# Patient Record
Sex: FEMALE | Race: BLACK OR AFRICAN AMERICAN | NOT HISPANIC OR LATINO | Employment: OTHER | URBAN - METROPOLITAN AREA
[De-identification: names, ages, dates, MRNs, and addresses within clinical notes are randomized per-mention and may not be internally consistent; named-entity substitution may affect disease eponyms.]

---

## 2017-02-10 DIAGNOSIS — G89.29 CHRONIC NECK PAIN: ICD-10-CM

## 2017-02-10 DIAGNOSIS — M54.50 CHRONIC LOW BACK PAIN: ICD-10-CM

## 2017-02-10 DIAGNOSIS — M54.2 CHRONIC NECK PAIN: ICD-10-CM

## 2017-02-10 DIAGNOSIS — G89.29 CHRONIC LOW BACK PAIN: ICD-10-CM

## 2017-02-11 RX ORDER — TRAMADOL HYDROCHLORIDE 50 MG/1
TABLET ORAL
Qty: 90 TABLET | Refills: 0 | Status: SHIPPED | OUTPATIENT
Start: 2017-02-11 | End: 2017-04-03 | Stop reason: SDUPTHER

## 2017-02-13 DIAGNOSIS — G89.29 CHRONIC BILATERAL LOW BACK PAIN WITHOUT SCIATICA: ICD-10-CM

## 2017-02-13 DIAGNOSIS — M54.50 CHRONIC BILATERAL LOW BACK PAIN WITHOUT SCIATICA: ICD-10-CM

## 2017-02-13 RX ORDER — DICLOFENAC SODIUM 10 MG/G
GEL TOPICAL 3 TIMES DAILY
Qty: 5 TUBE | Refills: 0 | Status: SHIPPED | OUTPATIENT
Start: 2017-02-13 | End: 2017-04-03 | Stop reason: SDUPTHER

## 2017-03-03 ENCOUNTER — CLINICAL SUPPORT (OUTPATIENT)
Dept: OPHTHALMOLOGY | Facility: CLINIC | Age: 77
End: 2017-03-03
Payer: MEDICARE

## 2017-03-03 ENCOUNTER — OFFICE VISIT (OUTPATIENT)
Dept: OPHTHALMOLOGY | Facility: CLINIC | Age: 77
End: 2017-03-03
Payer: MEDICARE

## 2017-03-03 DIAGNOSIS — H40.053 OCULAR HYPERTENSION, BILATERAL: Primary | ICD-10-CM

## 2017-03-03 DIAGNOSIS — Z96.1 PSEUDOPHAKIA: ICD-10-CM

## 2017-03-03 DIAGNOSIS — H53.002 AMBLYOPIA, LEFT: ICD-10-CM

## 2017-03-03 DIAGNOSIS — H52.7 REFRACTIVE ERROR: ICD-10-CM

## 2017-03-03 DIAGNOSIS — H04.123 DRY EYE SYNDROME, BILATERAL: ICD-10-CM

## 2017-03-03 DIAGNOSIS — H40.053 OCULAR HYPERTENSION, BILATERAL: ICD-10-CM

## 2017-03-03 PROCEDURE — 99212 OFFICE O/P EST SF 10 MIN: CPT | Mod: PBBFAC,PO | Performed by: OPHTHALMOLOGY

## 2017-03-03 PROCEDURE — 92083 EXTENDED VISUAL FIELD XM: CPT | Mod: 26,S$PBB,, | Performed by: OPHTHALMOLOGY

## 2017-03-03 PROCEDURE — 92133 CPTRZD OPH DX IMG PST SGM ON: CPT | Mod: 26,S$PBB,, | Performed by: OPHTHALMOLOGY

## 2017-03-03 PROCEDURE — 99999 PR PBB SHADOW E&M-EST. PATIENT-LVL II: CPT | Mod: PBBFAC,,, | Performed by: OPHTHALMOLOGY

## 2017-03-03 PROCEDURE — 92012 INTRM OPH EXAM EST PATIENT: CPT | Mod: S$PBB,,, | Performed by: OPHTHALMOLOGY

## 2017-03-03 NOTE — MR AVS SNAPSHOT
[facility]  [address]  [place] [address]  Phone: [phone]  Fax: [phone]                  Rachel Asif   3/3/2017 3:30 PM   Office Visit    Description:  Female : 1940   Provider:  Nilay Duval MD   Department:  [facility]           Reason for Visit     Ocular Hypertension           Diagnoses this Visit        Comments    Ocular hypertension, bilateral    -  Primary     Dry eye syndrome, bilateral         Amblyopia, left         Refractive error         Pseudophakia                To Do List           Future Appointments        Provider Department Dept Phone    3/6/2017 2:00 PM Oneyda Pace MD [facility] [phone]    4/3/2017 9:00 AM Lisa Villavicencio MD [facility] [phone]      Goals (5 Years of Data)              6/16/16    11/23/15    5/26/15    HEMOGLOBIN A1C < 7.0   6.0  6.0  6.1    Related Problems    Prediabetes      Follow-Up and Disposition     Return in about 6 months (around 9/3/2017) for IOP's & DFE..       These Medications        Disp Refills Start End    travoprost (TRAVATAN Z) 0.004 % Drop 2.5 mL 6 3/4/2017     Place 1 drop into both eyes every evening. - Both Eyes    Pharmacy: [facility] [address] [place] [address] [address] [facility] [address] Ph #: [phone]         [facility] On Call     [facility] On Call Nurse Care Line -  Assistance  Registered nurses in the [facility] provide clinical advisement, health education, appointment booking, and other advisory services.  Call for this free service at [phone].             Medications           Message regarding Medications     Verify the changes and/or additions to your medication regime listed below are the same as discussed with your clinician today.  If any of these changes or additions are incorrect, please notify your healthcare provider.             Verify that the  below list of medications is an accurate representation of the medications you are currently taking.  If none reported, the list may be blank. If incorrect, please contact your healthcare provider. Carry this list with you in case of emergency.           Current Medications     amlodipine (NORVASC) 10 MG tablet Take 1 tablet (10 mg total) by mouth once daily.    aspirin 81 MG chewable tablet Every day    atorvastatin (LIPITOR) 20 MG tablet Take 1 tablet (20 mg total) by mouth once daily.    diclofenac sodium (VOLTAREN) 1 % Gel Apply topically 3 (three) times daily.    duloxetine (CYMBALTA) 60 MG capsule Take 1 capsule (60 mg total) by mouth 2 (two) times daily.    gabapentin (NEURONTIN) 300 MG capsule Take 1 capsule (300 mg total) by mouth 3 (three) times daily.    latanoprost (XALATAN) 0.005 % ophthalmic solution     meloxicam (MOBIC) 15 MG tablet Take 1 tablet (15 mg total) by mouth daily as needed for Pain. 1 Tablet Oral Every day    pantoprazole (PROTONIX) 20 MG tablet Take 1 tablet (20 mg total) by mouth once daily. To protect stomach due to taking NSAIDs (meloxicam)    peg 3350-electrolytes-vit C 100-7.5-2.691 gram PwPk Use as directed    potassium chloride SA (K-DUR,KLOR-CON) 20 MEQ tablet Take 1 tablet (20 mEq total) by mouth once daily.    PROPYLENE GLYCOL/ (SYSTANE OPHT) Weekly PRN    tramadol (ULTRAM) 50 mg tablet TAKE 1-2 TABLETS BY MOUTH THREE TIMES DAILY AS NEEDED FOR PAIN    travoprost (TRAVATAN Z) 0.004 % Drop Place 1 drop into both eyes every evening.    valsartan-hydrochlorothiazide (DIOVAN-HCT) 320-25 mg per tablet Take 1 tablet by mouth once daily.           Clinical Reference Information           Allergies as of 3/3/2017     No Known Allergies      Immunizations Administered on Date of Encounter - 3/3/2017     None      [facility] Sign-Up     Activating your [facility] account is as easy as 1-2-3!     1) Visit [email or web address], select Sign Up Now, enter this activation code and your date of  birth, then select Next.  [email or web address]  Expires: 4/18/2017 10:48 AM      2) Create a username and password to use when you visit [facility] in the future and select a security question in case you lose your password and select Next.    3) Enter your e-mail address and click Sign Up!    Additional Information  If you have questions, please e-mail [email or web address] or call [phone] to talk to our [facility] staff. Remember, [facility] is NOT to be used for urgent needs. For medical emergencies, dial 911.         Language Assistance Services     ATTENTION: Language assistance services are available, free of charge. Please call [phone].      ATENCIÓN: Si habla español, tiene a cline disposición servicios gratuitos de asistencia lingüística. Llame al [phone].     CHÚ Ý: N?u b?n nói Ti?ng Vi?t, có các d?ch v? h? tr? ngôn ng? mi?n phí dành cho b?n. G?i s? [phone].         [facility] complies with applicable Federal civil rights laws and does not discriminate on the basis of race, color, national origin, age, disability, or sex.

## 2017-03-04 RX ORDER — TRAVOPROST OPHTHALMIC SOLUTION 0.04 MG/ML
1 SOLUTION OPHTHALMIC NIGHTLY
Qty: 2.5 ML | Refills: 6 | Status: SHIPPED | OUTPATIENT
Start: 2017-03-04 | End: 2017-10-12 | Stop reason: SDUPTHER

## 2017-03-04 NOTE — PROGRESS NOTES
Subjective:       Patient ID: Rachel Asif is a 76 y.o. female.    Chief Complaint: Ocular Hypertension (Ocular Hypertension bilateral)    HPI  Review of Systems    Objective:      Physical Exam    Assessment:       1. Ocular hypertension, bilateral    2. Dry eye syndrome, bilateral    3. Amblyopia, left    4. Refractive error    5. Pseudophakia - Both Eyes        Plan:       OHT-IOP's stable. HVF WNL's OD & unreliable OS with superior arcuate defect OS. OCT's WNL's OD & TI defect OS.  SABINO-Stable on AT's.  Amblyopia OS-Stable.  RE-Pt wants MRx.          Cont Travatan Z OU.  AT's.  Give MRx.  RTC 6 mos for IOP's & DFE.

## 2017-04-03 ENCOUNTER — OFFICE VISIT (OUTPATIENT)
Dept: PHYSICAL MEDICINE AND REHAB | Facility: CLINIC | Age: 77
End: 2017-04-03
Payer: MEDICARE

## 2017-04-03 VITALS
DIASTOLIC BLOOD PRESSURE: 77 MMHG | HEIGHT: 61 IN | SYSTOLIC BLOOD PRESSURE: 153 MMHG | HEART RATE: 109 BPM | WEIGHT: 191.81 LBS | BODY MASS INDEX: 36.21 KG/M2

## 2017-04-03 DIAGNOSIS — M54.2 CHRONIC NECK PAIN: ICD-10-CM

## 2017-04-03 DIAGNOSIS — M25.561 CHRONIC PAIN OF RIGHT KNEE: ICD-10-CM

## 2017-04-03 DIAGNOSIS — M47.816 LUMBAR FACET ARTHROPATHY: ICD-10-CM

## 2017-04-03 DIAGNOSIS — G89.29 CHRONIC PAIN OF RIGHT KNEE: ICD-10-CM

## 2017-04-03 DIAGNOSIS — G89.29 CHRONIC MIDLINE LOW BACK PAIN WITH RIGHT-SIDED SCIATICA: Primary | ICD-10-CM

## 2017-04-03 DIAGNOSIS — M47.812 DJD (DEGENERATIVE JOINT DISEASE), CERVICAL: ICD-10-CM

## 2017-04-03 DIAGNOSIS — G89.29 CHRONIC NECK PAIN: ICD-10-CM

## 2017-04-03 DIAGNOSIS — M54.41 CHRONIC MIDLINE LOW BACK PAIN WITH RIGHT-SIDED SCIATICA: Primary | ICD-10-CM

## 2017-04-03 PROCEDURE — 99214 OFFICE O/P EST MOD 30 MIN: CPT | Mod: S$PBB,,, | Performed by: PHYSICAL MEDICINE & REHABILITATION

## 2017-04-03 PROCEDURE — 99213 OFFICE O/P EST LOW 20 MIN: CPT | Mod: PBBFAC | Performed by: PHYSICAL MEDICINE & REHABILITATION

## 2017-04-03 PROCEDURE — 99999 PR PBB SHADOW E&M-EST. PATIENT-LVL III: CPT | Mod: PBBFAC,,, | Performed by: PHYSICAL MEDICINE & REHABILITATION

## 2017-04-03 RX ORDER — DULOXETIN HYDROCHLORIDE 60 MG/1
60 CAPSULE, DELAYED RELEASE ORAL DAILY
Qty: 180 CAPSULE | Refills: 1 | Status: SHIPPED | OUTPATIENT
Start: 2017-04-03 | End: 2017-08-03 | Stop reason: SDUPTHER

## 2017-04-03 RX ORDER — TRAMADOL HYDROCHLORIDE 50 MG/1
50 TABLET ORAL 3 TIMES DAILY PRN
Qty: 100 TABLET | Refills: 2 | Status: SHIPPED | OUTPATIENT
Start: 2017-04-03 | End: 2017-11-15 | Stop reason: SDUPTHER

## 2017-04-03 RX ORDER — GABAPENTIN 300 MG/1
300 CAPSULE ORAL 2 TIMES DAILY
Qty: 270 CAPSULE | Refills: 1 | Status: SHIPPED | OUTPATIENT
Start: 2017-04-03 | End: 2017-11-15 | Stop reason: SDUPTHER

## 2017-04-03 RX ORDER — DICLOFENAC SODIUM 10 MG/G
GEL TOPICAL 3 TIMES DAILY
Qty: 5 TUBE | Refills: 2 | Status: SHIPPED | OUTPATIENT
Start: 2017-04-03 | End: 2017-05-03 | Stop reason: SDUPTHER

## 2017-04-03 RX ORDER — MELOXICAM 15 MG/1
15 TABLET ORAL DAILY PRN
Qty: 90 TABLET | Refills: 1 | Status: SHIPPED | OUTPATIENT
Start: 2017-04-03 | End: 2017-07-12 | Stop reason: SDUPTHER

## 2017-04-03 NOTE — MR AVS SNAPSHOT
[facility]  [address]  [facility] [address]  Phone: [phone]                  Rachel Asif   4/3/2017 9:00 AM   Office Visit    Description:  Female : 1940   Provider:  Lisa Villavicencio MD   Department:  [facility]           Diagnoses this Visit        Comments    Chronic midline low back pain with right-sided sciatica    -  Primary     Lumbar facet arthropathy         Chronic neck pain         DJD (degenerative joint disease), cervical         Chronic pain of right knee                To Do List           Future Appointments        Provider Department Dept Phone    5/3/2017 9:00 AM Oneyda Pace MD [facility] [phone]      Goals (5 Years of Data)              6/16/16    11/23/15    5/26/15    HEMOGLOBIN A1C < 7.0   6.0  6.0  6.1    Related Problems    Prediabetes      Follow-Up and Disposition     Return in about 4 months (around 8/3/2017).       These Medications        Disp Refills Start End    meloxicam (MOBIC) 15 MG tablet 90 tablet 1 4/3/2017     Take 1 tablet (15 mg total) by mouth daily as needed for Pain. 1 Tablet Oral Every day - Oral    [email or web address] [id number] [place] [address] [address] AT [facility] Ph #: [phone]       duloxetine (CYMBALTA) 60 MG capsule 180 capsule 1 4/3/2017 5/3/2017    Take 1 capsule (60 mg total) by mouth once daily. In 1-2 weeks, if no relief, may increase dose to 2 capsules once daily. - Oral    Pharmacy: [facility] [id number] [facility] [place] - [address] AT [facility] Ph #: [phone]       gabapentin (NEURONTIN) 300 MG capsule 270 capsule 1 4/3/2017     Take 1 capsule (300 mg total) by mouth 2 (two) times daily. In 1-2 weeks, if no relief, may increase dose to 3 times per day. - Oral    Pharmacy: [facility] [id number] [facility] [place] - [address] AT [facility]  [place] Ph #: [phone]       tramadol (ULTRAM) 50 mg tablet 100 tablet 2 4/3/2017 5/6/2017    Take 1 tablet (50 mg total) by mouth 3 (three) times daily as needed for Pain. - Oral    Pharmacy: [facility] [id number] [facility] [address] AT [facility] Ph #: [phone]       diclofenac sodium (VOLTAREN) 1 % Gel 5 Tube 2 4/3/2017 7/2/2017    Apply topically 3 (three) times daily. - Topical    [facility] [id number] [facility] [address] AT [facility] Ph #: [phone]         [facility]     [facility] On Call Nurse Care Line - 24/7 Assistance  Unless otherwise directed by your provider, please contact [facility], our nurse care line that is available for 24/7 assistance.     Registered nurses in the [facility] provide: appointment scheduling, clinical advisement, health education, and other advisory services.  Call: [phone] (toll free)               Medications           Message regarding Medications     Verify the changes and/or additions to your medication regime listed below are the same as discussed with your clinician today.  If any of these changes or additions are incorrect, please notify your healthcare provider.        CHANGE how you are taking these medications     Start Taking Instead of    duloxetine (CYMBALTA) 60 MG capsule duloxetine (CYMBALTA) 60 MG capsule    Dosage:  Take 1 capsule (60 mg total) by mouth once daily. In 1-2 weeks, if no relief, may increase dose to 2 capsules once daily. Dosage:  Take 1 capsule (60 mg total) by mouth 2 (two) times daily.    Reason for Change:  Reorder     gabapentin (NEURONTIN) 300 MG capsule gabapentin (NEURONTIN) 300 MG capsule    Dosage:  Take 1 capsule (300 mg total) by mouth 2 (two) times daily. In 1-2 weeks, if no relief, may increase dose to 3 times per day. Dosage:  Take 1 capsule (300 mg total) by mouth 3 (three) times daily.    Reason  for Change:  Reorder     tramadol (ULTRAM) 50 mg tablet tramadol (ULTRAM) 50 mg tablet    Dosage:  Take 1 tablet (50 mg total) by mouth 3 (three) times daily as needed for Pain. Dosage:  TAKE 1-2 TABLETS BY MOUTH THREE TIMES DAILY AS NEEDED FOR PAIN    Reason for Change:  Reorder            Verify that the below list of medications is an accurate representation of the medications you are currently taking.  If none reported, the list may be blank. If incorrect, please contact your healthcare provider. Carry this list with you in case of emergency.           Current Medications     amlodipine (NORVASC) 10 MG tablet Take 1 tablet (10 mg total) by mouth once daily.    aspirin 81 MG chewable tablet Every day    atorvastatin (LIPITOR) 20 MG tablet Take 1 tablet (20 mg total) by mouth once daily.    diclofenac sodium (VOLTAREN) 1 % Gel Apply topically 3 (three) times daily.    duloxetine (CYMBALTA) 60 MG capsule Take 1 capsule (60 mg total) by mouth once daily. In 1-2 weeks, if no relief, may increase dose to 2 capsules once daily.    gabapentin (NEURONTIN) 300 MG capsule Take 1 capsule (300 mg total) by mouth 2 (two) times daily. In 1-2 weeks, if no relief, may increase dose to 3 times per day.    latanoprost (XALATAN) 0.005 % ophthalmic solution     meloxicam (MOBIC) 15 MG tablet Take 1 tablet (15 mg total) by mouth daily as needed for Pain. 1 Tablet Oral Every day    pantoprazole (PROTONIX) 20 MG tablet Take 1 tablet (20 mg total) by mouth once daily. To protect stomach due to taking NSAIDs (meloxicam)    peg 3350-electrolytes-vit C 100-7.5-2.691 gram PwPk Use as directed    potassium chloride SA (K-DUR,KLOR-CON) 20 MEQ tablet Take 1 tablet (20 mEq total) by mouth once daily.    PROPYLENE GLYCOL/ (SYSTANE OPHT) Weekly PRN    tramadol (ULTRAM) 50 mg tablet Take 1 tablet (50 mg total) by mouth 3 (three) times daily as needed for Pain.    travoprost (TRAVATAN Z) 0.004 % Drop Place 1 drop into both eyes every  "evening.    valsartan-hydrochlorothiazide (DIOVAN-HCT) 320-25 mg per tablet Take 1 tablet by mouth once daily.           Clinical Reference Information           Your Vitals Were     BP Pulse Height Weight BMI    153/77 109 5' 1" (1.549 m) 87 kg (191 lb 12.8 oz) 36.24 kg/m2      Blood Pressure          Most Recent Value    BP  (!)  153/77      Allergies as of 4/3/2017     No Known Allergies      Immunizations Administered on Date of Encounter - 4/3/2017     None      Orders Placed During Today's Visit     Future Labs/Procedures Expected by Expires    X-ray AP Standing Knees with Both Lateral  4/3/2017 4/3/2018      [facility] Sign-Up     Activating your [facility] account is as easy as 1-2-3!     1) Visit [email or web address], select Sign Up Now, enter this activation code and your date of birth, then select Next.  [email or web address]  Expires: 4/18/2017 11:48 AM      2) Create a username and password to use when you visit [facility] in the future and select a security question in case you lose your password and select Next.    3) Enter your e-mail address and click Sign Up!    Additional Information  If you have questions, please e-mail [email or web address] or call [phone] to talk to our [facility] staff. Remember, [facility] is NOT to be used for urgent needs. For medical emergencies, dial 911.         Instructions      Neck/Back Pain [General]    Both neck and back pain are usually caused by injury to the muscles or ligaments of the spine. Sometimes the disks that separate each bone of the spine may cause pain by putting pressure on a nearby nerve. Back and neck pain may appear after a sudden twisting/bending force (such as in a car accident), or sometimes after a simple awkward movement. In either case, muscle spasm is often present and adds to the pain.  Acute neck and back pain usually gets better in one to two weeks. Pain related to disk disease, arthritis in the spinal joints or spinal stenosis (narrowing of the " spinal canal) can become chronic and last for months or years.  Home Care:  · FOR NECK PAIN: Use a comfortable pillow that supports the head and keeps the spine in a neutral position. The position of the head should not be tilted forward or backward.  · FOR BACK PAIN: You may need to stay in bed the first few days. But, as soon as possible, begin sitting or walking to avoid problems with prolonged bed rest (muscle weakness, worsening back stiffness and pain, blood clots in the legs).  · When in bed, try to find a position of comfort. A firm mattress is best. Try lying flat on your back with pillows under your knees. You can also try lying on your side with your knees bent up towards your chest and a pillow between your knees.  · Avoid prolonged sitting. This puts more stress on the lower back than standing or walking.  · During the first two days after injury, apply an ICE PACK to the painful area for 20 minutes every 2-4 hours. This will reduce swelling and pain. HEAT (hot shower, hot bath or heating pad) works well for muscle spasm. You can start with ice, then switch to heat after two days. Some patients feel best alternating ice and heat treatments. Use the one method that feels the best to you.  · You may use acetaminophen (Tylenol) or ibuprofen (Motrin, Advil) to control pain, unless another pain medicine was prescribed. [NOTE: If you have chronic liver or kidney disease or ever had a stomach ulcer or GI bleeding, talk with your doctor before using these medicines.]  · Be aware of safe lifting methods and do not lift anything over 15 pounds until all the pain is gone.  Follow Up  with your physician or this facility if your symptoms do not start to improve after one week. Physical therapy or further tests may be needed.  [NOTE: If X-rays were taken, they will be reviewed by a radiologist. You will be notified of any new findings that may affect your care.]  Get Prompt Medical Attention  if any of the following  occur:  · Pain becomes worse or spreads into your arms or legs  · Weakness, numbness or pain in one or both arms or legs  · Loss of bowel or bladder control  · Numbness in the groin area  · Difficulty walking  · Fever of 100.4ºF (38ºC) or higher, or as directed by your healthcare provider  © 1795-0053 [place] [address]. All rights reserved. This information is not intended as a substitute for professional medical care. Always follow your healthcare professional's instructions.    Back Exercises: Back Press  Do this exercise on your hands and knees. Keep your knees under your hips and your hands under your shoulders. Keep your spine in a neutral position (not arched or sagging). Be sure to maintain your necks natural curve.  · Tighten your abdominal and buttocks muscles to press your back upward. Let your head drop slightly.  · Hold for 5 seconds. Return to starting position.  · Repeat 5 times.     © 2000-2013 [place] [address]. All rights reserved. This information is not intended as a substitute for professional medical care. Always follow your healthcare professional's instructions.    Back Exercises: Back Release  Do this exercise on your hands and knees. Keep your knees under your hips and your hands under your shoulders. Keep your spine in a neutral position (not arched or sagging). Be sure to maintain your necks natural curve.  · Relax your abdominal and buttocks muscles, lift your head, and let your back sag. Be sure to keep your weight evenly distributed. Dont sit back on your hips.   · Hold for 5 seconds.  · Return to starting position.  · Repeat 5 times.  © 2000-2013 [place] [address]. All rights reserved. This information is not intended as a substitute for professional medical care. Always follow your healthcare professional's instructions.    Back Exercises: Bridge  The Bridge  exercise strengthens your abdominal, buttocks, and hamstring muscles. This helps keep your back stable and aligned when you walk.  · Lie on the floor with your back and palms flat. Bend your knees. Keep your feet flat on the floor.  · Contract your abdominal and buttocks muscles. Slowly lift your buttocks off the floor until there is a straight line from your knees to your shoulders.  · Hold for 5  seconds. Repeat 10 times.    © 3574-1491 [address] [address]. All rights reserved. This information is not intended as a substitute for professional medical care. Always follow your healthcare professional's instructions.    Back Exercises: Elbow Press    To start, lie face down on your stomach, feet slightly apart, forehead on the floor. Breathe deeply. You should feel comfortable and relaxed in this position.  · Press up on your forearms. Keep your abdomen and hips on the floor.  · Hold for 20 seconds. Lower slowly.  · Repeat 2 times.  · Return to starting position.  © 5566-5958 [address] [address]. All rights reserved. This information is not intended as a substitute for professional medical care. Always follow your healthcare professional's instructions.    Back Exercises: Pelvic Tilt  To start, lie on your back with your knees bent and feet flat on the floor. Dont press your neck or lower back to the floor. Breathe deeply. You should feel comfortable and relaxed in this position.  · Tighten your abdomen and buttocks, and press your lower back toward the floor. This should be a small, subtle movement.  · Hold for 5 seconds. Release.  · Repeat 5 times.         © 0497-6961 [facility], [address] [place] [address]. All rights reserved. This information is not intended as a substitute for professional medical care. Always follow your healthcare professional's instructions.    Back Exercises: Partial Curl-Ups          To start,  lie on your back with your knees bent and feet flat on the floor. Dont press your neck or lower back to the floor. Breathe deeply. You should feel comfortable and relaxed in this position.  · Cross your arms loosely.  · Tighten your abdomen and curl MCFP up, keeping your head in line with your shoulders.  · Hold for 5 seconds. Uncurl to lie down.  · Repeat 5 times.   © 9088-4325 [place] [address]. All rights reserved. This information is not intended as a substitute for professional medical care. Always follow your healthcare professional's instructions.    Back Exercises: Lower Back Stretch                            To start, sit in a chair with your feet flat on the floor. Shift your weight slightly forward to avoid rounding your back. Relax, and keep your ears, shoulders, and hips aligned.  · Sit with your feet well apart.  · Bend forward and touch the floor with the backs of your hands. Relax and let your body drop.  · Hold for 20 seconds. Return to starting position.  · Repeat 2 times.   © 7535-7144 [place] [address]. All rights reserved. This information is not intended as a substitute for professional medical care. Always follow your healthcare professional's instructions.    Back Exercises: Seated Rotation      To start, sit in a chair with your feet flat on the floor. Shift your weight slightly forward to avoid rounding your back. Relax, and keep your ears, shoulders, and hips aligned.  · Fold your arms, elbows just below shoulder height.  · Turn from the waist with hips forward. Turn your head last.  · Hold for a count of 5. Return to starting position.  · Repeat 5 times on one side. Then switch sides.  © 1961-4585 [facility], [address] [place] [address]. All rights reserved. This information is not intended as a substitute for professional medical care. Always follow your healthcare professional's  instructions.    Back Exercises: Side Stretch  To start, sit in a chair with your feet flat on the floor. Shift your weight slightly forward to avoid rounding your back. Relax. Keep your ears, shoulders, and hips aligned.  · Stretch your right arm overhead.  · Slowly bend to the left. Dont twist your torso.  · Hold for 20 seconds. Return to starting position.  · Repeat 2 times. Then, switch to the other side.  © 3785-3458 [facility], [address] [place] [address]. All rights reserved. This information is not intended as a substitute for professional medical care. Always follow your healthcare professional's instructions    Neck/Back Pain [General]    Both neck and back pain are usually caused by injury to the muscles or ligaments of the spine. Sometimes the disks that separate each bone of the spine may cause pain by putting pressure on a nearby nerve. Back and neck pain may appear after a sudden twisting/bending force (such as in a car accident), or sometimes after a simple awkward movement. In either case, muscle spasm is often present and adds to the pain.  Acute neck and back pain usually gets better in one to two weeks. Pain related to disk disease, arthritis in the spinal joints or spinal stenosis (narrowing of the spinal canal) can become chronic and last for months or years.  Home Care:  · FOR NECK PAIN: Use a comfortable pillow that supports the head and keeps the spine in a neutral position. The position of the head should not be tilted forward or backward.  · FOR BACK PAIN: You may need to stay in bed the first few days. But, as soon as possible, begin sitting or walking to avoid problems with prolonged bed rest (muscle weakness, worsening back stiffness and pain, blood clots in the legs).  · When in bed, try to find a position of comfort. A firm mattress is best. Try lying flat on your back with pillows under your knees. You can also try lying on your side with your knees bent up  towards your chest and a pillow between your knees.  · Avoid prolonged sitting. This puts more stress on the lower back than standing or walking.  · During the first two days after injury, apply an ICE PACK to the painful area for 20 minutes every 2-4 hours. This will reduce swelling and pain. HEAT (hot shower, hot bath or heating pad) works well for muscle spasm. You can start with ice, then switch to heat after two days. Some patients feel best alternating ice and heat treatments. Use the one method that feels the best to you.  · You may use acetaminophen (Tylenol) or ibuprofen (Motrin, Advil) to control pain, unless another pain medicine was prescribed. [NOTE: If you have chronic liver or kidney disease or ever had a stomach ulcer or GI bleeding, talk with your doctor before using these medicines.]  · Be aware of safe lifting methods and do not lift anything over 15 pounds until all the pain is gone.  Follow Up  with your physician or this facility if your symptoms do not start to improve after one week. Physical therapy or further tests may be needed.  [NOTE: If X-rays were taken, they will be reviewed by a radiologist. You will be notified of any new findings that may affect your care.]  Get Prompt Medical Attention  if any of the following occur:  · Pain becomes worse or spreads into your arms or legs  · Weakness, numbness or pain in one or both arms or legs  · Loss of bowel or bladder control  · Numbness in the groin area  · Difficulty walking  · Fever of 100.4ºF (38ºC) or higher, or as directed by your healthcare provider  © 8919-0272 [facility], [address] [place] [address], [place] [address]. All rights reserved. This information is not intended as a substitute for professional medical care. Always follow your healthcare professional's instructions.    Neck Exercises: Passive Neck Rotation          To start, lie on your back, knees bent and feet flat on the floor. Keep your ears, shoulders, and hips  aligned, but dont press your lower back to the floor. Rest your hands on your pelvis. Breathe deeply and relax.  · With your neck relaxed, place the palm of one hand on your forehead. Use your hand to turn your head to one side until you feel a stretch in the neck muscles. Do not push through pain.  · Hold for 5 seconds. Then turn to the other side.  · Repeat 5 times on each side.   Note: Keep your shoulders on the floor. Dont lift your chin as you turn your head.  © 5135-9615 [facility], [address] [place] [address]. All rights reserved. This information is not intended as a substitute for professional medical care. Always follow your healthcare professional's instructions.    Exercises: Neck Isometrics  To start, sit in a chair with your feet flat on the floor. Your weight should be slightly forward so that youre balanced evenly on your buttocks. Relax your shoulders and keep your head level. Using a chair with arms may help you keep your balance.  1. Press your palm against your forehead. Resist with your neck muscles. Hold for 10 seconds. Relax. Repeat 5 times.  2. Do the exercise again, pressing on the side of your head. Repeat 5 times. Switch sides.  3. Do the exercise again, pressing on the back of your head. Repeat 5 times.          For your safety, check with your healthcare provider before starting an exercise program.    © 2052-2813 [facility], [address] [place] [address]. All rights reserved. This information is not intended as a substitute for professional medical care. Always follow your healthcare professional's instructions.    Shoulder and Upper Back Stretch  To start, stand tall with your ears, shoulders, and hips in line. Your feet should be slightly apart, positioned just under your hips. Focus your eyes directly in front of you.  this position for a few seconds before starting your exercise. This helps increase your awareness of proper  posture.  · Reach overhead and slightly back with both arms. Keep your shoulders and neck aligned and your elbows behind your shoulders.  · With your palms facing the ceiling, turn your fingers inward.  · Take a deep breath. Breathe out and lower your elbows toward your buttocks. Hold for 5 seconds, then return to starting position.  · Repeat 3 times.          © 4047-9302 [facility], [address] [place] [address], [place] [address]. All rights reserved. This information is not intended as a substitute for professional medical care. Always follow your healthcare professional's instructions.         Language Assistance Services     ATTENTION: Language assistance services are available, free of charge. Please call [phone].      ATENCIÓN: Si ernestola cristina, tiene a cline disposición servicios gratuitos de asistencia lingüística. Llame al [phone].     INGRID Ý: N?u b?n nói Ti?ng Vi?t, có các d?ch v? h? tr? ngôn ng? mi?n phí dành cho b?n. G?i s? [phone].         [facility] complies with applicable Federal civil rights laws and does not discriminate on the basis of race, color, national origin, age, disability, or sex.

## 2017-04-03 NOTE — PROGRESS NOTES
Subjective:       Patient ID: Rachel Asif is a 76 y.o. female.    Chief Complaint: No chief complaint on file.    HPI    HISTORY OF PRESENT ILLNESS:  Mrs. Asif is a 76-year-old black female who is   followed up in the [facility] for chronic low back pain with   lumbar radiculopathy, chronic neck pain, and recurrent trochanteric bursitis.    Her last visit to the clinic was on 05/24/2016.  She was maintained on   meloxicam, gabapentin, Cymbalta, p.r.n. tramadol and p.r.n. hydrocodone/APAP.    The patient was lost to follow up due to personal reasons.  She is coming today   to the clinic to reestablish care and to refill of her medications.  She   continues to complain of back pain.  It is an intermittent stabbing pain at the   lumbar spine.  She has occasional radiation to the right knee with nagging pain.    Her pain is worse with standing or walking, better with sitting down or lying   down.  Her maximum pain is 9-10/10 and minimum 3-4/10.  Today, it is 7-8/10.    The patient denies any lower extremity weakness.  She denies any bowel or   bladder incontinence.    Her neck pain has been under fair control.  It is an intermittent aching pain in   the cervical spine.  She denies any radiation to her arms.  It is worse with   activity and better with rest.  Her maximum pain is 3-4/10 and minimum 1-2/10.    Today, it is 1-2/10.  The patient denies any upper extremity weakness or hand   numbness.    She has been also complaining of chronic right knee pain.  It is an almost   constant aching pain aggravated by weightbearing and better with sitting down or   lying down.  Her maximum pain is 5-6/10 and minimum 1-2/10.  Today, it is   5-6/10.  The patient denies any warmth or swelling in her knees.    The patient was taking meloxicam, gabapentin, Cymbalta, but has been out of them   for couple of months.  She continues to take tramadol 50 mg p.r.n., usually   twice per day.  She has also been started  on diclofenac gel, which she uses a   couple of times per day as needed for her back or knee pain.  She has been   walking regularly, but not doing any exercises specific for her core muscles or   neck muscles.      MS/HN  dd: 04/03/2017 09:42:53 (CDT)  td: 04/03/2017 23:40:54 (CDT)  Doc ID   #[id number]  Job ID #[id number]    CC:           Review of Systems   Constitutional: Negative for fatigue.   Eyes: Negative for visual disturbance.   Respiratory: Positive for shortness of breath.    Cardiovascular: Negative for chest pain.   Gastrointestinal: Negative for blood in stool, constipation, nausea and vomiting.   Genitourinary: Negative for difficulty urinating.   Musculoskeletal: Positive for arthralgias, back pain and neck pain. Negative for gait problem.   Skin: Negative for rash.   Neurological: Positive for dizziness and headaches.   Psychiatric/Behavioral: Negative for behavioral problems and sleep disturbance.       Objective:      Physical Exam   Constitutional: She appears well-developed and well-nourished.   HENT:   Head: Normocephalic and atraumatic.   Neck: Normal range of motion.   Mild pain at end range.   Musculoskeletal:   BUE:  ROM:full.  Strength:    RUE: 5/5 at shoulder abduction, elbow flexion, wrist extension, elbow extension, hand .   LUE: 5/5 at shoulder abduction, elbow flexion, wrist extension, elbow extension, hand .  Sensation to pinprick:   RUE: intact.   LUE: intact.  DTR:    RUE: +1 biceps, +1 triceps.   LUE:  +1 biceps, +1 triceps.  Laboy:   RUE: -ve.   LUE: -ve.      BLE:  ROM:full.  Mild bilateral knee crepitus.  Strength:      RLE: 5/5 at hip flexion, knee extension, ankle DF/PF, EHL.     LLE:  5/5 at hip flexion, knee extension, ankle DF/PF, EHL.  Sensation to pinprick:     RLE: intact.      LLE: intact.   SLR (sitting):      RLE: +ve.      LLE: +ve.   Mild tenderness over lumbar spine, R>L hips.     Neurological: She is alert.   Skin: Skin is warm.   Psychiatric: She has a  normal mood and affect. Her behavior is normal.   Vitals reviewed.        IMAGING STUDIES (5/24/16):      X-Ray Lumbar Spine Ap Lateral w/Flex Ext    Narrative     Lumbar spine AP lateral and flexion-extension.      Alignment and vertebral body height are fairly well-maintained.  There is significant facet hypertrophy at the lower levels.    Vascular calcifications seen.    Flexion and extension views do not show any instability.  Impression      As above.      X-Ray Cervical Spine AP Lat with Flexion Extension    Narrative     Cervical spine AP lateral with flexion and extension 4 views.  Mild degenerative change seen in the lateral view.  There is some facet hypertrophy.  Calcified plaques in the distal left common carotid artery.  Flexion and extension does not show any instability.  Odontoid is not well seen.  Impression      As above.          Assessment:       1. Chronic midline low back pain with right-sided sciatica    2. Lumbar facet arthropathy    3. Chronic neck pain    4. DJD (degenerative joint disease), cervical    5. Chronic pain of right knee        Plan:     - X-Ray findings were discussed with the patient.  - X-ray AP Standing Knees with Both Lateral; Future  - Restart the patient on her medications:   -     meloxicam (MOBIC) 15 MG tablet; Take 1 tablet (15 mg total) by mouth daily as needed for Pain. 1 Tablet Oral Every day   -     duloxetine (CYMBALTA) 60 MG capsule; Take 1 capsule (60 mg total) by mouth once daily. In 1-2 weeks, if no relief, may increase dose to 2 capsules once daily.   -     gabapentin (NEURONTIN) 300 MG capsule; Take 1 capsule (300 mg total) by mouth 2 (two) times daily. In 1-2 weeks, if no relief, may increase dose to 3 times per day.   -     tramadol (ULTRAM) 50 mg tablet; Take 1 tablet (50 mg total) by mouth 3 (three) times daily as needed for Pain.   -     diclofenac sodium (VOLTAREN) 1 % Gel; Apply topically 3 (three) times daily.  - The patient was encouraged to adopt a  regular Home Exercise Program, and provided with printouts.  - Weight loss was encouraged. She gained 6.6 lbs since last visit.  - Return in about 4 months (around 8/3/2017).

## 2017-04-03 NOTE — PATIENT INSTRUCTIONS
Neck/Back Pain [General]    Both neck and back pain are usually caused by injury to the muscles or ligaments of the spine. Sometimes the disks that separate each bone of the spine may cause pain by putting pressure on a nearby nerve. Back and neck pain may appear after a sudden twisting/bending force (such as in a car accident), or sometimes after a simple awkward movement. In either case, muscle spasm is often present and adds to the pain.  Acute neck and back pain usually gets better in one to two weeks. Pain related to disk disease, arthritis in the spinal joints or spinal stenosis (narrowing of the spinal canal) can become chronic and last for months or years.  Home Care:  · FOR NECK PAIN: Use a comfortable pillow that supports the head and keeps the spine in a neutral position. The position of the head should not be tilted forward or backward.  · FOR BACK PAIN: You may need to stay in bed the first few days. But, as soon as possible, begin sitting or walking to avoid problems with prolonged bed rest (muscle weakness, worsening back stiffness and pain, blood clots in the legs).  · When in bed, try to find a position of comfort. A firm mattress is best. Try lying flat on your back with pillows under your knees. You can also try lying on your side with your knees bent up towards your chest and a pillow between your knees.  · Avoid prolonged sitting. This puts more stress on the lower back than standing or walking.  · During the first two days after injury, apply an ICE PACK to the painful area for 20 minutes every 2-4 hours. This will reduce swelling and pain. HEAT (hot shower, hot bath or heating pad) works well for muscle spasm. You can start with ice, then switch to heat after two days. Some patients feel best alternating ice and heat treatments. Use the one method that feels the best to you.  · You may use acetaminophen (Tylenol) or ibuprofen (Motrin, Advil) to control pain, unless another pain medicine was  prescribed. [NOTE: If you have chronic liver or kidney disease or ever had a stomach ulcer or GI bleeding, talk with your doctor before using these medicines.]  · Be aware of safe lifting methods and do not lift anything over 15 pounds until all the pain is gone.  Follow Up  with your physician or this facility if your symptoms do not start to improve after one week. Physical therapy or further tests may be needed.  [NOTE: If X-rays were taken, they will be reviewed by a radiologist. You will be notified of any new findings that may affect your care.]  Get Prompt Medical Attention  if any of the following occur:  · Pain becomes worse or spreads into your arms or legs  · Weakness, numbness or pain in one or both arms or legs  · Loss of bowel or bladder control  · Numbness in the groin area  · Difficulty walking  · Fever of 100.4ºF (38ºC) or higher, or as directed by your healthcare provider  © 6712-5558 [facility], [address] [place] [address]. All rights reserved. This information is not intended as a substitute for professional medical care. Always follow your healthcare professional's instructions.    Back Exercises: Back Press  Do this exercise on your hands and knees. Keep your knees under your hips and your hands under your shoulders. Keep your spine in a neutral position (not arched or sagging). Be sure to maintain your necks natural curve.  · Tighten your abdominal and buttocks muscles to press your back upward. Let your head drop slightly.  · Hold for 5 seconds. Return to starting position.  · Repeat 5 times.     © 4067-7925 [facility], [address] [place] [address]. All rights reserved. This information is not intended as a substitute for professional medical care. Always follow your healthcare professional's instructions.    Back Exercises: Back Release  Do this exercise on your hands and knees. Keep your knees under your hips and your hands under your shoulders. Keep  your spine in a neutral position (not arched or sagging). Be sure to maintain your necks natural curve.  · Relax your abdominal and buttocks muscles, lift your head, and let your back sag. Be sure to keep your weight evenly distributed. Dont sit back on your hips.   · Hold for 5 seconds.  · Return to starting position.  · Repeat 5 times.  © 6463-3148 [facility] [place] [address]. All rights reserved. This information is not intended as a substitute for professional medical care. Always follow your healthcare professional's instructions.    Back Exercises: Bridge  The Bridge exercise strengthens your abdominal, buttocks, and hamstring muscles. This helps keep your back stable and aligned when you walk.  · Lie on the floor with your back and palms flat. Bend your knees. Keep your feet flat on the floor.  · Contract your abdominal and buttocks muscles. Slowly lift your buttocks off the floor until there is a straight line from your knees to your shoulders.  · Hold for 5  seconds. Repeat 10 times.    © 6819-7591 [place] [address]. All rights reserved. This information is not intended as a substitute for professional medical care. Always follow your healthcare professional's instructions.    Back Exercises: Elbow Press    To start, lie face down on your stomach, feet slightly apart, forehead on the floor. Breathe deeply. You should feel comfortable and relaxed in this position.  · Press up on your forearms. Keep your abdomen and hips on the floor.  · Hold for 20 seconds. Lower slowly.  · Repeat 2 times.  · Return to starting position.  © 9862-9257 [facility], [address] [place] [address]. All rights reserved. This information is not intended as a substitute for professional medical care. Always follow your healthcare professional's instructions.    Back Exercises: Pelvic Tilt  To start, lie on your back with your knees bent and  feet flat on the floor. Dont press your neck or lower back to the floor. Breathe deeply. You should feel comfortable and relaxed in this position.  · Tighten your abdomen and buttocks, and press your lower back toward the floor. This should be a small, subtle movement.  · Hold for 5 seconds. Release.  · Repeat 5 times.         © 6656-8970 [facility], [address] [place] [address]. All rights reserved. This information is not intended as a substitute for professional medical care. Always follow your healthcare professional's instructions.    Back Exercises: Partial Curl-Ups          To start, lie on your back with your knees bent and feet flat on the floor. Dont press your neck or lower back to the floor. Breathe deeply. You should feel comfortable and relaxed in this position.  · Cross your arms loosely.  · Tighten your abdomen and curl nursing home up, keeping your head in line with your shoulders.  · Hold for 5 seconds. Uncurl to lie down.  · Repeat 5 times.   © 0845-4100 [facility], [address] [place] [address]. All rights reserved. This information is not intended as a substitute for professional medical care. Always follow your healthcare professional's instructions.    Back Exercises: Lower Back Stretch                            To start, sit in a chair with your feet flat on the floor. Shift your weight slightly forward to avoid rounding your back. Relax, and keep your ears, shoulders, and hips aligned.  · Sit with your feet well apart.  · Bend forward and touch the floor with the backs of your hands. Relax and let your body drop.  · Hold for 20 seconds. Return to starting position.  · Repeat 2 times.   © 0809-6504 [facility], [address] [place] [address]. All rights reserved. This information is not intended as a substitute for professional medical care. Always follow your healthcare professional's instructions.    Back Exercises: Seated Rotation      To  start, sit in a chair with your feet flat on the floor. Shift your weight slightly forward to avoid rounding your back. Relax, and keep your ears, shoulders, and hips aligned.  · Fold your arms, elbows just below shoulder height.  · Turn from the waist with hips forward. Turn your head last.  · Hold for a count of 5. Return to starting position.  · Repeat 5 times on one side. Then switch sides.  © 9798-0065 [place] [address]. All rights reserved. This information is not intended as a substitute for professional medical care. Always follow your healthcare professional's instructions.    Back Exercises: Side Stretch  To start, sit in a chair with your feet flat on the floor. Shift your weight slightly forward to avoid rounding your back. Relax. Keep your ears, shoulders, and hips aligned.  · Stretch your right arm overhead.  · Slowly bend to the left. Dont twist your torso.  · Hold for 20 seconds. Return to starting position.  · Repeat 2 times. Then, switch to the other side.  © 6184-5386 [facility], [address] [place] [address]. All rights reserved. This information is not intended as a substitute for professional medical care. Always follow your healthcare professional's instructions    Neck/Back Pain [General]    Both neck and back pain are usually caused by injury to the muscles or ligaments of the spine. Sometimes the disks that separate each bone of the spine may cause pain by putting pressure on a nearby nerve. Back and neck pain may appear after a sudden twisting/bending force (such as in a car accident), or sometimes after a simple awkward movement. In either case, muscle spasm is often present and adds to the pain.  Acute neck and back pain usually gets better in one to two weeks. Pain related to disk disease, arthritis in the spinal joints or spinal stenosis (narrowing of the spinal canal) can become chronic and last for months or years.  Home  Care:  · FOR NECK PAIN: Use a comfortable pillow that supports the head and keeps the spine in a neutral position. The position of the head should not be tilted forward or backward.  · FOR BACK PAIN: You may need to stay in bed the first few days. But, as soon as possible, begin sitting or walking to avoid problems with prolonged bed rest (muscle weakness, worsening back stiffness and pain, blood clots in the legs).  · When in bed, try to find a position of comfort. A firm mattress is best. Try lying flat on your back with pillows under your knees. You can also try lying on your side with your knees bent up towards your chest and a pillow between your knees.  · Avoid prolonged sitting. This puts more stress on the lower back than standing or walking.  · During the first two days after injury, apply an ICE PACK to the painful area for 20 minutes every 2-4 hours. This will reduce swelling and pain. HEAT (hot shower, hot bath or heating pad) works well for muscle spasm. You can start with ice, then switch to heat after two days. Some patients feel best alternating ice and heat treatments. Use the one method that feels the best to you.  · You may use acetaminophen (Tylenol) or ibuprofen (Motrin, Advil) to control pain, unless another pain medicine was prescribed. [NOTE: If you have chronic liver or kidney disease or ever had a stomach ulcer or GI bleeding, talk with your doctor before using these medicines.]  · Be aware of safe lifting methods and do not lift anything over 15 pounds until all the pain is gone.  Follow Up  with your physician or this facility if your symptoms do not start to improve after one week. Physical therapy or further tests may be needed.  [NOTE: If X-rays were taken, they will be reviewed by a radiologist. You will be notified of any new findings that may affect your care.]  Get Prompt Medical Attention  if any of the following occur:  · Pain becomes worse or spreads into your arms or  legs  · Weakness, numbness or pain in one or both arms or legs  · Loss of bowel or bladder control  · Numbness in the groin area  · Difficulty walking  · Fever of 100.4ºF (38ºC) or higher, or as directed by your healthcare provider  © 6993-5617 [facility], [address] [place] [address]. All rights reserved. This information is not intended as a substitute for professional medical care. Always follow your healthcare professional's instructions.    Neck Exercises: Passive Neck Rotation          To start, lie on your back, knees bent and feet flat on the floor. Keep your ears, shoulders, and hips aligned, but dont press your lower back to the floor. Rest your hands on your pelvis. Breathe deeply and relax.  · With your neck relaxed, place the palm of one hand on your forehead. Use your hand to turn your head to one side until you feel a stretch in the neck muscles. Do not push through pain.  · Hold for 5 seconds. Then turn to the other side.  · Repeat 5 times on each side.   Note: Keep your shoulders on the floor. Dont lift your chin as you turn your head.  © 2811-8253 [facility], [address] [place] [address]. All rights reserved. This information is not intended as a substitute for professional medical care. Always follow your healthcare professional's instructions.    Exercises: Neck Isometrics  To start, sit in a chair with your feet flat on the floor. Your weight should be slightly forward so that youre balanced evenly on your buttocks. Relax your shoulders and keep your head level. Using a chair with arms may help you keep your balance.  1. Press your palm against your forehead. Resist with your neck muscles. Hold for 10 seconds. Relax. Repeat 5 times.  2. Do the exercise again, pressing on the side of your head. Repeat 5 times. Switch sides.  3. Do the exercise again, pressing on the back of your head. Repeat 5 times.          For your safety, check with your healthcare  provider before starting an exercise program.    © 8151-0830 [facility], [address] [place] [address]. All rights reserved. This information is not intended as a substitute for professional medical care. Always follow your healthcare professional's instructions.    Shoulder and Upper Back Stretch  To start, stand tall with your ears, shoulders, and hips in line. Your feet should be slightly apart, positioned just under your hips. Focus your eyes directly in front of you.  this position for a few seconds before starting your exercise. This helps increase your awareness of proper posture.  · Reach overhead and slightly back with both arms. Keep your shoulders and neck aligned and your elbows behind your shoulders.  · With your palms facing the ceiling, turn your fingers inward.  · Take a deep breath. Breathe out and lower your elbows toward your buttocks. Hold for 5 seconds, then return to starting position.  · Repeat 3 times.          © 7341-2917 [facility], [address] [place] [address]. All rights reserved. This information is not intended as a substitute for professional medical care. Always follow your healthcare professional's instructions.

## 2017-05-03 ENCOUNTER — OFFICE VISIT (OUTPATIENT)
Dept: FAMILY MEDICINE | Facility: CLINIC | Age: 77
End: 2017-05-03
Payer: MEDICARE

## 2017-05-03 VITALS
HEART RATE: 88 BPM | SYSTOLIC BLOOD PRESSURE: 132 MMHG | OXYGEN SATURATION: 97 % | HEIGHT: 62 IN | WEIGHT: 186.75 LBS | DIASTOLIC BLOOD PRESSURE: 74 MMHG | BODY MASS INDEX: 34.37 KG/M2 | TEMPERATURE: 98 F

## 2017-05-03 DIAGNOSIS — M54.41 CHRONIC MIDLINE LOW BACK PAIN WITH RIGHT-SIDED SCIATICA: ICD-10-CM

## 2017-05-03 DIAGNOSIS — N18.30 BENIGN HYPERTENSION WITH CHRONIC KIDNEY DISEASE, STAGE III: Primary | ICD-10-CM

## 2017-05-03 DIAGNOSIS — N25.81 SECONDARY HYPERPARATHYROIDISM OF RENAL ORIGIN: ICD-10-CM

## 2017-05-03 DIAGNOSIS — E78.5 HYPERLIPIDEMIA WITH TARGET LDL LESS THAN 100: ICD-10-CM

## 2017-05-03 DIAGNOSIS — M94.9 DISORDER OF BONE AND CARTILAGE: ICD-10-CM

## 2017-05-03 DIAGNOSIS — E66.9 OBESITY (BMI 30.0-34.9): ICD-10-CM

## 2017-05-03 DIAGNOSIS — G89.29 CHRONIC NECK PAIN: ICD-10-CM

## 2017-05-03 DIAGNOSIS — M81.0 OSTEOPOROSIS, POST-MENOPAUSAL: ICD-10-CM

## 2017-05-03 DIAGNOSIS — R73.03 PREDIABETES: ICD-10-CM

## 2017-05-03 DIAGNOSIS — K21.9 GASTROESOPHAGEAL REFLUX DISEASE WITHOUT ESOPHAGITIS: ICD-10-CM

## 2017-05-03 DIAGNOSIS — G89.29 CHRONIC MIDLINE LOW BACK PAIN WITH RIGHT-SIDED SCIATICA: ICD-10-CM

## 2017-05-03 DIAGNOSIS — I70.0 ATHEROSCLEROSIS OF AORTA: ICD-10-CM

## 2017-05-03 DIAGNOSIS — G89.29 CHRONIC PAIN OF RIGHT KNEE: ICD-10-CM

## 2017-05-03 DIAGNOSIS — M54.2 CHRONIC NECK PAIN: ICD-10-CM

## 2017-05-03 DIAGNOSIS — I12.9 BENIGN HYPERTENSION WITH CHRONIC KIDNEY DISEASE, STAGE III: Primary | ICD-10-CM

## 2017-05-03 DIAGNOSIS — E55.9 VITAMIN D DEFICIENCY: ICD-10-CM

## 2017-05-03 DIAGNOSIS — M89.9 DISORDER OF BONE AND CARTILAGE: ICD-10-CM

## 2017-05-03 DIAGNOSIS — M25.561 CHRONIC PAIN OF RIGHT KNEE: ICD-10-CM

## 2017-05-03 PROCEDURE — 99999 PR PBB SHADOW E&M-EST. PATIENT-LVL III: CPT | Mod: PBBFAC,,, | Performed by: INTERNAL MEDICINE

## 2017-05-03 PROCEDURE — 99213 OFFICE O/P EST LOW 20 MIN: CPT | Mod: PBBFAC,PO | Performed by: INTERNAL MEDICINE

## 2017-05-03 PROCEDURE — 99214 OFFICE O/P EST MOD 30 MIN: CPT | Mod: S$PBB,,, | Performed by: INTERNAL MEDICINE

## 2017-05-03 RX ORDER — VALSARTAN AND HYDROCHLOROTHIAZIDE 320; 25 MG/1; MG/1
1 TABLET, FILM COATED ORAL DAILY
Qty: 90 TABLET | Refills: 1 | Status: SHIPPED | OUTPATIENT
Start: 2017-05-03 | End: 2017-07-13 | Stop reason: SDUPTHER

## 2017-05-03 RX ORDER — DICLOFENAC SODIUM 10 MG/G
GEL TOPICAL 3 TIMES DAILY
Qty: 5 TUBE | Refills: 2 | Status: SHIPPED | OUTPATIENT
Start: 2017-05-03 | End: 2018-05-14 | Stop reason: SDUPTHER

## 2017-05-03 NOTE — MR AVS SNAPSHOT
[facility]  [address]  [place] [address]  Phone: [phone]  Fax: [phone]                  Rachel Asif   5/3/2017 9:00 AM   Office Visit    Description:  Female : 1940   Provider:  Oneyda Pace MD   Department:  [facility]           Reason for Visit     Hypertension     Follow-up           Diagnoses this Visit        Comments    Hyperlipidemia with target LDL less than 100    -  Primary     Chronic midline low back pain with right-sided sciatica         Chronic neck pain         Chronic pain of right knee         Benign hypertension with chronic kidney disease, stage III         Osteoporosis, post-menopausal         Obesity (BMI 30.0-34.9)         Prediabetes         Atherosclerosis of aorta         Vitamin D deficiency         Gastroesophageal reflux disease without esophagitis         Secondary hyperparathyroidism of renal origin         Disorder of bone and cartilage                To Do List           Future Appointments        Provider Department Dept Phone    8/3/2017 10:00 AM Lisa Villavicencio MD [facility] [phone]      Goals (5 Years of Data)              6/16/16    11/23/15    5/26/15    HEMOGLOBIN A1C < 7.0   6.0  6.0  6.1    Related Problems    Prediabetes      Follow-Up and Disposition     Return in about 6 months (around 11/3/2017) for annual exam.       These Medications        Disp Refills Start End    diclofenac sodium (VOLTAREN) 1 % Gel 5 Tube 2 5/3/2017 2017    Apply topically 3 (three) times daily. - Topical    [email or web address] [id number] [place] [address] [address] [facility] Ph #: [phone]       valsartan-hydrochlorothiazide (DIOVAN-HCT) 320-25 mg per tablet 90 tablet 1 5/3/2017     Take 1 tablet by mouth once daily. - Oral    [email or web address] [id number] [facility] [address] AT [facility] Ph #:  [phone]         [facility]     [facility] On [facility] Nurse Care Line - 24/7 Assistance  Unless otherwise directed by your provider, please contact [facility], our nurse care line that is available for 24/7 assistance.     Registered nurses in the [facility] provide: appointment scheduling, clinical advisement, health education, and other advisory services.  Call: [phone] (toll free)               Medications           Message regarding Medications     Verify the changes and/or additions to your medication regime listed below are the same as discussed with your clinician today.  If any of these changes or additions are incorrect, please notify your healthcare provider.             Verify that the below list of medications is an accurate representation of the medications you are currently taking.  If none reported, the list may be blank. If incorrect, please contact your healthcare provider. Carry this list with you in case of emergency.           Current Medications     amlodipine (NORVASC) 10 MG tablet Take 1 tablet (10 mg total) by mouth once daily.    aspirin 81 MG chewable tablet Every day    atorvastatin (LIPITOR) 20 MG tablet Take 1 tablet (20 mg total) by mouth once daily.    diclofenac sodium (VOLTAREN) 1 % Gel Apply topically 3 (three) times daily.    duloxetine (CYMBALTA) 60 MG capsule Take 1 capsule (60 mg total) by mouth once daily. In 1-2 weeks, if no relief, may increase dose to 2 capsules once daily.    gabapentin (NEURONTIN) 300 MG capsule Take 1 capsule (300 mg total) by mouth 2 (two) times daily. In 1-2 weeks, if no relief, may increase dose to 3 times per day.    latanoprost (XALATAN) 0.005 % ophthalmic solution     meloxicam (MOBIC) 15 MG tablet Take 1 tablet (15 mg total) by mouth daily as needed for Pain. 1 Tablet Oral Every day    pantoprazole (PROTONIX) 20 MG tablet Take 1 tablet (20 mg total) by mouth once daily. To protect stomach due to taking NSAIDs (meloxicam)  "   peg 3350-electrolytes-vit C 100-7.5-2.691 gram PwPk Use as directed    potassium chloride SA (K-DUR,KLOR-CON) 20 MEQ tablet Take 1 tablet (20 mEq total) by mouth once daily.    PROPYLENE GLYCOL/ (SYSTANE OPHT) Weekly PRN    tramadol (ULTRAM) 50 mg tablet Take 1 tablet (50 mg total) by mouth 3 (three) times daily as needed for Pain.    travoprost (TRAVATAN Z) 0.004 % Drop Place 1 drop into both eyes every evening.    valsartan-hydrochlorothiazide (DIOVAN-HCT) 320-25 mg per tablet Take 1 tablet by mouth once daily.           Clinical Reference Information           Your Vitals Were     BP Pulse Temp Height Weight SpO2    132/74 88 98.4 °F (36.9 °C) (Oral) 5' 2" (1.575 m) 84.7 kg (186 lb 11.7 oz) 97%    BMI                34.15 kg/m2          Blood Pressure          Most Recent Value    BP  132/74      Allergies as of 5/3/2017     No Known Allergies      Immunizations Administered on Date of Encounter - 5/3/2017     None      Orders Placed During Today's Visit     Future Labs/Procedures Expected by Expires    CBC auto differential  5/3/2017 5/3/2018    Comprehensive metabolic panel  5/3/2017 5/3/2018    DXA Bone Density Spine And Hip  5/3/2017 5/3/2018    Hemoglobin A1c  5/3/2017 5/3/2018    Lipid panel  5/3/2017 5/3/2018    Vitamin D  5/3/2017 5/3/2018    X-Ray Chest PA And Lateral  5/3/2017 5/3/2018      Language Assistance Services     ATTENTION: Language assistance services are available, free of charge. Please call [phone].      ATENCIÓN: Si habla español, tiene a cline disposición servicios gratuitos de asistencia lingüística. Llame al [phone].     CHÚ Ý: N?u b?n nói Ti?ng Vi?t, có các d?ch v? h? tr? ngôn ng? mi?n phí dành cho b?n. G?i s? [phone].         [facility] complies with applicable Federal civil rights laws and does not discriminate on the basis of race, color, national origin, age, disability, or sex.        "

## 2017-05-03 NOTE — PROGRESS NOTES
HISTORY OF PRESENT ILLNESS:  Rachel Asif is a 76 y.o. female who presents to the clinic today for Hypertension and Follow-up  .  The patient presents to clinic today for follow-up of her hypertension complicated by chronic kidney disease stage III, hyperlipidemia, and prediabetes.  She states she does some minimal exercising on a regular basis.  She tries to eating healthy diet.  She has lost a few pounds since her last office visit with me. The patient denies any problems with cardiac chest pain, dizziness, palpitations, orthopnea, or lower extremity edema.  The patient reports compliance with current medication- patient denies missing any  medication doses.  She denies any significant problems with heartburn or reflux.  Her primary complaint today is intermittent arthralgias in her back, neck, and knees.  She uses topical Voltaren gel as needed.  She sleeps okay at night.  She denies anxiety or depression.      PAST MEDICAL HISTORY:  Past Medical History:   Diagnosis Date    Amblyopia     os    Anemia of other chronic disease     Atherosclerosis of aorta     noted on L-spine X-ray 4/14/2011    Chronic low back pain     followed by orthopaedics    DJD (degenerative joint disease)     Glaucoma     History of colonic polyps     History of vitamin D deficiency     Hyperlipidemia     Hypertension     Lumbar radiculopathy     Obesity     Obesity     OH (ocular hypertension)     Osteoarthritis     Osteoporosis, post-menopausal     currently on a drug holiday    Postmenopausal status     Prediabetes     Secondary hyperparathyroidism of renal origin     Trochanteric bursitis        PAST SURGICAL HISTORY:  Past Surgical History:   Procedure Laterality Date    CATARACT EXTRACTION W/  INTRAOCULAR LENS IMPLANT Bilateral        SOCIAL HISTORY:  Social History     Social History    Marital status:      Spouse name: N/A    Number of children: N/A    Years of education: N/A     Occupational  History    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 She is taking care of a sister who has brain damage from a car accident.       FAMILY HISTORY:  Family History   Problem Relation Age of Onset    Cataracts Mother     Hypertension Mother     Other Mother      complications from splenectomy after fall    Cataracts Father     Hypertension Father     Hypertension Sister     Edema Sister     Hypertension Brother     Diabetes Brother     Heart disease Brother     Glaucoma Maternal Grandmother     Hypertension Sister     Other Sister      shingles    Lupus Sister     Lung cancer Sister     Hypertension Sister     Other Sister      brain damage from MVA    Hypertension Sister     Hypertension Brother     Heart disease Brother     Hypertension Brother     Heart attack Brother     Hypertension Brother     Hypertension Brother     No Known Problems Brother     No Known Problems Brother     Hypertension Brother     Heart disease Brother     Cancer Neg Hx     Amblyopia Neg Hx     Blindness Neg Hx     Retinal detachment Neg Hx     Strabismus Neg Hx     Stroke Neg Hx        ALLERGIES AND MEDICATIONS: updated and reviewed.  Review of patient's allergies indicates:   Allergen Reactions    No known allergies      Medication List with Changes/Refills   Current Medications    AMLODIPINE (NORVASC) 10 MG TABLET    Take 1 tablet (10 mg total) by mouth once daily.    ASPIRIN 81 MG CHEWABLE TABLET    Every day    ATORVASTATIN (LIPITOR) 20 MG TABLET    Take 1 tablet (20 mg total) by mouth once daily.    DULOXETINE (CYMBALTA) 60 MG CAPSULE    Take 1 capsule (60 mg total) by mouth once daily. In 1-2 weeks, if no relief, may  increase dose to 2 capsules once daily.    GABAPENTIN (NEURONTIN) 300 MG CAPSULE    Take 1 capsule (300 mg total) by mouth 2 (two) times daily. In 1-2 weeks, if no relief, may increase dose to 3 times per day.    LATANOPROST (XALATAN) 0.005 % OPHTHALMIC SOLUTION        MELOXICAM (MOBIC) 15 MG TABLET    Take 1 tablet (15 mg total) by mouth daily as needed for Pain. 1 Tablet Oral Every day    PANTOPRAZOLE (PROTONIX) 20 MG TABLET    Take 1 tablet (20 mg total) by mouth once daily. To protect stomach due to taking NSAIDs (meloxicam)    PEG 3350-ELECTROLYTES-VIT C 100-7.5-2.691 GRAM PWPK    Use as directed    POTASSIUM CHLORIDE SA (K-DUR,KLOR-CON) 20 MEQ TABLET    Take 1 tablet (20 mEq total) by mouth once daily.    PROPYLENE GLYCOL/ (SYSTANE OPHT)    Weekly PRN    TRAMADOL (ULTRAM) 50 MG TABLET    Take 1 tablet (50 mg total) by mouth 3 (three) times daily as needed for Pain.    TRAVOPROST (TRAVATAN Z) 0.004 % DROP    Place 1 drop into both eyes every evening.   Changed and/or Refilled Medications    Modified Medication Previous Medication    DICLOFENAC SODIUM (VOLTAREN) 1 % GEL diclofenac sodium (VOLTAREN) 1 % Gel       Apply topically 3 (three) times daily.    Apply topically 3 (three) times daily.    VALSARTAN-HYDROCHLOROTHIAZIDE (DIOVAN-HCT) 320-25 MG PER TABLET valsartan-hydrochlorothiazide (DIOVAN-HCT) 320-25 mg per tablet       Take 1 tablet by mouth once daily.    Take 1 tablet by mouth once daily.            REVIEW OF SYSTEMS:  General ROS: negative for - chills, fever or malaise  Psychological ROS: negative for - anxiety, depression, sleep disturbances or suicidal ideation  Ophthalmic ROS: negative for - blurry vision or eye pain  ENT ROS: negative for - epistaxis, oral lesions or sore throat  Allergy and Immunology ROS: negative for - hives  Hematological and Lymphatic ROS: negative for - swollen lymph nodes  Endocrine ROS: negative for - polydipsia/polyuria or temperature intolerance  Respiratory ROS:  "no cough, shortness of breath, or wheezing  Cardiovascular ROS: no chest pain or dyspnea on exertion  Gastrointestinal ROS: no abdominal pain, change in bowel habits, or black or bloody stools  Dermatological ROS: negative for mole changes and rash  Musculoskeletal ROS: negative for - gait disturbance or joint swelling  Neurological ROS: no TIA or stroke symptoms  Genito-Urinary ROS: no dysuria, trouble voiding, or hematuria      PHYSICAL EXAM:   Vitals:    05/03/17 0854   BP: 132/74   Pulse: 88   Temp: 98.4 °F (36.9 °C)     Weight: 84.7 kg (186 lb 11.7 oz)   Height: 5' 2" (157.5 cm)   Body mass index is 34.15 kg/(m^2).     General appearance - alert, well appearing, and in no distress and obese  Mental status - alert, oriented to person, place, and time, normal mood, behavior, speech, dress, motor activity, and thought processes  Eyes - pupils equal and reactive, extraocular eye movements intact, sclera anicteric  Mouth - mucous membranes moist, pharynx normal without lesions  Neck - supple, no significant adenopathy, carotids upstroke normal bilaterally, no bruits, thyroid exam: thyroid is normal in size without nodules or tenderness  Lymphatics - no palpable lymphadenopathy  Chest - clear to auscultation, no wheezes, rales or rhonchi, symmetric air entry  Heart - normal rate and regular rhythm, no gallops noted  Back exam - limited range of motion; in depth exam deferred  Neurological - alert, oriented, normal speech, no focal findings or movement disorder noted, cranial nerves II through XII intact  Musculoskeletal - no muscular tenderness noted, Moderate osteoarthritic changes noted to both knee joints. No joint effusions noted.   Extremities - no pedal edema noted  Skin - normal coloration and turgor, no rashes, no suspicious skin lesions noted      ASSESSMENT AND PLAN:  1. Benign hypertension with chronic kidney disease, stage III  Discussed sodium restriction, maintaining ideal body weight and regular " exercise program as physiologic means to achieve blood pressure control. The patient will strive towards this. The current medical regimen is effective;  continue present plan and medications. Recommended patient to check home readings to monitor and see me for followup as scheduled or sooner as needed. Patient was educated that both decongestant and anti-inflammatory medication may raise blood pressure. Stable kidney function. Observe. Patient counseled to avoid/minimize the use of anti-inflammatory  medication.   - valsartan-hydrochlorothiazide (DIOVAN-HCT) 320-25 mg per tablet; Take 1 tablet by mouth once daily.  Dispense: 90 tablet; Refill: 1  - X-Ray Chest PA And Lateral; Future  - CBC auto differential; Future    2. Hyperlipidemia with target LDL less than 100  We discussed low fat diet and regular exercise.The current medical regimen is effective;  continue present plan and medications.    - Comprehensive metabolic panel; Future  - Lipid panel; Future    3. Osteoporosis, post-menopausal/4. Vitamin D deficiency  We discussed adequate calcium and vitamin D supplementation. She is scheduled for her BMD. She is currently on a drug Holiday.  - Vitamin D; Future    5. Prediabetes  Stable. We discussed low sugar/low carbohydrate diet and regular exercise to prevent progression. No need for prescription medication at this time.   - Hemoglobin A1c; Future    6. Chronic midline low back pain with right-sided sciatica/7. Chronic neck pain/8. Chronic pain of right knee  Stable. Medication as needed.  I encouraged continued regular physical activity.  No need for consultation with orthopedics at this time.  - diclofenac sodium (VOLTAREN) 1 % Gel; Apply topically 3 (three) times daily.  Dispense: 5 Tube; Refill: 2    9. Atherosclerosis of aorta  Patient with Atherosclerosis of the Aorta.  Stable/asymptomatic. Currently stable on lipid lowering medication and b/p monitoring.     10. Gastroesophageal reflux disease without  esophagitis  Symptoms controlled. Reflux precautions discussed (eliminate tobacco if a smoker; minimize caffeine, chocolate and red/white peppermint intake; avoid heavy and spicy meals; don't lay down within 2-3 hours after eating). Medication as needed. Patient asked to take medication breaks, if possible - discussed chronic use can limit calcium absorption, increases the risk for intestinal infections, and can cause kidney damage.      11. Secondary hyperparathyroidism of renal origin  Stable. Asymptomatic. Observe.     12. Obesity (BMI 30.0-34.9)  The patient is asked to make an attempt to improve diet and exercise patterns to aid in medical management of this problem.     13. Disorder of bone and cartilage    - DXA Bone Density Spine And Hip; Future          Return in about 6 months (around 11/3/2017) for annual exam. or sooner as needed.

## 2017-05-04 ENCOUNTER — HOSPITAL ENCOUNTER (OUTPATIENT)
Dept: RADIOLOGY | Facility: CLINIC | Age: 77
Discharge: HOME | End: 2017-05-04
Attending: INTERNAL MEDICINE
Payer: MEDICARE

## 2017-05-04 ENCOUNTER — HOSPITAL ENCOUNTER (OUTPATIENT)
Dept: RADIOLOGY | Facility: HOSPITAL | Age: 77
Discharge: HOME | End: 2017-05-04
Attending: INTERNAL MEDICINE
Payer: MEDICARE

## 2017-05-04 DIAGNOSIS — M94.9 DISORDER OF BONE AND CARTILAGE: ICD-10-CM

## 2017-05-04 DIAGNOSIS — M89.9 DISORDER OF BONE AND CARTILAGE: ICD-10-CM

## 2017-05-04 DIAGNOSIS — N18.30 BENIGN HYPERTENSION WITH CHRONIC KIDNEY DISEASE, STAGE III: ICD-10-CM

## 2017-05-04 DIAGNOSIS — I12.9 BENIGN HYPERTENSION WITH CHRONIC KIDNEY DISEASE, STAGE III: ICD-10-CM

## 2017-05-04 PROCEDURE — 77080 DXA BONE DENSITY AXIAL: CPT | Mod: TC,PO

## 2017-05-04 PROCEDURE — 71020 XR CHEST PA AND LATERAL: CPT | Mod: 26,,, | Performed by: RADIOLOGY

## 2017-05-04 PROCEDURE — 77080 DXA BONE DENSITY AXIAL: CPT | Mod: 26,,, | Performed by: INTERNAL MEDICINE

## 2017-05-12 ENCOUNTER — TELEPHONE (OUTPATIENT)
Dept: FAMILY MEDICINE | Facility: CLINIC | Age: 77
End: 2017-05-12

## 2017-05-12 NOTE — TELEPHONE ENCOUNTER
----- Message from Dileep Khanna sent at 5/12/2017  3:27 PM CDT -----  Contact: self  Pt requesting a call back to discuss lab results.  Please call pt at .    Thanks

## 2017-07-12 DIAGNOSIS — M54.41 CHRONIC MIDLINE LOW BACK PAIN WITH RIGHT-SIDED SCIATICA: ICD-10-CM

## 2017-07-12 DIAGNOSIS — G89.29 CHRONIC MIDLINE LOW BACK PAIN WITH RIGHT-SIDED SCIATICA: ICD-10-CM

## 2017-07-12 RX ORDER — MELOXICAM 15 MG/1
TABLET ORAL
Qty: 90 TABLET | Refills: 0 | Status: SHIPPED | OUTPATIENT
Start: 2017-07-12 | End: 2017-07-13 | Stop reason: SDUPTHER

## 2017-07-13 DIAGNOSIS — E78.5 HYPERLIPIDEMIA WITH TARGET LDL LESS THAN 100: Primary | ICD-10-CM

## 2017-07-13 DIAGNOSIS — N18.30 BENIGN HYPERTENSION WITH CHRONIC KIDNEY DISEASE, STAGE III: ICD-10-CM

## 2017-07-13 DIAGNOSIS — G89.29 CHRONIC MIDLINE LOW BACK PAIN WITH RIGHT-SIDED SCIATICA: ICD-10-CM

## 2017-07-13 DIAGNOSIS — M54.41 CHRONIC MIDLINE LOW BACK PAIN WITH RIGHT-SIDED SCIATICA: ICD-10-CM

## 2017-07-13 DIAGNOSIS — I12.9 BENIGN HYPERTENSION WITH CHRONIC KIDNEY DISEASE, STAGE III: ICD-10-CM

## 2017-07-13 RX ORDER — VALSARTAN AND HYDROCHLOROTHIAZIDE 320; 25 MG/1; MG/1
1 TABLET, FILM COATED ORAL DAILY
Qty: 90 TABLET | Refills: 1 | Status: SHIPPED | OUTPATIENT
Start: 2017-07-13 | End: 2018-02-15 | Stop reason: SDUPTHER

## 2017-07-13 RX ORDER — ATORVASTATIN CALCIUM 20 MG/1
20 TABLET, FILM COATED ORAL DAILY
Qty: 90 TABLET | Refills: 1 | Status: SHIPPED | OUTPATIENT
Start: 2017-07-13 | End: 2018-02-15 | Stop reason: SDUPTHER

## 2017-07-13 RX ORDER — POTASSIUM CHLORIDE 20 MEQ/1
20 TABLET, EXTENDED RELEASE ORAL DAILY
Qty: 90 TABLET | Refills: 1 | Status: SHIPPED | OUTPATIENT
Start: 2017-07-13 | End: 2018-02-15 | Stop reason: SDUPTHER

## 2017-07-13 RX ORDER — MELOXICAM 15 MG/1
15 TABLET ORAL DAILY
Qty: 90 TABLET | Refills: 0 | Status: SHIPPED | OUTPATIENT
Start: 2017-07-13 | End: 2018-02-15 | Stop reason: SDUPTHER

## 2017-07-13 RX ORDER — AMLODIPINE BESYLATE 10 MG/1
10 TABLET ORAL DAILY
Qty: 90 TABLET | Refills: 1 | Status: SHIPPED | OUTPATIENT
Start: 2017-07-13 | End: 2018-02-15 | Stop reason: SDUPTHER

## 2017-07-13 NOTE — TELEPHONE ENCOUNTER
----- Message from Caty Murdock sent at 7/13/2017  9:29 AM CDT -----  Contact: Self  Refill : valsartan-hydrochlorothiazide (DIOVAN-HCT) 320-25 mg per tablet             potassium chloride SA (K-DUR,KLOR-CON) 20 MEQ tablet             meloxicam (MOBIC) 15 MG tablet             amlodipine (NORVASC) 10 MG tablet             atorvastatin (LIPITOR) 20 MG tablet    Pharmacy     [facility] [address] [place] [address] [address]

## 2017-08-03 ENCOUNTER — OFFICE VISIT (OUTPATIENT)
Dept: PHYSICAL MEDICINE AND REHAB | Facility: CLINIC | Age: 77
End: 2017-08-03
Payer: MEDICARE

## 2017-08-03 ENCOUNTER — HOSPITAL ENCOUNTER (OUTPATIENT)
Dept: RADIOLOGY | Facility: HOSPITAL | Age: 77
Discharge: HOME | End: 2017-08-03
Attending: PHYSICAL MEDICINE & REHABILITATION
Payer: MEDICARE

## 2017-08-03 VITALS
HEIGHT: 62 IN | WEIGHT: 194 LBS | BODY MASS INDEX: 35.7 KG/M2 | DIASTOLIC BLOOD PRESSURE: 82 MMHG | HEART RATE: 91 BPM | SYSTOLIC BLOOD PRESSURE: 177 MMHG

## 2017-08-03 DIAGNOSIS — M47.812 DJD (DEGENERATIVE JOINT DISEASE), CERVICAL: ICD-10-CM

## 2017-08-03 DIAGNOSIS — M25.561 CHRONIC PAIN OF RIGHT KNEE: ICD-10-CM

## 2017-08-03 DIAGNOSIS — G89.29 CHRONIC PAIN OF RIGHT KNEE: ICD-10-CM

## 2017-08-03 DIAGNOSIS — G89.29 CHRONIC MIDLINE LOW BACK PAIN WITH RIGHT-SIDED SCIATICA: Primary | ICD-10-CM

## 2017-08-03 DIAGNOSIS — M54.2 CHRONIC NECK PAIN: ICD-10-CM

## 2017-08-03 DIAGNOSIS — M54.41 CHRONIC MIDLINE LOW BACK PAIN WITH RIGHT-SIDED SCIATICA: Primary | ICD-10-CM

## 2017-08-03 DIAGNOSIS — M47.816 LUMBAR FACET ARTHROPATHY: ICD-10-CM

## 2017-08-03 DIAGNOSIS — G89.29 CHRONIC NECK PAIN: ICD-10-CM

## 2017-08-03 PROCEDURE — 73560 X-RAY EXAM OF KNEE 1 OR 2: CPT | Mod: TC,50

## 2017-08-03 PROCEDURE — 73560 X-RAY EXAM OF KNEE 1 OR 2: CPT | Mod: 26,50,, | Performed by: RADIOLOGY

## 2017-08-03 PROCEDURE — 99214 OFFICE O/P EST MOD 30 MIN: CPT | Mod: S$PBB,,, | Performed by: PHYSICAL MEDICINE & REHABILITATION

## 2017-08-03 PROCEDURE — 99999 PR PBB SHADOW E&M-EST. PATIENT-LVL III: CPT | Mod: PBBFAC,,, | Performed by: PHYSICAL MEDICINE & REHABILITATION

## 2017-08-03 RX ORDER — DULOXETIN HYDROCHLORIDE 60 MG/1
60 CAPSULE, DELAYED RELEASE ORAL 2 TIMES DAILY
Start: 2017-08-03 | End: 2018-02-14 | Stop reason: SDUPTHER

## 2017-10-12 ENCOUNTER — OFFICE VISIT (OUTPATIENT)
Dept: OPHTHALMOLOGY | Facility: CLINIC | Age: 77
End: 2017-10-12
Payer: MEDICARE

## 2017-10-12 DIAGNOSIS — H43.813 VITREOUS DETACHMENT, BILATERAL: ICD-10-CM

## 2017-10-12 DIAGNOSIS — Z96.1 PSEUDOPHAKIA: ICD-10-CM

## 2017-10-12 DIAGNOSIS — H53.002 AMBLYOPIA, LEFT: ICD-10-CM

## 2017-10-12 DIAGNOSIS — H40.053 OCULAR HYPERTENSION, BILATERAL: Primary | ICD-10-CM

## 2017-10-12 DIAGNOSIS — H52.7 REFRACTIVE ERROR: ICD-10-CM

## 2017-10-12 DIAGNOSIS — I10 ESSENTIAL HYPERTENSION: ICD-10-CM

## 2017-10-12 DIAGNOSIS — H04.123 DRY EYE SYNDROME, BILATERAL: ICD-10-CM

## 2017-10-12 PROCEDURE — 92014 COMPRE OPH EXAM EST PT 1/>: CPT | Mod: S$PBB,,, | Performed by: OPHTHALMOLOGY

## 2017-10-12 PROCEDURE — 99999 PR PBB SHADOW E&M-EST. PATIENT-LVL II: CPT | Mod: PBBFAC,,, | Performed by: OPHTHALMOLOGY

## 2017-10-12 PROCEDURE — 99212 OFFICE O/P EST SF 10 MIN: CPT | Mod: PBBFAC,PO | Performed by: OPHTHALMOLOGY

## 2017-10-12 RX ORDER — TRAVOPROST OPHTHALMIC SOLUTION 0.04 MG/ML
1 SOLUTION OPHTHALMIC NIGHTLY
Qty: 2.5 ML | Refills: 6 | Status: SHIPPED | OUTPATIENT
Start: 2017-10-12 | End: 2019-01-18 | Stop reason: SDUPTHER

## 2017-10-12 NOTE — PROGRESS NOTES
Subjective:       Patient ID: Rachel Asif is a 77 y.o. female.    Chief Complaint: Glaucoma (IOP and DFE)    HPI  Review of Systems    Objective:      Physical Exam    Assessment:       1. Ocular hypertension, bilateral    2. Dry eye syndrome, bilateral    3. Vitreous detachment, bilateral    4. Essential hypertension    5. Amblyopia, left    6. Refractive error    7. Pseudophakia - Both Eyes        Plan:       OHT OU-IOP's & ON's are stable OU.  SABINO-Needs AT's.  PVD's OU-Stable.  HTN-No retinopathy OU.  Amblyopia OS-Stable.  RE-No need to change Rx.      Decrease Travatan to qd OU.  AT's.  Control HTN.  RTC 6 mos for IOP's & HVF's.

## 2017-11-07 ENCOUNTER — OFFICE VISIT (OUTPATIENT)
Dept: PHYSICAL MEDICINE AND REHAB | Facility: CLINIC | Age: 77
End: 2017-11-07
Payer: MEDICARE

## 2017-11-07 VITALS
SYSTOLIC BLOOD PRESSURE: 139 MMHG | WEIGHT: 187.38 LBS | DIASTOLIC BLOOD PRESSURE: 81 MMHG | HEIGHT: 63 IN | HEART RATE: 83 BPM | BODY MASS INDEX: 33.2 KG/M2

## 2017-11-07 DIAGNOSIS — G89.29 CHRONIC NECK PAIN: ICD-10-CM

## 2017-11-07 DIAGNOSIS — M54.2 CHRONIC NECK PAIN: ICD-10-CM

## 2017-11-07 DIAGNOSIS — G89.29 CHRONIC MIDLINE LOW BACK PAIN WITH RIGHT-SIDED SCIATICA: Primary | ICD-10-CM

## 2017-11-07 DIAGNOSIS — M54.41 CHRONIC MIDLINE LOW BACK PAIN WITH RIGHT-SIDED SCIATICA: Primary | ICD-10-CM

## 2017-11-07 DIAGNOSIS — E66.9 OBESITY (BMI 30.0-34.9): ICD-10-CM

## 2017-11-07 DIAGNOSIS — M47.816 LUMBAR FACET ARTHROPATHY: ICD-10-CM

## 2017-11-07 DIAGNOSIS — M25.561 CHRONIC PAIN OF RIGHT KNEE: ICD-10-CM

## 2017-11-07 DIAGNOSIS — G89.29 CHRONIC PAIN OF RIGHT KNEE: ICD-10-CM

## 2017-11-07 DIAGNOSIS — M47.812 DJD (DEGENERATIVE JOINT DISEASE), CERVICAL: ICD-10-CM

## 2017-11-07 DIAGNOSIS — M17.0 PRIMARY OSTEOARTHRITIS OF BOTH KNEES: ICD-10-CM

## 2017-11-07 PROCEDURE — 99214 OFFICE O/P EST MOD 30 MIN: CPT | Mod: S$PBB,,, | Performed by: PHYSICAL MEDICINE & REHABILITATION

## 2017-11-07 PROCEDURE — 99213 OFFICE O/P EST LOW 20 MIN: CPT | Mod: PBBFAC | Performed by: PHYSICAL MEDICINE & REHABILITATION

## 2017-11-07 PROCEDURE — 99999 PR PBB SHADOW E&M-EST. PATIENT-LVL III: CPT | Mod: PBBFAC,,, | Performed by: PHYSICAL MEDICINE & REHABILITATION

## 2017-11-07 NOTE — PROGRESS NOTES
Subjective:       Patient ID: Rachel Asif is a 77 y.o. female.    Chief Complaint: No chief complaint on file.    HPI    HISTORY OF PRESENT ILLNESS:  Mrs. Asif is a 77-year-old black female with   past medical history of hypertension and hyperlipidemia who is followed up in   the Physical Medicine Clinic for chronic low back pain, neck pain and knee pain.    Her last visit to the clinic was on 08/03/2017.  X-rays of the knees were   ordered.  She was maintained on gabapentin, Cymbalta, meloxicam, p.r.n. tramadol   and Voltaren gel.    The patient is coming today to the clinic for followup.  Her back pain has been   under good control.  It is an intermittent aching pain in the low lumbar spine   and across her back.  She continues to have occasional shooting pain to the   right knee, but less often than previously.  Her maximum pain is 5-6/10 and   minimum 1-2/10.  Today, it is 1-2/10.  The patient denies any lower extremity   weakness.  She denies any bowel or bladder incontinence.    Her neck pain has also been under good control.  It is an intermittent aching   pain in the cervical spine.  She has occasional shooting pain to the right hand,   but less often.  Her maximum pain is 3-4/10 and minimum 1-2/10.  Today, it is   1-2/10.  The patient denies any upper extremity weakness.    She continues to complain of bilateral knee pain, worse on the right.  It has   generally been better.  It is a localized aching aggravated by standing or   walking.  Her maximum pain is 3-4/10 and minimum 1-2/10.  Today, it is 1-2/10.    She denies any warmth or swelling of her knees.    She is currently taking meloxicam 15 mg p.o. daily, gabapentin 300 mg p.o. 2-3   times per week, Cymbalta 60 mg p.o. twice per daily and tramadol 50 mg p.r.n.,   usually once daily few days per week.  She has been exercising and dieting.  She   has lost some weight since her last visit.      MS/HN  dd: 11/07/2017 10:16:19 (CST)  td: 11/08/2017  08:29:48 ([facility])  Doc ID   #[id number]  Job ID #[id number]    CC:         Review of Systems   Constitutional: Negative for fatigue.   Eyes: Negative for visual disturbance.   Respiratory: Negative for shortness of breath.    Cardiovascular: Negative for chest pain.   Gastrointestinal: Negative for blood in stool, constipation, nausea and vomiting.   Genitourinary: Negative for difficulty urinating.   Musculoskeletal: Positive for arthralgias, back pain and neck pain. Negative for gait problem.   Skin: Negative for rash.   Neurological: Negative for dizziness and headaches.   Psychiatric/Behavioral: Negative for behavioral problems and sleep disturbance.       Objective:      Physical Exam   Constitutional: She appears well-developed and well-nourished.   HENT:   Head: Normocephalic and atraumatic.   Neck: Normal range of motion.   Mild pain at end range.   Musculoskeletal:   BUE:  ROM:full.  Strength:    RUE: 5/5 at shoulder abduction, 5 elbow flexion, 5 elbow extension, 5 hand .   LUE: 5/5 at shoulder abduction, 5 elbow flexion, 5 elbow extension, 5 hand .  Sensation to pinprick:   RUE: intact.   LUE: intact.  DTR:    RUE: +1 biceps, +1 triceps.   LUE:  +1 biceps, +1 triceps.  [place]oy:   RUE: -ve.   LUE: -ve.      BLE:  ROM:full.  Mild bilateral knee crepitus.  Strength:    RLE: 5/5 at hip flexion, 5 knee extension, 5 ankle DF, 5 PF.   LLE: 5/5 at hip flexion, 5 knee extension, 5 ankle DF, 5 PF.  Sensation to pinprick:     RLE: intact.      LLE: intact.   DTR:     RLE: +2 knee, +1 ankle.    LLE: +2 knee, +1 ankle.  SLR (sitting):      RLE: -ve.      LLE: -ve.     -ve tenderness over lumbar spine, R>L hips.    Gait: WNL.     Neurological: She is alert.   Skin: Skin is warm.   Psychiatric: She has a normal mood and affect. Her behavior is normal.   Vitals reviewed.          IMAGING STUDIES:    X-ray AP Standing Knees with Both Lateral (8/3/17):    Narrative     2 views bilateral.    Right: There is moderate DJD and a  varus deformity.  No fracture dislocation bone destruction seen.    Left: There is mild-moderate DJD and a varus deformity.  No fracture dislocation bone destruction seen.      Assessment:       1. Chronic midline low back pain with right-sided sciatica    2. Lumbar facet arthropathy    3. Chronic neck pain    4. DJD (degenerative joint disease), cervical    5. Chronic pain of right knee    6. Primary osteoarthritis of both knees (moderate)    7. Obesity (BMI 30.0-34.9)        Plan:       - X-Ray findings were discussed with the patient.  - Continue meloxicam (MOBIC) 15 MG tablet; Take 1 tablet (15 mg total) by mouth daily as needed for Pain. 1 Tablet Oral Every day  - Continue duloxetine (CYMBALTA) 60 MG capsule; Take 1 capsule (60 mg total) by mouth twice daily.  - Take consistently: gabapentin (NEURONTIN) 300 MG capsule; Take 1 capsule (300 mg total) by mouth at bedtime..  - Continue tramadol (ULTRAM) 50 mg tablet; Take 1 tablet (50 mg total) by mouth 3 (three) times daily as needed for Pain.  - Continue diclofenac sodium (VOLTAREN) 1 % Gel; Apply topically 3 (three) times daily.  - Regular home exercise program was encouraged.  - The patient was commended on her weight loss (187.4 lbs today vs 194 lbs last visit 8/3/17) and encouraged to continue with weight loss efforts  - Return in about 4 months (around 3/7/2018).     This was a 25 minute visit, more than 50% of which was spent counseling the patient about the diagnosis and the treatment plan including medication adjustment, weight loss and exercise.  .

## 2017-11-15 DIAGNOSIS — G89.29 CHRONIC NECK PAIN: ICD-10-CM

## 2017-11-15 DIAGNOSIS — G89.29 CHRONIC MIDLINE LOW BACK PAIN WITH RIGHT-SIDED SCIATICA: ICD-10-CM

## 2017-11-15 DIAGNOSIS — M54.2 CHRONIC NECK PAIN: ICD-10-CM

## 2017-11-15 DIAGNOSIS — M54.41 CHRONIC MIDLINE LOW BACK PAIN WITH RIGHT-SIDED SCIATICA: ICD-10-CM

## 2017-11-15 RX ORDER — TRAMADOL HYDROCHLORIDE 50 MG/1
50 TABLET ORAL 3 TIMES DAILY PRN
Qty: 100 TABLET | Refills: 2 | Status: SHIPPED | OUTPATIENT
Start: 2017-11-15 | End: 2018-05-14 | Stop reason: SDUPTHER

## 2017-11-15 RX ORDER — GABAPENTIN 300 MG/1
300 CAPSULE ORAL 2 TIMES DAILY
Qty: 270 CAPSULE | Refills: 1 | Status: SHIPPED | OUTPATIENT
Start: 2017-11-15 | End: 2018-03-26 | Stop reason: SDUPTHER

## 2018-02-14 DIAGNOSIS — G89.29 CHRONIC MIDLINE LOW BACK PAIN WITH RIGHT-SIDED SCIATICA: ICD-10-CM

## 2018-02-14 DIAGNOSIS — M54.41 CHRONIC MIDLINE LOW BACK PAIN WITH RIGHT-SIDED SCIATICA: ICD-10-CM

## 2018-02-14 RX ORDER — DULOXETIN HYDROCHLORIDE 60 MG/1
CAPSULE, DELAYED RELEASE ORAL
Qty: 180 CAPSULE | Refills: 0 | Status: SHIPPED | OUTPATIENT
Start: 2018-02-14 | End: 2018-03-26 | Stop reason: SDUPTHER

## 2018-02-15 DIAGNOSIS — I12.9 BENIGN HYPERTENSION WITH CHRONIC KIDNEY DISEASE, STAGE III: ICD-10-CM

## 2018-02-15 DIAGNOSIS — E78.5 HYPERLIPIDEMIA WITH TARGET LDL LESS THAN 100: ICD-10-CM

## 2018-02-15 DIAGNOSIS — N18.30 BENIGN HYPERTENSION WITH CHRONIC KIDNEY DISEASE, STAGE III: ICD-10-CM

## 2018-02-15 DIAGNOSIS — G89.29 CHRONIC MIDLINE LOW BACK PAIN WITH RIGHT-SIDED SCIATICA: ICD-10-CM

## 2018-02-15 DIAGNOSIS — M54.41 CHRONIC MIDLINE LOW BACK PAIN WITH RIGHT-SIDED SCIATICA: ICD-10-CM

## 2018-02-15 RX ORDER — MELOXICAM 15 MG/1
15 TABLET ORAL DAILY
Qty: 90 TABLET | Refills: 0 | Status: SHIPPED | OUTPATIENT
Start: 2018-02-15 | End: 2018-05-14 | Stop reason: SDUPTHER

## 2018-02-15 RX ORDER — AMLODIPINE BESYLATE 10 MG/1
TABLET ORAL
Qty: 90 TABLET | Refills: 0 | Status: SHIPPED | OUTPATIENT
Start: 2018-02-15 | End: 2018-05-14 | Stop reason: SDUPTHER

## 2018-02-15 RX ORDER — VALSARTAN AND HYDROCHLOROTHIAZIDE 320; 25 MG/1; MG/1
1 TABLET, FILM COATED ORAL DAILY
Qty: 90 TABLET | Refills: 0 | Status: SHIPPED | OUTPATIENT
Start: 2018-02-15 | End: 2018-05-14 | Stop reason: SDUPTHER

## 2018-02-15 RX ORDER — ATORVASTATIN CALCIUM 20 MG/1
20 TABLET, FILM COATED ORAL DAILY
Qty: 90 TABLET | Refills: 0 | Status: SHIPPED | OUTPATIENT
Start: 2018-02-15 | End: 2018-05-14 | Stop reason: SDUPTHER

## 2018-02-15 RX ORDER — POTASSIUM CHLORIDE 20 MEQ/1
20 TABLET, EXTENDED RELEASE ORAL DAILY
Qty: 90 TABLET | Refills: 0 | Status: SHIPPED | OUTPATIENT
Start: 2018-02-15 | End: 2018-05-14 | Stop reason: SDUPTHER

## 2018-02-20 ENCOUNTER — CLINICAL SUPPORT (OUTPATIENT)
Dept: FAMILY MEDICINE | Facility: CLINIC | Age: 78
End: 2018-02-20
Payer: MEDICARE

## 2018-02-20 DIAGNOSIS — Z23 NEED FOR PROPHYLACTIC VACCINATION AND INOCULATION AGAINST INFLUENZA: Primary | ICD-10-CM

## 2018-02-20 PROCEDURE — 99499 UNLISTED E&M SERVICE: CPT | Mod: S$PBB,,, | Performed by: INTERNAL MEDICINE

## 2018-02-20 PROCEDURE — 90662 IIV NO PRSV INCREASED AG IM: CPT | Mod: PBBFAC,PO | Performed by: INTERNAL MEDICINE

## 2018-03-26 ENCOUNTER — OFFICE VISIT (OUTPATIENT)
Dept: PHYSICAL MEDICINE AND REHAB | Facility: CLINIC | Age: 78
End: 2018-03-26
Payer: MEDICARE

## 2018-03-26 VITALS
SYSTOLIC BLOOD PRESSURE: 128 MMHG | WEIGHT: 197.19 LBS | HEART RATE: 87 BPM | BODY MASS INDEX: 34.94 KG/M2 | HEIGHT: 63 IN | DIASTOLIC BLOOD PRESSURE: 68 MMHG

## 2018-03-26 DIAGNOSIS — M25.561 CHRONIC PAIN OF RIGHT KNEE: ICD-10-CM

## 2018-03-26 DIAGNOSIS — G89.29 CHRONIC NECK PAIN: ICD-10-CM

## 2018-03-26 DIAGNOSIS — M54.41 CHRONIC MIDLINE LOW BACK PAIN WITH RIGHT-SIDED SCIATICA: Primary | ICD-10-CM

## 2018-03-26 DIAGNOSIS — M47.816 LUMBAR FACET ARTHROPATHY: ICD-10-CM

## 2018-03-26 DIAGNOSIS — E66.9 OBESITY (BMI 30.0-34.9): ICD-10-CM

## 2018-03-26 DIAGNOSIS — G89.29 CHRONIC MIDLINE LOW BACK PAIN WITH RIGHT-SIDED SCIATICA: Primary | ICD-10-CM

## 2018-03-26 DIAGNOSIS — G89.29 CHRONIC PAIN OF RIGHT KNEE: ICD-10-CM

## 2018-03-26 DIAGNOSIS — M17.0 PRIMARY OSTEOARTHRITIS OF BOTH KNEES: ICD-10-CM

## 2018-03-26 DIAGNOSIS — M54.2 CHRONIC NECK PAIN: ICD-10-CM

## 2018-03-26 DIAGNOSIS — M47.812 DJD (DEGENERATIVE JOINT DISEASE), CERVICAL: ICD-10-CM

## 2018-03-26 PROCEDURE — 3074F SYST BP LT 130 MM HG: CPT | Mod: CPTII,S$GLB,, | Performed by: PHYSICAL MEDICINE & REHABILITATION

## 2018-03-26 PROCEDURE — 3078F DIAST BP <80 MM HG: CPT | Mod: CPTII,S$GLB,, | Performed by: PHYSICAL MEDICINE & REHABILITATION

## 2018-03-26 PROCEDURE — 99214 OFFICE O/P EST MOD 30 MIN: CPT | Mod: S$GLB,,, | Performed by: PHYSICAL MEDICINE & REHABILITATION

## 2018-03-26 PROCEDURE — 99999 PR PBB SHADOW E&M-EST. PATIENT-LVL III: CPT | Mod: PBBFAC,,, | Performed by: PHYSICAL MEDICINE & REHABILITATION

## 2018-03-26 RX ORDER — GABAPENTIN 300 MG/1
300 CAPSULE ORAL 2 TIMES DAILY
Start: 2018-03-26 | End: 2018-05-14 | Stop reason: SDUPTHER

## 2018-03-26 RX ORDER — DULOXETIN HYDROCHLORIDE 60 MG/1
60 CAPSULE, DELAYED RELEASE ORAL 2 TIMES DAILY
Start: 2018-03-26 | End: 2018-05-14 | Stop reason: SDUPTHER

## 2018-03-26 NOTE — PROGRESS NOTES
Subjective:       Patient ID: Rachel Asif is a 77 y.o. female.    Chief Complaint: No chief complaint on file.    HPI    Ms. Asif is a 77-year-old black female with HTN who is followed up in the   [facility] for chronic low back pain with lumbar radiculopathy,   chronic neck pain and OA of the knees.  Her last visit to the clinic was on   11/07/2017.  She was maintained on meloxicam, Cymbalta, gabapentin and p.r.n.   tramadol.    The patient comes today to the clinic for followup.  Her back pain has been   slightly better.  It is an intermittent aching pain in the lumbar spine.  She   continues to have shooting pain to the right knee, but the frequency and   severity have been less.  Her pain is worse with activity.  Her maximum pain is   4 to 5/10 and minimum 2/10.  Today, it is 2/10.  The patient denies any lower   extremity weakness.  She denies any bowel or bladder incontinence.    Her neck pain has been under good control.  It is an intermittent aching pain in   the cervical spine.  She has infrequent radiation to the right hand.  Her   maximum pain is 3 to 4/10 and minimum 1/10.  Today, it is 1/10.  The patient   denies any upper extremity weakness.    She continues to complain of bilateral knee pain, worse on the right.  It is an   intermittent aching pain aggravated by weightbearing.  Her maximum pain is 4 to   5/10 and minimum 1 to 2/10.  Today it is 3/10.    She is currently taking meloxicam 15 mg p.o. once per day, gabapentin 300 mg   p.o. twice per day, Cymbalta 60 mg p.o. twice per day, tramadol 50 mg p.r.n.   usually twice per day and topical Voltaren gel as needed.      MS/IN  dd: 03/26/2018 15:27:09 (CDT)  td: 03/27/2018 15:38:29 (CDT)  Doc ID   #[id number]  Job ID #[id number]    CC:           Review of Systems   Constitutional: Negative for fatigue.   Eyes: Negative for visual disturbance.   Respiratory: Negative for shortness of breath.    Cardiovascular: Negative for chest pain.    Gastrointestinal: Negative for blood in stool, constipation, nausea and vomiting.   Genitourinary: Negative for difficulty urinating.   Musculoskeletal: Positive for arthralgias, back pain and neck pain. Negative for gait problem.   Skin: Negative for rash.   Neurological: Negative for dizziness and headaches.   Psychiatric/Behavioral: Negative for behavioral problems and sleep disturbance.       Objective:      Physical Exam   Constitutional: She appears well-developed and well-nourished.   HENT:   Head: Normocephalic and atraumatic.   Neck: Normal range of motion.   Mild pain at end range.   Musculoskeletal:   BUE:  ROM:full.  Strength:    RUE: 5/5 at shoulder abduction, 5 elbow flexion, 5 elbow extension, 5 hand .   LUE: 5/5 at shoulder abduction, 5 elbow flexion, 5 elbow extension, 5 hand .  Sensation to pinprick:   RUE: intact.   LUE: intact.  DTR:    RUE: +1 biceps, +1 triceps.   LUE:  +1 biceps, +1 triceps.      BLE:  ROM:full.  Mild bilateral knee crepitus.  Strength:    RLE: 5/5 at hip flexion, 5 knee extension, 5 ankle DF, 5 PF.   LLE: 5/5 at hip flexion, 5 knee extension, 5 ankle DF, 5 PF.  Sensation to pinprick:     RLE: intact.      LLE: intact.   DTR:     RLE: +2 knee, +1 ankle.    LLE: +2 knee, +1 ankle.  SLR (sitting):      RLE: -ve.      LLE: -ve.     -ve tenderness over lumbar spine.    Gait: WNL.     Neurological: She is alert.   Skin: Skin is warm.   Psychiatric: She has a normal mood and affect. Her behavior is normal.   Vitals reviewed.          Assessment:       1. Chronic midline low back pain with right-sided sciatica    2. Lumbar facet arthropathy    3. Chronic neck pain    4. DJD (degenerative joint disease), cervical    5. Chronic pain of right knee    6. Primary osteoarthritis of both knees (moderate)    7. Obesity (BMI 30.0-34.9)        Plan:         - Continue meloxicam (MOBIC) 15 MG tablet; Take 1 tablet (15 mg total) by mouth daily as needed for Pain. 1 Tablet Oral Every  day  - Continue duloxetine (CYMBALTA) 60 MG capsule; Take 1 capsule (60 mg total) by mouth twice daily.  - Continue gabapentin (NEURONTIN) 300 MG capsule; Take 1 capsule (300 mg total) by mouth twice daily.  - Continue tramadol (ULTRAM) 50 mg tablet; Take 1 tablet (50 mg total) by mouth 3 (three) times daily as needed for Pain.  - Continue diclofenac sodium (VOLTAREN) 1 % Gel; Apply topically 3 (three) times daily.  - Regular home exercise program was encouraged.  - Weight loss was encouraged. She gained ~ 10 lbs since last visit.  - Follow-up in about 6 months (around 9/26/2018).     This was a 25 minute visit, more than 50% of which was spent counseling the patient about the diagnosis and the treatment plan including medication adjustment, weight loss and exercise.  .

## 2018-05-14 DIAGNOSIS — M25.561 CHRONIC PAIN OF RIGHT KNEE: ICD-10-CM

## 2018-05-14 DIAGNOSIS — N18.30 BENIGN HYPERTENSION WITH CHRONIC KIDNEY DISEASE, STAGE III: Primary | ICD-10-CM

## 2018-05-14 DIAGNOSIS — K21.9 GASTROESOPHAGEAL REFLUX DISEASE WITHOUT ESOPHAGITIS: ICD-10-CM

## 2018-05-14 DIAGNOSIS — E55.9 VITAMIN D DEFICIENCY: ICD-10-CM

## 2018-05-14 DIAGNOSIS — N25.81 SECONDARY HYPERPARATHYROIDISM OF RENAL ORIGIN: ICD-10-CM

## 2018-05-14 DIAGNOSIS — M54.41 CHRONIC MIDLINE LOW BACK PAIN WITH RIGHT-SIDED SCIATICA: ICD-10-CM

## 2018-05-14 DIAGNOSIS — G89.29 CHRONIC PAIN OF RIGHT KNEE: ICD-10-CM

## 2018-05-14 DIAGNOSIS — I12.9 BENIGN HYPERTENSION WITH CHRONIC KIDNEY DISEASE, STAGE III: Primary | ICD-10-CM

## 2018-05-14 DIAGNOSIS — E78.5 HYPERLIPIDEMIA WITH TARGET LDL LESS THAN 100: ICD-10-CM

## 2018-05-14 DIAGNOSIS — R73.03 PREDIABETES: ICD-10-CM

## 2018-05-14 DIAGNOSIS — M54.2 CHRONIC NECK PAIN: ICD-10-CM

## 2018-05-14 DIAGNOSIS — G89.29 CHRONIC NECK PAIN: ICD-10-CM

## 2018-05-14 DIAGNOSIS — G89.29 CHRONIC MIDLINE LOW BACK PAIN WITH RIGHT-SIDED SCIATICA: ICD-10-CM

## 2018-05-14 RX ORDER — POTASSIUM CHLORIDE 20 MEQ/1
TABLET, EXTENDED RELEASE ORAL
Qty: 90 TABLET | Refills: 0 | Status: SHIPPED | OUTPATIENT
Start: 2018-05-14 | End: 2018-08-10 | Stop reason: SDUPTHER

## 2018-05-14 RX ORDER — PANTOPRAZOLE SODIUM 20 MG/1
20 TABLET, DELAYED RELEASE ORAL DAILY
Qty: 30 TABLET | Refills: 2 | Status: SHIPPED | OUTPATIENT
Start: 2018-05-14 | End: 2018-11-27 | Stop reason: SDUPTHER

## 2018-05-14 RX ORDER — AMLODIPINE BESYLATE 10 MG/1
TABLET ORAL
Qty: 90 TABLET | Refills: 0 | Status: SHIPPED | OUTPATIENT
Start: 2018-05-14 | End: 2018-08-10 | Stop reason: SDUPTHER

## 2018-05-14 RX ORDER — VALSARTAN AND HYDROCHLOROTHIAZIDE 320; 25 MG/1; MG/1
1 TABLET, FILM COATED ORAL DAILY
Qty: 90 TABLET | Refills: 0 | Status: SHIPPED | OUTPATIENT
Start: 2018-05-14 | End: 2018-08-10 | Stop reason: ALTCHOICE

## 2018-05-14 RX ORDER — TRAMADOL HYDROCHLORIDE 50 MG/1
TABLET ORAL
Qty: 100 TABLET | Refills: 0 | Status: SHIPPED | OUTPATIENT
Start: 2018-05-14 | End: 2018-08-03 | Stop reason: SDUPTHER

## 2018-05-14 RX ORDER — GABAPENTIN 300 MG/1
CAPSULE ORAL
Qty: 270 CAPSULE | Refills: 0 | Status: SHIPPED | OUTPATIENT
Start: 2018-05-14 | End: 2018-10-04

## 2018-05-14 RX ORDER — MELOXICAM 15 MG/1
TABLET ORAL
Qty: 90 TABLET | Refills: 0 | Status: SHIPPED | OUTPATIENT
Start: 2018-05-14 | End: 2018-08-20 | Stop reason: SDUPTHER

## 2018-05-14 RX ORDER — DICLOFENAC SODIUM 10 MG/G
GEL TOPICAL
Qty: 500 G | Refills: 0 | Status: SHIPPED | OUTPATIENT
Start: 2018-05-14 | End: 2019-06-03 | Stop reason: SDUPTHER

## 2018-05-14 RX ORDER — ATORVASTATIN CALCIUM 20 MG/1
20 TABLET, FILM COATED ORAL DAILY
Qty: 90 TABLET | Refills: 0 | Status: SHIPPED | OUTPATIENT
Start: 2018-05-14 | End: 2018-08-10 | Stop reason: SDUPTHER

## 2018-05-14 RX ORDER — DULOXETIN HYDROCHLORIDE 60 MG/1
CAPSULE, DELAYED RELEASE ORAL
Qty: 180 CAPSULE | Refills: 0 | Status: SHIPPED | OUTPATIENT
Start: 2018-05-14 | End: 2018-08-10 | Stop reason: SDUPTHER

## 2018-05-18 ENCOUNTER — CLINICAL SUPPORT (OUTPATIENT)
Dept: OPHTHALMOLOGY | Facility: CLINIC | Age: 78
End: 2018-05-18
Payer: MEDICARE

## 2018-05-18 ENCOUNTER — OFFICE VISIT (OUTPATIENT)
Dept: OPHTHALMOLOGY | Facility: CLINIC | Age: 78
End: 2018-05-18
Payer: MEDICARE

## 2018-05-18 DIAGNOSIS — Z96.1 PSEUDOPHAKIA: ICD-10-CM

## 2018-05-18 DIAGNOSIS — H40.053 OCULAR HYPERTENSION, BILATERAL: Primary | ICD-10-CM

## 2018-05-18 DIAGNOSIS — H40.053 OCULAR HYPERTENSION, BILATERAL: ICD-10-CM

## 2018-05-18 DIAGNOSIS — H53.002 AMBLYOPIA, LEFT: ICD-10-CM

## 2018-05-18 DIAGNOSIS — H04.123 DRY EYE SYNDROME, BILATERAL: ICD-10-CM

## 2018-05-18 PROCEDURE — 92083 EXTENDED VISUAL FIELD XM: CPT | Mod: S$GLB,,, | Performed by: OPHTHALMOLOGY

## 2018-05-18 PROCEDURE — 92012 INTRM OPH EXAM EST PATIENT: CPT | Mod: S$GLB,,, | Performed by: OPHTHALMOLOGY

## 2018-05-18 PROCEDURE — 99999 PR PBB SHADOW E&M-EST. PATIENT-LVL II: CPT | Mod: PBBFAC,,, | Performed by: OPHTHALMOLOGY

## 2018-05-18 NOTE — PROGRESS NOTES
Subjective:       Patient ID: Rachel Asif is a 77 y.o. female.    Chief Complaint: Glaucoma    HPI     DSL- 10/12/17     Pt is here for HVF review. Pt states no change. Pt c/o of teary eyes. Pt   denies floaters and flashes. Refills needed.     Eyemeds  Travatan OU [facility]     Last edited by Daniela Forbes on 5/18/2018  2:08 PM. (History)             Assessment:       1. Ocular hypertension, bilateral    2. Dry eye syndrome, bilateral    3. Amblyopia, left    4. Pseudophakia - Both Eyes        Plan:       OHT OU-IOP's stable. HVF's are WNL's OD & unreliable OS.  SABINO-Doing well.  Amblyopia OS-Stable.        Cont Travatan OU.  RTC 6 mos for IOP's & DFE.

## 2018-07-09 ENCOUNTER — LAB VISIT (OUTPATIENT)
Dept: LAB | Facility: HOSPITAL | Age: 78
End: 2018-07-09
Attending: INTERNAL MEDICINE
Payer: MEDICARE

## 2018-07-09 DIAGNOSIS — N25.81 SECONDARY HYPERPARATHYROIDISM OF RENAL ORIGIN: ICD-10-CM

## 2018-07-09 DIAGNOSIS — R73.03 PREDIABETES: ICD-10-CM

## 2018-07-09 DIAGNOSIS — I12.9 BENIGN HYPERTENSION WITH CHRONIC KIDNEY DISEASE, STAGE III: ICD-10-CM

## 2018-07-09 DIAGNOSIS — N18.30 BENIGN HYPERTENSION WITH CHRONIC KIDNEY DISEASE, STAGE III: ICD-10-CM

## 2018-07-09 DIAGNOSIS — E78.5 HYPERLIPIDEMIA WITH TARGET LDL LESS THAN 100: ICD-10-CM

## 2018-07-09 LAB
25(OH)D3+25(OH)D2 SERPL-MCNC: 26 NG/ML
ALBUMIN SERPL BCP-MCNC: 3.5 G/DL
ALP SERPL-CCNC: 55 U/L
ALT SERPL W/O P-5'-P-CCNC: 16 U/L
ANION GAP SERPL CALC-SCNC: 8 MMOL/L
AST SERPL-CCNC: 21 U/L
BASOPHILS # BLD AUTO: 0.05 K/UL
BASOPHILS NFR BLD: 1 %
BILIRUB SERPL-MCNC: 0.5 MG/DL
BUN SERPL-MCNC: 21 MG/DL
CALCIUM SERPL-MCNC: 9.4 MG/DL
CHLORIDE SERPL-SCNC: 107 MMOL/L
CHOLEST SERPL-MCNC: 188 MG/DL
CHOLEST/HDLC SERPL: 2.4 {RATIO}
CO2 SERPL-SCNC: 25 MMOL/L
CREAT SERPL-MCNC: 1.3 MG/DL
DIFFERENTIAL METHOD: ABNORMAL
EOSINOPHIL # BLD AUTO: 0.1 K/UL
EOSINOPHIL NFR BLD: 2.1 %
ERYTHROCYTE [DISTWIDTH] IN BLOOD BY AUTOMATED COUNT: 15.2 %
EST. GFR  (AFRICAN AMERICAN): 44.8 ML/MIN/1.73 M^2
EST. GFR  (NON AFRICAN AMERICAN): 38.8 ML/MIN/1.73 M^2
ESTIMATED AVG GLUCOSE: 126 MG/DL
GLUCOSE SERPL-MCNC: 95 MG/DL
HBA1C MFR BLD HPLC: 6 %
HCT VFR BLD AUTO: 33.7 %
HDLC SERPL-MCNC: 78 MG/DL
HDLC SERPL: 41.5 %
HGB BLD-MCNC: 10.4 G/DL
IMM GRANULOCYTES # BLD AUTO: 0.02 K/UL
IMM GRANULOCYTES NFR BLD AUTO: 0.4 %
LDLC SERPL CALC-MCNC: 93 MG/DL
LYMPHOCYTES # BLD AUTO: 1.3 K/UL
LYMPHOCYTES NFR BLD: 25.3 %
MCH RBC QN AUTO: 28.4 PG
MCHC RBC AUTO-ENTMCNC: 30.9 G/DL
MCV RBC AUTO: 92 FL
MONOCYTES # BLD AUTO: 0.5 K/UL
MONOCYTES NFR BLD: 9.3 %
NEUTROPHILS # BLD AUTO: 3.3 K/UL
NEUTROPHILS NFR BLD: 61.9 %
NONHDLC SERPL-MCNC: 110 MG/DL
NRBC BLD-RTO: 0 /100 WBC
PLATELET # BLD AUTO: 336 K/UL
PMV BLD AUTO: 10.3 FL
POTASSIUM SERPL-SCNC: 4.1 MMOL/L
PROT SERPL-MCNC: 7.4 G/DL
PTH-INTACT SERPL-MCNC: 87 PG/ML
RBC # BLD AUTO: 3.66 M/UL
SODIUM SERPL-SCNC: 140 MMOL/L
TRIGL SERPL-MCNC: 85 MG/DL
WBC # BLD AUTO: 5.25 K/UL

## 2018-07-09 PROCEDURE — 80061 LIPID PANEL: CPT

## 2018-07-09 PROCEDURE — 36415 COLL VENOUS BLD VENIPUNCTURE: CPT | Mod: PO

## 2018-07-09 PROCEDURE — 83970 ASSAY OF PARATHORMONE: CPT

## 2018-07-09 PROCEDURE — 85025 COMPLETE CBC W/AUTO DIFF WBC: CPT

## 2018-07-09 PROCEDURE — 83036 HEMOGLOBIN GLYCOSYLATED A1C: CPT

## 2018-07-09 PROCEDURE — 80053 COMPREHEN METABOLIC PANEL: CPT

## 2018-07-09 PROCEDURE — 82306 VITAMIN D 25 HYDROXY: CPT

## 2018-07-16 ENCOUNTER — TELEPHONE (OUTPATIENT)
Dept: FAMILY MEDICINE | Facility: CLINIC | Age: 78
End: 2018-07-16

## 2018-07-19 ENCOUNTER — TELEPHONE (OUTPATIENT)
Dept: FAMILY MEDICINE | Facility: CLINIC | Age: 78
End: 2018-07-19

## 2018-07-19 NOTE — TELEPHONE ENCOUNTER
----- Message from Maty Nielson sent at 7/19/2018 10:07 AM CDT -----  Contact: Self/ [phone]  Pt calling to let office know she will be coming to office to request paperwork be filled out for her car insurance. Please call to advise. Thank you.

## 2018-07-20 NOTE — TELEPHONE ENCOUNTER
----- Message from Opal Shrestha sent at 7/20/2018  9:27 AM CDT -----  Contact: [phone]  Checking the status on paper work that was going to be filled out. Pls call pt [phone]. Thanks......Carol

## 2018-07-20 NOTE — TELEPHONE ENCOUNTER
Spoke with the pt, I told her that the paper work is ready.  Pt stated her brother will  the paper. Patient verbalized understandings.

## 2018-08-03 DIAGNOSIS — M54.41 CHRONIC MIDLINE LOW BACK PAIN WITH RIGHT-SIDED SCIATICA: ICD-10-CM

## 2018-08-03 DIAGNOSIS — G89.29 CHRONIC MIDLINE LOW BACK PAIN WITH RIGHT-SIDED SCIATICA: ICD-10-CM

## 2018-08-03 DIAGNOSIS — G89.29 CHRONIC NECK PAIN: ICD-10-CM

## 2018-08-03 DIAGNOSIS — M54.2 CHRONIC NECK PAIN: ICD-10-CM

## 2018-08-03 RX ORDER — TRAMADOL HYDROCHLORIDE 50 MG/1
TABLET ORAL
Qty: 100 TABLET | Refills: 0 | Status: SHIPPED | OUTPATIENT
Start: 2018-08-03 | End: 2018-08-10 | Stop reason: SDUPTHER

## 2018-08-10 ENCOUNTER — OFFICE VISIT (OUTPATIENT)
Dept: FAMILY MEDICINE | Facility: CLINIC | Age: 78
End: 2018-08-10
Payer: MEDICARE

## 2018-08-10 VITALS
BODY MASS INDEX: 35.65 KG/M2 | WEIGHT: 201.19 LBS | OXYGEN SATURATION: 98 % | SYSTOLIC BLOOD PRESSURE: 125 MMHG | DIASTOLIC BLOOD PRESSURE: 75 MMHG | TEMPERATURE: 100 F | HEART RATE: 90 BPM | HEIGHT: 63 IN

## 2018-08-10 DIAGNOSIS — N25.81 SECONDARY HYPERPARATHYROIDISM OF RENAL ORIGIN: ICD-10-CM

## 2018-08-10 DIAGNOSIS — M54.41 CHRONIC MIDLINE LOW BACK PAIN WITH RIGHT-SIDED SCIATICA: ICD-10-CM

## 2018-08-10 DIAGNOSIS — N18.30 BENIGN HYPERTENSION WITH CHRONIC KIDNEY DISEASE, STAGE III: ICD-10-CM

## 2018-08-10 DIAGNOSIS — R73.03 PREDIABETES: ICD-10-CM

## 2018-08-10 DIAGNOSIS — I12.9 BENIGN HYPERTENSION WITH CHRONIC KIDNEY DISEASE, STAGE III: ICD-10-CM

## 2018-08-10 DIAGNOSIS — Z23 FLU VACCINE NEED: ICD-10-CM

## 2018-08-10 DIAGNOSIS — G89.29 CHRONIC MIDLINE LOW BACK PAIN WITH RIGHT-SIDED SCIATICA: ICD-10-CM

## 2018-08-10 DIAGNOSIS — E66.01 SEVERE OBESITY (BMI 35.0-39.9) WITH COMORBIDITY: ICD-10-CM

## 2018-08-10 DIAGNOSIS — I70.0 ATHEROSCLEROSIS OF AORTA: ICD-10-CM

## 2018-08-10 DIAGNOSIS — M81.0 OSTEOPOROSIS, POST-MENOPAUSAL: ICD-10-CM

## 2018-08-10 DIAGNOSIS — M54.2 CHRONIC NECK PAIN: ICD-10-CM

## 2018-08-10 DIAGNOSIS — K21.9 GASTROESOPHAGEAL REFLUX DISEASE WITHOUT ESOPHAGITIS: ICD-10-CM

## 2018-08-10 DIAGNOSIS — G89.29 CHRONIC NECK PAIN: ICD-10-CM

## 2018-08-10 DIAGNOSIS — Z00.00 ROUTINE MEDICAL EXAM: Primary | ICD-10-CM

## 2018-08-10 DIAGNOSIS — E78.5 HYPERLIPIDEMIA WITH TARGET LDL LESS THAN 100: ICD-10-CM

## 2018-08-10 DIAGNOSIS — Z71.89 ADVANCED DIRECTIVES, COUNSELING/DISCUSSION: ICD-10-CM

## 2018-08-10 PROCEDURE — 3078F DIAST BP <80 MM HG: CPT | Mod: CPTII,S$GLB,, | Performed by: INTERNAL MEDICINE

## 2018-08-10 PROCEDURE — 3074F SYST BP LT 130 MM HG: CPT | Mod: CPTII,S$GLB,, | Performed by: INTERNAL MEDICINE

## 2018-08-10 PROCEDURE — 99999 PR PBB SHADOW E&M-EST. PATIENT-LVL III: CPT | Mod: PBBFAC,,, | Performed by: INTERNAL MEDICINE

## 2018-08-10 PROCEDURE — 99214 OFFICE O/P EST MOD 30 MIN: CPT | Mod: S$GLB,,, | Performed by: INTERNAL MEDICINE

## 2018-08-10 RX ORDER — TRAMADOL HYDROCHLORIDE 50 MG/1
TABLET ORAL
Qty: 100 TABLET | Refills: 0 | Status: SHIPPED | OUTPATIENT
Start: 2018-08-10 | End: 2018-10-04 | Stop reason: SDUPTHER

## 2018-08-10 RX ORDER — POTASSIUM CHLORIDE 20 MEQ/1
TABLET, EXTENDED RELEASE ORAL
Qty: 90 TABLET | Refills: 1 | Status: SHIPPED | OUTPATIENT
Start: 2018-08-10 | End: 2019-02-26 | Stop reason: SDUPTHER

## 2018-08-10 RX ORDER — OLMESARTAN MEDOXOMIL AND HYDROCHLOROTHIAZIDE 40/25 40; 25 MG/1; MG/1
1 TABLET ORAL DAILY
Qty: 90 TABLET | Refills: 1 | Status: SHIPPED | OUTPATIENT
Start: 2018-08-10 | End: 2018-11-26 | Stop reason: SDUPTHER

## 2018-08-10 RX ORDER — ATORVASTATIN CALCIUM 20 MG/1
20 TABLET, FILM COATED ORAL DAILY
Qty: 90 TABLET | Refills: 1 | Status: SHIPPED | OUTPATIENT
Start: 2018-08-10 | End: 2019-01-11 | Stop reason: SDUPTHER

## 2018-08-10 RX ORDER — DULOXETIN HYDROCHLORIDE 60 MG/1
CAPSULE, DELAYED RELEASE ORAL
Qty: 180 CAPSULE | Refills: 1 | Status: SHIPPED | OUTPATIENT
Start: 2018-08-10 | End: 2018-10-04

## 2018-08-10 RX ORDER — AMLODIPINE BESYLATE 10 MG/1
TABLET ORAL
Qty: 90 TABLET | Refills: 1 | Status: SHIPPED | OUTPATIENT
Start: 2018-08-10 | End: 2019-02-26 | Stop reason: SDUPTHER

## 2018-08-10 NOTE — PROGRESS NOTES
HISTORY OF PRESENT ILLNESS:  Rachel Asif is a 78 y.o. female who presents to the clinic today for a routine medical physical exam. Her last physical exam was approximately 1 years(s) ago.        PAST MEDICAL HISTORY:  Past Medical History:   Diagnosis Date    Amblyopia     os    Anemia of other chronic disease     Atherosclerosis of aorta     noted on L-spine X-ray 2011    Atherosclerosis of aortic arch     - noted on CXR 2017    Chronic low back pain     followed by orthopaedics    DJD (degenerative joint disease)     Glaucoma     History of colonic polyps     History of vitamin D deficiency     Hyperlipidemia     Hypertension     Lumbar radiculopathy     Obesity     Obesity     OH (ocular hypertension)     Osteoarthritis     Osteoporosis, post-menopausal     currently on a drug holiday    Postmenopausal status     Prediabetes     Secondary hyperparathyroidism of renal origin     Trochanteric bursitis        PAST SURGICAL HISTORY:  Past Surgical History:   Procedure Laterality Date    CATARACT EXTRACTION W/  INTRAOCULAR LENS IMPLANT Bilateral        SOCIAL HISTORY:  Social History     Social History    Marital status:      Spouse name: N/A    Number of children: N/A    Years of education: N/A     Occupational History    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 She is taking care of a sister who has brain damage from a car accident.       FAMILY HISTORY:  Family History   Problem Relation Age of Onset    Cataracts Mother     Hypertension Mother     Other Mother         complications from splenectomy after fall    Cataracts Father     Hypertension Father     Hypertension Sister     Edema Sister      Hypertension Brother     Diabetes Brother     Heart disease Brother     Glaucoma Maternal Grandmother     Hypertension Sister     Other Sister         shingles    Lupus Sister     Lung cancer Sister     Hypertension Sister     Other Sister         brain damage from MVA    Hypertension Sister     Hypertension Brother     Heart disease Brother     Hypertension Brother     Heart attack Brother     Hypertension Brother     Hypertension Brother     No Known Problems Brother     No Known Problems Brother     Hypertension Brother     Heart disease Brother     Cancer Neg Hx     Amblyopia Neg Hx     Blindness Neg Hx     Retinal detachment Neg Hx     Strabismus Neg Hx     Stroke Neg Hx        ALLERGIES AND MEDICATIONS: updated and reviewed.  Review of patient's allergies indicates:   Allergen Reactions    No known allergies      Medication List with Changes/Refills   Current Medications    AMLODIPINE (NORVASC) 10 MG TABLET    TAKE 1 TABLET(10 MG) BY MOUTH EVERY DAY    ASPIRIN 81 MG CHEWABLE TABLET    Every day    ATORVASTATIN (LIPITOR) 20 MG TABLET    Take 1 tablet (20 mg total) by mouth once daily.    DICLOFENAC SODIUM 1 % GEL    APPLY THREE TIMES DAILY    DULOXETINE (CYMBALTA) 60 MG CAPSULE    TAKE 1 CAPSULE BY MOUTH ONCE DAILY. AFTER 1-2 WEEKS, IF NO RELIEF, INCREASE TO 2 CAPSULES DAILY.    GABAPENTIN (NEURONTIN) 300 MG CAPSULE    TAKE 1 CAPSULE BY MOUTH TWICE DAILY IN 1 TO 2 WEEKS IF NO RELIEF MAY INCREASE DOSE TO THREE TIMES DAILY    MELOXICAM (MOBIC) 15 MG TABLET    TAKE 1 TABLET BY MOUTH EVERY DAY    PANTOPRAZOLE (PROTONIX) 20 MG TABLET    Take 1 tablet (20 mg total) by mouth once daily. To protect stomach due to taking NSAIDs (meloxicam)    POTASSIUM CHLORIDE SA (K-DUR,KLOR-CON) 20 MEQ TABLET    TAKE 1 TABLET(20 MEQ) BY MOUTH EVERY DAY    PROPYLENE GLYCOL/ (SYSTANE OPHT)    Weekly PRN    TRAMADOL (ULTRAM) 50 MG TABLET    TAKE 1 TABLET(50 MG) BY MOUTH THREE TIMES DAILY AS NEEDED  "FOR PAIN    TRAVOPROST (TRAVATAN Z) 0.004 % DROP    Place 1 drop into both eyes every evening.   Discontinued Medications    PEG 3350-ELECTROLYTES-VIT C 100-7.5-2.691 GRAM PWPK    Use as directed    VALSARTAN-HYDROCHLOROTHIAZIDE (DIOVAN-HCT) 320-25 MG PER TABLET    Take 1 tablet by mouth once daily.         CARE TEAM:  Patient Care Team:  Oneyda Pace MD as PCP - General (Internal Medicine)           SCREENING HISTORY:  Health Maintenance       Date Due Completion Date    Influenza Vaccine 08/01/2018 2/20/2018    Hemoglobin A1c 07/09/2019 7/9/2018    Colonoscopy 10/07/2019 10/7/2014    Override on 9/23/2011: Done    DEXA SCAN 05/04/2022 5/4/2017    Override on 10/18/2011: Done    Lipid Panel 07/09/2023 7/9/2018    TETANUS VACCINE 06/20/2026 6/20/2016            REVIEW OF SYSTEMS:   The patient reports good dietary habits.  The patient does not exercise regularly. (occasionally rides her stationary bicycle)  Review of Systems   Constitutional: Negative for chills, fatigue, fever and unexpected weight change.   HENT: Negative for congestion, ear discharge, ear pain and postnasal drip.    Eyes: Negative for photophobia, pain and visual disturbance.   Respiratory: Negative for cough, shortness of breath and wheezing.    Cardiovascular: Negative for chest pain, palpitations and leg swelling.   Gastrointestinal: Negative for abdominal pain, constipation, diarrhea, nausea and vomiting.   Genitourinary: Negative for dysuria, frequency, urgency and vaginal discharge.   Musculoskeletal: Negative for back pain, joint swelling and neck stiffness.   Skin: Negative for rash.   Neurological: Negative for weakness and headaches.   Psychiatric/Behavioral: Negative for dysphoric mood and sleep disturbance. The patient is not nervous/anxious.      Breast ROS: negative for breast lumps             Physical Examination:   Vitals:    08/10/18 1404   BP: 125/75   Pulse:    Temp:      Weight: 91.3 kg (201 lb 2.7 oz)   Height: 5' 3" " (160 cm)   Body mass index is 35.64 kg/m².      Patient did not require to have a chaperone present during the exam today.  General appearance - alert, well appearing, and in no distress and obese  Mental status - alert, oriented to person, place, and time, normal mood, behavior, speech, dress, motor activity, and thought processes  Eyes - pupils equal and reactive, extraocular eye movements intact, sclera anicteric  Mouth - mucous membranes moist, pharynx normal without lesions  Neck - supple, no significant adenopathy, carotids upstroke normal bilaterally, no bruits, thyroid exam: thyroid is normal in size without nodules or tenderness  Lymphatics - no palpable lymphadenopathy  Chest - clear to auscultation, no wheezes, rales or rhonchi, symmetric air entry  Heart - normal rate and regular rhythm, no gallops noted  Abdomen - soft, nontender, nondistended, no masses or organomegaly  Breasts - not examined  Back exam - full range of motion, no tenderness, palpable spasm or pain on motion  Neurological - alert, oriented, normal speech, no focal findings or movement disorder noted, cranial nerves II through XII intact  Musculoskeletal - no muscular tenderness noted, Moderate osteoarthritic changes noted to both knee joints. No joint effusions noted.   Extremities - pedal edema trace +  Skin - normal coloration and turgor, no rashes, no suspicious skin lesions noted      Results for orders placed or performed in visit on 07/09/18   CBC auto differential   Result Value Ref Range    WBC 5.25 3.90 - 12.70 K/uL    RBC 3.66 (L) 4.00 - 5.40 M/uL    Hemoglobin 10.4 (L) 12.0 - 16.0 g/dL    Hematocrit 33.7 (L) 37.0 - 48.5 %    MCV 92 82 - 98 fL    MCH 28.4 27.0 - 31.0 pg    MCHC 30.9 (L) 32.0 - 36.0 g/dL    RDW 15.2 (H) 11.5 - 14.5 %    Platelets 336 150 - 350 K/uL    MPV 10.3 9.2 - 12.9 fL    Immature Granulocytes 0.4 0.0 - 0.5 %    Gran # (ANC) 3.3 1.8 - 7.7 K/uL    Immature Grans (Abs) 0.02 0.00 - 0.04 K/uL    Lymph # 1.3  1.0 - 4.8 K/uL    Mono # 0.5 0.3 - 1.0 K/uL    Eos # 0.1 0.0 - 0.5 K/uL    Baso # 0.05 0.00 - 0.20 K/uL    nRBC 0 0 /100 WBC    Gran% 61.9 38.0 - 73.0 %    Lymph% 25.3 18.0 - 48.0 %    Mono% 9.3 4.0 - 15.0 %    Eosinophil% 2.1 0.0 - 8.0 %    Basophil% 1.0 0.0 - 1.9 %    Differential Method Automated    Comprehensive metabolic panel   Result Value Ref Range    Sodium 140 136 - 145 mmol/L    Potassium 4.1 3.5 - 5.1 mmol/L    Chloride 107 95 - 110 mmol/L    CO2 25 23 - 29 mmol/L    Glucose 95 70 - 110 mg/dL    BUN, Bld 21 8 - 23 mg/dL    Creatinine 1.3 0.5 - 1.4 mg/dL    Calcium 9.4 8.7 - 10.5 mg/dL    Total Protein 7.4 6.0 - 8.4 g/dL    Albumin 3.5 3.5 - 5.2 g/dL    Total Bilirubin 0.5 0.1 - 1.0 mg/dL    Alkaline Phosphatase 55 55 - 135 U/L    AST 21 10 - 40 U/L    ALT 16 10 - 44 U/L    Anion Gap 8 8 - 16 mmol/L    eGFR if African American 44.8 (A) >60 mL/min/1.73 m^2    eGFR if non  38.8 (A) >60 mL/min/1.73 m^2   Lipid panel   Result Value Ref Range    Cholesterol 188 120 - 199 mg/dL    Triglycerides 85 30 - 150 mg/dL    HDL 78 (H) 40 - 75 mg/dL    LDL Cholesterol 93.0 63.0 - 159.0 mg/dL    HDL/Chol Ratio 41.5 20.0 - 50.0 %    Total Cholesterol/HDL Ratio 2.4 2.0 - 5.0    Non-HDL Cholesterol 110 mg/dL   Hemoglobin A1c   Result Value Ref Range    Hemoglobin A1C 6.0 (H) 4.0 - 5.6 %    Estimated Avg Glucose 126 68 - 131 mg/dL   PTH, intact   Result Value Ref Range    PTH, Intact 87.0 (H) 9.0 - 77.0 pg/mL   Vitamin D   Result Value Ref Range    Vit D, 25-Hydroxy 26 (L) 30 - 96 ng/mL          ASSESSMENT AND PLAN:  1. Routine medical exam  Counseled on age appropriate medical preventative services including age appropriate cancer screenings, age appropriate eye and dental exams, over all nutritional health, need for a consistent exercise regimen, and an over all push towards maintaining a vigorous and active lifestyle.  Counseled on age appropriate vaccines and discussed upcoming health care needs based on  age/gender. Discussed good sleep hygiene and stress management.     2. Benign hypertension with chronic kidney disease, stage III  Discussed sodium restriction, maintaining ideal body weight and regular exercise program as physiologic means to achieve blood pressure control. The patient will strive towards this. The current medical regimen is effective;  continue present plan and medications. Recommended patient to check home readings to monitor and see me for followup as scheduled or sooner as needed. Patient was educated that both decongestant and anti-inflammatory medication may raise blood pressure. Stable kidney function. Observe. Patient counseled to avoid/minimize the use of anti-inflammatory  Medication. Discussed to stay well hydrated. Also discussed with patient that good control of blood pressure or diabetes, if present, will help to prevent progression.   - potassium chloride SA (K-DUR,KLOR-CON) 20 MEQ tablet; TAKE 1 TABLET(20 MEQ) BY MOUTH EVERY DAY  Dispense: 90 tablet; Refill: 1  - amLODIPine (NORVASC) 10 MG tablet; TAKE 1 TABLET(10 MG) BY MOUTH EVERY DAY  Dispense: 90 tablet; Refill: 1  - olmesartan-hydrochlorothiazide (BENICAR HCT) 40-25 mg per tablet; Take 1 tablet by mouth once daily.  Dispense: 90 tablet; Refill: 1    3. Hyperlipidemia with target LDL less than 100  We discussed low fat diet and regular exercise.The current medical regimen is effective;  continue present plan and medications.    - atorvastatin (LIPITOR) 20 MG tablet; Take 1 tablet (20 mg total) by mouth once daily.  Dispense: 90 tablet; Refill: 1    4. Osteoporosis, post-menopausal  We discussed adequate calcium and vitamin D supplementation. We discussed fall precautions. She is up to date on her BMD. No need for Rx tx at this time.     5. Prediabetes  Stable. We discussed low sugar/low carbohydrate diet and regular exercise to prevent progression. No need for prescription medication at this time.     6. Atherosclerosis of  aorta  Patient with Atherosclerosis of the Aorta.  Stable/asymptomatic. Currently stable on lipid lowering medication and b/p monitoring.     7. Gastroesophageal reflux disease without esophagitis  Symptoms controlled: yes. Reflux precautions discussed (eliminate tobacco if a smoker; minimize caffeine, chocolate and red/white peppermint intake; avoid heavy and spicy meals; don't lay down within 2-3 hours after eating; minimize the intake of NSAIDs). Medication as needed. Patient asked to take medication breaks, if possible - discussed chronic use can limit calcium absorption (which can lead to osteopenia/osteoporosis), increases the risk for intestinal infections, and can cause kidney damage. There are also some newer studies that show possible increased risk of mortality.     8. Secondary hyperparathyroidism of renal origin  Stable. Asymptomatic. Observe.     9. Severe obesity (BMI 35.0-39.9) with comorbidity  The patient is asked to make an attempt to improve diet and exercise patterns to aid in medical management of this problem.     10. Flu vaccine need  Advised to get flu shot this fall.    11. Advanced directives, counseling/discussion  Will bring paperwork from home regarding advanced directives.    12. Chronic midline low back pain with right-sided sciatica  Stable. Medication as needed.   - traMADol (ULTRAM) 50 mg tablet; TAKE 1 TABLET(50 MG) BY MOUTH THREE TIMES DAILY AS NEEDED FOR PAIN  Dispense: 100 tablet; Refill: 0  - DULoxetine (CYMBALTA) 60 MG capsule; TAKE 1 CAPSULE BY MOUTH ONCE DAILY. AFTER 1-2 WEEKS, IF NO RELIEF, INCREASE TO 2 CAPSULES DAILY.  Dispense: 180 capsule; Refill: 1    13. Chronic neck pain  Stable. Medication as needed.   - traMADol (ULTRAM) 50 mg tablet; TAKE 1 TABLET(50 MG) BY MOUTH THREE TIMES DAILY AS NEEDED FOR PAIN  Dispense: 100 tablet; Refill: 0          Follow-up in about 6 months (around 2/10/2019), or if symptoms worsen or fail to improve, for follow up chronic medical  conditions.. or sooner as needed.

## 2018-08-20 DIAGNOSIS — I12.9 BENIGN HYPERTENSION WITH CHRONIC KIDNEY DISEASE, STAGE III: ICD-10-CM

## 2018-08-20 DIAGNOSIS — M54.41 CHRONIC MIDLINE LOW BACK PAIN WITH RIGHT-SIDED SCIATICA: ICD-10-CM

## 2018-08-20 DIAGNOSIS — N18.30 BENIGN HYPERTENSION WITH CHRONIC KIDNEY DISEASE, STAGE III: ICD-10-CM

## 2018-08-20 DIAGNOSIS — G89.29 CHRONIC MIDLINE LOW BACK PAIN WITH RIGHT-SIDED SCIATICA: ICD-10-CM

## 2018-08-20 RX ORDER — MELOXICAM 15 MG/1
TABLET ORAL
Qty: 90 TABLET | Refills: 0 | Status: SHIPPED | OUTPATIENT
Start: 2018-08-20 | End: 2019-01-11 | Stop reason: SDUPTHER

## 2018-08-22 RX ORDER — VALSARTAN AND HYDROCHLOROTHIAZIDE 320; 25 MG/1; MG/1
1 TABLET, FILM COATED ORAL DAILY
Qty: 90 TABLET | Refills: 1 | OUTPATIENT
Start: 2018-08-22

## 2018-09-07 ENCOUNTER — CLINICAL SUPPORT (OUTPATIENT)
Dept: FAMILY MEDICINE | Facility: CLINIC | Age: 78
End: 2018-09-07
Payer: MEDICARE

## 2018-09-07 VITALS — DIASTOLIC BLOOD PRESSURE: 74 MMHG | HEART RATE: 67 BPM | SYSTOLIC BLOOD PRESSURE: 132 MMHG | OXYGEN SATURATION: 96 %

## 2018-09-07 DIAGNOSIS — Z23 NEED FOR PROPHYLACTIC VACCINATION AND INOCULATION AGAINST INFLUENZA: Primary | ICD-10-CM

## 2018-09-07 DIAGNOSIS — I12.9 BENIGN HYPERTENSION WITH CHRONIC KIDNEY DISEASE, STAGE III: ICD-10-CM

## 2018-09-07 DIAGNOSIS — N18.30 BENIGN HYPERTENSION WITH CHRONIC KIDNEY DISEASE, STAGE III: ICD-10-CM

## 2018-09-07 PROCEDURE — 99499 UNLISTED E&M SERVICE: CPT | Mod: S$PBB,,, | Performed by: INTERNAL MEDICINE

## 2018-09-07 PROCEDURE — 99212 OFFICE O/P EST SF 10 MIN: CPT | Mod: PBBFAC,25,PO

## 2018-09-07 PROCEDURE — 99999 PR PBB SHADOW E&M-EST. PATIENT-LVL II: CPT | Mod: PBBFAC,,,

## 2018-09-07 PROCEDURE — 90662 IIV NO PRSV INCREASED AG IM: CPT | Mod: PBBFAC,PO

## 2018-09-07 NOTE — PROGRESS NOTES
Rachel Asif 78 y.o. female is here today for Blood Pressure check.   History of HTN yes.    Review of patient's allergies indicates:   Allergen Reactions    No known allergies      Creatinine   Date Value Ref Range Status   07/09/2018 1.3 0.5 - 1.4 mg/dL Final     Sodium   Date Value Ref Range Status   07/09/2018 140 136 - 145 mmol/L Final     Potassium   Date Value Ref Range Status   07/09/2018 4.1 3.5 - 5.1 mmol/L Final   ]  Patient verifies taking blood pressure medications on a regular basis at the same time of the day.     Current Outpatient Medications:     amLODIPine (NORVASC) 10 MG tablet, TAKE 1 TABLET(10 MG) BY MOUTH EVERY DAY, Disp: 90 tablet, Rfl: 1    aspirin 81 MG chewable tablet, Every day, Disp: , Rfl:     atorvastatin (LIPITOR) 20 MG tablet, Take 1 tablet (20 mg total) by mouth once daily., Disp: 90 tablet, Rfl: 1    diclofenac sodium 1 % Gel, APPLY THREE TIMES DAILY, Disp: 500 g, Rfl: 0    DULoxetine (CYMBALTA) 60 MG capsule, TAKE 1 CAPSULE BY MOUTH ONCE DAILY. AFTER 1-2 WEEKS, IF NO RELIEF, INCREASE TO 2 CAPSULES DAILY., Disp: 180 capsule, Rfl: 1    gabapentin (NEURONTIN) 300 MG capsule, TAKE 1 CAPSULE BY MOUTH TWICE DAILY IN 1 TO 2 WEEKS IF NO RELIEF MAY INCREASE DOSE TO THREE TIMES DAILY, Disp: 270 capsule, Rfl: 0    meloxicam (MOBIC) 15 MG tablet, TAKE 1 TABLET BY MOUTH EVERY DAY, Disp: 90 tablet, Rfl: 0    olmesartan-hydrochlorothiazide (BENICAR HCT) 40-25 mg per tablet, Take 1 tablet by mouth once daily., Disp: 90 tablet, Rfl: 1    pantoprazole (PROTONIX) 20 MG tablet, Take 1 tablet (20 mg total) by mouth once daily. To protect stomach due to taking NSAIDs (meloxicam), Disp: 30 tablet, Rfl: 2    potassium chloride SA (K-DUR,KLOR-CON) 20 MEQ tablet, TAKE 1 TABLET(20 MEQ) BY MOUTH EVERY DAY, Disp: 90 tablet, Rfl: 1    PROPYLENE GLYCOL/ (SYSTANE OPHT), Weekly PRN, Disp: , Rfl:     traMADol (ULTRAM) 50 mg tablet, TAKE 1 TABLET(50 MG) BY MOUTH THREE TIMES DAILY AS NEEDED  FOR PAIN, Disp: 100 tablet, Rfl: 0    travoprost (TRAVATAN Z) 0.004 % Drop, Place 1 drop into both eyes every evening., Disp: 2.5 mL, Rfl: 6  Does patient have record of home blood pressure readings yes. Readings have been averaging 130's/80's.   Last dose of blood pressure medication was taken at approx 7am.  Patient is asymptomatic.   Complains of none.    Vitals:    09/07/18 0916   BP: 132/74   BP Location: Right arm   Patient Position: Sitting   BP Method: Large (Manual)   Pulse: 67   SpO2: 96%         Dr. Pace notified.     Patient tolerated flu injection well.  Instructed to wait 15 minutes after injection for monitoring. Verbalized understanding. VIS given

## 2018-09-07 NOTE — PROGRESS NOTES
Blood pressure ok. Continue current regimen. Next office visit for routine follow up as instructed at last office visit.

## 2018-10-04 ENCOUNTER — OFFICE VISIT (OUTPATIENT)
Dept: PHYSICAL MEDICINE AND REHAB | Facility: CLINIC | Age: 78
End: 2018-10-04
Payer: MEDICARE

## 2018-10-04 VITALS
HEART RATE: 86 BPM | BODY MASS INDEX: 35 KG/M2 | SYSTOLIC BLOOD PRESSURE: 142 MMHG | WEIGHT: 197.56 LBS | HEIGHT: 63 IN | DIASTOLIC BLOOD PRESSURE: 77 MMHG

## 2018-10-04 DIAGNOSIS — M47.22 OSTEOARTHRITIS OF SPINE WITH RADICULOPATHY, CERVICAL REGION: ICD-10-CM

## 2018-10-04 DIAGNOSIS — G89.29 CHRONIC MIDLINE LOW BACK PAIN WITH RIGHT-SIDED SCIATICA: Primary | ICD-10-CM

## 2018-10-04 DIAGNOSIS — M47.816 LUMBAR FACET ARTHROPATHY: ICD-10-CM

## 2018-10-04 DIAGNOSIS — E66.9 OBESITY (BMI 30.0-34.9): ICD-10-CM

## 2018-10-04 DIAGNOSIS — M54.41 CHRONIC MIDLINE LOW BACK PAIN WITH RIGHT-SIDED SCIATICA: Primary | ICD-10-CM

## 2018-10-04 DIAGNOSIS — M54.2 CHRONIC NECK PAIN: ICD-10-CM

## 2018-10-04 DIAGNOSIS — M17.0 PRIMARY OSTEOARTHRITIS OF BOTH KNEES: ICD-10-CM

## 2018-10-04 DIAGNOSIS — G89.29 CHRONIC NECK PAIN: ICD-10-CM

## 2018-10-04 PROCEDURE — 99999 PR PBB SHADOW E&M-EST. PATIENT-LVL III: CPT | Mod: PBBFAC,,, | Performed by: PHYSICAL MEDICINE & REHABILITATION

## 2018-10-04 PROCEDURE — 3078F DIAST BP <80 MM HG: CPT | Mod: CPTII,,, | Performed by: PHYSICAL MEDICINE & REHABILITATION

## 2018-10-04 PROCEDURE — 3077F SYST BP >= 140 MM HG: CPT | Mod: CPTII,,, | Performed by: PHYSICAL MEDICINE & REHABILITATION

## 2018-10-04 PROCEDURE — 1101F PT FALLS ASSESS-DOCD LE1/YR: CPT | Mod: CPTII,,, | Performed by: PHYSICAL MEDICINE & REHABILITATION

## 2018-10-04 PROCEDURE — 99214 OFFICE O/P EST MOD 30 MIN: CPT | Mod: S$PBB,,, | Performed by: PHYSICAL MEDICINE & REHABILITATION

## 2018-10-04 PROCEDURE — 99213 OFFICE O/P EST LOW 20 MIN: CPT | Mod: PBBFAC | Performed by: PHYSICAL MEDICINE & REHABILITATION

## 2018-10-04 RX ORDER — GABAPENTIN 300 MG/1
300 CAPSULE ORAL NIGHTLY
Start: 2018-10-04 | End: 2019-06-03 | Stop reason: ALTCHOICE

## 2018-10-04 RX ORDER — TRAMADOL HYDROCHLORIDE 50 MG/1
TABLET ORAL
Qty: 90 TABLET | Refills: 2 | Status: SHIPPED | OUTPATIENT
Start: 2018-10-04 | End: 2019-02-26 | Stop reason: SDUPTHER

## 2018-10-04 RX ORDER — DULOXETIN HYDROCHLORIDE 60 MG/1
60 CAPSULE, DELAYED RELEASE ORAL DAILY
Start: 2018-10-04 | End: 2019-06-03 | Stop reason: SDUPTHER

## 2018-10-04 NOTE — PROGRESS NOTES
Subjective:       Patient ID: Rachel Asif is a 78 y.o. female.    Chief Complaint: No chief complaint on file.    HPI    HISTORY OF PRESENT ILLNESS:  Ms. Asif is an 78-year-old black female with   hypertension who is followed up in the [facility] Clinic for chronic low   back pain with lumbar radiculopathy, chronic neck pain with history of cervical   radiculopathy and OA of the knees.  Her last visit to the clinic was on   03/26/2018.  She was maintained on meloxicam, gabapentin, Cymbalta, p.r.n.   tramadol and diclofenac gel.    The patient is coming to the clinic for followup.  Her back pain has been under   good control.  It is an intermittent aching pain in the lumbar spine and across   her back.  She has infrequent radiation to the right knee (once or twice per   week).  Her maximum pain is 5-6/10 and minimum 1-2/10.  Today, it is 1-2/10.    The patient denies any lower extremity weakness.  She denies any bowel or   bladder incontinence.    Her neck pain has been better.  It is an intermittent aching pain in the   cervical spine.  She has occasional shooting pain across her upper back.  She   used to have also radiation to her right hand, but this has nearly subsided.    Her maximum pain is 3-4/10 and minimum 0/10.  Today, it is 0/10.  The patient   denies any upper extremity weakness or hand numbness.  She complains of   intermittent pain in the first CMC joint bilaterally.    Her bilateral knee pain has been better.  It is an intermittent aching pain in   both knees, worse on the right.  It is worse with prolonged standing or walking   and better with rest.  Her maximum pain is 5-6/10 and minimum 1-2/10.  Today, it   is 1-2/10.    She is currently taking meloxicam 15 mg p.o. once per day, Cymbalta 60 mg p.o.   once per day, gabapentin 300 mg p.o. once per daily and tramadol 50 mg p.r.n.,   usually once or twice per day.  She uses diclofenac gel intermittently for her   knees.      MS/HN  dd:  10/04/2018 10:29:30 (CDT)  td: 10/05/2018 03:34:35 (CDT)  Doc ID   #[id number]  Job ID #[id number]    CC:         Review of Systems   Constitutional: Negative for fatigue.   Eyes: Negative for visual disturbance.   Respiratory: Negative for shortness of breath.    Cardiovascular: Negative for chest pain.   Gastrointestinal: Negative for blood in stool, constipation, nausea and vomiting.   Genitourinary: Negative for difficulty urinating.   Musculoskeletal: Positive for arthralgias, back pain and neck pain. Negative for gait problem.   Skin: Negative for rash.   Neurological: Negative for dizziness and headaches.   Psychiatric/Behavioral: Negative for behavioral problems and sleep disturbance.       Objective:      Physical Exam   Constitutional: She appears well-developed and well-nourished.   HENT:   Head: Normocephalic and atraumatic.   Neck: Normal range of motion.   Mild pain at end range.   Musculoskeletal:   BUE:  ROM:full.  Strength:    RUE: 5/5 at shoulder abduction, 5 elbow flexion, 5 elbow extension, 5 hand .   LUE: 5/5 at shoulder abduction, 5 elbow flexion, 5 elbow extension, 5 hand .  Sensation to pinprick:   RUE: intact.   LUE: intact.  DTR:    RUE: +1 biceps, +1 triceps.   LUE:  +1 biceps, +1 triceps.      BLE:  ROM:full.  Mild bilateral knee crepitus.  Strength:    RLE: 5/5 at hip flexion, 5 knee extension, 5 ankle DF, 5 PF.   LLE: 5/5 at hip flexion, 5 knee extension, 5 ankle DF, 5 PF.  Sensation to pinprick:     RLE: intact.      LLE: intact.   DTR:     RLE: +1 knee, +1 ankle.    LLE: +1 knee, +1 ankle.  SLR (sitting):      RLE: -ve.      LLE: -ve.     Mild tenderness over lumbar spine.    Gait: some waddling.     Neurological: She is alert.   Skin: Skin is warm.   Psychiatric: She has a normal mood and affect. Her behavior is normal.   Vitals reviewed.          Assessment:       1. Chronic midline low back pain with right-sided sciatica    2. Lumbar facet arthropathy    3. Chronic neck pain    4.  Osteoarthritis of spine with radiculopathy, cervical region    5. Primary osteoarthritis of both knees (moderate)    6. Obesity (BMI 30.0-34.9)        Plan:         - Continue meloxicam (MOBIC) 15 MG tablet; Take 1 tablet (15 mg total) by mouth daily as needed for Pain. 1 Tablet Oral Every day  - Continue duloxetine (CYMBALTA) 60 MG capsule; Take 1 capsule (60 mg total) by mouth once daily.  - Hold gabapentin due to only mild radicular symptoms. She may restart if symptoms worsen..  - Continue tramadol (ULTRAM) 50 mg tablet; Take 1 tablet (50 mg total) by mouth 3 (three) times daily as needed for Pain.  - Continue diclofenac sodium (VOLTAREN) 1 % Gel; Apply topically 3 (three) times daily.  - Regular home exercise program was encouraged.  - Referral to hand Clinic may be done if bilateral CMC pain gets worse.  - Weight loss was encouraged.  - Follow-up in about 6 months (around 4/4/2019).      This was a 25 minute visit, more than 50% of which was spent counseling the patient about the diagnosis and the treatment plan including medication adjustment, weight loss and exercise.  .

## 2018-11-26 DIAGNOSIS — I12.9 BENIGN HYPERTENSION WITH CHRONIC KIDNEY DISEASE, STAGE III: ICD-10-CM

## 2018-11-26 DIAGNOSIS — N18.30 BENIGN HYPERTENSION WITH CHRONIC KIDNEY DISEASE, STAGE III: ICD-10-CM

## 2018-11-26 RX ORDER — OLMESARTAN MEDOXOMIL AND HYDROCHLOROTHIAZIDE 40/25 40; 25 MG/1; MG/1
TABLET ORAL
Qty: 90 TABLET | Refills: 0 | Status: SHIPPED | OUTPATIENT
Start: 2018-11-26 | End: 2019-02-26 | Stop reason: SDUPTHER

## 2018-11-27 DIAGNOSIS — K21.9 GASTROESOPHAGEAL REFLUX DISEASE WITHOUT ESOPHAGITIS: ICD-10-CM

## 2018-11-27 RX ORDER — PANTOPRAZOLE SODIUM 20 MG/1
20 TABLET, DELAYED RELEASE ORAL DAILY
Qty: 30 TABLET | Refills: 2 | Status: SHIPPED | OUTPATIENT
Start: 2018-11-27 | End: 2019-09-19 | Stop reason: SDUPTHER

## 2019-01-11 DIAGNOSIS — E78.5 HYPERLIPIDEMIA WITH TARGET LDL LESS THAN 100: ICD-10-CM

## 2019-01-11 DIAGNOSIS — M54.41 CHRONIC MIDLINE LOW BACK PAIN WITH RIGHT-SIDED SCIATICA: ICD-10-CM

## 2019-01-11 DIAGNOSIS — G89.29 CHRONIC MIDLINE LOW BACK PAIN WITH RIGHT-SIDED SCIATICA: ICD-10-CM

## 2019-01-11 RX ORDER — MELOXICAM 15 MG/1
TABLET ORAL
Qty: 90 TABLET | Refills: 0 | Status: SHIPPED | OUTPATIENT
Start: 2019-01-11 | End: 2019-06-03 | Stop reason: SDUPTHER

## 2019-01-11 RX ORDER — ATORVASTATIN CALCIUM 20 MG/1
TABLET, FILM COATED ORAL
Qty: 90 TABLET | Refills: 0 | Status: SHIPPED | OUTPATIENT
Start: 2019-01-11 | End: 2019-02-26 | Stop reason: SDUPTHER

## 2019-01-14 ENCOUNTER — OFFICE VISIT (OUTPATIENT)
Dept: FAMILY MEDICINE | Facility: CLINIC | Age: 79
End: 2019-01-14
Payer: MEDICARE

## 2019-01-14 VITALS
HEART RATE: 62 BPM | OXYGEN SATURATION: 98 % | DIASTOLIC BLOOD PRESSURE: 48 MMHG | HEIGHT: 63 IN | WEIGHT: 201.75 LBS | TEMPERATURE: 99 F | BODY MASS INDEX: 35.75 KG/M2 | SYSTOLIC BLOOD PRESSURE: 112 MMHG

## 2019-01-14 DIAGNOSIS — Z23 NEED FOR SHINGLES VACCINE: ICD-10-CM

## 2019-01-14 DIAGNOSIS — I70.0 ATHEROSCLEROSIS OF AORTA: ICD-10-CM

## 2019-01-14 DIAGNOSIS — E66.01 SEVERE OBESITY (BMI 35.0-39.9) WITH COMORBIDITY: ICD-10-CM

## 2019-01-14 DIAGNOSIS — N18.30 BENIGN HYPERTENSION WITH CHRONIC KIDNEY DISEASE, STAGE III: Primary | ICD-10-CM

## 2019-01-14 DIAGNOSIS — N25.81 SECONDARY HYPERPARATHYROIDISM OF RENAL ORIGIN: ICD-10-CM

## 2019-01-14 DIAGNOSIS — M81.0 OSTEOPOROSIS, POST-MENOPAUSAL: ICD-10-CM

## 2019-01-14 DIAGNOSIS — Z71.89 ADVANCED DIRECTIVES, COUNSELING/DISCUSSION: ICD-10-CM

## 2019-01-14 DIAGNOSIS — E78.5 HYPERLIPIDEMIA WITH TARGET LDL LESS THAN 100: ICD-10-CM

## 2019-01-14 DIAGNOSIS — I12.9 BENIGN HYPERTENSION WITH CHRONIC KIDNEY DISEASE, STAGE III: Primary | ICD-10-CM

## 2019-01-14 DIAGNOSIS — R73.03 PREDIABETES: ICD-10-CM

## 2019-01-14 DIAGNOSIS — K21.9 GASTROESOPHAGEAL REFLUX DISEASE WITHOUT ESOPHAGITIS: ICD-10-CM

## 2019-01-14 PROCEDURE — 99999 PR PBB SHADOW E&M-EST. PATIENT-LVL III: CPT | Mod: PBBFAC,,, | Performed by: INTERNAL MEDICINE

## 2019-01-14 PROCEDURE — 3078F DIAST BP <80 MM HG: CPT | Mod: CPTII,S$GLB,, | Performed by: INTERNAL MEDICINE

## 2019-01-14 PROCEDURE — 1101F PR PT FALLS ASSESS DOC 0-1 FALLS W/OUT INJ PAST YR: ICD-10-PCS | Mod: CPTII,S$GLB,, | Performed by: INTERNAL MEDICINE

## 2019-01-14 PROCEDURE — 99214 PR OFFICE/OUTPT VISIT, EST, LEVL IV, 30-39 MIN: ICD-10-PCS | Mod: S$GLB,,, | Performed by: INTERNAL MEDICINE

## 2019-01-14 PROCEDURE — 99999 PR PBB SHADOW E&M-EST. PATIENT-LVL III: ICD-10-PCS | Mod: PBBFAC,,, | Performed by: INTERNAL MEDICINE

## 2019-01-14 PROCEDURE — 99214 OFFICE O/P EST MOD 30 MIN: CPT | Mod: S$GLB,,, | Performed by: INTERNAL MEDICINE

## 2019-01-14 PROCEDURE — 3074F PR MOST RECENT SYSTOLIC BLOOD PRESSURE < 130 MM HG: ICD-10-PCS | Mod: CPTII,S$GLB,, | Performed by: INTERNAL MEDICINE

## 2019-01-14 PROCEDURE — 3078F PR MOST RECENT DIASTOLIC BLOOD PRESSURE < 80 MM HG: ICD-10-PCS | Mod: CPTII,S$GLB,, | Performed by: INTERNAL MEDICINE

## 2019-01-14 PROCEDURE — 1101F PT FALLS ASSESS-DOCD LE1/YR: CPT | Mod: CPTII,S$GLB,, | Performed by: INTERNAL MEDICINE

## 2019-01-14 PROCEDURE — 3074F SYST BP LT 130 MM HG: CPT | Mod: CPTII,S$GLB,, | Performed by: INTERNAL MEDICINE

## 2019-01-14 NOTE — PROGRESS NOTES
HISTORY OF PRESENT ILLNESS:  Rachel Asif is a 78 y.o. female who presents to the clinic today for Hypertension  .   The patient presents to clinic today for follow-up of her hypertension complicated by chronic kidney disease stage 3, hyperlipidemia, and prediabetes.  Hypertension: The patient reports that they check their blood pressures - not done The patient  is not enrolled in the digital hypertension program. The patient denies  cardiac chest pain, shortness of breath, lower extremity edema, headaches and side effects of medication. The patient reports problems with  none. The patient  has been compliant with the current medication regimen.  The patient : tries to follow a low salt diet.  Hyperlipidemia: Patient reports that they have been compliant with low fat diet and regular exercise. Patient reports that they have been compliant with their medication regimen and deny any problems/side effects from the medication.  She reports minimal problems with her reflux.  She takes Protonix only occasionally as needed.      PAST MEDICAL HISTORY:  Past Medical History:   Diagnosis Date    Amblyopia     os    Anemia of other chronic disease     Atherosclerosis of aorta     noted on L-spine X-ray 4/14/2011    Atherosclerosis of aortic arch     - noted on CXR 5/2017    Chronic low back pain     followed by orthopaedics    DJD (degenerative joint disease)     Glaucoma     History of colonic polyps     History of vitamin D deficiency     Hyperlipidemia     Hypertension     Lumbar radiculopathy     Obesity     Obesity     OH (ocular hypertension)     Osteoarthritis     Osteoporosis, post-menopausal     currently on a drug holiday    Postmenopausal status     Prediabetes     Secondary hyperparathyroidism of renal origin     Trochanteric bursitis        PAST SURGICAL HISTORY:  Past Surgical History:   Procedure Laterality Date    CATARACT EXTRACTION W/  INTRAOCULAR LENS IMPLANT Bilateral      Colonoscopy N/A 10/7/2014    Performed by Clyde Cohen MD at [facility]       SOCIAL HISTORY:  Social History     Socioeconomic History    Marital status:      Spouse name: Not on file    Number of children: Not on file    Years of education: Not on file    Highest education level: Not on file   Social Needs    Financial resource strain: Not on file    Food insecurity - worry: Not on file    Food insecurity - inability: Not on file    Transportation needs - medical: Not on file    Transportation needs - non-medical: Not on file   Occupational History    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 She is taking care of a sister who has brain damage from a car accident.       FAMILY HISTORY:  Family History   Problem Relation Age of Onset    Cataracts Mother     Hypertension Mother     Other Mother         complications from splenectomy after fall    Cataracts Father     Hypertension Father     Hypertension Sister     Edema Sister     Hypertension Brother     Diabetes Brother     Heart disease Brother     Glaucoma Maternal Grandmother     Hypertension Sister     Other Sister         shingles    Lupus Sister     Lung cancer Sister     Hypertension Sister     Other Sister         brain damage from MVA    Hypertension Sister     Hypertension Brother     Heart disease Brother     Hypertension Brother     Heart attack Brother     Hypertension Brother     Hypertension Brother     No Known Problems Brother     No Known Problems Brother     Hypertension Brother     Heart disease Brother     Cancer Neg Hx     Amblyopia Neg Hx     Blindness Neg Hx     Retinal detachment Neg Hx     Strabismus Neg Hx     Stroke Neg  Hx        ALLERGIES AND MEDICATIONS: updated and reviewed.  Review of patient's allergies indicates:   Allergen Reactions    No known allergies         Medication List           Accurate as of 1/14/19 10:32 AM. If you have any questions, ask your nurse or doctor.               CONTINUE taking these medications    amLODIPine 10 MG tablet  Commonly known as:  NORVASC  TAKE 1 TABLET(10 MG) BY MOUTH EVERY DAY     aspirin 81 MG Chew     atorvastatin 20 MG tablet  Commonly known as:  LIPITOR  TAKE 1 TABLET(20 MG) BY MOUTH EVERY DAY     diclofenac sodium 1 % Gel  Commonly known as:  VOLTAREN  APPLY THREE TIMES DAILY     DULoxetine 60 MG capsule  Commonly known as:  CYMBALTA  Take 1 capsule (60 mg total) by mouth once daily.     gabapentin 300 MG capsule  Commonly known as:  NEURONTIN  Take 1 capsule (300 mg total) by mouth every evening.     meloxicam 15 MG tablet  Commonly known as:  MOBIC  TAKE 1 TABLET BY MOUTH EVERY DAY     olmesartan-hydrochlorothiazide 40-25 mg per tablet  Commonly known as:  BENICAR HCT  TAKE 1 TABLET BY MOUTH EVERY DAY     pantoprazole 20 MG tablet  Commonly known as:  PROTONIX  Take 1 tablet (20 mg total) by mouth once daily. To protect stomach due to taking NSAIDs (meloxicam)     potassium chloride SA 20 MEQ tablet  Commonly known as:  K-DUR,KLOR-CON  TAKE 1 TABLET(20 MEQ) BY MOUTH EVERY DAY     SYSTANE OPHT     traMADol 50 mg tablet  Commonly known as:  ULTRAM  TAKE 1 TABLET(50 MG) BY MOUTH THREE TIMES DAILY AS NEEDED FOR PAIN     travoprost 0.004 % ophthalmic solution  Commonly known as:  TRAVATAN Z  Place 1 drop into both eyes every evening.               CARE TEAM:  Patient Care Team:  Oneyda Pace MD as PCP - General (Internal Medicine)         REVIEW OF SYSTEMS:  Review of Systems   Constitutional: Negative for chills, fatigue, fever and unexpected weight change.   HENT: Negative for congestion, ear discharge, ear pain and postnasal drip.    Eyes: Negative for photophobia, pain and  "visual disturbance.   Respiratory: Negative for cough, shortness of breath and wheezing.    Cardiovascular: Negative for chest pain, palpitations and leg swelling.   Gastrointestinal: Negative for abdominal pain, constipation, diarrhea, nausea and vomiting.   Genitourinary: Negative for dysuria, frequency, urgency and vaginal discharge.   Musculoskeletal: Negative for back pain, joint swelling and neck stiffness.   Skin: Negative for rash.   Neurological: Negative for weakness and headaches.   Psychiatric/Behavioral: Negative for dysphoric mood and sleep disturbance. The patient is not nervous/anxious.          PHYSICAL EXAM:   Vitals:    01/14/19 1004   BP: (!) 112/48   Pulse: 62   Temp: 98.7 °F (37.1 °C)     Weight: 91.5 kg (201 lb 11.5 oz)   Height: 5' 3" (160 cm)   Body mass index is 35.73 kg/m².     General appearance - alert, well appearing, and in no distress and obese  Mental status - alert, oriented to person, place, and time, normal mood, behavior, speech, dress, motor activity, and thought processes  Eyes - pupils equal and reactive, extraocular eye movements intact, sclera anicteric  Mouth - mucous membranes moist, pharynx normal without lesions  Neck - supple, no significant adenopathy, carotids upstroke normal bilaterally, no bruits  Lymphatics - no palpable lymphadenopathy  Chest - clear to auscultation, no wheezes, rales or rhonchi, symmetric air entry  Heart - normal rate and regular rhythm, no gallops noted  Neurological - alert, oriented, normal speech, no focal findings or movement disorder noted, cranial nerves II through XII intact  Musculoskeletal - no muscular tenderness noted, Mild-Moderate osteoarthritic changes noted to both knee joints. No joint effusions noted.   Extremities - mild bilateral LE lymphedema noted  Skin - normal coloration and turgor, no rashes, no suspicious skin lesions noted      ASSESSMENT AND PLAN:  1. Benign hypertension with chronic kidney disease, stage " III  Discussed sodium restriction, maintaining ideal body weight and regular exercise program as physiologic means to achieve blood pressure control. The patient will strive towards this. The current medical regimen is effective;  continue present plan and medications. Recommended patient to check home readings to monitor and see me for followup as scheduled or sooner as needed. Patient was educated that both decongestant and anti-inflammatory medication may raise blood pressure. Stable decreased kidney function. Observe. Patient counseled to avoid/minimize the use of anti-inflammatory  Medication. Discussed to stay well hydrated. Also discussed with patient that good control of blood pressure and/or diabetes, if present, will help to prevent progression.     2. Hyperlipidemia with target LDL less than 100  We discussed low fat diet and regular exercise.The current medical regimen is effective;  continue present plan and medications.      3. Osteoporosis, post-menopausal  We discussed adequate calcium and vitamin D supplementation. We discussed fall precautions. She is up to date on her BMD. No need for prescription medication at this time     4. Prediabetes  Stable. We discussed low sugar/low carbohydrate diet and regular exercise to prevent progression. No need for prescription medication at this time.     5. Atherosclerosis of aorta  Patient with Atherosclerosis of the Aorta.  Stable/asymptomatic. Currently stable on lipid lowering medication and b/p monitoring.     6. Secondary hyperparathyroidism of renal origin  Stable. Asymptomatic. Observe.     7. Gastroesophageal reflux disease without esophagitis  Symptoms controlled: yes. Reflux precautions discussed (eliminate tobacco if a smoker; minimize caffeine, chocolate and red/white peppermint intake; avoid heavy and spicy meals; don't lay down within 2-3 hours after eating; minimize the intake of NSAIDs). Medication as needed. Patient asked to take medication  breaks, if possible - discussed chronic use can limit calcium absorption (which can lead to osteopenia/osteoporosis), increases the risk for intestinal infections, and can cause kidney damage. There are also some newer studies that show possible increased risk of mortality.     8. Severe obesity (BMI 35.0-39.9) with comorbidity  The patient is asked to make an attempt to improve diet and exercise patterns to aid in medical management of this problem.     9. Need for shingles vaccine  Patient was advised to get immunization at the pharmacy.     10. Advanced directives, counseling/discussion  Patient reports they have previously completed advance directives and they will bring paperwork from home at their earliest convenience so they can be scanned into their chart.            Follow-up in about 6 months (around 7/14/2019), or if symptoms worsen or fail to improve, for annual exam. or sooner as needed.

## 2019-01-15 DIAGNOSIS — N18.30 BENIGN HYPERTENSION WITH CHRONIC KIDNEY DISEASE, STAGE III: ICD-10-CM

## 2019-01-15 DIAGNOSIS — I12.9 BENIGN HYPERTENSION WITH CHRONIC KIDNEY DISEASE, STAGE III: ICD-10-CM

## 2019-01-15 RX ORDER — VALSARTAN AND HYDROCHLOROTHIAZIDE 320; 25 MG/1; MG/1
1 TABLET, FILM COATED ORAL DAILY
Qty: 90 TABLET | Refills: 1 | OUTPATIENT
Start: 2019-01-15

## 2019-01-15 RX ORDER — VALSARTAN AND HYDROCHLOROTHIAZIDE 320; 25 MG/1; MG/1
1 TABLET, FILM COATED ORAL DAILY
Qty: 90 TABLET | Refills: 0 | Status: CANCELLED | OUTPATIENT
Start: 2019-01-15

## 2019-01-18 ENCOUNTER — OFFICE VISIT (OUTPATIENT)
Dept: OPHTHALMOLOGY | Facility: CLINIC | Age: 79
End: 2019-01-18
Payer: MEDICARE

## 2019-01-18 DIAGNOSIS — Z96.1 PSEUDOPHAKIA: ICD-10-CM

## 2019-01-18 DIAGNOSIS — H43.813 VITREOUS DETACHMENT, BILATERAL: ICD-10-CM

## 2019-01-18 DIAGNOSIS — I10 ESSENTIAL HYPERTENSION: ICD-10-CM

## 2019-01-18 DIAGNOSIS — H40.053 OCULAR HYPERTENSION, BILATERAL: Primary | ICD-10-CM

## 2019-01-18 DIAGNOSIS — H04.123 DRY EYE SYNDROME, BILATERAL: ICD-10-CM

## 2019-01-18 DIAGNOSIS — H53.002 AMBLYOPIA, LEFT: ICD-10-CM

## 2019-01-18 PROCEDURE — 92014 PR EYE EXAM, EST PATIENT,COMPREHESV: ICD-10-PCS | Mod: S$GLB,,, | Performed by: OPHTHALMOLOGY

## 2019-01-18 PROCEDURE — 99999 PR PBB SHADOW E&M-EST. PATIENT-LVL II: ICD-10-PCS | Mod: PBBFAC,,, | Performed by: OPHTHALMOLOGY

## 2019-01-18 PROCEDURE — 92014 COMPRE OPH EXAM EST PT 1/>: CPT | Mod: S$GLB,,, | Performed by: OPHTHALMOLOGY

## 2019-01-18 PROCEDURE — 99999 PR PBB SHADOW E&M-EST. PATIENT-LVL II: CPT | Mod: PBBFAC,,, | Performed by: OPHTHALMOLOGY

## 2019-01-18 RX ORDER — TRAVOPROST OPHTHALMIC SOLUTION 0.04 MG/ML
1 SOLUTION OPHTHALMIC NIGHTLY
Qty: 2.5 ML | Refills: 6 | Status: SHIPPED | OUTPATIENT
Start: 2019-01-18 | End: 2020-07-02 | Stop reason: SDUPTHER

## 2019-01-18 NOTE — PROGRESS NOTES
Subjective:       Patient ID: Rachel Asif is a 78 y.o. female.    Chief Complaint: Glaucoma    HPI     DSL- 5/18/18     79 y/o female is here for IOP and DFE. Pt states no Va change . Pt is   doing Travatan as directed. Refills needed.     Eyemeds  Travatan BID OU    Last edited by Daniela Forbes on 1/18/2019  2:27 PM. (History)             Assessment:       1. Ocular hypertension, bilateral    2. Dry eye syndrome, bilateral    3. Vitreous detachment, bilateral    4. Essential hypertension    5. Amblyopia, left    6. Pseudophakia - Both Eyes        Plan:       OHT OU-IOP's stable OU. ON's appear stable OU.  SABINO-Doing well.  PVD's OU-Stable.  HTN-No retinopathy OU.  Amblyopia OS-Stable.      Cont Travatan Z OU.  AT's.  Control HTN.  RTC 6 mos for IOP's, HVF's & OCT's.

## 2019-02-13 ENCOUNTER — TELEPHONE (OUTPATIENT)
Dept: PHYSICAL MEDICINE AND REHAB | Facility: CLINIC | Age: 79
End: 2019-02-13

## 2019-02-18 ENCOUNTER — PES CALL (OUTPATIENT)
Dept: ADMINISTRATIVE | Facility: CLINIC | Age: 79
End: 2019-02-18

## 2019-02-26 ENCOUNTER — TELEPHONE (OUTPATIENT)
Dept: FAMILY MEDICINE | Facility: CLINIC | Age: 79
End: 2019-02-26

## 2019-02-26 DIAGNOSIS — M54.2 CHRONIC NECK PAIN: ICD-10-CM

## 2019-02-26 DIAGNOSIS — I12.9 BENIGN HYPERTENSION WITH CHRONIC KIDNEY DISEASE, STAGE III: ICD-10-CM

## 2019-02-26 DIAGNOSIS — M54.41 CHRONIC MIDLINE LOW BACK PAIN WITH RIGHT-SIDED SCIATICA: ICD-10-CM

## 2019-02-26 DIAGNOSIS — E78.5 HYPERLIPIDEMIA WITH TARGET LDL LESS THAN 100: ICD-10-CM

## 2019-02-26 DIAGNOSIS — G89.29 CHRONIC NECK PAIN: ICD-10-CM

## 2019-02-26 DIAGNOSIS — G89.29 CHRONIC MIDLINE LOW BACK PAIN WITH RIGHT-SIDED SCIATICA: ICD-10-CM

## 2019-02-26 DIAGNOSIS — N18.30 BENIGN HYPERTENSION WITH CHRONIC KIDNEY DISEASE, STAGE III: ICD-10-CM

## 2019-02-26 RX ORDER — AMLODIPINE BESYLATE 10 MG/1
TABLET ORAL
Qty: 90 TABLET | Refills: 1 | Status: SHIPPED | OUTPATIENT
Start: 2019-02-26 | End: 2019-08-30 | Stop reason: SDUPTHER

## 2019-02-26 RX ORDER — POTASSIUM CHLORIDE 20 MEQ/1
TABLET, EXTENDED RELEASE ORAL
Qty: 90 TABLET | Refills: 1 | Status: SHIPPED | OUTPATIENT
Start: 2019-02-26 | End: 2019-08-30 | Stop reason: SDUPTHER

## 2019-02-26 RX ORDER — ATORVASTATIN CALCIUM 20 MG/1
TABLET, FILM COATED ORAL
Qty: 90 TABLET | Refills: 1 | Status: SHIPPED | OUTPATIENT
Start: 2019-02-26 | End: 2019-08-30 | Stop reason: SDUPTHER

## 2019-02-26 RX ORDER — OLMESARTAN MEDOXOMIL AND HYDROCHLOROTHIAZIDE 40/25 40; 25 MG/1; MG/1
1 TABLET ORAL DAILY
Qty: 90 TABLET | Refills: 1 | Status: SHIPPED | OUTPATIENT
Start: 2019-02-26 | End: 2019-08-30 | Stop reason: SDUPTHER

## 2019-02-26 RX ORDER — TRAMADOL HYDROCHLORIDE 50 MG/1
TABLET ORAL
Qty: 90 TABLET | Refills: 0 | Status: SHIPPED | OUTPATIENT
Start: 2019-02-26 | End: 2019-05-07 | Stop reason: SDUPTHER

## 2019-02-26 NOTE — TELEPHONE ENCOUNTER
Spoke with patient: stated she needed all medications filled 'that I take in the morning', but could not name them. Sent Rx for her routine medications. Also asked for refill on Tramadol (I do not normally prescribe but took care of this one time). Patient to call back if I missed any medications.

## 2019-02-26 NOTE — TELEPHONE ENCOUNTER
----- Message from Leyla Archibald sent at 2/26/2019  2:27 PM CST -----  Contact: Self   Type: RX Refill Request    Who Called: Self     Refill or New Rx: refill    RX Name and Strength: patient is unaware but says the office will know     How is the patient currently taking it? (ex. 1XDay):    Is this a 30 day or 90 day RX: 90 days     Preferred Pharmacy with phone number:   [id number] - [place] [address] AT [address]    Local or Mail Order: local    Ordering Provider:david Alexander Call Back Number: [phone]    Additional Information:

## 2019-05-07 DIAGNOSIS — M54.41 CHRONIC MIDLINE LOW BACK PAIN WITH RIGHT-SIDED SCIATICA: ICD-10-CM

## 2019-05-07 DIAGNOSIS — G89.29 CHRONIC NECK PAIN: ICD-10-CM

## 2019-05-07 DIAGNOSIS — M54.2 CHRONIC NECK PAIN: ICD-10-CM

## 2019-05-07 DIAGNOSIS — G89.29 CHRONIC MIDLINE LOW BACK PAIN WITH RIGHT-SIDED SCIATICA: ICD-10-CM

## 2019-05-07 RX ORDER — TRAMADOL HYDROCHLORIDE 50 MG/1
TABLET ORAL
Qty: 90 TABLET | Refills: 0 | Status: SHIPPED | OUTPATIENT
Start: 2019-05-07 | End: 2019-08-30 | Stop reason: SDUPTHER

## 2019-05-17 ENCOUNTER — PES CALL (OUTPATIENT)
Dept: ADMINISTRATIVE | Facility: CLINIC | Age: 79
End: 2019-05-17

## 2019-06-03 ENCOUNTER — OFFICE VISIT (OUTPATIENT)
Dept: PHYSICAL MEDICINE AND REHAB | Facility: CLINIC | Age: 79
End: 2019-06-03
Payer: MEDICARE

## 2019-06-03 ENCOUNTER — TELEPHONE (OUTPATIENT)
Dept: OPHTHALMOLOGY | Facility: CLINIC | Age: 79
End: 2019-06-03

## 2019-06-03 VITALS
BODY MASS INDEX: 35.39 KG/M2 | DIASTOLIC BLOOD PRESSURE: 60 MMHG | HEART RATE: 85 BPM | WEIGHT: 199.75 LBS | SYSTOLIC BLOOD PRESSURE: 135 MMHG | HEIGHT: 63 IN

## 2019-06-03 DIAGNOSIS — G89.29 CHRONIC NECK PAIN: ICD-10-CM

## 2019-06-03 DIAGNOSIS — M54.41 CHRONIC MIDLINE LOW BACK PAIN WITH RIGHT-SIDED SCIATICA: Primary | ICD-10-CM

## 2019-06-03 DIAGNOSIS — M47.816 LUMBAR FACET ARTHROPATHY: ICD-10-CM

## 2019-06-03 DIAGNOSIS — G89.29 CHRONIC MIDLINE LOW BACK PAIN WITH RIGHT-SIDED SCIATICA: Primary | ICD-10-CM

## 2019-06-03 DIAGNOSIS — M17.0 PRIMARY OSTEOARTHRITIS OF BOTH KNEES: ICD-10-CM

## 2019-06-03 DIAGNOSIS — M25.561 CHRONIC PAIN OF RIGHT KNEE: ICD-10-CM

## 2019-06-03 DIAGNOSIS — E66.9 OBESITY (BMI 30.0-34.9): ICD-10-CM

## 2019-06-03 DIAGNOSIS — M54.2 CHRONIC NECK PAIN: ICD-10-CM

## 2019-06-03 DIAGNOSIS — G89.29 CHRONIC PAIN OF RIGHT KNEE: ICD-10-CM

## 2019-06-03 DIAGNOSIS — M47.22 OSTEOARTHRITIS OF SPINE WITH RADICULOPATHY, CERVICAL REGION: ICD-10-CM

## 2019-06-03 PROCEDURE — 1101F PR PT FALLS ASSESS DOC 0-1 FALLS W/OUT INJ PAST YR: ICD-10-PCS | Mod: CPTII,S$GLB,, | Performed by: PHYSICAL MEDICINE & REHABILITATION

## 2019-06-03 PROCEDURE — 99999 PR PBB SHADOW E&M-EST. PATIENT-LVL II: ICD-10-PCS | Mod: PBBFAC,,, | Performed by: PHYSICAL MEDICINE & REHABILITATION

## 2019-06-03 PROCEDURE — 3075F PR MOST RECENT SYSTOLIC BLOOD PRESS GE 130-139MM HG: ICD-10-PCS | Mod: CPTII,S$GLB,, | Performed by: PHYSICAL MEDICINE & REHABILITATION

## 2019-06-03 PROCEDURE — 99214 OFFICE O/P EST MOD 30 MIN: CPT | Mod: S$GLB,,, | Performed by: PHYSICAL MEDICINE & REHABILITATION

## 2019-06-03 PROCEDURE — 99214 PR OFFICE/OUTPT VISIT, EST, LEVL IV, 30-39 MIN: ICD-10-PCS | Mod: S$GLB,,, | Performed by: PHYSICAL MEDICINE & REHABILITATION

## 2019-06-03 PROCEDURE — 3075F SYST BP GE 130 - 139MM HG: CPT | Mod: CPTII,S$GLB,, | Performed by: PHYSICAL MEDICINE & REHABILITATION

## 2019-06-03 PROCEDURE — 1101F PT FALLS ASSESS-DOCD LE1/YR: CPT | Mod: CPTII,S$GLB,, | Performed by: PHYSICAL MEDICINE & REHABILITATION

## 2019-06-03 PROCEDURE — 99999 PR PBB SHADOW E&M-EST. PATIENT-LVL II: CPT | Mod: PBBFAC,,, | Performed by: PHYSICAL MEDICINE & REHABILITATION

## 2019-06-03 PROCEDURE — 3078F PR MOST RECENT DIASTOLIC BLOOD PRESSURE < 80 MM HG: ICD-10-PCS | Mod: CPTII,S$GLB,, | Performed by: PHYSICAL MEDICINE & REHABILITATION

## 2019-06-03 PROCEDURE — 3078F DIAST BP <80 MM HG: CPT | Mod: CPTII,S$GLB,, | Performed by: PHYSICAL MEDICINE & REHABILITATION

## 2019-06-03 RX ORDER — DULOXETIN HYDROCHLORIDE 60 MG/1
60 CAPSULE, DELAYED RELEASE ORAL DAILY
Qty: 90 CAPSULE | Refills: 1 | Status: SHIPPED | OUTPATIENT
Start: 2019-06-03 | End: 2019-12-18 | Stop reason: SDUPTHER

## 2019-06-03 RX ORDER — DICLOFENAC SODIUM 10 MG/G
GEL TOPICAL 3 TIMES DAILY PRN
Qty: 500 G | Refills: 2 | Status: SHIPPED | OUTPATIENT
Start: 2019-06-03 | End: 2019-12-18 | Stop reason: SDUPTHER

## 2019-06-03 RX ORDER — MELOXICAM 15 MG/1
15 TABLET ORAL DAILY
Qty: 90 TABLET | Refills: 1 | Status: SHIPPED | OUTPATIENT
Start: 2019-06-03 | End: 2019-12-18 | Stop reason: SDUPTHER

## 2019-06-03 NOTE — PROGRESS NOTES
Subjective:       Patient ID: Rachel Asif is a 78 y.o. female.    Chief Complaint: No chief complaint on file.    HPI    HISTORY OF PRESENT ILLNESS:  Ms. Asif is an 78-year-old black female with hypertension who is followed up in the Physical Medicine Clinic for chronic low back pain with lumbar radiculopathy, chronic neck pain with history of cervical radiculopathy and OA of the knees.  Her last visit to the clinic was on 10/4/18.  She was maintained on meloxicam, gabapentin, Cymbalta, p.r.n. tramadol and diclofenac gel.    The patient is coming to the clinic for followup.  Her back pain has been under good control.  It is an intermittent aching pain in the lumbar spine and across   her back.  It is usually localized.  Her maximum pain is 8/10 and minimum 1/10.  Today, it is 1/10.  The patient denies any lower extremity weakness or numbness.  She denies any bowel or bladder incontinence.    Her neck pain has been under good control.  It is an intermittent aching pain in the cervical spine.  It is localize.  Her maximum pain is 3-4/10 and minimum 0/10.  Today, it is 0/10.  The patient denies any upper extremity weakness or hand numbness.      Her bilateral knee pain has been better.  It is an intermittent aching pain in both knees, recently worse on the left.  It is worse with prolonged standing or walking   and better with rest.  Her maximum pain is 5-6/10 and minimum 1-2/10.  Today, it is 1-2/10.    She is currently taking meloxicam 15 mg p.o. once per day, Cymbalta 60 mg p.o. once per day and tramadol 50 mg p.r.n., usually twice per day.  She uses diclofenac gel intermittently for her knees. She takes gabapentin few times per month as needed.        Review of Systems   Constitutional: Negative for fatigue.   Eyes: Positive for visual disturbance.   Respiratory: Negative for shortness of breath.    Cardiovascular: Negative for chest pain.   Gastrointestinal: Negative for blood in stool, constipation,  nausea and vomiting.   Genitourinary: Negative for difficulty urinating.   Musculoskeletal: Positive for arthralgias, back pain and neck pain. Negative for gait problem.   Skin: Negative for rash.   Neurological: Negative for dizziness and headaches.   Psychiatric/Behavioral: Negative for behavioral problems and sleep disturbance.       Objective:      Physical Exam   Constitutional: She appears well-developed and well-nourished.   HENT:   Head: Normocephalic and atraumatic.   Neck: Normal range of motion.   Mild pain at end range.   Musculoskeletal:   BUE:  ROM:full.  Strength:    RUE: 5/5 at shoulder abduction, 5 elbow flexion, 5 elbow extension, 5 hand .   LUE: 5/5 at shoulder abduction, 5 elbow flexion, 5 elbow extension, 5 hand .  Sensation to pinprick:   RUE: intact.   LUE: intact.  DTR:    RUE: +1 biceps, +1 triceps.   LUE:  +1 biceps, +1 triceps.      BLE:  ROM:full.  Mild bilateral knee crepitus.  Strength:    RLE: 5/5 at hip flexion, 5 knee extension, 5 ankle DF, 5 PF.   LLE: 5/5 at hip flexion, 5 knee extension, 5 ankle DF, 5 PF.  Sensation to pinprick:     RLE: intact.      LLE: intact.   DTR:     RLE: +1 knee, +1 ankle.    LLE: +1 knee, +1 ankle.  SLR (sitting):      RLE: -ve.      LLE: -ve.     -ve tenderness over lumbar spine.    Gait: some waddling.     Neurological: She is alert.   Skin: Skin is warm.   Psychiatric: She has a normal mood and affect. Her behavior is normal.   Vitals reviewed.          Assessment:       1. Chronic midline low back pain with right-sided sciatica    2. Lumbar facet arthropathy    3. Chronic neck pain    4. Osteoarthritis of spine with radiculopathy, cervical region    5. Primary osteoarthritis of both knees (moderate)    6. Obesity (BMI 30.0-34.9)        Plan:         - Continue meloxicam (MOBIC) 15 MG tablet; Take 1 tablet (15 mg total) by mouth daily as needed for Pain. 1 Tablet Oral Every day  - Continue duloxetine (CYMBALTA) 60 MG capsule; Take 1 capsule (60  mg total) by mouth once daily.  - Hold gabapentin due to only mild radicular symptoms. She may restart if symptoms worsen..  - Continue tramadol (ULTRAM) 50 mg tablet; Take 1 tablet (50 mg total) by mouth 3 (three) times daily as needed for Pain.  - Continue diclofenac sodium (VOLTAREN) 1 % Gel; Apply topically 3 (three) times daily.  - Regular home exercise program was encouraged.  - Weight loss was encouraged.  - Follow up in about 6 months (around 12/3/2019).      This was a 25 minute visit, more than 50% of which was spent counseling the patient about the diagnosis and the treatment plan including medication adjustment, weight loss and exercise.  .

## 2019-07-24 ENCOUNTER — PATIENT OUTREACH (OUTPATIENT)
Dept: ADMINISTRATIVE | Facility: OTHER | Age: 79
End: 2019-07-24

## 2019-07-26 ENCOUNTER — OFFICE VISIT (OUTPATIENT)
Dept: OPHTHALMOLOGY | Facility: CLINIC | Age: 79
End: 2019-07-26
Payer: MEDICARE

## 2019-07-26 ENCOUNTER — CLINICAL SUPPORT (OUTPATIENT)
Dept: OPHTHALMOLOGY | Facility: CLINIC | Age: 79
End: 2019-07-26
Payer: MEDICARE

## 2019-07-26 DIAGNOSIS — H53.002 AMBLYOPIA, LEFT: ICD-10-CM

## 2019-07-26 DIAGNOSIS — H04.123 DRY EYE SYNDROME, BILATERAL: ICD-10-CM

## 2019-07-26 DIAGNOSIS — H40.053 OCULAR HYPERTENSION, BILATERAL: Primary | ICD-10-CM

## 2019-07-26 DIAGNOSIS — Z96.1 PSEUDOPHAKIA: ICD-10-CM

## 2019-07-26 DIAGNOSIS — H40.053 OCULAR HYPERTENSION, BILATERAL: ICD-10-CM

## 2019-07-26 PROCEDURE — 92133 POSTERIOR SEGMENT OCT OPTIC NERVE(OCULAR COHERENCE TOMOGRAPHY) - OU - BOTH EYES: ICD-10-PCS | Mod: S$GLB,,, | Performed by: OPHTHALMOLOGY

## 2019-07-26 PROCEDURE — 92012 PR EYE EXAM, EST PATIENT,INTERMED: ICD-10-PCS | Mod: S$GLB,,, | Performed by: OPHTHALMOLOGY

## 2019-07-26 PROCEDURE — 99999 PR PBB SHADOW E&M-EST. PATIENT-LVL II: CPT | Mod: PBBFAC,,, | Performed by: OPHTHALMOLOGY

## 2019-07-26 PROCEDURE — 92133 CPTRZD OPH DX IMG PST SGM ON: CPT | Mod: S$GLB,,, | Performed by: OPHTHALMOLOGY

## 2019-07-26 PROCEDURE — 99999 PR PBB SHADOW E&M-EST. PATIENT-LVL II: ICD-10-PCS | Mod: PBBFAC,,, | Performed by: OPHTHALMOLOGY

## 2019-07-26 PROCEDURE — 92083 EXTENDED VISUAL FIELD XM: CPT | Mod: S$GLB,,, | Performed by: OPHTHALMOLOGY

## 2019-07-26 PROCEDURE — 92083 HUMPHREY VISUAL FIELD - OU - BOTH EYES: ICD-10-PCS | Mod: S$GLB,,, | Performed by: OPHTHALMOLOGY

## 2019-07-26 PROCEDURE — 92012 INTRM OPH EXAM EST PATIENT: CPT | Mod: S$GLB,,, | Performed by: OPHTHALMOLOGY

## 2019-07-26 NOTE — PROGRESS NOTES
Subjective:       Patient ID: Rachel Asif is a 79 y.o. female.    Chief Complaint: 6 mo Ocular HTN OU    HPI     DLS 01/18/19  By Dr. MITCHELL Duval MD     80 YO F here today for her IOP, HVF and OCT review.  Pt c/o eye constantly   tearing. stj     Eyemeds   Travatan BID OU     POHx:   1. OHT OU   2. SABINO OU  3. Vitreous Detachment OU   4. Essential HTN  5. Amblyopia OS  6. Psuedophakia OU       Last edited by Kate Spicer MA on 7/26/2019 10:13 AM. (History)             Assessment:       1. Ocular hypertension, bilateral    2. Dry eye syndrome, bilateral    3. Amblyopia, left    4. Pseudophakia - Both Eyes        Plan:       OHT OU-IOP's are acceptable OU. OCT's are WNL's OU (improved OS). HVF's are stable OU.  SABINO-Doing well OU.        Cont Travatan OU but decrease to qhs OU.  RTC 6 mos for IOP's & DFE.

## 2019-08-16 ENCOUNTER — PATIENT OUTREACH (OUTPATIENT)
Dept: ADMINISTRATIVE | Facility: HOSPITAL | Age: 79
End: 2019-08-16

## 2019-08-16 DIAGNOSIS — N18.30 BENIGN HYPERTENSION WITH CHRONIC KIDNEY DISEASE, STAGE III: Primary | ICD-10-CM

## 2019-08-16 DIAGNOSIS — R73.03 PREDIABETES: Primary | ICD-10-CM

## 2019-08-16 DIAGNOSIS — I12.9 BENIGN HYPERTENSION WITH CHRONIC KIDNEY DISEASE, STAGE III: Primary | ICD-10-CM

## 2019-08-16 DIAGNOSIS — I10 ESSENTIAL HYPERTENSION: ICD-10-CM

## 2019-08-16 DIAGNOSIS — E78.5 HYPERLIPIDEMIA WITH TARGET LDL LESS THAN 100: ICD-10-CM

## 2019-08-20 ENCOUNTER — LAB VISIT (OUTPATIENT)
Dept: LAB | Facility: HOSPITAL | Age: 79
End: 2019-08-20
Attending: INTERNAL MEDICINE
Payer: MEDICARE

## 2019-08-20 DIAGNOSIS — R73.03 PREDIABETES: ICD-10-CM

## 2019-08-20 DIAGNOSIS — N18.30 BENIGN HYPERTENSION WITH CHRONIC KIDNEY DISEASE, STAGE III: ICD-10-CM

## 2019-08-20 DIAGNOSIS — I10 ESSENTIAL HYPERTENSION: ICD-10-CM

## 2019-08-20 DIAGNOSIS — I12.9 BENIGN HYPERTENSION WITH CHRONIC KIDNEY DISEASE, STAGE III: ICD-10-CM

## 2019-08-20 DIAGNOSIS — E78.5 HYPERLIPIDEMIA WITH TARGET LDL LESS THAN 100: ICD-10-CM

## 2019-08-20 LAB
ALBUMIN SERPL BCP-MCNC: 3.8 G/DL (ref 3.5–5.2)
ALP SERPL-CCNC: 64 U/L (ref 55–135)
ALT SERPL W/O P-5'-P-CCNC: 23 U/L (ref 10–44)
ANION GAP SERPL CALC-SCNC: 11 MMOL/L (ref 8–16)
AST SERPL-CCNC: 24 U/L (ref 10–40)
BASOPHILS # BLD AUTO: 0.06 K/UL (ref 0–0.2)
BASOPHILS NFR BLD: 0.9 % (ref 0–1.9)
BILIRUB SERPL-MCNC: 0.6 MG/DL (ref 0.1–1)
BUN SERPL-MCNC: 17 MG/DL (ref 8–23)
CALCIUM SERPL-MCNC: 9.3 MG/DL (ref 8.7–10.5)
CHLORIDE SERPL-SCNC: 110 MMOL/L (ref 95–110)
CHOLEST SERPL-MCNC: 169 MG/DL (ref 120–199)
CHOLEST/HDLC SERPL: 2.1 {RATIO} (ref 2–5)
CO2 SERPL-SCNC: 22 MMOL/L (ref 23–29)
CREAT SERPL-MCNC: 1.4 MG/DL (ref 0.5–1.4)
DIFFERENTIAL METHOD: ABNORMAL
EOSINOPHIL # BLD AUTO: 0.2 K/UL (ref 0–0.5)
EOSINOPHIL NFR BLD: 2.7 % (ref 0–8)
ERYTHROCYTE [DISTWIDTH] IN BLOOD BY AUTOMATED COUNT: 15 % (ref 11.5–14.5)
EST. GFR  (AFRICAN AMERICAN): 40.6 ML/MIN/1.73 M^2
EST. GFR  (NON AFRICAN AMERICAN): 35.3 ML/MIN/1.73 M^2
ESTIMATED AVG GLUCOSE: 120 MG/DL (ref 68–131)
GLUCOSE SERPL-MCNC: 84 MG/DL (ref 70–110)
HBA1C MFR BLD HPLC: 5.8 % (ref 4–5.6)
HCT VFR BLD AUTO: 32.7 % (ref 37–48.5)
HDLC SERPL-MCNC: 80 MG/DL (ref 40–75)
HDLC SERPL: 47.3 % (ref 20–50)
HGB BLD-MCNC: 9.8 G/DL (ref 12–16)
IMM GRANULOCYTES # BLD AUTO: 0.02 K/UL (ref 0–0.04)
IMM GRANULOCYTES NFR BLD AUTO: 0.3 % (ref 0–0.5)
LDLC SERPL CALC-MCNC: 74.2 MG/DL (ref 63–159)
LYMPHOCYTES # BLD AUTO: 1.4 K/UL (ref 1–4.8)
LYMPHOCYTES NFR BLD: 20.1 % (ref 18–48)
MCH RBC QN AUTO: 27.5 PG (ref 27–31)
MCHC RBC AUTO-ENTMCNC: 30 G/DL (ref 32–36)
MCV RBC AUTO: 92 FL (ref 82–98)
MONOCYTES # BLD AUTO: 0.8 K/UL (ref 0.3–1)
MONOCYTES NFR BLD: 11 % (ref 4–15)
NEUTROPHILS # BLD AUTO: 4.5 K/UL (ref 1.8–7.7)
NEUTROPHILS NFR BLD: 65 % (ref 38–73)
NONHDLC SERPL-MCNC: 89 MG/DL
NRBC BLD-RTO: 0 /100 WBC
PLATELET # BLD AUTO: 355 K/UL (ref 150–350)
PMV BLD AUTO: 10.4 FL (ref 9.2–12.9)
POTASSIUM SERPL-SCNC: 4.5 MMOL/L (ref 3.5–5.1)
PROT SERPL-MCNC: 7.6 G/DL (ref 6–8.4)
RBC # BLD AUTO: 3.56 M/UL (ref 4–5.4)
SODIUM SERPL-SCNC: 143 MMOL/L (ref 136–145)
TRIGL SERPL-MCNC: 74 MG/DL (ref 30–150)
WBC # BLD AUTO: 6.92 K/UL (ref 3.9–12.7)

## 2019-08-20 PROCEDURE — 36415 COLL VENOUS BLD VENIPUNCTURE: CPT | Mod: PO

## 2019-08-20 PROCEDURE — 80061 LIPID PANEL: CPT

## 2019-08-20 PROCEDURE — 85025 COMPLETE CBC W/AUTO DIFF WBC: CPT

## 2019-08-20 PROCEDURE — 83036 HEMOGLOBIN GLYCOSYLATED A1C: CPT

## 2019-08-20 PROCEDURE — 80053 COMPREHEN METABOLIC PANEL: CPT

## 2019-08-30 ENCOUNTER — OFFICE VISIT (OUTPATIENT)
Dept: FAMILY MEDICINE | Facility: CLINIC | Age: 79
End: 2019-08-30
Payer: MEDICARE

## 2019-08-30 VITALS
OXYGEN SATURATION: 97 % | HEART RATE: 87 BPM | SYSTOLIC BLOOD PRESSURE: 130 MMHG | WEIGHT: 192.56 LBS | HEIGHT: 63 IN | BODY MASS INDEX: 34.12 KG/M2 | TEMPERATURE: 98 F | DIASTOLIC BLOOD PRESSURE: 70 MMHG

## 2019-08-30 DIAGNOSIS — Z23 NEED FOR SHINGLES VACCINE: ICD-10-CM

## 2019-08-30 DIAGNOSIS — E66.01 SEVERE OBESITY (BMI 35.0-39.9) WITH COMORBIDITY: ICD-10-CM

## 2019-08-30 DIAGNOSIS — I70.0 ATHEROSCLEROSIS OF AORTA: ICD-10-CM

## 2019-08-30 DIAGNOSIS — I12.9 BENIGN HYPERTENSION WITH CHRONIC KIDNEY DISEASE, STAGE III: ICD-10-CM

## 2019-08-30 DIAGNOSIS — R73.03 PREDIABETES: ICD-10-CM

## 2019-08-30 DIAGNOSIS — E78.5 HYPERLIPIDEMIA WITH TARGET LDL LESS THAN 100: ICD-10-CM

## 2019-08-30 DIAGNOSIS — M54.2 CHRONIC NECK PAIN: ICD-10-CM

## 2019-08-30 DIAGNOSIS — Z00.00 ROUTINE MEDICAL EXAM: Primary | ICD-10-CM

## 2019-08-30 DIAGNOSIS — M54.41 CHRONIC MIDLINE LOW BACK PAIN WITH RIGHT-SIDED SCIATICA: ICD-10-CM

## 2019-08-30 DIAGNOSIS — Z23 FLU VACCINE NEED: ICD-10-CM

## 2019-08-30 DIAGNOSIS — G89.29 CHRONIC NECK PAIN: ICD-10-CM

## 2019-08-30 DIAGNOSIS — G89.29 CHRONIC MIDLINE LOW BACK PAIN WITH RIGHT-SIDED SCIATICA: ICD-10-CM

## 2019-08-30 DIAGNOSIS — M81.0 OSTEOPOROSIS, POST-MENOPAUSAL: ICD-10-CM

## 2019-08-30 DIAGNOSIS — N18.30 BENIGN HYPERTENSION WITH CHRONIC KIDNEY DISEASE, STAGE III: ICD-10-CM

## 2019-08-30 PROCEDURE — 3078F DIAST BP <80 MM HG: CPT | Mod: CPTII,S$GLB,, | Performed by: INTERNAL MEDICINE

## 2019-08-30 PROCEDURE — 3078F PR MOST RECENT DIASTOLIC BLOOD PRESSURE < 80 MM HG: ICD-10-PCS | Mod: CPTII,S$GLB,, | Performed by: INTERNAL MEDICINE

## 2019-08-30 PROCEDURE — 99999 PR PBB SHADOW E&M-EST. PATIENT-LVL IV: ICD-10-PCS | Mod: PBBFAC,,, | Performed by: INTERNAL MEDICINE

## 2019-08-30 PROCEDURE — 3075F PR MOST RECENT SYSTOLIC BLOOD PRESS GE 130-139MM HG: ICD-10-PCS | Mod: CPTII,S$GLB,, | Performed by: INTERNAL MEDICINE

## 2019-08-30 PROCEDURE — 99214 PR OFFICE/OUTPT VISIT, EST, LEVL IV, 30-39 MIN: ICD-10-PCS | Mod: S$GLB,,, | Performed by: INTERNAL MEDICINE

## 2019-08-30 PROCEDURE — 99999 PR PBB SHADOW E&M-EST. PATIENT-LVL IV: CPT | Mod: PBBFAC,,, | Performed by: INTERNAL MEDICINE

## 2019-08-30 PROCEDURE — 99214 OFFICE O/P EST MOD 30 MIN: CPT | Mod: S$GLB,,, | Performed by: INTERNAL MEDICINE

## 2019-08-30 PROCEDURE — 3075F SYST BP GE 130 - 139MM HG: CPT | Mod: CPTII,S$GLB,, | Performed by: INTERNAL MEDICINE

## 2019-08-30 RX ORDER — POTASSIUM CHLORIDE 20 MEQ/1
TABLET, EXTENDED RELEASE ORAL
Qty: 90 TABLET | Refills: 1 | Status: SHIPPED | OUTPATIENT
Start: 2019-08-30 | End: 2019-09-21 | Stop reason: SDUPTHER

## 2019-08-30 RX ORDER — OLMESARTAN MEDOXOMIL AND HYDROCHLOROTHIAZIDE 40/25 40; 25 MG/1; MG/1
1 TABLET ORAL DAILY
Qty: 90 TABLET | Refills: 1 | Status: SHIPPED | OUTPATIENT
Start: 2019-08-30 | End: 2020-03-05 | Stop reason: SDUPTHER

## 2019-08-30 RX ORDER — ATORVASTATIN CALCIUM 20 MG/1
TABLET, FILM COATED ORAL
Qty: 90 TABLET | Refills: 1 | Status: SHIPPED | OUTPATIENT
Start: 2019-08-30 | End: 2020-03-05 | Stop reason: SDUPTHER

## 2019-08-30 RX ORDER — AMLODIPINE BESYLATE 10 MG/1
TABLET ORAL
Qty: 90 TABLET | Refills: 1 | Status: SHIPPED | OUTPATIENT
Start: 2019-08-30 | End: 2019-09-21 | Stop reason: SDUPTHER

## 2019-08-30 RX ORDER — TRAMADOL HYDROCHLORIDE 50 MG/1
TABLET ORAL
Qty: 90 TABLET | Refills: 0 | Status: SHIPPED | OUTPATIENT
Start: 2019-08-30 | End: 2019-09-21 | Stop reason: SDUPTHER

## 2019-08-30 NOTE — PROGRESS NOTES
HISTORY OF PRESENT ILLNESS:  Rachel Asif is a 79 y.o. female who presents to the clinic today for a routine medical physical exam. Her last physical exam was approximately 1 years(s) ago.        PAST MEDICAL HISTORY:  Past Medical History:   Diagnosis Date    Amblyopia     os    Anemia of other chronic disease     Atherosclerosis of aorta     noted on L-spine X-ray 4/14/2011    Atherosclerosis of aortic arch     - noted on CXR 5/2017    Chronic low back pain     followed by orthopaedics    DJD (degenerative joint disease)     Glaucoma     History of colonic polyps     History of vitamin D deficiency     Hyperlipidemia     Hypertension     Lumbar radiculopathy     Obesity     Obesity     OH (ocular hypertension)     Osteoarthritis     Osteoporosis, post-menopausal     currently on a drug holiday    Postmenopausal status     Prediabetes     Secondary hyperparathyroidism of renal origin     Trochanteric bursitis        PAST SURGICAL HISTORY:  Past Surgical History:   Procedure Laterality Date    CATARACT EXTRACTION W/  INTRAOCULAR LENS IMPLANT Bilateral     Colonoscopy N/A 10/7/2014    Performed by Clyde Cohen MD at 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[employer])   Social Needs    Financial resource strain: Not on file    Food insecurity:     Worry: Not on file     Inability: Not on file    Transportation needs:     Medical: Not on file     Non-medical: Not on file   Tobacco Use    Smoking status: Never Smoker    Smokeless tobacco: Never Used   Substance and Sexual Activity    Alcohol use: No    Drug use: Not on file    Sexual activity: Not on file   Lifestyle    Physical activity:     Days per week:  Not on file     Minutes per session: Not on file    Stress: Not on file   Relationships    Social connections:     Talks on phone: Not on file     Gets together: Not on file     Attends Muslim service: Not on file     Active member of club or organization: Not on file     Attends meetings of clubs or organizations: Not on file     Relationship status: Not on file   Other Topics Concern    Not on file   Social History Narrative    Her  is . She is taking care of a sister who has brain damage from a car accident.       FAMILY HISTORY:  Family History   Problem Relation Age of Onset    Cataracts Mother     Hypertension Mother     Other Mother         complications from splenectomy after fall    Cataracts Father     Hypertension Father     Hypertension Sister     Edema Sister     Hypertension Brother     Diabetes Brother     Heart disease Brother     Glaucoma Maternal Grandmother     Hypertension Sister     Other Sister         shingles    Lupus Sister     Lung cancer Sister     Hypertension Sister     Other Sister         brain damage from MVA    Hypertension Sister     Hypertension Brother     Heart disease Brother     Hypertension Brother     Heart attack Brother     Hypertension Brother     Hypertension Brother     No Known Problems Brother     No Known Problems Brother     Hypertension Brother     Heart disease Brother     Cancer Neg Hx     Amblyopia Neg Hx     Blindness Neg Hx     Retinal detachment Neg Hx     Strabismus Neg Hx     Stroke Neg Hx        ALLERGIES AND MEDICATIONS: updated and reviewed.  Review of patient's allergies indicates:   Allergen Reactions    No known allergies      Medication List with Changes/Refills   Current Medications    ASPIRIN 81 MG CHEWABLE TABLET    Every day    DICLOFENAC SODIUM (VOLTAREN) 1 % GEL    Apply topically 3 (three) times daily as needed.    DULOXETINE (CYMBALTA) 60 MG CAPSULE    Take 1 capsule (60 mg total) by  mouth once daily.    MELOXICAM (MOBIC) 15 MG TABLET    Take 1 tablet (15 mg total) by mouth once daily.    PANTOPRAZOLE (PROTONIX) 20 MG TABLET    Take 1 tablet (20 mg total) by mouth once daily. To protect stomach due to taking NSAIDs (meloxicam)    PROPYLENE GLYCOL/ (SYSTANE OPHT)    Weekly PRN    TRAVOPROST (TRAVATAN Z) 0.004 % OPHTHALMIC SOLUTION    Place 1 drop into both eyes every evening.   Changed and/or Refilled Medications    Modified Medication Previous Medication    AMLODIPINE (NORVASC) 10 MG TABLET amLODIPine (NORVASC) 10 MG tablet       TAKE 1 TABLET(10 MG) BY MOUTH EVERY DAY    TAKE 1 TABLET(10 MG) BY MOUTH EVERY DAY    ATORVASTATIN (LIPITOR) 20 MG TABLET atorvastatin (LIPITOR) 20 MG tablet       TAKE 1 TABLET(20 MG) BY MOUTH EVERY DAY    TAKE 1 TABLET(20 MG) BY MOUTH EVERY DAY    OLMESARTAN-HYDROCHLOROTHIAZIDE (BENICAR HCT) 40-25 MG PER TABLET olmesartan-hydrochlorothiazide (BENICAR HCT) 40-25 mg per tablet       Take 1 tablet by mouth once daily.    Take 1 tablet by mouth once daily.    POTASSIUM CHLORIDE SA (K-DUR,KLOR-CON) 20 MEQ TABLET potassium chloride SA (K-DUR,KLOR-CON) 20 MEQ tablet       TAKE 1 TABLET(20 MEQ) BY MOUTH EVERY DAY    TAKE 1 TABLET(20 MEQ) BY MOUTH EVERY DAY    TRAMADOL (ULTRAM) 50 MG TABLET traMADol (ULTRAM) 50 mg tablet       TAKE 1 TABLET(50 MG) BY MOUTH THREE TIMES DAILY AS NEEDED FOR PAIN.    TAKE 1 TABLET(50 MG) BY MOUTH THREE TIMES DAILY AS NEEDED FOR PAIN         CARE TEAM:  Patient Care Team:  Oneyda Pace MD as PCP - General (Internal Medicine)  Yasmin Meyer LPN as Licensed Practical Nurse           SCREENING HISTORY:  Health Maintenance       Date Due Completion Date    Shingles Vaccine (2 of 3) 12/10/2010 10/15/2010    Influenza Vaccine (1) 09/01/2019 9/7/2018    Colonoscopy 10/07/2019 10/7/2014    Override on 9/23/2011: Done    Lipid Panel 08/20/2020 8/20/2019    Hemoglobin A1c 08/20/2020 8/20/2019    DEXA SCAN 05/04/2022 5/4/2017    Override on  "10/18/2011: Done    TETANUS VACCINE 06/20/2026 6/20/2016            REVIEW OF SYSTEMS:   The patient reports: good dietary habits.  The patient reports : that they do not exercise, but stay active.  Review of Systems   Constitutional: Negative for chills, fatigue, fever and unexpected weight change.   HENT: Negative for congestion and postnasal drip.    Eyes: Negative for pain and visual disturbance.   Respiratory: Negative for cough, shortness of breath and wheezing.    Cardiovascular: Negative for chest pain, palpitations and leg swelling.   Gastrointestinal: Negative for abdominal pain, constipation, diarrhea, nausea and vomiting.   Genitourinary: Negative for dysuria.   Musculoskeletal: Positive for arthralgias (- mild; chronic) and back pain.   Skin: Negative for rash.   Neurological: Negative for weakness and headaches.   Psychiatric/Behavioral: Negative for dysphoric mood and sleep disturbance. The patient is not nervous/anxious.      Breast ROS: negative for breast lumps             Physical Examination:   Vitals:    08/30/19 0900   BP: 130/70   Pulse: 87   Temp: 98.3 °F (36.8 °C)     Weight: 87.4 kg (192 lb 9.2 oz)   Height: 5' 3" (160 cm)   Body mass index is 34.11 kg/m².      Patient did not require to have a chaperone present during the exam today.  General appearance - alert, well appearing, and in no distress and obese  Mental status - alert, oriented to person, place, and time, normal mood, behavior, speech, dress, motor activity, and thought processes  Eyes - pupils equal and reactive, extraocular eye movements intact, sclera anicteric  Mouth - mucous membranes moist, pharynx normal without lesions  Neck - supple, no significant adenopathy, carotids upstroke normal bilaterally, no bruits, thyroid exam: thyroid is normal in size without nodules or tenderness  Lymphatics - no palpable lymphadenopathy  Chest - clear to auscultation, no wheezes, rales or rhonchi, symmetric air entry  Heart - normal rate " and regular rhythm, no gallops noted  Abdomen - soft, nontender, nondistended, no masses or organomegaly  Breasts - not examined  Back exam - mild limit in range of motion noted on exam, mild pain with motion noted during exam; in depth exam deferred   Neurological - alert, oriented, normal speech, no focal findings or movement disorder noted, cranial nerves II through XII intact  Musculoskeletal - no muscular tenderness noted, Mild-Moderate osteoarthritic changes noted to both knee joints. No joint effusions noted.    Extremities - pedal edema trace +  Skin - normal coloration and turgor, no rashes, no suspicious skin lesions noted      ASSESSMENT AND PLAN:  1. Routine medical exam  Counseled on age appropriate medical preventative services including age appropriate cancer screenings, age appropriate eye and dental exams, over all nutritional health, need for a consistent exercise regimen, and an over all push towards maintaining a vigorous and active lifestyle.  Counseled on age appropriate vaccines and discussed upcoming health care needs based on age/gender. Discussed good sleep hygiene and stress management.     2. Benign hypertension with chronic kidney disease, stage III  Discussed sodium restriction, maintaining ideal body weight and regular exercise program as physiologic means to achieve blood pressure control. The patient will strive towards this. The current medical regimen is effective;  continue present plan and medications. Recommended patient to check home readings to monitor and see me for followup as scheduled or sooner as needed. Patient was educated that both decongestant and anti-inflammatory medication may raise blood pressure. Stable decreased kidney function. Observe. Patient counseled to avoid/minimize the use of anti-inflammatory  Medication. Discussed to stay well hydrated. Also discussed with patient that good control of blood pressure and/or diabetes, if present, will help to prevent  progression.   - potassium chloride SA (K-DUR,KLOR-CON) 20 MEQ tablet; TAKE 1 TABLET(20 MEQ) BY MOUTH EVERY DAY  Dispense: 90 tablet; Refill: 1  - olmesartan-hydrochlorothiazide (BENICAR HCT) 40-25 mg per tablet; Take 1 tablet by mouth once daily.  Dispense: 90 tablet; Refill: 1  - amLODIPine (NORVASC) 10 MG tablet; TAKE 1 TABLET(10 MG) BY MOUTH EVERY DAY  Dispense: 90 tablet; Refill: 1    3. Hyperlipidemia with target LDL less than 100  We discussed low fat diet and regular exercise.The current medical regimen is effective;  continue present plan and medications.    - atorvastatin (LIPITOR) 20 MG tablet; TAKE 1 TABLET(20 MG) BY MOUTH EVERY DAY  Dispense: 90 tablet; Refill: 1    4. Osteoporosis, post-menopausal  We discussed adequate calcium and vitamin D supplementation. We discussed fall precautions. She is up to date on her BMD. No need for prescription medication at this time     5. Prediabetes  Stable. We discussed low sugar/low carbohydrate diet and regular exercise to prevent progression. No need for prescription medication at this time.     6. Atherosclerosis of aorta  Patient with Atherosclerosis of the Aorta.  Stable/asymptomatic. Currently stable on lipid lowering medication and b/p monitoring.     7. Severe obesity (BMI 35.0-39.9) with comorbidity  The patient is asked to make an attempt to improve diet and exercise patterns to aid in medical management of this problem.     8. Flu vaccine need  Patient advised to get flu shot in September/October.     9. Need for shingles vaccine  Patient was advised to get immunization at the pharmacy.     10. Chronic midline low back pain with right-sided sciatica  Stable. Medication as needed.   - traMADol (ULTRAM) 50 mg tablet; TAKE 1 TABLET(50 MG) BY MOUTH THREE TIMES DAILY AS NEEDED FOR PAIN.  Dispense: 90 tablet; Refill: 0    11. Chronic neck pain  Stable. Medication as needed.   - traMADol (ULTRAM) 50 mg tablet; TAKE 1 TABLET(50 MG) BY MOUTH THREE TIMES DAILY AS  NEEDED FOR PAIN.  Dispense: 90 tablet; Refill: 0    Advance Care Planning   I initiated the process and explained the importance of advance care planning today with the patient.  Advanced directives were discussed due to patient's age and/or chronic medical conditions. Prognosis based on current condition: fair.   Paperwork was given to complete living will (at this point in time, the patient does have full decision-making capacity.  We discussed different extreme health states that she could experience, and reviewed what kind of medical care she would want in those situations) and medical POA (The patient received detailed information about the importance of designating a Health Care Power of . She was also instructed to communicate with this person about their wishes for future healthcare, should she become sick and lose decision-making capacity).   [place] was not discussed.   Approximately 3 minute(s) were spent on counseling/discussion regarding end of life decision making.              Follow up in about 6 months (around 2/29/2020), or if symptoms worsen or fail to improve, for follow up chronic medical conditions.. or sooner as needed.

## 2019-09-11 ENCOUNTER — TELEPHONE (OUTPATIENT)
Dept: PHYSICAL MEDICINE AND REHAB | Facility: CLINIC | Age: 79
End: 2019-09-11

## 2019-09-11 NOTE — TELEPHONE ENCOUNTER
Appointment scheduled for Dec 2019.  Reminder letter mailed to patient.    ----- Message from Emma Love sent at 9/10/2019  5:22 PM CDT -----  Contact: self, [phone]  Patient requests to schedule her 6 months follow up appointment before 1/7/20 if possible. Please advise.

## 2019-09-16 ENCOUNTER — PES CALL (OUTPATIENT)
Dept: ADMINISTRATIVE | Facility: CLINIC | Age: 79
End: 2019-09-16

## 2019-09-19 DIAGNOSIS — K21.9 GASTROESOPHAGEAL REFLUX DISEASE WITHOUT ESOPHAGITIS: ICD-10-CM

## 2019-09-20 RX ORDER — PANTOPRAZOLE SODIUM 20 MG/1
20 TABLET, DELAYED RELEASE ORAL DAILY
Qty: 30 TABLET | Refills: 2 | Status: SHIPPED | OUTPATIENT
Start: 2019-09-20 | End: 2020-03-05 | Stop reason: SDUPTHER

## 2019-09-21 DIAGNOSIS — N18.30 BENIGN HYPERTENSION WITH CHRONIC KIDNEY DISEASE, STAGE III: ICD-10-CM

## 2019-09-21 DIAGNOSIS — G89.29 CHRONIC MIDLINE LOW BACK PAIN WITH RIGHT-SIDED SCIATICA: ICD-10-CM

## 2019-09-21 DIAGNOSIS — G89.29 CHRONIC NECK PAIN: ICD-10-CM

## 2019-09-21 DIAGNOSIS — M54.41 CHRONIC MIDLINE LOW BACK PAIN WITH RIGHT-SIDED SCIATICA: ICD-10-CM

## 2019-09-21 DIAGNOSIS — I12.9 BENIGN HYPERTENSION WITH CHRONIC KIDNEY DISEASE, STAGE III: ICD-10-CM

## 2019-09-21 DIAGNOSIS — M54.2 CHRONIC NECK PAIN: ICD-10-CM

## 2019-09-23 RX ORDER — AMLODIPINE BESYLATE 10 MG/1
TABLET ORAL
Qty: 90 TABLET | Refills: 0 | Status: SHIPPED | OUTPATIENT
Start: 2019-09-23 | End: 2020-03-05 | Stop reason: SDUPTHER

## 2019-09-23 RX ORDER — TRAMADOL HYDROCHLORIDE 50 MG/1
TABLET ORAL
Qty: 90 TABLET | Refills: 0 | Status: SHIPPED | OUTPATIENT
Start: 2019-09-23 | End: 2019-09-24 | Stop reason: SDUPTHER

## 2019-09-23 RX ORDER — POTASSIUM CHLORIDE 20 MEQ/1
TABLET, EXTENDED RELEASE ORAL
Qty: 90 TABLET | Refills: 0 | Status: SHIPPED | OUTPATIENT
Start: 2019-09-23 | End: 2020-03-05 | Stop reason: SDUPTHER

## 2019-09-24 DIAGNOSIS — G89.29 CHRONIC MIDLINE LOW BACK PAIN WITH RIGHT-SIDED SCIATICA: ICD-10-CM

## 2019-09-24 DIAGNOSIS — G89.29 CHRONIC NECK PAIN: ICD-10-CM

## 2019-09-24 DIAGNOSIS — M54.2 CHRONIC NECK PAIN: ICD-10-CM

## 2019-09-24 DIAGNOSIS — M54.41 CHRONIC MIDLINE LOW BACK PAIN WITH RIGHT-SIDED SCIATICA: ICD-10-CM

## 2019-09-24 RX ORDER — TRAMADOL HYDROCHLORIDE 50 MG/1
TABLET ORAL
Qty: 90 TABLET | Refills: 0 | Status: SHIPPED | OUTPATIENT
Start: 2019-09-24 | End: 2019-09-26

## 2019-09-24 NOTE — TELEPHONE ENCOUNTER
----- Message from Genna Lara sent at 9/23/2019  4:44 PM CDT -----  Contact: self  Type: Patient Call Back    Who called:self    What is the request in detail:pt is saying [facility] will not fill tramadol due to a new law  has to call Pharmacy.    Can the clinic reply by [facility]?no    Would the patient rather a call back or a response via [facility]? call    Best call back number:

## 2019-09-26 ENCOUNTER — TELEPHONE (OUTPATIENT)
Dept: FAMILY MEDICINE | Facility: CLINIC | Age: 79
End: 2019-09-26

## 2019-09-26 DIAGNOSIS — M54.2 CHRONIC NECK PAIN: ICD-10-CM

## 2019-09-26 DIAGNOSIS — G89.29 CHRONIC NECK PAIN: ICD-10-CM

## 2019-09-26 DIAGNOSIS — M54.41 CHRONIC MIDLINE LOW BACK PAIN WITH RIGHT-SIDED SCIATICA: ICD-10-CM

## 2019-09-26 DIAGNOSIS — G89.29 CHRONIC MIDLINE LOW BACK PAIN WITH RIGHT-SIDED SCIATICA: ICD-10-CM

## 2019-09-26 RX ORDER — TRAMADOL HYDROCHLORIDE 50 MG/1
TABLET ORAL
Qty: 90 TABLET | Refills: 0 | Status: SHIPPED | OUTPATIENT
Start: 2019-09-26 | End: 2019-12-18 | Stop reason: SDUPTHER

## 2019-09-26 NOTE — TELEPHONE ENCOUNTER
Spoke with Ailyn at [facility] pharmacy says needs a new Rx sent over cannot take a verbal.

## 2019-10-02 ENCOUNTER — TELEPHONE (OUTPATIENT)
Dept: PHYSICAL MEDICINE AND REHAB | Facility: CLINIC | Age: 79
End: 2019-10-02

## 2019-10-02 NOTE — TELEPHONE ENCOUNTER
Date of service: 



10/08/2018



HISTORY:

 coughing 



COMPARISON:

No prior.



TECHNIQUE:

Chest PA and lateral



FINDINGS:



LUNGS:

No active pulmonary disease.



PLEURA:

No significant pleural effusion identified. No pneumothorax apparent.



CARDIOVASCULAR:

Normal.



OSSEOUS STRUCTURES:

No significant abnormalities.



VISUALIZED UPPER ABDOMEN:

Normal.



OTHER FINDINGS:

None.



IMPRESSION:

No active disease. Patient to check [facility].  Rx refill completed on 09/26/19.

## 2019-11-20 ENCOUNTER — TELEPHONE (OUTPATIENT)
Dept: FAMILY MEDICINE | Facility: CLINIC | Age: 79
End: 2019-11-20

## 2019-11-20 ENCOUNTER — HOSPITAL ENCOUNTER (OUTPATIENT)
Dept: RADIOLOGY | Facility: HOSPITAL | Age: 79
Discharge: HOME | End: 2019-11-20
Attending: NURSE PRACTITIONER
Payer: MEDICARE

## 2019-11-20 ENCOUNTER — OFFICE VISIT (OUTPATIENT)
Dept: FAMILY MEDICINE | Facility: CLINIC | Age: 79
End: 2019-11-20
Payer: MEDICARE

## 2019-11-20 VITALS
HEIGHT: 62 IN | SYSTOLIC BLOOD PRESSURE: 132 MMHG | BODY MASS INDEX: 36.42 KG/M2 | TEMPERATURE: 99 F | WEIGHT: 197.88 LBS | DIASTOLIC BLOOD PRESSURE: 79 MMHG | HEART RATE: 99 BPM | OXYGEN SATURATION: 98 %

## 2019-11-20 DIAGNOSIS — R60.0 BILATERAL LOWER EXTREMITY EDEMA: Primary | ICD-10-CM

## 2019-11-20 DIAGNOSIS — I70.0 ATHEROSCLEROSIS OF AORTA: ICD-10-CM

## 2019-11-20 DIAGNOSIS — R73.03 PREDIABETES: ICD-10-CM

## 2019-11-20 DIAGNOSIS — E66.01 SEVERE OBESITY (BMI 35.0-39.9) WITH COMORBIDITY: ICD-10-CM

## 2019-11-20 DIAGNOSIS — N25.81 SECONDARY HYPERPARATHYROIDISM OF RENAL ORIGIN: ICD-10-CM

## 2019-11-20 DIAGNOSIS — E78.5 HYPERLIPIDEMIA WITH TARGET LDL LESS THAN 100: ICD-10-CM

## 2019-11-20 DIAGNOSIS — I12.9 BENIGN HYPERTENSION WITH CHRONIC KIDNEY DISEASE, STAGE III: ICD-10-CM

## 2019-11-20 DIAGNOSIS — N18.30 BENIGN HYPERTENSION WITH CHRONIC KIDNEY DISEASE, STAGE III: ICD-10-CM

## 2019-11-20 DIAGNOSIS — R60.0 BILATERAL LOWER EXTREMITY EDEMA: ICD-10-CM

## 2019-11-20 DIAGNOSIS — I70.0 ATHEROSCLEROSIS OF AORTIC ARCH: ICD-10-CM

## 2019-11-20 PROCEDURE — 99214 PR OFFICE/OUTPT VISIT, EST, LEVL IV, 30-39 MIN: ICD-10-PCS | Mod: S$GLB,,, | Performed by: NURSE PRACTITIONER

## 2019-11-20 PROCEDURE — 1101F PT FALLS ASSESS-DOCD LE1/YR: CPT | Mod: CPTII,S$GLB,, | Performed by: NURSE PRACTITIONER

## 2019-11-20 PROCEDURE — 3078F PR MOST RECENT DIASTOLIC BLOOD PRESSURE < 80 MM HG: ICD-10-PCS | Mod: CPTII,S$GLB,, | Performed by: NURSE PRACTITIONER

## 2019-11-20 PROCEDURE — 99999 PR PBB SHADOW E&M-EST. PATIENT-LVL III: ICD-10-PCS | Mod: PBBFAC,,, | Performed by: NURSE PRACTITIONER

## 2019-11-20 PROCEDURE — 71046 X-RAY EXAM CHEST 2 VIEWS: CPT | Mod: 26,,, | Performed by: RADIOLOGY

## 2019-11-20 PROCEDURE — 3075F PR MOST RECENT SYSTOLIC BLOOD PRESS GE 130-139MM HG: ICD-10-PCS | Mod: CPTII,S$GLB,, | Performed by: NURSE PRACTITIONER

## 2019-11-20 PROCEDURE — 71046 X-RAY EXAM CHEST 2 VIEWS: CPT | Mod: TC,FY,PO

## 2019-11-20 PROCEDURE — 99214 OFFICE O/P EST MOD 30 MIN: CPT | Mod: S$GLB,,, | Performed by: NURSE PRACTITIONER

## 2019-11-20 PROCEDURE — 1159F PR MEDICATION LIST DOCUMENTED IN MEDICAL RECORD: ICD-10-PCS | Mod: S$GLB,,, | Performed by: NURSE PRACTITIONER

## 2019-11-20 PROCEDURE — 1159F MED LIST DOCD IN RCRD: CPT | Mod: S$GLB,,, | Performed by: NURSE PRACTITIONER

## 2019-11-20 PROCEDURE — 71046 XR CHEST PA AND LATERAL: ICD-10-PCS | Mod: 26,,, | Performed by: RADIOLOGY

## 2019-11-20 PROCEDURE — 3075F SYST BP GE 130 - 139MM HG: CPT | Mod: CPTII,S$GLB,, | Performed by: NURSE PRACTITIONER

## 2019-11-20 PROCEDURE — 3078F DIAST BP <80 MM HG: CPT | Mod: CPTII,S$GLB,, | Performed by: NURSE PRACTITIONER

## 2019-11-20 PROCEDURE — 1101F PR PT FALLS ASSESS DOC 0-1 FALLS W/OUT INJ PAST YR: ICD-10-PCS | Mod: CPTII,S$GLB,, | Performed by: NURSE PRACTITIONER

## 2019-11-20 PROCEDURE — 99999 PR PBB SHADOW E&M-EST. PATIENT-LVL III: CPT | Mod: PBBFAC,,, | Performed by: NURSE PRACTITIONER

## 2019-11-20 NOTE — PROGRESS NOTES
"History of Present Illness   Rachel Asif is a 79 y.o. woman with medical history as listed below who presents today for evaluation of edema to BLE. She had a home visit by NP with her insurance for annual wellness visit. She was noted to have weight gain about 7 pounds and swelling to lower extremities. Her BP was also elevated during the visit. She was instructed to follow-up with PCP for further evaluation.    She has noticed an increase in swelling to lower extremities from her baseline. She denies claudication symptoms, redness, tenderness, warmth, or pain in the extremities. She reports some dyspnea on exertion, but no worsening "for years." She denies chest pain, palpitations, dizziness, orthopnea, or PND. She denies other swelling. Her urine output has been good. She denies increase in sodium intake or OTC medications.     Her BP is at goal today. Her weight gain is about 5 pounds from last visit.    She has no additional complaints and is otherwise healthy on today's visit.    Past Medical History:   Diagnosis Date    Amblyopia     os    Anemia of other chronic disease     Atherosclerosis of aorta     noted on L-spine X-ray 4/14/2011    Atherosclerosis of aortic arch     - noted on CXR 5/2017    Chronic low back pain     followed by orthopaedics    DJD (degenerative joint disease)     Glaucoma     History of colonic polyps     History of vitamin D deficiency     Hyperlipidemia     Hypertension     Lumbar radiculopathy     Obesity     Obesity     OH (ocular hypertension)     Osteoarthritis     Osteoporosis, post-menopausal     currently on a drug holiday    Postmenopausal status     Prediabetes     Secondary hyperparathyroidism of renal origin     Trochanteric bursitis        Past Surgical History:   Procedure Laterality Date    CATARACT EXTRACTION W/  INTRAOCULAR LENS IMPLANT Bilateral        Social History     Socioeconomic History    Marital status:      Spouse name: Not " on file    Number of children: Not on file    Years of education: Not on file    Highest education level: Not on file   Occupational History    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[employer])   Social Needs    Financial resource strain: Not on file    Food insecurity:     Worry: Not on file     Inability: Not on file    Transportation needs:     Medical: Not on file     Non-medical: Not on file   Tobacco Use    Smoking status: Never Smoker    Smokeless tobacco: Never Used   Substance and Sexual Activity    Alcohol use: No    Drug use: Not on file    Sexual activity: Not on file   Lifestyle    Physical activity:     Days per week: Not on file     Minutes per session: Not on file    Stress: Not on file   Relationships    Social connections:     Talks on phone: Not on file     Gets together: Not on file     Attends Restoration service: Not on file     Active member of club or organization: Not on file     Attends meetings of clubs or organizations: Not on file     Relationship status: Not on file   Other Topics Concern    Not on file   Social History Narrative    Her  is . She is taking care of a sister who has brain damage from a car accident.       Family History   Problem Relation Age of Onset    Cataracts Mother     Hypertension Mother     Other Mother         complications from splenectomy after fall    Cataracts Father     Hypertension Father     Hypertension Sister     Edema Sister     Hypertension Brother     Diabetes Brother     Heart disease Brother     Glaucoma Maternal Grandmother     Hypertension Sister     Other Sister         shingles    Lupus Sister     Lung cancer Sister     Hypertension Sister     Other Sister         brain damage from MVA    Hypertension Sister     Hypertension Brother     Heart disease Brother     Hypertension Brother     Heart attack Brother     Hypertension Brother     Hypertension Brother     No Known Problems Brother  "    No Known Problems Brother     Hypertension Brother     Heart disease Brother     Cancer Neg Hx     Amblyopia Neg Hx     Blindness Neg Hx     Retinal detachment Neg Hx     Strabismus Neg Hx     Stroke Neg Hx        Review of Systems  Review of Systems   Respiratory: Negative for shortness of breath and wheezing.    Cardiovascular: Positive for leg swelling. Negative for chest pain, palpitations, orthopnea, claudication and PND.   Genitourinary: Negative for flank pain and hematuria.        Negative for nocturia.   Musculoskeletal: Positive for back pain.   Skin: Negative for itching and rash.   Neurological: Negative for dizziness, tingling, sensory change, focal weakness and loss of consciousness.     A complete review of systems was otherwise negative.    Physical Exam  /79   Pulse 99   Temp 98.6 °F (37 °C)   Ht 5' 2" (1.575 m)   Wt 89.8 kg (197 lb 13.8 oz)   SpO2 98%   BMI 36.19 kg/m²   General appearance: alert, appears stated age, cooperative and no distress  Neck: no carotid bruit, no JVD and supple, symmetrical, trachea midline  Back: symmetric, no curvature. ROM normal. No CVA tenderness.  Lungs: clear to auscultation bilaterally  Heart: regular rate and rhythm, S1, S2 normal, no murmur, click, rub or gallop  Extremities: edema 2+ non-pitting, BLE, extremities normal, atraumatic, no cyanosis, Homans sign is negative, no sign of DVT and no redness or tenderness in the calves or thighs  Pulses: 2+ and symmetric  Skin: Skin color, texture, turgor normal. No rashes or lesions  Neurologic: Grossly normal    Assessment/Plan  Bilateral lower extremity edema  Reassuring physical exam today: no JVD, JOHNSON, CP, palpitations, orthopnea.  We will check CMP, BNP, and x-ray chest given increase in lower extremity edema.  Recommend she resume compression socks with sodium restriction.  Further work-up and management pending results.  -     Comprehensive metabolic panel; Future; Expected date: " 11/20/2019  -     Brain natriuretic peptide; Future; Expected date: 11/20/2019  -     X-Ray Chest PA And Lateral; Future; Expected date: 11/20/2019    Benign hypertension with chronic kidney disease, stage III  The current medical regimen is effective;  continue present plan and medications.  As above.  Avoid nephrotoxic agents.    Secondary hyperparathyroidism of renal origin  The current medical regimen is effective;  continue present plan and medications.    Prediabetes  The current medical regimen is effective;  continue present plan and medications.    Hyperlipidemia with target LDL less than 100  The current medical regimen is effective;  continue present plan and medications.    Atherosclerosis of aorta  Stable. Statin therapy.    Atherosclerosis of aortic arch  Stable. Statin therapy.    Severe obesity (BMI 35.0-39.9) with comorbidity  The patient is asked to make an attempt to improve diet and exercise patterns to aid in medical management of this problem.    Patient has verbalized understanding and is in agreement with plan of care.    Follow up if symptoms worsen or fail to improve, for pending results.

## 2019-11-20 NOTE — TELEPHONE ENCOUNTER
Please let the patient know her chest x-ray. There is no fluid on her lungs and her heart is normal in size. Her labs are still pending.    Thanks!  TORRIE Calderon

## 2019-11-20 NOTE — MEDICAL/APP STUDENT
Subjective:       Patient ID: Rachel Asif is a 79 y.o. female.    Chief Complaint: Leg Pain    Pt presents today for evaluation of home health nurse concerns from a home visit on 11/19/2019. HH RN concerns of elevated bloodpressure, SOB, and bilateral leg swelling and pt history of Stage 3 CKD. Pt reports feeling well today overall, states her blood pressure is elevated at home but decreases after bp meds are taken. Pt fenies any worsening in her baseline SOB on exertion. Pt denies chest pain, palpations, orthopnea, wheezing, pain in calves when walking, nocturia, or decreased urinary output of characteristics of urine.  Pt noticed BLE swelling with pitting since 11/16/2019. Swelling worse at end of day, better in AM. Denies high sodium intake over the weekend.    Review of Systems   Constitutional: Negative for activity change, fatigue and fever.   Respiratory: Negative for cough, chest tightness, shortness of breath and wheezing.    Cardiovascular: Positive for leg swelling. Negative for chest pain and palpitations.   Gastrointestinal: Negative for abdominal pain, constipation, diarrhea, nausea and vomiting.   Genitourinary: Negative for decreased urine volume, dysuria, enuresis, flank pain, frequency, hematuria and pelvic pain.   Musculoskeletal: Negative for arthralgias, gait problem, joint swelling and myalgias.   Skin: Negative for color change and rash.   Neurological: Negative for dizziness, weakness, light-headedness, numbness and headaches.       Objective:      Physical Exam   Constitutional: She is oriented to person, place, and time. She appears well-developed and well-nourished. No distress.   HENT:   Head: Normocephalic and atraumatic.   Cardiovascular: Normal rate, regular rhythm, normal heart sounds and intact distal pulses.   No murmur heard.  Pulmonary/Chest: Effort normal. She has no wheezes. She has no rales.   Musculoskeletal: She exhibits edema. She exhibits no tenderness.   +1pitting  edema to BLE  Bilateral ankle edema  Bilateral pedal edema     Neurological: She is alert and oriented to person, place, and time.   Skin: Skin is warm and dry. Capillary refill takes less than 2 seconds. She is not diaphoretic. No erythema.       Assessment:       1. Bilateral lower extremity edema        Plan:     Bilateral lower extremity edema  Labs ordered to evaluate kidney and heart function. Chest xray ordered to evaluate is heart is enlarged. Further evaluation pending order results.  -     Comprehensive metabolic panel; Future; Expected date: 11/20/2019  -     Brain natriuretic peptide; Future; Expected date: 11/20/2019  -     X-Ray Chest PA And Lateral; Future; Expected date: 11/20/2019    POC DW PT, verbalized understanding.  RTC PRN or if symptoms worsen. Further instructions pending study results.

## 2019-11-22 ENCOUNTER — TELEPHONE (OUTPATIENT)
Dept: FAMILY MEDICINE | Facility: CLINIC | Age: 79
End: 2019-11-22

## 2019-11-22 DIAGNOSIS — R79.89 ELEVATED BRAIN NATRIURETIC PEPTIDE (BNP) LEVEL: Primary | ICD-10-CM

## 2019-11-22 NOTE — TELEPHONE ENCOUNTER
Please let the patient know that her chest x-ray is normal. Her kidney function is stable. Her BNP was slightly elevated, but this can be a false elevation. I would like to repeat this in about one week.    How is the swelling doing?    Thanks!  Lindsay Jenkins NP-C

## 2019-11-30 ENCOUNTER — LAB VISIT (OUTPATIENT)
Dept: LAB | Facility: HOSPITAL | Age: 79
End: 2019-11-30
Attending: NURSE PRACTITIONER
Payer: MEDICARE

## 2019-11-30 DIAGNOSIS — R79.89 ELEVATED BRAIN NATRIURETIC PEPTIDE (BNP) LEVEL: ICD-10-CM

## 2019-11-30 LAB — BNP SERPL-MCNC: 273 PG/ML (ref 0–99)

## 2019-11-30 PROCEDURE — 83880 ASSAY OF NATRIURETIC PEPTIDE: CPT

## 2019-11-30 PROCEDURE — 36415 COLL VENOUS BLD VENIPUNCTURE: CPT | Mod: PO

## 2019-12-03 ENCOUNTER — TELEPHONE (OUTPATIENT)
Dept: FAMILY MEDICINE | Facility: CLINIC | Age: 79
End: 2019-12-03

## 2019-12-03 DIAGNOSIS — R60.0 BILATERAL LOWER EXTREMITY EDEMA: Primary | ICD-10-CM

## 2019-12-03 NOTE — TELEPHONE ENCOUNTER
Okay, her BNP was still elevated, but this may be related to her chronic kidney disease. However, I would like her to see cardiology for evaluation. I am placing a referral.    Thanks!  TORRIE Calderon

## 2019-12-03 NOTE — TELEPHONE ENCOUNTER
Spoke with the pt and her swelling is the same.  Pt don't have no SOB.  Pt denies any chest pain and no other symptoms.  Patient verbalized understandings.

## 2019-12-03 NOTE — TELEPHONE ENCOUNTER
Please contact the patient, how is her swelling? Is it better, worse, or the same? Is she having any shortness of breath, chest pain, or other symptoms?    Thanks!  TORRIE Calderon

## 2019-12-09 ENCOUNTER — PATIENT OUTREACH (OUTPATIENT)
Dept: ADMINISTRATIVE | Facility: OTHER | Age: 79
End: 2019-12-09

## 2019-12-11 ENCOUNTER — OFFICE VISIT (OUTPATIENT)
Dept: CARDIOLOGY | Facility: CLINIC | Age: 79
End: 2019-12-11
Payer: MEDICARE

## 2019-12-11 VITALS
RESPIRATION RATE: 16 BRPM | HEART RATE: 102 BPM | SYSTOLIC BLOOD PRESSURE: 187 MMHG | DIASTOLIC BLOOD PRESSURE: 89 MMHG | OXYGEN SATURATION: 99 % | WEIGHT: 197.75 LBS | BODY MASS INDEX: 36.17 KG/M2

## 2019-12-11 DIAGNOSIS — R60.9 EDEMA, UNSPECIFIED TYPE: Primary | ICD-10-CM

## 2019-12-11 DIAGNOSIS — Z76.89 ESTABLISHING CARE WITH NEW DOCTOR, ENCOUNTER FOR: ICD-10-CM

## 2019-12-11 DIAGNOSIS — R07.89 CHEST TIGHTNESS: ICD-10-CM

## 2019-12-11 PROCEDURE — 3079F PR MOST RECENT DIASTOLIC BLOOD PRESSURE 80-89 MM HG: ICD-10-PCS | Mod: CPTII,S$GLB,, | Performed by: INTERNAL MEDICINE

## 2019-12-11 PROCEDURE — 3077F PR MOST RECENT SYSTOLIC BLOOD PRESSURE >= 140 MM HG: ICD-10-PCS | Mod: CPTII,S$GLB,, | Performed by: INTERNAL MEDICINE

## 2019-12-11 PROCEDURE — 1101F PR PT FALLS ASSESS DOC 0-1 FALLS W/OUT INJ PAST YR: ICD-10-PCS | Mod: CPTII,S$GLB,, | Performed by: INTERNAL MEDICINE

## 2019-12-11 PROCEDURE — 99999 PR PBB SHADOW E&M-EST. PATIENT-LVL III: CPT | Mod: PBBFAC,,, | Performed by: INTERNAL MEDICINE

## 2019-12-11 PROCEDURE — 3079F DIAST BP 80-89 MM HG: CPT | Mod: CPTII,S$GLB,, | Performed by: INTERNAL MEDICINE

## 2019-12-11 PROCEDURE — 93000 ELECTROCARDIOGRAM COMPLETE: CPT | Mod: S$GLB,,, | Performed by: INTERNAL MEDICINE

## 2019-12-11 PROCEDURE — 93000 EKG 12-LEAD: ICD-10-PCS | Mod: S$GLB,,, | Performed by: INTERNAL MEDICINE

## 2019-12-11 PROCEDURE — 1126F AMNT PAIN NOTED NONE PRSNT: CPT | Mod: S$GLB,,, | Performed by: INTERNAL MEDICINE

## 2019-12-11 PROCEDURE — 3077F SYST BP >= 140 MM HG: CPT | Mod: CPTII,S$GLB,, | Performed by: INTERNAL MEDICINE

## 2019-12-11 PROCEDURE — 1159F MED LIST DOCD IN RCRD: CPT | Mod: S$GLB,,, | Performed by: INTERNAL MEDICINE

## 2019-12-11 PROCEDURE — 1126F PR PAIN SEVERITY QUANTIFIED, NO PAIN PRESENT: ICD-10-PCS | Mod: S$GLB,,, | Performed by: INTERNAL MEDICINE

## 2019-12-11 PROCEDURE — 99999 PR PBB SHADOW E&M-EST. PATIENT-LVL III: ICD-10-PCS | Mod: PBBFAC,,, | Performed by: INTERNAL MEDICINE

## 2019-12-11 PROCEDURE — 99204 OFFICE O/P NEW MOD 45 MIN: CPT | Mod: S$GLB,,, | Performed by: INTERNAL MEDICINE

## 2019-12-11 PROCEDURE — 99204 PR OFFICE/OUTPT VISIT, NEW, LEVL IV, 45-59 MIN: ICD-10-PCS | Mod: S$GLB,,, | Performed by: INTERNAL MEDICINE

## 2019-12-11 PROCEDURE — 1101F PT FALLS ASSESS-DOCD LE1/YR: CPT | Mod: CPTII,S$GLB,, | Performed by: INTERNAL MEDICINE

## 2019-12-11 PROCEDURE — 1159F PR MEDICATION LIST DOCUMENTED IN MEDICAL RECORD: ICD-10-PCS | Mod: S$GLB,,, | Performed by: INTERNAL MEDICINE

## 2019-12-11 RX ORDER — FUROSEMIDE 20 MG/1
20 TABLET ORAL 2 TIMES DAILY
Qty: 90 TABLET | Refills: 0 | Status: SHIPPED | OUTPATIENT
Start: 2019-12-11 | End: 2020-03-05 | Stop reason: SDUPTHER

## 2019-12-11 RX ORDER — METOPROLOL SUCCINATE 50 MG/1
50 TABLET, EXTENDED RELEASE ORAL DAILY
Qty: 90 TABLET | Refills: 3 | Status: SHIPPED | OUTPATIENT
Start: 2019-12-11 | End: 2020-01-06

## 2019-12-11 NOTE — PROGRESS NOTES
CARDIOVASCULAR CONSULTATION    REASON FOR CONSULT:   Rachel Asif is a 79 y.o. female who presents for EVALUATION OF NEW ONSET PERIPHERAL EDEMA, dyspnea on exertion as well as chest tightness     HISTORY OF PRESENT ILLNESS:     Patient is a pleasant 79-year-old lady.  She states that over the past few months has started noticing significant bilateral peripheral edema.  Denies any orthopnea, PND but has dyspnea on exertion as well as chest tightness on exertion.  Denies any chest pains at rest.  BNP was checked and was found to be elevated.      PAST MEDICAL HISTORY:     Past Medical History:   Diagnosis Date    Amblyopia     os    Anemia of other chronic disease     Atherosclerosis of aorta     noted on L-spine X-ray 4/14/2011    Atherosclerosis of aortic arch     - noted on CXR 5/2017    Chronic low back pain     followed by orthopaedics    DJD (degenerative joint disease)     Glaucoma     History of colonic polyps     History of vitamin D deficiency     Hyperlipidemia     Hypertension     Lumbar radiculopathy     Obesity     Obesity     OH (ocular hypertension)     Osteoarthritis     Osteoporosis, post-menopausal     currently on a drug holiday    Postmenopausal status     Prediabetes     Secondary hyperparathyroidism of renal origin     Trochanteric bursitis        PAST SURGICAL HISTORY:     Past Surgical History:   Procedure Laterality Date    CATARACT EXTRACTION W/  INTRAOCULAR LENS IMPLANT Bilateral        ALLERGIES AND MEDICATION:     Review of patient's allergies indicates:   Allergen Reactions    No known allergies         Medication List           Accurate as of December 11, 2019 11:01 AM. If you have any questions, ask your nurse or doctor.               CONTINUE taking these medications    amLODIPine 10 MG tablet  Commonly known as:  NORVASC  TAKE 1 TABLET(10 MG) BY MOUTH EVERY DAY     aspirin 81 MG Chew     atorvastatin 20 MG tablet  Commonly known as:  LIPITOR  TAKE 1  TABLET(20 MG) BY MOUTH EVERY DAY     diclofenac sodium 1 % Gel  Commonly known as:  VOLTAREN  Apply topically 3 (three) times daily as needed.     DULoxetine 60 MG capsule  Commonly known as:  CYMBALTA  Take 1 capsule (60 mg total) by mouth once daily.     meloxicam 15 MG tablet  Commonly known as:  MOBIC  Take 1 tablet (15 mg total) by mouth once daily.     olmesartan-hydrochlorothiazide 40-25 mg per tablet  Commonly known as:  BENICAR HCT  Take 1 tablet by mouth once daily.     pantoprazole 20 MG tablet  Commonly known as:  PROTONIX  Take 1 tablet (20 mg total) by mouth once daily. To protect stomach due to taking NSAIDs (meloxicam)     potassium chloride SA 20 MEQ tablet  Commonly known as:  K-DUR,KLOR-CON  TAKE 1 TABLET(20 MEQ) BY MOUTH EVERY DAY     SYSTANE OPHT     traMADol 50 mg tablet  Commonly known as:  ULTRAM  TAKE 1 TABLET(50 MG) BY MOUTH THREE TIMES DAILY AS NEEDED FOR PAIN     travoprost 0.004 % ophthalmic solution  Commonly known as:  Travatan Z  Place 1 drop into both eyes every evening.            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[employer])   Social Needs    Financial resource strain: Not on file    Food insecurity:     Worry: Not on file     Inability: Not on file    Transportation needs:     Medical: Not on file     Non-medical: Not on file   Tobacco Use    Smoking status: Never Smoker    Smokeless tobacco: Never Used   Substance and Sexual Activity    Alcohol use: No    Drug use: Not on file    Sexual activity: Not on file   Lifestyle    Physical activity:     Days per week: Not on file     Minutes per session: Not on file    Stress: Not on file   Relationships    Social connections:     Talks on phone: Not on file      Gets together: Not on file     Attends [employer] service: Not on file     Active member of club or organization: Not on file     Attends meetings of clubs or organizations: Not on file     Relationship status: Not on file   Other Topics Concern    Not on file   Social History Narrative    Her  is . She is taking care of a sister who has brain damage from a car accident.       FAMILY HISTORY:     Family History   Problem Relation Age of Onset    Cataracts Mother     Hypertension Mother     Other Mother         complications from splenectomy after fall    Cataracts Father     Hypertension Father     Hypertension Sister     Edema Sister     Hypertension Brother     Diabetes Brother     Heart disease Brother     Glaucoma Maternal Grandmother     Hypertension Sister     Other Sister         shingles    Lupus Sister     Lung cancer Sister     Hypertension Sister     Other Sister         brain damage from MVA    Hypertension Sister     Hypertension Brother     Heart disease Brother     Hypertension Brother     Heart attack Brother     Hypertension Brother     Hypertension Brother     No Known Problems Brother     No Known Problems Brother     Hypertension Brother     Heart disease Brother     Cancer Neg Hx     Amblyopia Neg Hx     Blindness Neg Hx     Retinal detachment Neg Hx     Strabismus Neg Hx     Stroke Neg Hx        REVIEW OF SYSTEMS:   Review of Systems   Constitution: Negative.   HENT: Negative.    Eyes: Negative.    Cardiovascular: Positive for chest pain, dyspnea on exertion and leg swelling.   Respiratory: Negative.    Endocrine: Negative.    Hematologic/Lymphatic: Negative.    Skin: Negative.    Musculoskeletal: Negative.    Gastrointestinal: Negative.    Genitourinary: Negative.    Neurological: Negative.    Psychiatric/Behavioral: Negative.    Allergic/Immunologic: Negative.        A 10 point review of systems was performed and all the pertinent positives  have been mentioned. Rest of review of systems was negative.        PHYSICAL EXAM:     Vitals:    12/11/19 1039   BP: (!) 187/89   Pulse:    Resp:     Body mass index is 36.17 kg/m².  Weight: 89.7 kg (197 lb 12 oz)         Physical Exam   Constitutional: She is oriented to person, place, and time. She appears well-developed and well-nourished.   HENT:   Head: Normocephalic.   Eyes: Pupils are equal, round, and reactive to light.   Neck: Normal range of motion. Neck supple.   Cardiovascular: Normal rate and regular rhythm.   Pulmonary/Chest: Effort normal and breath sounds normal. She has no rales.   Abdominal: Soft. Normal appearance and bowel sounds are normal. There is no tenderness.   Musculoskeletal: Normal range of motion. She exhibits edema.   Neurological: She is alert and oriented to person, place, and time.   Skin: Skin is warm.   Psychiatric: She has a normal mood and affect.         DATA:     Laboratory:  CBC:  Recent Labs   Lab 05/04/17  0902 07/09/18  0751 08/20/19  0946   WBC 5.74 5.25 6.92   Hemoglobin 10.6 L 10.4 L 9.8 L   Hematocrit 33.5 L 33.7 L 32.7 L   Platelets 356 H 336 355 H       CHEMISTRIES:  Recent Labs   Lab 07/09/18  0751 08/20/19  0946 11/20/19  1022   Glucose 95 84 92   Sodium 140 143 143   Potassium 4.1 4.5 3.9   BUN, Bld 21 17 16   Creatinine 1.3 1.4 1.3   eGFR if African American 44.8 A 40.6 A 44.5 A   eGFR if non  38.8 A 35.3 A 38.6 A   Calcium 9.4 9.3 9.3       CARDIAC BIOMARKERS:        COAGS:        LIPIDS/LFTS:  Recent Labs   Lab 05/04/17  0902 07/09/18  0751 08/20/19  0946 11/20/19  1022   Cholesterol 172 188 169  --    Triglycerides 67 85 74  --    HDL 74 78 H 80 H  --    LDL Cholesterol 84.6 93.0 74.2  --    Non-HDL Cholesterol 98 110 89  --    AST 27 21 24 28   ALT 15 16 23 21       Hemoglobin A1C   Date Value Ref Range Status   08/20/2019 5.8 (H) 4.0 - 5.6 % Final     Comment:     ADA Screening Guidelines:  5.7-6.4%  Consistent with prediabetes  >or=6.5%   Consistent with diabetes  High levels of fetal hemoglobin interfere with the HbA1C  assay. Heterozygous hemoglobin variants (HbS, HgC, etc)do  not significantly interfere with this assay.   However, presence of multiple variants may affect accuracy.     07/09/2018 6.0 (H) 4.0 - 5.6 % Final     Comment:     ADA Screening Guidelines:  5.7-6.4%  Consistent with prediabetes  >or=6.5%  Consistent with diabetes  High levels of fetal hemoglobin interfere with the HbA1C  assay. Heterozygous hemoglobin variants (HbS, HgC, etc)do  not significantly interfere with this assay.   However, presence of multiple variants may affect accuracy.     05/04/2017 6.0 4.5 - 6.2 % Final     Comment:     According to ADA guidelines, hemoglobin A1C <7.0% represents  optimal control in non-pregnant diabetic patients.  Different  metrics may apply to specific populations.   Standards of Medical Care in Diabetes - 2016.  For the purpose of screening for the presence of diabetes:  <5.7%     Consistent with the absence of diabetes  5.7-6.4%  Consistent with increasing risk for diabetes   (prediabetes)  >or=6.5%  Consistent with diabetes  Currently no consensus exists for use of hemoglobin A1C  for diagnosis of diabetes for children.         TSH        The ASCVD Risk score (Davonte DC Jr., et al., 2013) failed to calculate for the following reasons:    The 2013 ASCVD risk score is only valid for ages 40 to 79           Cardiovascular Testing:    EKG: (personally reviewed tracing)  Normal sinus rhythm, low-voltage QRS, nonspecific ST changes.      ASSESSMENT AND PLAN     Patient Active Problem List   Diagnosis    Lumbar radiculopathy    Osteoarthritis    Benign hypertension with chronic kidney disease, stage III    Hyperlipidemia with target LDL less than 100    Chronic low back pain    Osteoporosis, post-menopausal    Anemia of other chronic disease    Postmenopausal status    DJD (degenerative joint disease)    History of colonic polyps     Severe obesity (BMI 35.0-39.9) with comorbidity    Prediabetes    Vitamin D deficiency    Atherosclerosis of aorta    Gastroesophageal reflux disease without esophagitis    Secondary hyperparathyroidism of renal origin    Pseudophakia - Both Eyes    Refractive error    Dry eye syndrome, bilateral    Ocular hypertension, bilateral    Amblyopia, left    Atherosclerosis of aortic arch    Vitreous detachment, bilateral       New onset dyspnea on exertion, chest tightness on exertion as well as bilateral peripheral edema.  Check nuclear stress test as well as 2D echocardiogram.  Check TSH.  Start Lasix 20 mg b.i.d. titrate as needed.    Hypertension:  Uncontrolled.  Add Toprol-XL 50 mg daily.      Follow-up after cardiac testing        Thank you very much for involving me in the care of your patient.  Please do not hesitate to contact me if there are any questions.      Joceline Colon MD, FACC, Psychiatric  Interventional Cardiologist, [facility].           This note was dictated with the help of speech recognition software.  There might be un-intended errors and/or substitutions.

## 2019-12-11 NOTE — LETTER
December 11, 2019      Lindsay Jenkins, NP  [address]  [place] [address]           [facility]  [address]  [place] [address]  Phone: [phone]          Patient: Rachel Asif   MR Number: [id number]   YOB: 1940   Date of Visit: 12/11/2019       Dear Lindsay Jenkins:    Thank you for referring Rachel Asif to me for evaluation. Attached you will find relevant portions of my assessment and plan of care.    If you have questions, please do not hesitate to call me. I look forward to following Rachel Asif along with you.    Sincerely,    Joceline Colon MD    Enclosure  CC:  No Recipients    If you would like to receive this communication electronically, please contact [email or web address] or [phone] to request more information on [employer] access.    For providers and/or their staff who would like to refer a patient to [facility], please contact us through our one-stop-shop provider referral line, [facility], at [phone].    If you feel you have received this communication in error or would no longer like to receive these types of communications, please e-mail [email or web address]

## 2019-12-16 ENCOUNTER — TELEPHONE (OUTPATIENT)
Dept: FAMILY MEDICINE | Facility: CLINIC | Age: 79
End: 2019-12-16

## 2019-12-16 NOTE — TELEPHONE ENCOUNTER
Notified the patient states that she has top call the pharmacy to get the name of the medication that she is request a refill for.

## 2019-12-16 NOTE — TELEPHONE ENCOUNTER
----- Message from Maty Day sent at 12/11/2019  2:56 PM CST -----  Contact: pt  Type: RX Refill Request    Who Called: pt    Have you contacted your pharmacy:    Refill or New Rx:pt requesting all meds to be refilled. She doesn't know the names of them. Call pt    RX Name and Strength:    How is the patient currently taking it? (ex. 1XDay):    Is this a 30 day or 90 day RX:    Preferred Pharmacy with phone number:  [id number] - [place], [place] - [address]  [place] [address]  Phone: [phone] Fax: [phone]          Local or Mail Order    Ordering Provider:    Would the patient rather a call back or a response via [facility]? call    Best Call Back Number:[phone]    Additional Information:

## 2019-12-17 ENCOUNTER — PATIENT OUTREACH (OUTPATIENT)
Dept: ADMINISTRATIVE | Facility: OTHER | Age: 79
End: 2019-12-17

## 2019-12-18 ENCOUNTER — OFFICE VISIT (OUTPATIENT)
Dept: PHYSICAL MEDICINE AND REHAB | Facility: CLINIC | Age: 79
End: 2019-12-18
Payer: MEDICARE

## 2019-12-18 VITALS
DIASTOLIC BLOOD PRESSURE: 77 MMHG | BODY MASS INDEX: 35.68 KG/M2 | WEIGHT: 193.88 LBS | HEIGHT: 62 IN | SYSTOLIC BLOOD PRESSURE: 159 MMHG | HEART RATE: 78 BPM

## 2019-12-18 DIAGNOSIS — M47.22 OSTEOARTHRITIS OF SPINE WITH RADICULOPATHY, CERVICAL REGION: ICD-10-CM

## 2019-12-18 DIAGNOSIS — M25.561 CHRONIC PAIN OF RIGHT KNEE: ICD-10-CM

## 2019-12-18 DIAGNOSIS — E66.9 OBESITY (BMI 30.0-34.9): ICD-10-CM

## 2019-12-18 DIAGNOSIS — M54.2 CHRONIC NECK PAIN: ICD-10-CM

## 2019-12-18 DIAGNOSIS — M47.816 LUMBAR FACET ARTHROPATHY: ICD-10-CM

## 2019-12-18 DIAGNOSIS — G89.29 CHRONIC NECK PAIN: ICD-10-CM

## 2019-12-18 DIAGNOSIS — M17.0 PRIMARY OSTEOARTHRITIS OF BOTH KNEES: ICD-10-CM

## 2019-12-18 DIAGNOSIS — G89.29 CHRONIC MIDLINE LOW BACK PAIN WITH RIGHT-SIDED SCIATICA: Primary | ICD-10-CM

## 2019-12-18 DIAGNOSIS — M54.41 CHRONIC MIDLINE LOW BACK PAIN WITH RIGHT-SIDED SCIATICA: Primary | ICD-10-CM

## 2019-12-18 DIAGNOSIS — G89.29 CHRONIC PAIN OF RIGHT KNEE: ICD-10-CM

## 2019-12-18 PROCEDURE — 1159F MED LIST DOCD IN RCRD: CPT | Mod: S$GLB,,, | Performed by: PHYSICAL MEDICINE & REHABILITATION

## 2019-12-18 PROCEDURE — 1126F PR PAIN SEVERITY QUANTIFIED, NO PAIN PRESENT: ICD-10-PCS | Mod: S$GLB,,, | Performed by: PHYSICAL MEDICINE & REHABILITATION

## 2019-12-18 PROCEDURE — 99214 PR OFFICE/OUTPT VISIT, EST, LEVL IV, 30-39 MIN: ICD-10-PCS | Mod: S$GLB,,, | Performed by: PHYSICAL MEDICINE & REHABILITATION

## 2019-12-18 PROCEDURE — 1126F AMNT PAIN NOTED NONE PRSNT: CPT | Mod: S$GLB,,, | Performed by: PHYSICAL MEDICINE & REHABILITATION

## 2019-12-18 PROCEDURE — 3078F PR MOST RECENT DIASTOLIC BLOOD PRESSURE < 80 MM HG: ICD-10-PCS | Mod: CPTII,S$GLB,, | Performed by: PHYSICAL MEDICINE & REHABILITATION

## 2019-12-18 PROCEDURE — 1101F PT FALLS ASSESS-DOCD LE1/YR: CPT | Mod: CPTII,S$GLB,, | Performed by: PHYSICAL MEDICINE & REHABILITATION

## 2019-12-18 PROCEDURE — 3077F PR MOST RECENT SYSTOLIC BLOOD PRESSURE >= 140 MM HG: ICD-10-PCS | Mod: CPTII,S$GLB,, | Performed by: PHYSICAL MEDICINE & REHABILITATION

## 2019-12-18 PROCEDURE — 3078F DIAST BP <80 MM HG: CPT | Mod: CPTII,S$GLB,, | Performed by: PHYSICAL MEDICINE & REHABILITATION

## 2019-12-18 PROCEDURE — 1101F PR PT FALLS ASSESS DOC 0-1 FALLS W/OUT INJ PAST YR: ICD-10-PCS | Mod: CPTII,S$GLB,, | Performed by: PHYSICAL MEDICINE & REHABILITATION

## 2019-12-18 PROCEDURE — 99999 PR PBB SHADOW E&M-EST. PATIENT-LVL III: ICD-10-PCS | Mod: PBBFAC,,, | Performed by: PHYSICAL MEDICINE & REHABILITATION

## 2019-12-18 PROCEDURE — 1159F PR MEDICATION LIST DOCUMENTED IN MEDICAL RECORD: ICD-10-PCS | Mod: S$GLB,,, | Performed by: PHYSICAL MEDICINE & REHABILITATION

## 2019-12-18 PROCEDURE — 99999 PR PBB SHADOW E&M-EST. PATIENT-LVL III: CPT | Mod: PBBFAC,,, | Performed by: PHYSICAL MEDICINE & REHABILITATION

## 2019-12-18 PROCEDURE — 99214 OFFICE O/P EST MOD 30 MIN: CPT | Mod: S$GLB,,, | Performed by: PHYSICAL MEDICINE & REHABILITATION

## 2019-12-18 PROCEDURE — 3077F SYST BP >= 140 MM HG: CPT | Mod: CPTII,S$GLB,, | Performed by: PHYSICAL MEDICINE & REHABILITATION

## 2019-12-18 RX ORDER — DULOXETIN HYDROCHLORIDE 60 MG/1
60 CAPSULE, DELAYED RELEASE ORAL DAILY
Qty: 90 CAPSULE | Refills: 1 | Status: SHIPPED | OUTPATIENT
Start: 2019-12-18 | End: 2020-07-02 | Stop reason: SDUPTHER

## 2019-12-18 RX ORDER — DICLOFENAC SODIUM 10 MG/G
GEL TOPICAL 3 TIMES DAILY PRN
Qty: 5 TUBE | Refills: 2 | Status: SHIPPED | OUTPATIENT
Start: 2019-12-18 | End: 2021-05-18 | Stop reason: SDUPTHER

## 2019-12-18 RX ORDER — TRAMADOL HYDROCHLORIDE 50 MG/1
TABLET ORAL
Qty: 90 TABLET | Refills: 1 | Status: SHIPPED | OUTPATIENT
Start: 2019-12-18 | End: 2020-07-13 | Stop reason: SDUPTHER

## 2019-12-18 RX ORDER — MELOXICAM 15 MG/1
15 TABLET ORAL DAILY
Qty: 90 TABLET | Refills: 1 | Status: SHIPPED | OUTPATIENT
Start: 2019-12-18 | End: 2020-07-02 | Stop reason: SDUPTHER

## 2019-12-18 NOTE — PROGRESS NOTES
Subjective:       Patient ID: Rachel Asif is a 79 y.o. female.    Chief Complaint: No chief complaint on file.    HPI    HISTORY OF PRESENT ILLNESS:  Ms. Asif is an 79-year-old black female with hypertension who is followed up in the [facility] Medicine Clinic for chronic low back pain with lumbar radiculopathy, chronic neck pain with history of cervical radiculopathy and OA of the knees.  Her last visit to the clinic was on 6/3/19.  She was maintained on meloxicam, Cymbalta, p.r.n. tramadol and diclofenac gel.    The patient is coming to the clinic for followup.  Her back pain has been under good control.  It is an intermittent aching pain in the lumbar spine and across   her back.  It is usually localized.  Her maximum pain is 5/10 and minimum 2/10.  Today, it is 2/10.  The patient denies any lower extremity weakness or numbness.  She denies any bowel or bladder incontinence.    Her neck pain has been under good control.  It is an intermittent aching pain in the cervical spine.  It is localized.  Her maximum pain is 3-4/10 and minimum 1-2/10.  Today, it is 1-2/10.  The patient denies any upper extremity weakness or hand numbness.      Her bilateral knee pain has been under good control.  It is an intermittent aching pain in both knees, recently worse on the left.  It is worse with prolonged standing or walking and better with rest.  Her maximum pain is 3-4/10 and minimum 1-2/10.  Today, it is 1-2/10. She denies any swelling.    She is currently taking meloxicam 15 mg p.o. once per day, Cymbalta 60 mg p.o. once per day and tramadol 50 mg p.r.n., usually twice per day.  She uses diclofenac gel intermittently for her knees.         Review of Systems   Constitutional: Negative for fatigue.   Eyes: Positive for visual disturbance.   Respiratory: Negative for shortness of breath.    Cardiovascular: Negative for chest pain.   Gastrointestinal: Negative for blood in stool, constipation, nausea and vomiting.    Genitourinary: Negative for difficulty urinating.   Musculoskeletal: Positive for arthralgias, back pain and neck pain. Negative for gait problem.   Skin: Negative for rash.   Neurological: Negative for dizziness and headaches.   Psychiatric/Behavioral: Negative for behavioral problems and sleep disturbance.       Objective:      Physical Exam   Constitutional: She appears well-developed and well-nourished.   HENT:   Head: Normocephalic and atraumatic.   Neck: Normal range of motion.   Mild pain at end range.   Musculoskeletal:   BUE:  ROM:full.  Strength:    RUE: 5/5 at shoulder abduction, 5 elbow flexion, 5 elbow extension, 5 hand .   LUE: 5/5 at shoulder abduction, 5 elbow flexion, 5 elbow extension, 5 hand .  Sensation to pinprick:   RUE: intact.   LUE: intact.  DTR:    RUE: +1 biceps, +1 triceps.   LUE:  +1 biceps, +1 triceps.      BLE:  ROM:full.  Mild bilateral knee crepitus.  Strength:    RLE: 5/5 at hip flexion, 5 knee extension, 5 ankle DF, 5 PF.   LLE: 5/5 at hip flexion, 5 knee extension, 5 ankle DF, 5 PF.  Sensation to pinprick:     RLE: intact.      LLE: intact.   DTR:     RLE: +1 knee, +1 ankle.    LLE: +1 knee, +1 ankle.  SLR (sitting):      RLE: -ve.      LLE: -ve.     -ve tenderness over lumbar spine.    Gait: some waddling.     Neurological: She is alert.   Skin: Skin is warm.   Psychiatric: She has a normal mood and affect. Her behavior is normal.   Vitals reviewed.          Assessment:       1. Chronic midline low back pain with right-sided sciatica    2. Lumbar facet arthropathy    3. Chronic neck pain    4. Osteoarthritis of spine with radiculopathy, cervical region    5. Primary osteoarthritis of both knees (moderate)    6. Obesity (BMI 30.0-34.9)        Plan:         - Continue meloxicam (MOBIC) 15 MG tablet; Take 1 tablet (15 mg total) by mouth daily as needed for Pain. 1 Tablet Oral Every day  - Continue duloxetine (CYMBALTA) 60 MG capsule; Take 1 capsule (60 mg total) by mouth  once daily.  - Continue tramadol (ULTRAM) 50 mg tablet; Take 1 tablet (50 mg total) by mouth 3 (three) times daily as needed for Pain.  - Continue diclofenac sodium (VOLTAREN) 1 % Gel; Apply topically 3 (three) times daily.  - The patient was encouraged to adopt a regular Home Exercise Program, and provided with printouts.  - The patient was commended on her weight loss (193.9 lbs today vs 199.7 lbs last visit 6/3/19) and encouraged to continue with weight loss efforts  - Follow up in about 6 months (around 6/18/2020).      This was a 25 minute visit, more than 50% of which was spent counseling the patient about the diagnosis and the treatment plan including medication adjustment, weight loss and exercise.

## 2019-12-18 NOTE — PATIENT INSTRUCTIONS
Neck/Back Pain [General]    Both neck and back pain are usually caused by injury to the muscles or ligaments of the spine. Sometimes the disks that separate each bone of the spine may cause pain by putting pressure on a nearby nerve. Back and neck pain may appear after a sudden twisting/bending force (such as in a car accident), or sometimes after a simple awkward movement. In either case, muscle spasm is often present and adds to the pain.  Acute neck and back pain usually gets better in one to two weeks. Pain related to disk disease, arthritis in the spinal joints or spinal stenosis (narrowing of the spinal canal) can become chronic and last for months or years.  Home Care:  · FOR NECK PAIN: Use a comfortable pillow that supports the head and keeps the spine in a neutral position. The position of the head should not be tilted forward or backward.  · FOR BACK PAIN: You may need to stay in bed the first few days. But, as soon as possible, begin sitting or walking to avoid problems with prolonged bed rest (muscle weakness, worsening back stiffness and pain, blood clots in the legs).  · When in bed, try to find a position of comfort. A firm mattress is best. Try lying flat on your back with pillows under your knees. You can also try lying on your side with your knees bent up towards your chest and a pillow between your knees.  · Avoid prolonged sitting. This puts more stress on the lower back than standing or walking.  · During the first two days after injury, apply an ICE PACK to the painful area for 20 minutes every 2-4 hours. This will reduce swelling and pain. HEAT (hot shower, hot bath or heating pad) works well for muscle spasm. You can start with ice, then switch to heat after two days. Some patients feel best alternating ice and heat treatments. Use the one method that feels the best to you.  · You may use acetaminophen (Tylenol) or ibuprofen (Motrin, Advil) to control pain, unless another pain medicine was  prescribed. [NOTE: If you have chronic liver or kidney disease or ever had a stomach ulcer or GI bleeding, talk with your doctor before using these medicines.]  · Be aware of safe lifting methods and do not lift anything over 15 pounds until all the pain is gone.  Follow Up  with your physician or this facility if your symptoms do not start to improve after one week. Physical therapy or further tests may be needed.  [NOTE: If X-rays were taken, they will be reviewed by a radiologist. You will be notified of any new findings that may affect your care.]  Get Prompt Medical Attention  if any of the following occur:  · Pain becomes worse or spreads into your arms or legs  · Weakness, numbness or pain in one or both arms or legs  · Loss of bowel or bladder control  · Numbness in the groin area  · Difficulty walking  · Fever of 100.4ºF (38ºC) or higher, or as directed by your healthcare provider  © 5537-0394 [facility], [address] [place] [address]. All rights reserved. This information is not intended as a substitute for professional medical care. Always follow your healthcare professional's instructions.    Neck Exercises: Passive Neck Rotation          To start, lie on your back, knees bent and feet flat on the floor. Keep your ears, shoulders, and hips aligned, but dont press your lower back to the floor. Rest your hands on your pelvis. Breathe deeply and relax.  · With your neck relaxed, place the palm of one hand on your forehead. Use your hand to turn your head to one side until you feel a stretch in the neck muscles. Do not push through pain.  · Hold for 5 seconds. Then turn to the other side.  · Repeat 5 times on each side.   Note: Keep your shoulders on the floor. Dont lift your chin as you turn your head.  © 3772-4636 [facility], [address] [place] [address]. All rights reserved. This information is not intended as a substitute for professional medical care. Always follow  your healthcare professional's instructions.    Exercises: Neck Isometrics  To start, sit in a chair with your feet flat on the floor. Your weight should be slightly forward so that youre balanced evenly on your buttocks. Relax your shoulders and keep your head level. Using a chair with arms may help you keep your balance.  1. Press your palm against your forehead. Resist with your neck muscles. Hold for 10 seconds. Relax. Repeat 5 times.  2. Do the exercise again, pressing on the side of your head. Repeat 5 times. Switch sides.  3. Do the exercise again, pressing on the back of your head. Repeat 5 times.          For your safety, check with your healthcare provider before starting an exercise program.    © 0806-2029 Caitlin \[facility]", [address] [place] [address], [place] [address]. All rights reserved. This information is not intended as a substitute for professional medical care. Always follow your healthcare professional's instructions.    Shoulder and Upper Back Stretch  To start, stand tall with your ears, shoulders, and hips in line. Your feet should be slightly apart, positioned just under your hips. Focus your eyes directly in front of you.  this position for a few seconds before starting your exercise. This helps increase your awareness of proper posture.  · Reach overhead and slightly back with both arms. Keep your shoulders and neck aligned and your elbows behind your shoulders.  · With your palms facing the ceiling, turn your fingers inward.  · Take a deep breath. Breathe out and lower your elbows toward your buttocks. Hold for 5 seconds, then return to starting position.  · Repeat 3 times.          © 6090-8839 [facility], [address] [place] [address]. All rights reserved. This information is not intended as a substitute for professional medical care. Always follow your healthcare professional's instructions.        Neck/Back Pain [General]    Both neck and back pain are usually  caused by injury to the muscles or ligaments of the spine. Sometimes the disks that separate each bone of the spine may cause pain by putting pressure on a nearby nerve. Back and neck pain may appear after a sudden twisting/bending force (such as in a car accident), or sometimes after a simple awkward movement. In either case, muscle spasm is often present and adds to the pain.  Acute neck and back pain usually gets better in one to two weeks. Pain related to disk disease, arthritis in the spinal joints or spinal stenosis (narrowing of the spinal canal) can become chronic and last for months or years.  Home Care:  · FOR NECK PAIN: Use a comfortable pillow that supports the head and keeps the spine in a neutral position. The position of the head should not be tilted forward or backward.  · FOR BACK PAIN: You may need to stay in bed the first few days. But, as soon as possible, begin sitting or walking to avoid problems with prolonged bed rest (muscle weakness, worsening back stiffness and pain, blood clots in the legs).  · When in bed, try to find a position of comfort. A firm mattress is best. Try lying flat on your back with pillows under your knees. You can also try lying on your side with your knees bent up towards your chest and a pillow between your knees.  · Avoid prolonged sitting. This puts more stress on the lower back than standing or walking.  · During the first two days after injury, apply an ICE PACK to the painful area for 20 minutes every 2-4 hours. This will reduce swelling and pain. HEAT (hot shower, hot bath or heating pad) works well for muscle spasm. You can start with ice, then switch to heat after two days. Some patients feel best alternating ice and heat treatments. Use the one method that feels the best to you.  · You may use acetaminophen (Tylenol) or ibuprofen (Motrin, Advil) to control pain, unless another pain medicine was prescribed. [NOTE: If you have chronic liver or kidney disease or  ever had a stomach ulcer or GI bleeding, talk with your doctor before using these medicines.]  · Be aware of safe lifting methods and do not lift anything over 15 pounds until all the pain is gone.  Follow Up  with your physician or this facility if your symptoms do not start to improve after one week. Physical therapy or further tests may be needed.  [NOTE: If X-rays were taken, they will be reviewed by a radiologist. You will be notified of any new findings that may affect your care.]  Get Prompt Medical Attention  if any of the following occur:  · Pain becomes worse or spreads into your arms or legs  · Weakness, numbness or pain in one or both arms or legs  · Loss of bowel or bladder control  · Numbness in the groin area  · Difficulty walking  · Fever of 100.4ºF (38ºC) or higher, or as directed by your healthcare provider  © 6846-7115 [facility], [address] [place] [address]. All rights reserved. This information is not intended as a substitute for professional medical care. Always follow your healthcare professional's instructions.    Back Exercises: Back Press  Do this exercise on your hands and knees. Keep your knees under your hips and your hands under your shoulders. Keep your spine in a neutral position (not arched or sagging). Be sure to maintain your necks natural curve.  · Tighten your abdominal and buttocks muscles to press your back upward. Let your head drop slightly.  · Hold for 5 seconds. Return to starting position.  · Repeat 5 times.     © 8948-9134 [facility], [address] [place] [address]. All rights reserved. This information is not intended as a substitute for professional medical care. Always follow your healthcare professional's instructions.    Back Exercises: Back Release  Do this exercise on your hands and knees. Keep your knees under your hips and your hands under your shoulders. Keep your spine in a neutral position (not arched or sagging). Be sure to  maintain your necks natural curve.  · Relax your abdominal and buttocks muscles, lift your head, and let your back sag. Be sure to keep your weight evenly distributed. Dont sit back on your hips.   · Hold for 5 seconds.  · Return to starting position.  · Repeat 5 times.  © 7719-9850 [place] [address]. All rights reserved. This information is not intended as a substitute for professional medical care. Always follow your healthcare professional's instructions.    Back Exercises: Bridge  The Bridge exercise strengthens your abdominal, buttocks, and hamstring muscles. This helps keep your back stable and aligned when you walk.  · Lie on the floor with your back and palms flat. Bend your knees. Keep your feet flat on the floor.  · Contract your abdominal and buttocks muscles. Slowly lift your buttocks off the floor until there is a straight line from your knees to your shoulders.  · Hold for 5  seconds. Repeat 10 times.    © 8054-2568 [place] [address]. All rights reserved. This information is not intended as a substitute for professional medical care. Always follow your healthcare professional's instructions.    Back Exercises: Elbow Press    To start, lie face down on your stomach, feet slightly apart, forehead on the floor. Breathe deeply. You should feel comfortable and relaxed in this position.  · Press up on your forearms. Keep your abdomen and hips on the floor.  · Hold for 20 seconds. Lower slowly.  · Repeat 2 times.  · Return to starting position.  © 9195-5306 [address] [address]. All rights reserved. This information is not intended as a substitute for professional medical care. Always follow your healthcare professional's instructions.    Back Exercises: Pelvic Tilt  To start, lie on your back with your knees bent and feet flat on the floor. Dont press your neck or lower back to the  floor. Breathe deeply. You should feel comfortable and relaxed in this position.  · Tighten your abdomen and buttocks, and press your lower back toward the floor. This should be a small, subtle movement.  · Hold for 5 seconds. Release.  · Repeat 5 times.         © 6305-4598 [facility], [address] [place] [address]. All rights reserved. This information is not intended as a substitute for professional medical care. Always follow your healthcare professional's instructions.    Back Exercises: Partial Curl-Ups          To start, lie on your back with your knees bent and feet flat on the floor. Dont press your neck or lower back to the floor. Breathe deeply. You should feel comfortable and relaxed in this position.  · Cross your arms loosely.  · Tighten your abdomen and curl prison up, keeping your head in line with your shoulders.  · Hold for 5 seconds. Uncurl to lie down.  · Repeat 5 times.   © 9609-1495 [place] [address]. All rights reserved. This information is not intended as a substitute for professional medical care. Always follow your healthcare professional's instructions.    Back Exercises: Lower Back Stretch                            To start, sit in a chair with your feet flat on the floor. Shift your weight slightly forward to avoid rounding your back. Relax, and keep your ears, shoulders, and hips aligned.  · Sit with your feet well apart.  · Bend forward and touch the floor with the backs of your hands. Relax and let your body drop.  · Hold for 20 seconds. Return to starting position.  · Repeat 2 times.   © 7221-8611 [facility], [address] [place] [address]. All rights reserved. This information is not intended as a substitute for professional medical care. Always follow your healthcare professional's instructions.    Back Exercises: Seated Rotation      To start, sit in a chair with your feet flat on the floor. Shift your  weight slightly forward to avoid rounding your back. Relax, and keep your ears, shoulders, and hips aligned.  · Fold your arms, elbows just below shoulder height.  · Turn from the waist with hips forward. Turn your head last.  · Hold for a count of 5. Return to starting position.  · Repeat 5 times on one side. Then switch sides.  © 8698-5710 [employer], [address] [place] [address]. All rights reserved. This information is not intended as a substitute for professional medical care. Always follow your healthcare professional's instructions.    Back Exercises: Side Stretch  To start, sit in a chair with your feet flat on the floor. Shift your weight slightly forward to avoid rounding your back. Relax. Keep your ears, shoulders, and hips aligned.  · Stretch your right arm overhead.  · Slowly bend to the left. Dont twist your torso.  · Hold for 20 seconds. Return to starting position.  · Repeat 2 times. Then, switch to the other side.  © [id number] [employer], [address] [place] [address]. All rights reserved. This information is not intended as a substitute for professional medical care. Always follow your healthcare professional's instructions

## 2019-12-19 ENCOUNTER — HOSPITAL ENCOUNTER (OUTPATIENT)
Dept: CARDIOLOGY | Facility: HOSPITAL | Age: 79
Discharge: HOME | End: 2019-12-19
Attending: INTERNAL MEDICINE
Payer: MEDICARE

## 2019-12-19 ENCOUNTER — HOSPITAL ENCOUNTER (OUTPATIENT)
Dept: RADIOLOGY | Facility: HOSPITAL | Age: 79
Discharge: HOME | End: 2019-12-19
Attending: INTERNAL MEDICINE
Payer: MEDICARE

## 2019-12-19 VITALS
WEIGHT: 193 LBS | BODY MASS INDEX: 35.51 KG/M2 | HEART RATE: 69 BPM | DIASTOLIC BLOOD PRESSURE: 77 MMHG | HEIGHT: 62 IN | SYSTOLIC BLOOD PRESSURE: 159 MMHG

## 2019-12-19 DIAGNOSIS — Z76.89 ESTABLISHING CARE WITH NEW DOCTOR, ENCOUNTER FOR: ICD-10-CM

## 2019-12-19 DIAGNOSIS — R60.9 EDEMA, UNSPECIFIED TYPE: ICD-10-CM

## 2019-12-19 DIAGNOSIS — R07.89 CHEST TIGHTNESS: ICD-10-CM

## 2019-12-19 LAB
AORTIC ROOT ANNULUS: 2.92 CM
AORTIC VALVE CUSP SEPERATION: 1.91 CM
AV INDEX (PROSTH): 0.68
AV MEAN GRADIENT: 4 MMHG
AV PEAK GRADIENT: 7 MMHG
AV VALVE AREA: 1.89 CM2
AV VELOCITY RATIO: 0.85
BSA FOR ECHO PROCEDURE: 1.96 M2
CV ECHO LV RWT: 0.48 CM
CV STRESS BASE HR: 57 BPM
DIASTOLIC BLOOD PRESSURE: 59 MMHG
DOP CALC AO PEAK VEL: 1.3 M/S
DOP CALC AO VTI: 37.15 CM
DOP CALC LVOT AREA: 2.8 CM2
DOP CALC LVOT DIAMETER: 1.88 CM
DOP CALC LVOT PEAK VEL: 1.1 M/S
DOP CALC LVOT STROKE VOLUME: 70.22 CM3
DOP CALCLVOT PEAK VEL VTI: 25.31 CM
E WAVE DECELERATION TIME: 192.78 MSEC
E/A RATIO: 0.94
ECHO LV POSTERIOR WALL: 1.14 CM (ref 0.6–1.1)
FRACTIONAL SHORTENING: 39 % (ref 28–44)
INTERVENTRICULAR SEPTUM: 1.11 CM (ref 0.6–1.1)
IVRT: 0.07 MSEC
LA MAJOR: 5.29 CM
LA MINOR: 5.47 CM
LA WIDTH: 4.2 CM
LEFT ATRIUM SIZE: 3.91 CM
LEFT ATRIUM VOLUME INDEX: 39.9 ML/M2
LEFT ATRIUM VOLUME: 75.08 CM3
LEFT INTERNAL DIMENSION IN SYSTOLE: 2.9 CM (ref 2.1–4)
LEFT VENTRICLE DIASTOLIC VOLUME INDEX: 55.22 ML/M2
LEFT VENTRICLE DIASTOLIC VOLUME: 103.96 ML
LEFT VENTRICLE MASS INDEX: 104 G/M2
LEFT VENTRICLE SYSTOLIC VOLUME INDEX: 17 ML/M2
LEFT VENTRICLE SYSTOLIC VOLUME: 32.08 ML
LEFT VENTRICULAR INTERNAL DIMENSION IN DIASTOLE: 4.73 CM (ref 3.5–6)
LEFT VENTRICULAR MASS: 195.49 G
MV PEAK A VEL: 1.1 M/S
MV PEAK E VEL: 1.03 M/S
NUC REST EJECTION FRACTION: 67
OHS CV CPX 85 PERCENT MAX PREDICTED HEART RATE MALE: 115
OHS CV CPX MAX PREDICTED HEART RATE: 135
OHS CV CPX PATIENT IS FEMALE: 1
OHS CV CPX PATIENT IS MALE: 0
OHS CV CPX PEAK DIASTOLIC BLOOD PRESSURE: 63 MMHG
OHS CV CPX PEAK HEAR RATE: 76 BPM
OHS CV CPX PEAK RATE PRESSURE PRODUCT: 8512
OHS CV CPX PEAK SYSTOLIC BLOOD PRESSURE: 112 MMHG
OHS CV CPX PERCENT MAX PREDICTED HEART RATE ACHIEVED: 56
OHS CV CPX RATE PRESSURE PRODUCT PRESENTING: 6726
PISA TR MAX VEL: 2.94 M/S
PV PEAK VELOCITY: 0.9 CM/S
RA MAJOR: 5.11 CM
RA PRESSURE: 3 MMHG
RA WIDTH: 3.69 CM
RIGHT VENTRICULAR END-DIASTOLIC DIMENSION: 3 CM
RV TISSUE DOPPLER FREE WALL SYSTOLIC VELOCITY 1 (APICAL 4 CHAMBER VIEW): 7.59 CM/S
SINUS: 3.1 CM
STJ: 2.49 CM
STRESS ECHO TARGET HR: 118 BPM
SYSTOLIC BLOOD PRESSURE: 118 MMHG
TR MAX PG: 35 MMHG
TRICUSPID ANNULAR PLANE SYSTOLIC EXCURSION: 2.01 CM
TV REST PULMONARY ARTERY PRESSURE: 38 MMHG

## 2019-12-19 PROCEDURE — A9502 TC99M TETROFOSMIN: HCPCS

## 2019-12-19 PROCEDURE — 63600175 PHARM REV CODE 636 W HCPCS: Performed by: INTERNAL MEDICINE

## 2019-12-19 PROCEDURE — 93016 CV STRESS TEST SUPVJ ONLY: CPT | Mod: ,,, | Performed by: INTERNAL MEDICINE

## 2019-12-19 PROCEDURE — 93018 CV STRESS TEST I&R ONLY: CPT | Mod: ,,, | Performed by: INTERNAL MEDICINE

## 2019-12-19 PROCEDURE — 78452 STRESS TEST WITH MYOCARDIAL PERFUSION (CUPID ONLY): ICD-10-PCS | Mod: 26,,, | Performed by: INTERNAL MEDICINE

## 2019-12-19 PROCEDURE — 93306 ECHO (CUPID ONLY): ICD-10-PCS | Mod: 26,,, | Performed by: INTERNAL MEDICINE

## 2019-12-19 PROCEDURE — 93306 TTE W/DOPPLER COMPLETE: CPT | Mod: 26,,, | Performed by: INTERNAL MEDICINE

## 2019-12-19 PROCEDURE — 93018 STRESS TEST WITH MYOCARDIAL PERFUSION (CUPID ONLY): ICD-10-PCS | Mod: ,,, | Performed by: INTERNAL MEDICINE

## 2019-12-19 PROCEDURE — 78452 HT MUSCLE IMAGE SPECT MULT: CPT | Mod: 26,,, | Performed by: INTERNAL MEDICINE

## 2019-12-19 PROCEDURE — 93306 TTE W/DOPPLER COMPLETE: CPT

## 2019-12-19 PROCEDURE — 93016 STRESS TEST WITH MYOCARDIAL PERFUSION (CUPID ONLY): ICD-10-PCS | Mod: ,,, | Performed by: INTERNAL MEDICINE

## 2019-12-19 PROCEDURE — 93017 CV STRESS TEST TRACING ONLY: CPT

## 2019-12-19 RX ORDER — REGADENOSON 0.08 MG/ML
0.4 INJECTION, SOLUTION INTRAVENOUS ONCE
Status: COMPLETED | OUTPATIENT
Start: 2019-12-19 | End: 2019-12-19

## 2019-12-19 RX ADMIN — REGADENOSON 0.4 MG: 0.08 INJECTION, SOLUTION INTRAVENOUS at 08:12

## 2020-01-02 ENCOUNTER — PATIENT OUTREACH (OUTPATIENT)
Dept: ADMINISTRATIVE | Facility: OTHER | Age: 80
End: 2020-01-02

## 2020-01-06 ENCOUNTER — OFFICE VISIT (OUTPATIENT)
Dept: CARDIOLOGY | Facility: CLINIC | Age: 80
End: 2020-01-06
Payer: MEDICARE

## 2020-01-06 VITALS
SYSTOLIC BLOOD PRESSURE: 193 MMHG | DIASTOLIC BLOOD PRESSURE: 83 MMHG | BODY MASS INDEX: 35.79 KG/M2 | OXYGEN SATURATION: 99 % | RESPIRATION RATE: 16 BRPM | WEIGHT: 195.69 LBS | HEART RATE: 88 BPM

## 2020-01-06 DIAGNOSIS — I10 ESSENTIAL HYPERTENSION: Primary | ICD-10-CM

## 2020-01-06 DIAGNOSIS — E78.5 HYPERLIPIDEMIA WITH TARGET LDL LESS THAN 100: ICD-10-CM

## 2020-01-06 DIAGNOSIS — I70.0 ATHEROSCLEROSIS OF AORTA: ICD-10-CM

## 2020-01-06 PROCEDURE — 1126F PR PAIN SEVERITY QUANTIFIED, NO PAIN PRESENT: ICD-10-PCS | Mod: S$GLB,,, | Performed by: INTERNAL MEDICINE

## 2020-01-06 PROCEDURE — 99214 OFFICE O/P EST MOD 30 MIN: CPT | Mod: S$GLB,,, | Performed by: INTERNAL MEDICINE

## 2020-01-06 PROCEDURE — 1159F PR MEDICATION LIST DOCUMENTED IN MEDICAL RECORD: ICD-10-PCS | Mod: S$GLB,,, | Performed by: INTERNAL MEDICINE

## 2020-01-06 PROCEDURE — 1159F MED LIST DOCD IN RCRD: CPT | Mod: S$GLB,,, | Performed by: INTERNAL MEDICINE

## 2020-01-06 PROCEDURE — 3079F DIAST BP 80-89 MM HG: CPT | Mod: CPTII,S$GLB,, | Performed by: INTERNAL MEDICINE

## 2020-01-06 PROCEDURE — 99999 PR PBB SHADOW E&M-EST. PATIENT-LVL III: ICD-10-PCS | Mod: PBBFAC,,, | Performed by: INTERNAL MEDICINE

## 2020-01-06 PROCEDURE — 3077F SYST BP >= 140 MM HG: CPT | Mod: CPTII,S$GLB,, | Performed by: INTERNAL MEDICINE

## 2020-01-06 PROCEDURE — 1101F PR PT FALLS ASSESS DOC 0-1 FALLS W/OUT INJ PAST YR: ICD-10-PCS | Mod: CPTII,S$GLB,, | Performed by: INTERNAL MEDICINE

## 2020-01-06 PROCEDURE — 1101F PT FALLS ASSESS-DOCD LE1/YR: CPT | Mod: CPTII,S$GLB,, | Performed by: INTERNAL MEDICINE

## 2020-01-06 PROCEDURE — 99214 PR OFFICE/OUTPT VISIT, EST, LEVL IV, 30-39 MIN: ICD-10-PCS | Mod: S$GLB,,, | Performed by: INTERNAL MEDICINE

## 2020-01-06 PROCEDURE — 3079F PR MOST RECENT DIASTOLIC BLOOD PRESSURE 80-89 MM HG: ICD-10-PCS | Mod: CPTII,S$GLB,, | Performed by: INTERNAL MEDICINE

## 2020-01-06 PROCEDURE — 99999 PR PBB SHADOW E&M-EST. PATIENT-LVL III: CPT | Mod: PBBFAC,,, | Performed by: INTERNAL MEDICINE

## 2020-01-06 PROCEDURE — 1126F AMNT PAIN NOTED NONE PRSNT: CPT | Mod: S$GLB,,, | Performed by: INTERNAL MEDICINE

## 2020-01-06 PROCEDURE — 3077F PR MOST RECENT SYSTOLIC BLOOD PRESSURE >= 140 MM HG: ICD-10-PCS | Mod: CPTII,S$GLB,, | Performed by: INTERNAL MEDICINE

## 2020-01-06 RX ORDER — METOPROLOL SUCCINATE 100 MG/1
100 TABLET, EXTENDED RELEASE ORAL DAILY
Qty: 90 TABLET | Refills: 3 | Status: SHIPPED | OUTPATIENT
Start: 2020-01-06 | End: 2020-07-02 | Stop reason: SDUPTHER

## 2020-01-06 NOTE — PROGRESS NOTES
CARDIOVASCULAR CONSULTATION    REASON FOR CONSULT:   Rachel Asif is a 79 y.o. female who presents for EVALUATION OF NEW ONSET PERIPHERAL EDEMA, dyspnea on exertion as well as chest tightness     HISTORY OF PRESENT ILLNESS:     Patient is a pleasant 79-year-old lady.  She states that over the past few months has started noticing significant bilateral peripheral edema.  Denies any orthopnea, PND but has dyspnea on exertion as well as chest tightness on exertion.  Denies any chest pains at rest.  BNP was checked and was found to be elevated.    Notes from January 2020    Patient here for follow-up.  After started Lasix her symptoms have improved considerably.  Swelling of feet has gone down as well as dyspnea on exertion has gone away as well as chest tightness on exertion has gone away.  Denies any orthopnea, PND.  Blood pressure is elevated in clinic today.  Will increase her Toprol-XL.    · Normal left ventricular systolic function. The estimated ejection fraction is 60%  · Mild concentric left ventricular hypertrophy.  · Indeterminate left ventricular diastolic function.  · Normal right ventricular systolic function.  · Moderate left atrial enlargement.  · Mild right atrial enlargement.  · Mild mitral regurgitation.  · Mild tricuspid regurgitation.  · Normal central venous pressure (3 mm Hg).  · The estimated PA systolic pressure is 38 mm Hg      The perfusion scan is free of evidence from myocardial ischemia or injury.    There is a  mild intensity fixed defect in the inferior wall of the left ventricle secondary to diaphragm attenuation.    Gated perfusion images showed an ejection fraction of 67 %    There is normal wall motion at rest and post stress.    The EKG portion of this study is negative for ischemia.    The patient reported no chest pain during the stress test.    There were no arrhythmias during stress.       PAST MEDICAL HISTORY:     Past Medical History:   Diagnosis Date    Amblyopia      os    Anemia of other chronic disease     Atherosclerosis of aorta     noted on L-spine X-ray 4/14/2011    Atherosclerosis of aortic arch     - noted on CXR 5/2017    Chronic low back pain     followed by orthopaedics    DJD (degenerative joint disease)     Glaucoma     History of colonic polyps     History of vitamin D deficiency     Hyperlipidemia     Hypertension     Lumbar radiculopathy     Obesity     Obesity     OH (ocular hypertension)     Osteoarthritis     Osteoporosis, post-menopausal     currently on a drug holiday    Postmenopausal status     Prediabetes     Secondary hyperparathyroidism of renal origin     Trochanteric bursitis        PAST SURGICAL HISTORY:     Past Surgical History:   Procedure Laterality Date    CATARACT EXTRACTION W/  INTRAOCULAR LENS IMPLANT Bilateral        ALLERGIES AND MEDICATION:     Review of patient's allergies indicates:   Allergen Reactions    No known allergies         Medication List           Accurate as of January 6, 2020 11:02 AM. If you have any questions, ask your nurse or doctor.               CONTINUE taking these medications    amLODIPine 10 MG tablet  Commonly known as:  NORVASC  TAKE 1 TABLET(10 MG) BY MOUTH EVERY DAY     aspirin 81 MG Chew     atorvastatin 20 MG tablet  Commonly known as:  LIPITOR  TAKE 1 TABLET(20 MG) BY MOUTH EVERY DAY     diclofenac sodium 1 % Gel  Commonly known as:  VOLTAREN  Apply topically 3 (three) times daily as needed.     DULoxetine 60 MG capsule  Commonly known as:  CYMBALTA  Take 1 capsule (60 mg total) by mouth once daily.     furosemide 20 MG tablet  Commonly known as:  LASIX  Take 1 tablet (20 mg total) by mouth 2 (two) times daily.     meloxicam 15 MG tablet  Commonly known as:  MOBIC  Take 1 tablet (15 mg total) by mouth once daily.     metoprolol succinate 50 MG 24 hr tablet  Commonly known as:  TOPROL-XL  Take 1 tablet (50 mg total) by mouth once daily.     olmesartan-hydrochlorothiazide 40-25 mg per  tablet  Commonly known as:  BENICAR HCT  Take 1 tablet by mouth once daily.     pantoprazole 20 MG tablet  Commonly known as:  PROTONIX  Take 1 tablet (20 mg total) by mouth once daily. To protect stomach due to taking NSAIDs (meloxicam)     potassium chloride SA 20 MEQ tablet  Commonly known as:  K-DUR,KLOR-CON  TAKE 1 TABLET(20 MEQ) BY MOUTH EVERY DAY     SYSTANE OPHT     traMADol 50 mg tablet  Commonly known as:  ULTRAM  TAKE 1 TABLET(50 MG) BY MOUTH THREE TIMES DAILY AS NEEDED FOR PAIN     travoprost 0.004 % ophthalmic solution  Commonly known as:  Travatan Z  Place 1 drop into both eyes every evening.            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[employer])   Social Needs    Financial resource strain: Not on file    Food insecurity:     Worry: Not on file     Inability: Not on file    Transportation needs:     Medical: Not on file     Non-medical: Not on file   Tobacco Use    Smoking status: Never Smoker    Smokeless tobacco: Never Used   Substance and Sexual Activity    Alcohol use: No    Drug use: Not on file    Sexual activity: Not on file   Lifestyle    Physical activity:     Days per week: Not on file     Minutes per session: Not on file    Stress: Not on file   Relationships    Social connections:     Talks on phone: Not on file     Gets together: Not on file     Attends Advent service: Not on file     Active member of club or organization: Not on file     Attends meetings of clubs or organizations: Not on file     Relationship status: Not on file   Other Topics Concern    Not on file   Social History Narrative    Her  is . She is taking care of a sister who has brain damage from a car accident.       FAMILY  HISTORY:     Family History   Problem Relation Age of Onset    Cataracts Mother     Hypertension Mother     Other Mother         complications from splenectomy after fall    Cataracts Father     Hypertension Father     Hypertension Sister     Edema Sister     Hypertension Brother     Diabetes Brother     Heart disease Brother     Glaucoma Maternal Grandmother     Hypertension Sister     Other Sister         shingles    Lupus Sister     Lung cancer Sister     Hypertension Sister     Other Sister         brain damage from MVA    Hypertension Sister     Hypertension Brother     Heart disease Brother     Hypertension Brother     Heart attack Brother     Hypertension Brother     Hypertension Brother     No Known Problems Brother     No Known Problems Brother     Hypertension Brother     Heart disease Brother     Cancer Neg Hx     Amblyopia Neg Hx     Blindness Neg Hx     Retinal detachment Neg Hx     Strabismus Neg Hx     Stroke Neg Hx        REVIEW OF SYSTEMS:   Review of Systems   Constitution: Negative.   HENT: Negative.    Eyes: Negative.    Respiratory: Negative.    Endocrine: Negative.    Hematologic/Lymphatic: Negative.    Skin: Negative.    Musculoskeletal: Negative.    Gastrointestinal: Negative.    Genitourinary: Negative.    Neurological: Negative.    Psychiatric/Behavioral: Negative.    Allergic/Immunologic: Negative.        A 10 point review of systems was performed and all the pertinent positives have been mentioned. Rest of review of systems was negative.        PHYSICAL EXAM:     Vitals:    01/06/20 1058   BP: (!) 245/103   Pulse:    Resp:     Body mass index is 35.79 kg/m².  Weight: 88.7 kg (195 lb 10.5 oz)         Physical Exam   Constitutional: She is oriented to person, place, and time. She appears well-developed and well-nourished.   HENT:   Head: Normocephalic.   Eyes: Pupils are equal, round, and reactive to light.   Neck: Normal range of motion. Neck supple.    Cardiovascular: Normal rate and regular rhythm.   Pulmonary/Chest: Effort normal and breath sounds normal. She has no rales.   Abdominal: Soft. Normal appearance and bowel sounds are normal. There is no tenderness.   Musculoskeletal: Normal range of motion.   Neurological: She is alert and oriented to person, place, and time.   Skin: Skin is warm.   Psychiatric: She has a normal mood and affect.         DATA:     Laboratory:  CBC:  Recent Labs   Lab 05/04/17  0902 07/09/18  0751 08/20/19  0946   WBC 5.74 5.25 6.92   Hemoglobin 10.6 L 10.4 L 9.8 L   Hematocrit 33.5 L 33.7 L 32.7 L   Platelets 356 H 336 355 H       CHEMISTRIES:  Recent Labs   Lab 07/09/18  0751 08/20/19  0946 11/20/19  1022   Glucose 95 84 92   Sodium 140 143 143   Potassium 4.1 4.5 3.9   BUN, Bld 21 17 16   Creatinine 1.3 1.4 1.3   eGFR if African American 44.8 A 40.6 A 44.5 A   eGFR if non  38.8 A 35.3 A 38.6 A   Calcium 9.4 9.3 9.3       CARDIAC BIOMARKERS:        COAGS:        LIPIDS/LFTS:  Recent Labs   Lab 05/04/17  0902 07/09/18  0751 08/20/19  0946 11/20/19  1022   Cholesterol 172 188 169  --    Triglycerides 67 85 74  --    HDL 74 78 H 80 H  --    LDL Cholesterol 84.6 93.0 74.2  --    Non-HDL Cholesterol 98 110 89  --    AST 27 21 24 28   ALT 15 16 23 21       Hemoglobin A1C   Date Value Ref Range Status   08/20/2019 5.8 (H) 4.0 - 5.6 % Final     Comment:     ADA Screening Guidelines:  5.7-6.4%  Consistent with prediabetes  >or=6.5%  Consistent with diabetes  High levels of fetal hemoglobin interfere with the HbA1C  assay. Heterozygous hemoglobin variants (HbS, HgC, etc)do  not significantly interfere with this assay.   However, presence of multiple variants may affect accuracy.     07/09/2018 6.0 (H) 4.0 - 5.6 % Final     Comment:     ADA Screening Guidelines:  5.7-6.4%  Consistent with prediabetes  >or=6.5%  Consistent with diabetes  High levels of fetal hemoglobin interfere with the HbA1C  assay. Heterozygous hemoglobin  variants (HbS, HgC, etc)do  not significantly interfere with this assay.   However, presence of multiple variants may affect accuracy.     05/04/2017 6.0 4.5 - 6.2 % Final     Comment:     According to ADA guidelines, hemoglobin A1C <7.0% represents  optimal control in non-pregnant diabetic patients.  Different  metrics may apply to specific populations.   Standards of Medical Care in Diabetes - 2016.  For the purpose of screening for the presence of diabetes:  <5.7%     Consistent with the absence of diabetes  5.7-6.4%  Consistent with increasing risk for diabetes   (prediabetes)  >or=6.5%  Consistent with diabetes  Currently no consensus exists for use of hemoglobin A1C  for diagnosis of diabetes for children.         TSH  Recent Labs   Lab 12/19/19  0839   TSH 2.082       The ASCVD Risk score (Mareniscojanice ROCHE Jr., et al., 2013) failed to calculate for the following reasons:    The 2013 ASCVD risk score is only valid for ages 40 to 79           Cardiovascular Testing:    EKG: (personally reviewed tracing)  Normal sinus rhythm, low-voltage QRS, nonspecific ST changes.      ASSESSMENT AND PLAN     Patient Active Problem List   Diagnosis    Lumbar radiculopathy    Osteoarthritis    Benign hypertension with chronic kidney disease, stage III    Hyperlipidemia with target LDL less than 100    Chronic low back pain    Osteoporosis, post-menopausal    Anemia of other chronic disease    Postmenopausal status    DJD (degenerative joint disease)    History of colonic polyps    Severe obesity (BMI 35.0-39.9) with comorbidity    Prediabetes    Vitamin D deficiency    Atherosclerosis of aorta    Gastroesophageal reflux disease without esophagitis    Secondary hyperparathyroidism of renal origin    Pseudophakia - Both Eyes    Refractive error    Dry eye syndrome, bilateral    Ocular hypertension, bilateral    Amblyopia, left    Atherosclerosis of aortic arch    Vitreous detachment, bilateral       New onset  dyspnea on exertion, chest tightness on exertion as well as bilateral peripheral edema.  Stress test did not reveal any significant ischemia.  2D echocardiogram showed normal left ventricular systolic function.  Symptoms have improved considerably starting Lasix.  Continue Lasix.  Asked the patient to maintain a low-salt diet as well as weigh herself daily.  Weight goes more than 3 lb over 2-3 days, asked her to increase her Lasix until her weight comes back to baseline.    Hypertension:  Uncontrolled.  Increase Toprol-XL to 100 mg daily.    Follow-up in 1 month      Thank you very much for involving me in the care of your patient.  Please do not hesitate to contact me if there are any questions.      Joceline Colon MD, FACC, [facility]  Interventional Cardiologist, [facility].           This note was dictated with the help of speech recognition software.  There might be un-intended errors and/or substitutions.

## 2020-01-27 ENCOUNTER — TELEPHONE (OUTPATIENT)
Dept: OPHTHALMOLOGY | Facility: CLINIC | Age: 80
End: 2020-01-27

## 2020-01-31 ENCOUNTER — PATIENT OUTREACH (OUTPATIENT)
Dept: ADMINISTRATIVE | Facility: OTHER | Age: 80
End: 2020-01-31

## 2020-02-03 ENCOUNTER — OFFICE VISIT (OUTPATIENT)
Dept: CARDIOLOGY | Facility: CLINIC | Age: 80
End: 2020-02-03
Payer: MEDICARE

## 2020-02-03 VITALS
HEART RATE: 57 BPM | OXYGEN SATURATION: 98 % | RESPIRATION RATE: 16 BRPM | BODY MASS INDEX: 35.48 KG/M2 | SYSTOLIC BLOOD PRESSURE: 181 MMHG | WEIGHT: 194 LBS | DIASTOLIC BLOOD PRESSURE: 78 MMHG

## 2020-02-03 DIAGNOSIS — I10 ESSENTIAL HYPERTENSION: Primary | ICD-10-CM

## 2020-02-03 PROCEDURE — 1159F MED LIST DOCD IN RCRD: CPT | Mod: S$GLB,,, | Performed by: INTERNAL MEDICINE

## 2020-02-03 PROCEDURE — 99214 OFFICE O/P EST MOD 30 MIN: CPT | Mod: S$GLB,,, | Performed by: INTERNAL MEDICINE

## 2020-02-03 PROCEDURE — 1159F PR MEDICATION LIST DOCUMENTED IN MEDICAL RECORD: ICD-10-PCS | Mod: S$GLB,,, | Performed by: INTERNAL MEDICINE

## 2020-02-03 PROCEDURE — 1126F AMNT PAIN NOTED NONE PRSNT: CPT | Mod: S$GLB,,, | Performed by: INTERNAL MEDICINE

## 2020-02-03 PROCEDURE — 1101F PT FALLS ASSESS-DOCD LE1/YR: CPT | Mod: CPTII,S$GLB,, | Performed by: INTERNAL MEDICINE

## 2020-02-03 PROCEDURE — 1126F PR PAIN SEVERITY QUANTIFIED, NO PAIN PRESENT: ICD-10-PCS | Mod: S$GLB,,, | Performed by: INTERNAL MEDICINE

## 2020-02-03 PROCEDURE — 1101F PR PT FALLS ASSESS DOC 0-1 FALLS W/OUT INJ PAST YR: ICD-10-PCS | Mod: CPTII,S$GLB,, | Performed by: INTERNAL MEDICINE

## 2020-02-03 PROCEDURE — 99999 PR PBB SHADOW E&M-EST. PATIENT-LVL III: CPT | Mod: PBBFAC,,, | Performed by: INTERNAL MEDICINE

## 2020-02-03 PROCEDURE — 3078F DIAST BP <80 MM HG: CPT | Mod: CPTII,S$GLB,, | Performed by: INTERNAL MEDICINE

## 2020-02-03 PROCEDURE — 3077F SYST BP >= 140 MM HG: CPT | Mod: CPTII,S$GLB,, | Performed by: INTERNAL MEDICINE

## 2020-02-03 PROCEDURE — 99214 PR OFFICE/OUTPT VISIT, EST, LEVL IV, 30-39 MIN: ICD-10-PCS | Mod: S$GLB,,, | Performed by: INTERNAL MEDICINE

## 2020-02-03 PROCEDURE — 3077F PR MOST RECENT SYSTOLIC BLOOD PRESSURE >= 140 MM HG: ICD-10-PCS | Mod: CPTII,S$GLB,, | Performed by: INTERNAL MEDICINE

## 2020-02-03 PROCEDURE — 3078F PR MOST RECENT DIASTOLIC BLOOD PRESSURE < 80 MM HG: ICD-10-PCS | Mod: CPTII,S$GLB,, | Performed by: INTERNAL MEDICINE

## 2020-02-03 PROCEDURE — 99999 PR PBB SHADOW E&M-EST. PATIENT-LVL III: ICD-10-PCS | Mod: PBBFAC,,, | Performed by: INTERNAL MEDICINE

## 2020-02-03 RX ORDER — HYDRALAZINE HYDROCHLORIDE 25 MG/1
25 TABLET, FILM COATED ORAL 3 TIMES DAILY
Qty: 90 TABLET | Refills: 3 | Status: SHIPPED | OUTPATIENT
Start: 2020-02-03 | End: 2020-03-04

## 2020-02-03 NOTE — PROGRESS NOTES
CARDIOVASCULAR CONSULTATION    REASON FOR CONSULT:   Rachel Asif is a 79 y.o. female who presents for EVALUATION OF NEW ONSET PERIPHERAL EDEMA, dyspnea on exertion as well as chest tightness     HISTORY OF PRESENT ILLNESS:     Patient is a pleasant 79-year-old lady.  She states that over the past few months has started noticing significant bilateral peripheral edema.  Denies any orthopnea, PND but has dyspnea on exertion as well as chest tightness on exertion.  Denies any chest pains at rest.  BNP was checked and was found to be elevated.    Notes from January 2020    Patient here for follow-up.  After started Lasix her symptoms have improved considerably.  Swelling of feet has gone down as well as dyspnea on exertion has gone away as well as chest tightness on exertion has gone away.  Denies any orthopnea, PND.  Blood pressure is elevated in clinic today.  Will increase her Toprol-XL.    · Normal left ventricular systolic function. The estimated ejection fraction is 60%  · Mild concentric left ventricular hypertrophy.  · Indeterminate left ventricular diastolic function.  · Normal right ventricular systolic function.  · Moderate left atrial enlargement.  · Mild right atrial enlargement.  · Mild mitral regurgitation.  · Mild tricuspid regurgitation.  · Normal central venous pressure (3 mm Hg).  · The estimated PA systolic pressure is 38 mm Hg      The perfusion scan is free of evidence from myocardial ischemia or injury.    There is a  mild intensity fixed defect in the inferior wall of the left ventricle secondary to diaphragm attenuation.    Gated perfusion images showed an ejection fraction of 67 %    There is normal wall motion at rest and post stress.    The EKG portion of this study is negative for ischemia.    The patient reported no chest pain during the stress test.    There were no arrhythmias during stress.      Notes from February 2020:  Patient feeling much better.  Denies any chest pains at  rest on exertion, orthopnea, PND.  Blood pressure better controlled although still elevated.  States does not feel dizzy       PAST MEDICAL HISTORY:     Past Medical History:   Diagnosis Date    Amblyopia     os    Anemia of other chronic disease     Atherosclerosis of aorta     noted on L-spine X-ray 4/14/2011    Atherosclerosis of aortic arch     - noted on CXR 5/2017    Chronic low back pain     followed by orthopaedics    DJD (degenerative joint disease)     Glaucoma     History of colonic polyps     History of vitamin D deficiency     Hyperlipidemia     Hypertension     Lumbar radiculopathy     Obesity     Obesity     OH (ocular hypertension)     Osteoarthritis     Osteoporosis, post-menopausal     currently on a drug holiday    Postmenopausal status     Prediabetes     Secondary hyperparathyroidism of renal origin     Trochanteric bursitis        PAST SURGICAL HISTORY:     Past Surgical History:   Procedure Laterality Date    CATARACT EXTRACTION W/  INTRAOCULAR LENS IMPLANT Bilateral        ALLERGIES AND MEDICATION:     Review of patient's allergies indicates:   Allergen Reactions    No known allergies         Medication List           Accurate as of February 3, 2020 10:43 AM. If you have any questions, ask your nurse or doctor.               START taking these medications    hydrALAZINE 25 MG tablet  Commonly known as:  APRESOLINE  Take 1 tablet (25 mg total) by mouth 3 (three) times daily.  Started by:  Joceline Colon MD        CONTINUE taking these medications    amLODIPine 10 MG tablet  Commonly known as:  NORVASC  TAKE 1 TABLET(10 MG) BY MOUTH EVERY DAY     aspirin 81 MG Chew     atorvastatin 20 MG tablet  Commonly known as:  LIPITOR  TAKE 1 TABLET(20 MG) BY MOUTH EVERY DAY     diclofenac sodium 1 % Gel  Commonly known as:  VOLTAREN  Apply topically 3 (three) times daily as needed.     DULoxetine 60 MG capsule  Commonly known as:  CYMBALTA  Take 1 capsule (60 mg total) by mouth once  daily.     furosemide 20 MG tablet  Commonly known as:  LASIX  Take 1 tablet (20 mg total) by mouth 2 (two) times daily.     meloxicam 15 MG tablet  Commonly known as:  MOBIC  Take 1 tablet (15 mg total) by mouth once daily.     metoprolol succinate 100 MG 24 hr tablet  Commonly known as:  TOPROL-XL  Take 1 tablet (100 mg total) by mouth once daily.     olmesartan-hydrochlorothiazide 40-25 mg per tablet  Commonly known as:  BENICAR HCT  Take 1 tablet by mouth once daily.     pantoprazole 20 MG tablet  Commonly known as:  PROTONIX  Take 1 tablet (20 mg total) by mouth once daily. To protect stomach due to taking NSAIDs (meloxicam)     potassium chloride SA 20 MEQ tablet  Commonly known as:  K-DUR,KLOR-CON  TAKE 1 TABLET(20 MEQ) BY MOUTH EVERY DAY     SYSTANE OPHT     traMADol 50 mg tablet  Commonly known as:  ULTRAM  TAKE 1 TABLET(50 MG) BY MOUTH THREE TIMES DAILY AS NEEDED FOR PAIN     travoprost 0.004 % ophthalmic solution  Commonly known as:  Travatan Z  Place 1 drop into both eyes every evening.           Where to Get Your Medications      These medications were sent to [id number] [address] [facility] [place]  [address], [facility] [address]    Hours:  24-hours Phone:  [phone]   · hydrALAZINE 25 MG tablet         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[employer])   Social Needs    Financial resource strain: Not on file    Food insecurity:     Worry: Not on file     Inability: Not on file    Transportation needs:     Medical: Not on file     Non-medical: Not on file   Tobacco Use    Smoking status: Never Smoker    Smokeless tobacco: Never Used   Substance and  Sexual Activity    Alcohol use: No    Drug use: Not on file    Sexual activity: Not on file   Lifestyle    Physical activity:     Days per week: Not on file     Minutes per session: Not on file    Stress: Not on file   Relationships    Social connections:     Talks on phone: Not on file     Gets together: Not on file     Attends Yazidism service: Not on file     Active member of club or organization: Not on file     Attends meetings of clubs or organizations: Not on file     Relationship status: Not on file   Other Topics Concern    Not on file   Social History Narrative    Her  is . She is taking care of a sister who has brain damage from a car accident.       FAMILY HISTORY:     Family History   Problem Relation Age of Onset    Cataracts Mother     Hypertension Mother     Other Mother         complications from splenectomy after fall    Cataracts Father     Hypertension Father     Hypertension Sister     Edema Sister     Hypertension Brother     Diabetes Brother     Heart disease Brother     Glaucoma Maternal Grandmother     Hypertension Sister     Other Sister         shingles    Lupus Sister     Lung cancer Sister     Hypertension Sister     Other Sister         brain damage from MVA    Hypertension Sister     Hypertension Brother     Heart disease Brother     Hypertension Brother     Heart attack Brother     Hypertension Brother     Hypertension Brother     No Known Problems Brother     No Known Problems Brother     Hypertension Brother     Heart disease Brother     Cancer Neg Hx     Amblyopia Neg Hx     Blindness Neg Hx     Retinal detachment Neg Hx     Strabismus Neg Hx     Stroke Neg Hx        REVIEW OF SYSTEMS:   Review of Systems   Constitution: Negative.   HENT: Negative.    Eyes: Negative.    Respiratory: Negative.    Endocrine: Negative.    Hematologic/Lymphatic: Negative.    Skin: Negative.    Musculoskeletal: Negative.    Gastrointestinal:  Negative.    Genitourinary: Negative.    Neurological: Negative.    Psychiatric/Behavioral: Negative.    Allergic/Immunologic: Negative.        A 10 point review of systems was performed and all the pertinent positives have been mentioned. Rest of review of systems was negative.        PHYSICAL EXAM:     Vitals:    02/03/20 1018   BP: (!) 181/78   Pulse: (!) 57   Resp: 16    Body mass index is 35.48 kg/m².  Weight: 88 kg (194 lb 0.1 oz)         Physical Exam   Constitutional: She is oriented to person, place, and time. She appears well-developed and well-nourished.   HENT:   Head: Normocephalic.   Eyes: Pupils are equal, round, and reactive to light.   Neck: Normal range of motion. Neck supple.   Cardiovascular: Normal rate and regular rhythm.   Pulmonary/Chest: Effort normal and breath sounds normal. She has no rales.   Abdominal: Soft. Normal appearance and bowel sounds are normal. There is no tenderness.   Musculoskeletal: Normal range of motion.   Neurological: She is alert and oriented to person, place, and time.   Skin: Skin is warm.   Psychiatric: She has a normal mood and affect.         DATA:     Laboratory:  CBC:  Recent Labs   Lab 05/04/17  0902 07/09/18  0751 08/20/19  0946   WBC 5.74 5.25 6.92   Hemoglobin 10.6 L 10.4 L 9.8 L   Hematocrit 33.5 L 33.7 L 32.7 L   Platelets 356 H 336 355 H       CHEMISTRIES:  Recent Labs   Lab 07/09/18 0751 08/20/19  0946 11/20/19  1022   Glucose 95 84 92   Sodium 140 143 143   Potassium 4.1 4.5 3.9   BUN, Bld 21 17 16   Creatinine 1.3 1.4 1.3   eGFR if African American 44.8 A 40.6 A 44.5 A   eGFR if non  38.8 A 35.3 A 38.6 A   Calcium 9.4 9.3 9.3       CARDIAC BIOMARKERS:        COAGS:        LIPIDS/LFTS:  Recent Labs   Lab 05/04/17  0902 07/09/18  0751 08/20/19  0946 11/20/19  1022   Cholesterol 172 188 169  --    Triglycerides 67 85 74  --    HDL 74 78 H 80 H  --    LDL Cholesterol 84.6 93.0 74.2  --    Non-HDL Cholesterol 98 110 89  --    AST 27 21 24  28   ALT 15 16 23 21       Hemoglobin A1C   Date Value Ref Range Status   08/20/2019 5.8 (H) 4.0 - 5.6 % Final     Comment:     ADA Screening Guidelines:  5.7-6.4%  Consistent with prediabetes  >or=6.5%  Consistent with diabetes  High levels of fetal hemoglobin interfere with the HbA1C  assay. Heterozygous hemoglobin variants (HbS, HgC, etc)do  not significantly interfere with this assay.   However, presence of multiple variants may affect accuracy.     07/09/2018 6.0 (H) 4.0 - 5.6 % Final     Comment:     ADA Screening Guidelines:  5.7-6.4%  Consistent with prediabetes  >or=6.5%  Consistent with diabetes  High levels of fetal hemoglobin interfere with the HbA1C  assay. Heterozygous hemoglobin variants (HbS, HgC, etc)do  not significantly interfere with this assay.   However, presence of multiple variants may affect accuracy.     05/04/2017 6.0 4.5 - 6.2 % Final     Comment:     According to ADA guidelines, hemoglobin A1C <7.0% represents  optimal control in non-pregnant diabetic patients.  Different  metrics may apply to specific populations.   Standards of Medical Care in Diabetes - 2016.  For the purpose of screening for the presence of diabetes:  <5.7%     Consistent with the absence of diabetes  5.7-6.4%  Consistent with increasing risk for diabetes   (prediabetes)  >or=6.5%  Consistent with diabetes  Currently no consensus exists for use of hemoglobin A1C  for diagnosis of diabetes for children.         TSH  Recent Labs   Lab 12/19/19  0839   TSH 2.082       The ASCVD Risk score (Davonte KALEY Jr., et al., 2013) failed to calculate for the following reasons:    The 2013 ASCVD risk score is only valid for ages 40 to 79           Cardiovascular Testing:    EKG: (personally reviewed tracing)  Normal sinus rhythm, low-voltage QRS, nonspecific ST changes.      ASSESSMENT AND PLAN     Patient Active Problem List   Diagnosis    Lumbar radiculopathy    Osteoarthritis    Benign hypertension with chronic kidney disease,  stage III    Hyperlipidemia with target LDL less than 100    Chronic low back pain    Osteoporosis, post-menopausal    Anemia of other chronic disease    Postmenopausal status    DJD (degenerative joint disease)    History of colonic polyps    Severe obesity (BMI 35.0-39.9) with comorbidity    Prediabetes    Vitamin D deficiency    Atherosclerosis of aorta    Gastroesophageal reflux disease without esophagitis    Secondary hyperparathyroidism of renal origin    Pseudophakia - Both Eyes    Refractive error    Essential hypertension    Dry eye syndrome, bilateral    Ocular hypertension, bilateral    Amblyopia, left    Atherosclerosis of aortic arch    Vitreous detachment, bilateral       New onset dyspnea on exertion, chest tightness on exertion as well as bilateral peripheral edema.  Stress test did not reveal any significant ischemia.  2D echocardiogram showed normal left ventricular systolic function.  Symptoms have improved considerably starting Lasix.  Continue Lasix.  Asked the patient to maintain a low-salt diet as well as weigh herself daily.  Weight goes more than 3 lb over 2-3 days, asked her to increase her Lasix until her weight comes back to baseline.    Hypertension:  Uncontrolled.  No room to go up on beta-blockers because of heart rate.  Initiate hydralazine 25 mg 3 times daily.  Follow-up in clinic in 1 month.          Thank you very much for involving me in the care of your patient.  Please do not hesitate to contact me if there are any questions.      Joceline Colon MD, FACC, [place] [facility]  Interventional Cardiologist, [facility].           This note was dictated with the help of speech recognition software.  There might be un-intended errors and/or substitutions.

## 2020-02-24 ENCOUNTER — PATIENT OUTREACH (OUTPATIENT)
Dept: ADMINISTRATIVE | Facility: OTHER | Age: 80
End: 2020-02-24

## 2020-02-27 ENCOUNTER — OFFICE VISIT (OUTPATIENT)
Dept: OPHTHALMOLOGY | Facility: CLINIC | Age: 80
End: 2020-02-27
Payer: MEDICARE

## 2020-02-27 DIAGNOSIS — I10 ESSENTIAL HYPERTENSION: ICD-10-CM

## 2020-02-27 DIAGNOSIS — Z96.1 PSEUDOPHAKIA: ICD-10-CM

## 2020-02-27 DIAGNOSIS — H04.123 DRY EYE SYNDROME, BILATERAL: ICD-10-CM

## 2020-02-27 DIAGNOSIS — H43.813 VITREOUS DETACHMENT, BILATERAL: ICD-10-CM

## 2020-02-27 DIAGNOSIS — H53.002 AMBLYOPIA, LEFT: ICD-10-CM

## 2020-02-27 DIAGNOSIS — H40.053 OCULAR HYPERTENSION, BILATERAL: Primary | ICD-10-CM

## 2020-02-27 DIAGNOSIS — H26.491 PCO (POSTERIOR CAPSULAR OPACIFICATION), RIGHT: ICD-10-CM

## 2020-02-27 PROCEDURE — 99999 PR PBB SHADOW E&M-EST. PATIENT-LVL II: ICD-10-PCS | Mod: PBBFAC,,, | Performed by: OPHTHALMOLOGY

## 2020-02-27 PROCEDURE — 99999 PR PBB SHADOW E&M-EST. PATIENT-LVL II: CPT | Mod: PBBFAC,,, | Performed by: OPHTHALMOLOGY

## 2020-02-27 PROCEDURE — 92014 COMPRE OPH EXAM EST PT 1/>: CPT | Mod: S$GLB,,, | Performed by: OPHTHALMOLOGY

## 2020-02-27 PROCEDURE — 92014 PR EYE EXAM, EST PATIENT,COMPREHESV: ICD-10-PCS | Mod: S$GLB,,, | Performed by: OPHTHALMOLOGY

## 2020-02-27 NOTE — PROGRESS NOTES
[facility]     DSL- 7/26/19 Chiquita    Last edited by Daniela Forbes on 2/27/2020 10:07 AM. (History)            Assessment /Plan     For exam results, see Encounter Report.    There are no diagnoses linked to this encounter.

## 2020-02-27 NOTE — PROGRESS NOTES
Subjective:       Patient ID: Rachel Asif is a 79 y.o. female.    Chief Complaint: Ocular Hypertension    HPI     DSL- 7/26/19 Mukund    78 y/o female is here for IOP check. H/o of Ocular Hypertension. No   Visible Va change since last visit. Pt c/o of dry eyes. Pt is doing   Travatan as directed- refills needed. No other ocular complaints.     Eyemeds  Travatan BID OU     Last edited by Daniela Forbes on 2/27/2020 10:18 AM. (History)             Assessment:       1. Ocular hypertension, bilateral    2. PCO (posterior capsular opacification), right    3. Dry eye syndrome, bilateral    4. Amblyopia, left    5. Vitreous detachment, bilateral    6. Essential hypertension    7. Pseudophakia - Both Eyes        Plan:       OHT OU-IOP's are acceptable OU & ON's appear stable OU.  Early PCO OD- Not Visually Significant.  SABINO-Doing well.  Amblyopia OS-Stable.  PVD's OU-STable.  HTN-No retinopathy OU.        Decrease Travatan to qhs OU.  AT's.  Control HTN.  RTC 6 mos for IOP's, HVF's & OCT's.

## 2020-03-04 ENCOUNTER — OFFICE VISIT (OUTPATIENT)
Dept: CARDIOLOGY | Facility: CLINIC | Age: 80
End: 2020-03-04
Payer: MEDICARE

## 2020-03-04 VITALS
SYSTOLIC BLOOD PRESSURE: 142 MMHG | HEART RATE: 75 BPM | OXYGEN SATURATION: 97 % | BODY MASS INDEX: 35.6 KG/M2 | RESPIRATION RATE: 16 BRPM | WEIGHT: 194.69 LBS | DIASTOLIC BLOOD PRESSURE: 65 MMHG

## 2020-03-04 DIAGNOSIS — I10 ESSENTIAL HYPERTENSION: Primary | ICD-10-CM

## 2020-03-04 PROCEDURE — 1101F PT FALLS ASSESS-DOCD LE1/YR: CPT | Mod: CPTII,S$GLB,, | Performed by: INTERNAL MEDICINE

## 2020-03-04 PROCEDURE — 1159F MED LIST DOCD IN RCRD: CPT | Mod: S$GLB,,, | Performed by: INTERNAL MEDICINE

## 2020-03-04 PROCEDURE — 99214 OFFICE O/P EST MOD 30 MIN: CPT | Mod: S$GLB,,, | Performed by: INTERNAL MEDICINE

## 2020-03-04 PROCEDURE — 3078F PR MOST RECENT DIASTOLIC BLOOD PRESSURE < 80 MM HG: ICD-10-PCS | Mod: CPTII,S$GLB,, | Performed by: INTERNAL MEDICINE

## 2020-03-04 PROCEDURE — 99214 PR OFFICE/OUTPT VISIT, EST, LEVL IV, 30-39 MIN: ICD-10-PCS | Mod: S$GLB,,, | Performed by: INTERNAL MEDICINE

## 2020-03-04 PROCEDURE — 1101F PR PT FALLS ASSESS DOC 0-1 FALLS W/OUT INJ PAST YR: ICD-10-PCS | Mod: CPTII,S$GLB,, | Performed by: INTERNAL MEDICINE

## 2020-03-04 PROCEDURE — 99999 PR PBB SHADOW E&M-EST. PATIENT-LVL III: ICD-10-PCS | Mod: PBBFAC,,, | Performed by: INTERNAL MEDICINE

## 2020-03-04 PROCEDURE — 99999 PR PBB SHADOW E&M-EST. PATIENT-LVL III: CPT | Mod: PBBFAC,,, | Performed by: INTERNAL MEDICINE

## 2020-03-04 PROCEDURE — 1126F AMNT PAIN NOTED NONE PRSNT: CPT | Mod: S$GLB,,, | Performed by: INTERNAL MEDICINE

## 2020-03-04 PROCEDURE — 3078F DIAST BP <80 MM HG: CPT | Mod: CPTII,S$GLB,, | Performed by: INTERNAL MEDICINE

## 2020-03-04 PROCEDURE — 1159F PR MEDICATION LIST DOCUMENTED IN MEDICAL RECORD: ICD-10-PCS | Mod: S$GLB,,, | Performed by: INTERNAL MEDICINE

## 2020-03-04 PROCEDURE — 1126F PR PAIN SEVERITY QUANTIFIED, NO PAIN PRESENT: ICD-10-PCS | Mod: S$GLB,,, | Performed by: INTERNAL MEDICINE

## 2020-03-04 PROCEDURE — 3077F PR MOST RECENT SYSTOLIC BLOOD PRESSURE >= 140 MM HG: ICD-10-PCS | Mod: CPTII,S$GLB,, | Performed by: INTERNAL MEDICINE

## 2020-03-04 PROCEDURE — 3077F SYST BP >= 140 MM HG: CPT | Mod: CPTII,S$GLB,, | Performed by: INTERNAL MEDICINE

## 2020-03-04 RX ORDER — HYDRALAZINE HYDROCHLORIDE 25 MG/1
50 TABLET, FILM COATED ORAL 3 TIMES DAILY
Qty: 90 TABLET | Refills: 2 | Status: SHIPPED | OUTPATIENT
Start: 2020-03-04 | End: 2020-07-02 | Stop reason: SDUPTHER

## 2020-03-04 NOTE — PROGRESS NOTES
CARDIOVASCULAR CONSULTATION    REASON FOR CONSULT:   Rachel Asif is a 79 y.o. female who presents for EVALUATION OF NEW ONSET PERIPHERAL EDEMA, dyspnea on exertion as well as chest tightness     HISTORY OF PRESENT ILLNESS:     Patient is a pleasant 79-year-old lady.  She states that over the past few months has started noticing significant bilateral peripheral edema.  Denies any orthopnea, PND but has dyspnea on exertion as well as chest tightness on exertion.  Denies any chest pains at rest.  BNP was checked and was found to be elevated.    Notes from January 2020    Patient here for follow-up.  After started Lasix her symptoms have improved considerably.  Swelling of feet has gone down as well as dyspnea on exertion has gone away as well as chest tightness on exertion has gone away.  Denies any orthopnea, PND.  Blood pressure is elevated in clinic today.  Will increase her Toprol-XL.    · Normal left ventricular systolic function. The estimated ejection fraction is 60%  · Mild concentric left ventricular hypertrophy.  · Indeterminate left ventricular diastolic function.  · Normal right ventricular systolic function.  · Moderate left atrial enlargement.  · Mild right atrial enlargement.  · Mild mitral regurgitation.  · Mild tricuspid regurgitation.  · Normal central venous pressure (3 mm Hg).  · The estimated PA systolic pressure is 38 mm Hg      The perfusion scan is free of evidence from myocardial ischemia or injury.    There is a  mild intensity fixed defect in the inferior wall of the left ventricle secondary to diaphragm attenuation.    Gated perfusion images showed an ejection fraction of 67 %    There is normal wall motion at rest and post stress.    The EKG portion of this study is negative for ischemia.    The patient reported no chest pain during the stress test.    There were no arrhythmias during stress.      Notes from February 2020:  Patient feeling much better.  Denies any chest pains at  rest on exertion, orthopnea, PND.  Blood pressure better controlled although still elevated.  States does not feel dizzy       March 2020:  Patient here for follow-up.  Blood pressure better controlled with hydralazine.  At home stays around 150-160 mm systolic.  Slightly better in the clinic today although still mildly elevated.  Denies any chest pain at rest on exertion, orthopnea, PND.  With better control and blood pressure her symptoms of dyspnea on exertion also getting better as per the patient.    PAST MEDICAL HISTORY:     Past Medical History:   Diagnosis Date    Amblyopia     os    Anemia of other chronic disease     Atherosclerosis of aorta     noted on L-spine X-ray 4/14/2011    Atherosclerosis of aortic arch     - noted on CXR 5/2017    Chronic low back pain     followed by orthopaedics    DJD (degenerative joint disease)     Glaucoma     History of colonic polyps     History of vitamin D deficiency     Hyperlipidemia     Hypertension     Lumbar radiculopathy     Obesity     Obesity     OH (ocular hypertension)     Osteoarthritis     Osteoporosis, post-menopausal     currently on a drug holiday    PCO (posterior capsular opacification), right 2/27/2020    Postmenopausal status     Prediabetes     Secondary hyperparathyroidism of renal origin     Trochanteric bursitis        PAST SURGICAL HISTORY:     Past Surgical History:   Procedure Laterality Date    CATARACT EXTRACTION W/  INTRAOCULAR LENS IMPLANT Bilateral        ALLERGIES AND MEDICATION:     Review of patient's allergies indicates:   Allergen Reactions    No known allergies         Medication List           Accurate as of March 4, 2020 11:15 AM. If you have any questions, ask your nurse or doctor.               CONTINUE taking these medications    amLODIPine 10 MG tablet  Commonly known as:  NORVASC  TAKE 1 TABLET(10 MG) BY MOUTH EVERY DAY     aspirin 81 MG Chew     atorvastatin 20 MG tablet  Commonly known as:   LIPITOR  TAKE 1 TABLET(20 MG) BY MOUTH EVERY DAY     diclofenac sodium 1 % Gel  Commonly known as:  VOLTAREN  Apply topically 3 (three) times daily as needed.     DULoxetine 60 MG capsule  Commonly known as:  CYMBALTA  Take 1 capsule (60 mg total) by mouth once daily.     furosemide 20 MG tablet  Commonly known as:  LASIX  Take 1 tablet (20 mg total) by mouth 2 (two) times daily.     hydrALAZINE 25 MG tablet  Commonly known as:  APRESOLINE  Take 1 tablet (25 mg total) by mouth 3 (three) times daily.     meloxicam 15 MG tablet  Commonly known as:  MOBIC  Take 1 tablet (15 mg total) by mouth once daily.     metoprolol succinate 100 MG 24 hr tablet  Commonly known as:  TOPROL-XL  Take 1 tablet (100 mg total) by mouth once daily.     olmesartan-hydrochlorothiazide 40-25 mg per tablet  Commonly known as:  BENICAR HCT  Take 1 tablet by mouth once daily.     pantoprazole 20 MG tablet  Commonly known as:  PROTONIX  Take 1 tablet (20 mg total) by mouth once daily. To protect stomach due to taking NSAIDs (meloxicam)     potassium chloride SA 20 MEQ tablet  Commonly known as:  K-DUR,KLOR-CON  TAKE 1 TABLET(20 MEQ) BY MOUTH EVERY DAY     SYSTANE OPHT     traMADol 50 mg tablet  Commonly known as:  ULTRAM  TAKE 1 TABLET(50 MG) BY MOUTH THREE TIMES DAILY AS NEEDED FOR PAIN     travoprost 0.004 % ophthalmic solution  Commonly known as:  Travatan Z  Place 1 drop into both eyes every evening.            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[employer])   Social Needs    Financial resource strain: Not on file    Food insecurity:     Worry: Not on file     Inability: Not on file    Transportation needs:     Medical: Not on file     Non-medical: Not on file   Tobacco Use     Smoking status: Never Smoker    Smokeless tobacco: Never Used   Substance and Sexual Activity    Alcohol use: No    Drug use: Not on file    Sexual activity: Not on file   Lifestyle    Physical activity:     Days per week: Not on file     Minutes per session: Not on file    Stress: Not on file   Relationships    Social connections:     Talks on phone: Not on file     Gets together: Not on file     Attends Shinto service: Not on file     Active member of club or organization: Not on file     Attends meetings of clubs or organizations: Not on file     Relationship status: Not on file   Other Topics Concern    Not on file   Social History Narrative    Her  is . She is taking care of a sister who has brain damage from a car accident.       FAMILY HISTORY:     Family History   Problem Relation Age of Onset    Cataracts Mother     Hypertension Mother     Other Mother         complications from splenectomy after fall    Cataracts Father     Hypertension Father     Hypertension Sister     Edema Sister     Hypertension Brother     Diabetes Brother     Heart disease Brother     Glaucoma Maternal Grandmother     Hypertension Sister     Other Sister         shingles    Lupus Sister     Lung cancer Sister     Hypertension Sister     Other Sister         brain damage from MVA    Hypertension Sister     Hypertension Brother     Heart disease Brother     Hypertension Brother     Heart attack Brother     Hypertension Brother     Hypertension Brother     No Known Problems Brother     No Known Problems Brother     Hypertension Brother     Heart disease Brother     Cancer Neg Hx     Amblyopia Neg Hx     Blindness Neg Hx     Retinal detachment Neg Hx     Strabismus Neg Hx     Stroke Neg Hx        REVIEW OF SYSTEMS:   Review of Systems   Constitution: Negative.   HENT: Negative.    Eyes: Negative.    Respiratory: Negative.    Endocrine: Negative.    Hematologic/Lymphatic:  Negative.    Skin: Negative.    Musculoskeletal: Negative.    Gastrointestinal: Negative.    Genitourinary: Negative.    Neurological: Negative.    Psychiatric/Behavioral: Negative.    Allergic/Immunologic: Negative.        A 10 point review of systems was performed and all the pertinent positives have been mentioned. Rest of review of systems was negative.        PHYSICAL EXAM:     Vitals:    03/04/20 1044   BP: (!) 142/65   Pulse: 75   Resp: 16    Body mass index is 35.6 kg/m².  Weight: 88.3 kg (194 lb 10.7 oz)         Physical Exam   Constitutional: She is oriented to person, place, and time. She appears well-developed and well-nourished.   HENT:   Head: Normocephalic.   Eyes: Pupils are equal, round, and reactive to light.   Neck: Normal range of motion. Neck supple.   Cardiovascular: Normal rate and regular rhythm.   Pulmonary/Chest: Effort normal and breath sounds normal. She has no rales.   Abdominal: Soft. Normal appearance and bowel sounds are normal. There is no tenderness.   Musculoskeletal: Normal range of motion.   Neurological: She is alert and oriented to person, place, and time.   Skin: Skin is warm.   Psychiatric: She has a normal mood and affect.         DATA:     Laboratory:  CBC:  Recent Labs   Lab 05/04/17  0902 07/09/18  0751 08/20/19  0946   WBC 5.74 5.25 6.92   Hemoglobin 10.6 L 10.4 L 9.8 L   Hematocrit 33.5 L 33.7 L 32.7 L   Platelets 356 H 336 355 H       CHEMISTRIES:  Recent Labs   Lab 07/09/18  0751 08/20/19  0946 11/20/19  1022   Glucose 95 84 92   Sodium 140 143 143   Potassium 4.1 4.5 3.9   BUN, Bld 21 17 16   Creatinine 1.3 1.4 1.3   eGFR if African American 44.8 A 40.6 A 44.5 A   eGFR if non  38.8 A 35.3 A 38.6 A   Calcium 9.4 9.3 9.3       CARDIAC BIOMARKERS:        COAGS:        LIPIDS/LFTS:  Recent Labs   Lab 05/04/17  0902 07/09/18  0751 08/20/19  0946 11/20/19  1022   Cholesterol 172 188 169  --    Triglycerides 67 85 74  --    HDL 74 78 H 80 H  --    LDL  Cholesterol 84.6 93.0 74.2  --    Non-HDL Cholesterol 98 110 89  --    AST 27 21 24 28   ALT 15 16 23 21       Hemoglobin A1C   Date Value Ref Range Status   08/20/2019 5.8 (H) 4.0 - 5.6 % Final     Comment:     ADA Screening Guidelines:  5.7-6.4%  Consistent with prediabetes  >or=6.5%  Consistent with diabetes  High levels of fetal hemoglobin interfere with the HbA1C  assay. Heterozygous hemoglobin variants (HbS, HgC, etc)do  not significantly interfere with this assay.   However, presence of multiple variants may affect accuracy.     07/09/2018 6.0 (H) 4.0 - 5.6 % Final     Comment:     ADA Screening Guidelines:  5.7-6.4%  Consistent with prediabetes  >or=6.5%  Consistent with diabetes  High levels of fetal hemoglobin interfere with the HbA1C  assay. Heterozygous hemoglobin variants (HbS, HgC, etc)do  not significantly interfere with this assay.   However, presence of multiple variants may affect accuracy.     05/04/2017 6.0 4.5 - 6.2 % Final     Comment:     According to ADA guidelines, hemoglobin A1C <7.0% represents  optimal control in non-pregnant diabetic patients.  Different  metrics may apply to specific populations.   Standards of Medical Care in Diabetes - 2016.  For the purpose of screening for the presence of diabetes:  <5.7%     Consistent with the absence of diabetes  5.7-6.4%  Consistent with increasing risk for diabetes   (prediabetes)  >or=6.5%  Consistent with diabetes  Currently no consensus exists for use of hemoglobin A1C  for diagnosis of diabetes for children.         TSH  Recent Labs   Lab 12/19/19  0839   TSH 2.082       The ASCVD Risk score (Davonte KALEY Jr., et al., 2013) failed to calculate for the following reasons:    The 2013 ASCVD risk score is only valid for ages 40 to 79           Cardiovascular Testing:    EKG: (personally reviewed tracing)  Normal sinus rhythm, low-voltage QRS, nonspecific ST changes.      ASSESSMENT AND PLAN     Patient Active Problem List   Diagnosis    Lumbar  radiculopathy    Osteoarthritis    Benign hypertension with chronic kidney disease, stage III    Hyperlipidemia with target LDL less than 100    Chronic low back pain    Osteoporosis, post-menopausal    Anemia of other chronic disease    Postmenopausal status    DJD (degenerative joint disease)    History of colonic polyps    Severe obesity (BMI 35.0-39.9) with comorbidity    Prediabetes    Vitamin D deficiency    Atherosclerosis of aorta    Gastroesophageal reflux disease without esophagitis    Secondary hyperparathyroidism of renal origin    Pseudophakia - Both Eyes    Refractive error    Essential hypertension    Dry eye syndrome, bilateral    Ocular hypertension, bilateral    Amblyopia, left    Atherosclerosis of aortic arch    Vitreous detachment, bilateral    PCO (posterior capsular opacification), right       New onset dyspnea on exertion, chest tightness on exertion as well as bilateral peripheral edema.  Stress test did not reveal any significant ischemia.  2D echocardiogram showed normal left ventricular systolic function.  Symptoms have improved considerably starting Lasix.  Continue Lasix.  Asked the patient to maintain a low-salt diet as well as weigh herself daily.  Weight goes more than 3 lb over 2-3 days, asked her to increase her Lasix until her weight comes back to baseline.    Hypertension:  Better controlled on hydralazine.  Still elevated.  Increase hydralazine to 50 mg 3 times daily.  I have asked her to go back to 25 mg 3 times daily if she starts getting dizzy on the higher dose.  Low-salt diet has been recommended.  Follow-up in clinic in 3 months.          Thank you very much for involving me in the care of your patient.  Please do not hesitate to contact me if there are any questions.      Joceline Colon MD, FACC, [facility]  Interventional Cardiologist, [facility].           This note was dictated with the help of speech recognition software.  There might be  un-intended errors and/or substitutions.

## 2020-03-05 DIAGNOSIS — I12.9 BENIGN HYPERTENSION WITH CHRONIC KIDNEY DISEASE, STAGE III: ICD-10-CM

## 2020-03-05 DIAGNOSIS — E78.5 HYPERLIPIDEMIA WITH TARGET LDL LESS THAN 100: ICD-10-CM

## 2020-03-05 DIAGNOSIS — K21.9 GASTROESOPHAGEAL REFLUX DISEASE WITHOUT ESOPHAGITIS: ICD-10-CM

## 2020-03-05 DIAGNOSIS — N18.30 BENIGN HYPERTENSION WITH CHRONIC KIDNEY DISEASE, STAGE III: ICD-10-CM

## 2020-03-05 RX ORDER — PANTOPRAZOLE SODIUM 20 MG/1
20 TABLET, DELAYED RELEASE ORAL DAILY
Qty: 30 TABLET | Refills: 0 | Status: SHIPPED | OUTPATIENT
Start: 2020-03-05 | End: 2020-03-05

## 2020-03-05 RX ORDER — POTASSIUM CHLORIDE 20 MEQ/1
TABLET, EXTENDED RELEASE ORAL
Qty: 90 TABLET | Refills: 0 | Status: SHIPPED | OUTPATIENT
Start: 2020-03-05 | End: 2020-04-12

## 2020-03-05 RX ORDER — FUROSEMIDE 20 MG/1
TABLET ORAL
Qty: 180 TABLET | Refills: 0 | Status: SHIPPED | OUTPATIENT
Start: 2020-03-05 | End: 2020-07-02 | Stop reason: SDUPTHER

## 2020-03-05 RX ORDER — PANTOPRAZOLE SODIUM 20 MG/1
TABLET, DELAYED RELEASE ORAL
Qty: 90 TABLET | Refills: 0 | Status: SHIPPED | OUTPATIENT
Start: 2020-03-05 | End: 2020-07-02 | Stop reason: SDUPTHER

## 2020-03-05 RX ORDER — OLMESARTAN MEDOXOMIL AND HYDROCHLOROTHIAZIDE 40/25 40; 25 MG/1; MG/1
1 TABLET ORAL DAILY
Qty: 90 TABLET | Refills: 0 | Status: SHIPPED | OUTPATIENT
Start: 2020-03-05 | End: 2020-07-02 | Stop reason: SDUPTHER

## 2020-03-05 RX ORDER — ATORVASTATIN CALCIUM 20 MG/1
TABLET, FILM COATED ORAL
Qty: 90 TABLET | Refills: 0 | Status: SHIPPED | OUTPATIENT
Start: 2020-03-05 | End: 2020-07-02 | Stop reason: SDUPTHER

## 2020-03-05 RX ORDER — AMLODIPINE BESYLATE 10 MG/1
TABLET ORAL
Qty: 90 TABLET | Refills: 0 | Status: SHIPPED | OUTPATIENT
Start: 2020-03-05 | End: 2020-07-02 | Stop reason: SDUPTHER

## 2020-03-05 RX ORDER — FUROSEMIDE 20 MG/1
20 TABLET ORAL 2 TIMES DAILY
Qty: 90 TABLET | Refills: 0 | Status: SHIPPED | OUTPATIENT
Start: 2020-03-05 | End: 2020-03-05

## 2020-03-09 ENCOUNTER — TELEPHONE (OUTPATIENT)
Dept: PHYSICAL MEDICINE AND REHAB | Facility: CLINIC | Age: 80
End: 2020-03-09

## 2020-03-09 NOTE — TELEPHONE ENCOUNTER
Patient placed on wait list    ----- Message from Ismael Potts sent at 3/9/2020  1:10 PM CDT -----  Patient Requesting Sooner Appointment.     Reason for sooner appt.: f/u  When is the first available appointment? 07/13/20, pt scheduled but requesting earlier date  Communication Preference: [phone]  Additional Information:

## 2020-04-11 DIAGNOSIS — N18.30 BENIGN HYPERTENSION WITH CHRONIC KIDNEY DISEASE, STAGE III: ICD-10-CM

## 2020-04-11 DIAGNOSIS — I12.9 BENIGN HYPERTENSION WITH CHRONIC KIDNEY DISEASE, STAGE III: ICD-10-CM

## 2020-04-12 RX ORDER — POTASSIUM CHLORIDE 20 MEQ/1
TABLET, EXTENDED RELEASE ORAL
Qty: 90 TABLET | Refills: 0 | Status: SHIPPED | OUTPATIENT
Start: 2020-04-12 | End: 2020-07-02 | Stop reason: SDUPTHER

## 2020-06-03 ENCOUNTER — PATIENT OUTREACH (OUTPATIENT)
Dept: ADMINISTRATIVE | Facility: HOSPITAL | Age: 80
End: 2020-06-03

## 2020-06-03 RX ORDER — METOPROLOL SUCCINATE 50 MG/1
50 TABLET, EXTENDED RELEASE ORAL DAILY
COMMUNITY
Start: 2020-04-11 | End: 2021-01-11 | Stop reason: SDUPTHER

## 2020-07-02 ENCOUNTER — OFFICE VISIT (OUTPATIENT)
Dept: FAMILY MEDICINE | Facility: CLINIC | Age: 80
End: 2020-07-02
Payer: MEDICARE

## 2020-07-02 VITALS
OXYGEN SATURATION: 99 % | BODY MASS INDEX: 37.17 KG/M2 | WEIGHT: 202 LBS | SYSTOLIC BLOOD PRESSURE: 130 MMHG | TEMPERATURE: 98 F | DIASTOLIC BLOOD PRESSURE: 50 MMHG | HEART RATE: 60 BPM | HEIGHT: 62 IN

## 2020-07-02 DIAGNOSIS — M54.41 CHRONIC MIDLINE LOW BACK PAIN WITH RIGHT-SIDED SCIATICA: ICD-10-CM

## 2020-07-02 DIAGNOSIS — E78.5 HYPERLIPIDEMIA WITH TARGET LDL LESS THAN 100: ICD-10-CM

## 2020-07-02 DIAGNOSIS — I70.0 ATHEROSCLEROSIS OF AORTA: ICD-10-CM

## 2020-07-02 DIAGNOSIS — I12.9 BENIGN HYPERTENSION WITH CHRONIC KIDNEY DISEASE, STAGE III: Primary | ICD-10-CM

## 2020-07-02 DIAGNOSIS — M54.2 CHRONIC NECK PAIN: ICD-10-CM

## 2020-07-02 DIAGNOSIS — R73.03 PREDIABETES: ICD-10-CM

## 2020-07-02 DIAGNOSIS — I70.0 ATHEROSCLEROSIS OF AORTIC ARCH: ICD-10-CM

## 2020-07-02 DIAGNOSIS — G89.29 CHRONIC NECK PAIN: ICD-10-CM

## 2020-07-02 DIAGNOSIS — G89.29 CHRONIC MIDLINE LOW BACK PAIN WITH RIGHT-SIDED SCIATICA: ICD-10-CM

## 2020-07-02 DIAGNOSIS — N25.81 SECONDARY HYPERPARATHYROIDISM OF RENAL ORIGIN: ICD-10-CM

## 2020-07-02 DIAGNOSIS — N18.30 BENIGN HYPERTENSION WITH CHRONIC KIDNEY DISEASE, STAGE III: Primary | ICD-10-CM

## 2020-07-02 DIAGNOSIS — E66.01 SEVERE OBESITY (BMI 35.0-39.9) WITH COMORBIDITY: ICD-10-CM

## 2020-07-02 DIAGNOSIS — E55.9 VITAMIN D DEFICIENCY: ICD-10-CM

## 2020-07-02 DIAGNOSIS — H40.053 OCULAR HYPERTENSION, BILATERAL: ICD-10-CM

## 2020-07-02 DIAGNOSIS — K21.9 GASTROESOPHAGEAL REFLUX DISEASE WITHOUT ESOPHAGITIS: ICD-10-CM

## 2020-07-02 DIAGNOSIS — Z12.11 SCREENING FOR MALIGNANT NEOPLASM OF COLON: ICD-10-CM

## 2020-07-02 PROCEDURE — 1126F PR PAIN SEVERITY QUANTIFIED, NO PAIN PRESENT: ICD-10-PCS | Mod: S$GLB,,, | Performed by: NURSE PRACTITIONER

## 2020-07-02 PROCEDURE — 1101F PR PT FALLS ASSESS DOC 0-1 FALLS W/OUT INJ PAST YR: ICD-10-PCS | Mod: CPTII,S$GLB,, | Performed by: NURSE PRACTITIONER

## 2020-07-02 PROCEDURE — 3075F PR MOST RECENT SYSTOLIC BLOOD PRESS GE 130-139MM HG: ICD-10-PCS | Mod: CPTII,S$GLB,, | Performed by: NURSE PRACTITIONER

## 2020-07-02 PROCEDURE — 99214 PR OFFICE/OUTPT VISIT, EST, LEVL IV, 30-39 MIN: ICD-10-PCS | Mod: S$GLB,,, | Performed by: NURSE PRACTITIONER

## 2020-07-02 PROCEDURE — 99214 OFFICE O/P EST MOD 30 MIN: CPT | Mod: S$GLB,,, | Performed by: NURSE PRACTITIONER

## 2020-07-02 PROCEDURE — 1126F AMNT PAIN NOTED NONE PRSNT: CPT | Mod: S$GLB,,, | Performed by: NURSE PRACTITIONER

## 2020-07-02 PROCEDURE — 1101F PT FALLS ASSESS-DOCD LE1/YR: CPT | Mod: CPTII,S$GLB,, | Performed by: NURSE PRACTITIONER

## 2020-07-02 PROCEDURE — 3078F DIAST BP <80 MM HG: CPT | Mod: CPTII,S$GLB,, | Performed by: NURSE PRACTITIONER

## 2020-07-02 PROCEDURE — 1159F PR MEDICATION LIST DOCUMENTED IN MEDICAL RECORD: ICD-10-PCS | Mod: S$GLB,,, | Performed by: NURSE PRACTITIONER

## 2020-07-02 PROCEDURE — 3078F PR MOST RECENT DIASTOLIC BLOOD PRESSURE < 80 MM HG: ICD-10-PCS | Mod: CPTII,S$GLB,, | Performed by: NURSE PRACTITIONER

## 2020-07-02 PROCEDURE — 3075F SYST BP GE 130 - 139MM HG: CPT | Mod: CPTII,S$GLB,, | Performed by: NURSE PRACTITIONER

## 2020-07-02 PROCEDURE — 99999 PR PBB SHADOW E&M-EST. PATIENT-LVL IV: CPT | Mod: PBBFAC,,, | Performed by: NURSE PRACTITIONER

## 2020-07-02 PROCEDURE — 99999 PR PBB SHADOW E&M-EST. PATIENT-LVL IV: ICD-10-PCS | Mod: PBBFAC,,, | Performed by: NURSE PRACTITIONER

## 2020-07-02 PROCEDURE — 1159F MED LIST DOCD IN RCRD: CPT | Mod: S$GLB,,, | Performed by: NURSE PRACTITIONER

## 2020-07-02 RX ORDER — FUROSEMIDE 20 MG/1
TABLET ORAL
Qty: 180 TABLET | Refills: 1 | Status: SHIPPED | OUTPATIENT
Start: 2020-07-02 | End: 2021-01-11 | Stop reason: SDUPTHER

## 2020-07-02 RX ORDER — OLMESARTAN MEDOXOMIL AND HYDROCHLOROTHIAZIDE 40/25 40; 25 MG/1; MG/1
1 TABLET ORAL DAILY
Qty: 90 TABLET | Refills: 1 | Status: SHIPPED | OUTPATIENT
Start: 2020-07-02 | End: 2021-01-11 | Stop reason: SDUPTHER

## 2020-07-02 RX ORDER — METOPROLOL SUCCINATE 50 MG/1
TABLET, EXTENDED RELEASE ORAL
Status: CANCELLED | OUTPATIENT
Start: 2020-07-02

## 2020-07-02 RX ORDER — MELOXICAM 15 MG/1
15 TABLET ORAL DAILY
Qty: 90 TABLET | Refills: 1 | Status: SHIPPED | OUTPATIENT
Start: 2020-07-02 | End: 2020-09-17 | Stop reason: SINTOL

## 2020-07-02 RX ORDER — ATORVASTATIN CALCIUM 20 MG/1
TABLET, FILM COATED ORAL
Qty: 90 TABLET | Refills: 1 | Status: SHIPPED | OUTPATIENT
Start: 2020-07-02 | End: 2020-09-08 | Stop reason: SDUPTHER

## 2020-07-02 RX ORDER — PANTOPRAZOLE SODIUM 20 MG/1
TABLET, DELAYED RELEASE ORAL
Qty: 90 TABLET | Refills: 1 | Status: SHIPPED | OUTPATIENT
Start: 2020-07-02 | End: 2021-01-11 | Stop reason: SDUPTHER

## 2020-07-02 RX ORDER — METOPROLOL SUCCINATE 100 MG/1
100 TABLET, EXTENDED RELEASE ORAL DAILY
Qty: 90 TABLET | Refills: 1 | Status: SHIPPED | OUTPATIENT
Start: 2020-07-02 | End: 2020-09-08 | Stop reason: SDUPTHER

## 2020-07-02 RX ORDER — TRAMADOL HYDROCHLORIDE 50 MG/1
TABLET ORAL
Qty: 90 TABLET | Refills: 1 | Status: CANCELLED | OUTPATIENT
Start: 2020-07-02

## 2020-07-02 RX ORDER — TRAVOPROST OPHTHALMIC SOLUTION 0.04 MG/ML
1 SOLUTION OPHTHALMIC NIGHTLY
Qty: 2.5 ML | Refills: 1 | Status: SHIPPED | OUTPATIENT
Start: 2020-07-02 | End: 2020-10-01

## 2020-07-02 RX ORDER — AMLODIPINE BESYLATE 10 MG/1
TABLET ORAL
Qty: 90 TABLET | Refills: 1 | Status: SHIPPED | OUTPATIENT
Start: 2020-07-02 | End: 2020-09-08 | Stop reason: SDUPTHER

## 2020-07-02 RX ORDER — POTASSIUM CHLORIDE 20 MEQ/1
20 TABLET, EXTENDED RELEASE ORAL ONCE
Qty: 90 TABLET | Refills: 1 | Status: SHIPPED | OUTPATIENT
Start: 2020-07-02 | End: 2020-07-02

## 2020-07-02 RX ORDER — DULOXETIN HYDROCHLORIDE 60 MG/1
60 CAPSULE, DELAYED RELEASE ORAL DAILY
Qty: 90 CAPSULE | Refills: 1 | Status: SHIPPED | OUTPATIENT
Start: 2020-07-02 | End: 2021-05-18 | Stop reason: SDUPTHER

## 2020-07-02 NOTE — PROGRESS NOTES
History of Present Illness   Rachel Asif is a 80 y.o. woman with medical history as listed below who presents today for routine follow-up, HTN, HLD, prediabetes, chronic anemia, CKD III, and secondary hyperparathyroidism. She is taking and tolerating medications as prescribed without perceived SE. She reports improvement in lower extremity swelling since our last visit. Her blood pressures have been well controlled at home. She has upcoming follow-up with her cardiologist and PMR specialist. We will address her HM today. She has no additional complaints and is otherwise healthy on today's visit.    Past Medical History:   Diagnosis Date    Amblyopia     os    Anemia of other chronic disease     Atherosclerosis of aorta     noted on L-spine X-ray 4/14/2011    Atherosclerosis of aortic arch     - noted on CXR 5/2017    Chronic low back pain     followed by orthopaedics    DJD (degenerative joint disease)     Glaucoma     History of colonic polyps     History of vitamin D deficiency     Hyperlipidemia     Hypertension     Lumbar radiculopathy     Obesity     Obesity     OH (ocular hypertension)     Osteoarthritis     Osteoporosis, post-menopausal     currently on a drug holiday    PCO (posterior capsular opacification), right 2/27/2020    Postmenopausal status     Prediabetes     Secondary hyperparathyroidism of renal origin     Trochanteric bursitis        Past Surgical History:   Procedure Laterality Date    CATARACT EXTRACTION W/  INTRAOCULAR LENS IMPLANT Bilateral        Social History     Socioeconomic History    Marital status:      Spouse name: Not on file    Number of children: Not on file    Years of education: Not on file    Highest education level: Not on file   Occupational History    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[employer])   Social Needs    Financial resource strain: Not on file    Food insecurity     Worry: Not on file     Inability: Not on file     Transportation needs     Medical: Not on file     Non-medical: Not on file   Tobacco Use    Smoking status: Never Smoker    Smokeless tobacco: Never Used   Substance and Sexual Activity    Alcohol use: No    Drug use: Not on file    Sexual activity: Not on file   Lifestyle    Physical activity     Days per week: Not on file     Minutes per session: Not on file    Stress: Not on file   Relationships    Social connections     Talks on phone: Not on file     Gets together: Not on file     Attends Anabaptism service: Not on file     Active member of club or organization: Not on file     Attends meetings of clubs or organizations: Not on file     Relationship status: Not on file   Other Topics Concern    Not on file   Social History Narrative    Her  is . She is taking care of a sister who has brain damage from a car accident.       Family History   Problem Relation Age of Onset    Cataracts Mother     Hypertension Mother     Other Mother         complications from splenectomy after fall    Cataracts Father     Hypertension Father     Hypertension Sister     Edema Sister     Hypertension Brother     Diabetes Brother     Heart disease Brother     Glaucoma Maternal Grandmother     Hypertension Sister     Other Sister         shingles    Lupus Sister     Lung cancer Sister     Hypertension Sister     Other Sister         brain damage from MVA    Hypertension Sister     Hypertension Brother     Heart disease Brother     Hypertension Brother     Heart attack Brother     Hypertension Brother     Hypertension Brother     No Known Problems Brother     No Known Problems Brother     Hypertension Brother     Heart disease Brother     Cancer Neg Hx     Amblyopia Neg Hx     Blindness Neg Hx     Retinal detachment Neg Hx     Strabismus Neg Hx     Stroke Neg Hx        Review of Systems  Review of Systems   Constitutional: Negative for malaise/fatigue and weight loss.   Eyes:  "Negative for blurred vision and double vision.   Respiratory: Negative for shortness of breath.    Cardiovascular: Negative for chest pain and palpitations.   Gastrointestinal: Negative for blood in stool and melena.   Genitourinary: Negative for flank pain and hematuria.   Musculoskeletal: Negative for back pain, falls and joint pain.   Neurological: Negative for dizziness, loss of consciousness and headaches.   Endo/Heme/Allergies: Negative for polydipsia.     A complete review of systems was otherwise negative.    Physical Exam  BP (!) 130/50   Pulse 60   Temp 97.5 °F (36.4 °C) (Temporal)   Ht 5' 2" (1.575 m)   Wt 91.6 kg (202 lb)   SpO2 99%   BMI 36.95 kg/m²   General appearance: alert, appears stated age, cooperative and no distress  Neck: no carotid bruit, no JVD, supple, symmetrical, trachea midline and thyroid not enlarged, symmetric, no tenderness/mass/nodules  Lungs: clear to auscultation bilaterally  Heart: regular rate and rhythm, S1, S2 normal, no murmur, click, rub or gallop  Extremities: extremities normal, atraumatic, no cyanosis or edema  Pulses: 2+ and symmetric  Skin: Skin color, texture, turgor normal. No rashes or lesions  Neurologic: Grossly normal    Assessment/Plan  Benign hypertension with chronic kidney disease, stage III  The current medical regimen is effective;  continue present plan and medications.  BP at goal today.  -     amLODIPine (NORVASC) 10 MG tablet; TAKE 1 TABLET(10 MG) BY MOUTH EVERY DAY  Dispense: 90 tablet; Refill: 1  -     furosemide (LASIX) 20 MG tablet; TAKE 1 TABLET(20 MG) BY MOUTH TWICE DAILY  Dispense: 180 tablet; Refill: 1  -     hydrALAZINE (APRESOLINE) 50 MG Tab; Take 1 tablet (50 mg total) by mouth 3 (three) times daily.  Dispense: 270 tablet; Refill: 1  -     metoprolol succinate (TOPROL-XL) 100 MG 24 hr tablet; Take 1 tablet (100 mg total) by mouth once daily.  Dispense: 90 tablet; Refill: 1  -     olmesartan-hydrochlorothiazide (BENICAR HCT) 40-25 mg per " tablet; Take 1 tablet by mouth once daily.  Dispense: 90 tablet; Refill: 1  -     potassium chloride SA (K-DUR,KLOR-CON) 20 MEQ tablet; Take 1 tablet (20 mEq total) by mouth once. for 1 dose  Dispense: 90 tablet; Refill: 1  -     Comprehensive metabolic panel; Future; Expected date: 07/02/2020    Hyperlipidemia with target LDL less than 100  The current medical regimen is effective;  continue present plan and medications.  -     atorvastatin (LIPITOR) 20 MG tablet; TAKE 1 TABLET(20 MG) BY MOUTH EVERY DAY  Dispense: 90 tablet; Refill: 1  -     Lipid Panel; Future; Expected date: 07/02/2020    Prediabetes  The patient is asked to make an attempt to improve diet and exercise patterns to aid in medical management of this problem.  -     Hemoglobin A1C; Future; Expected date: 07/02/2020    Secondary hyperparathyroidism of renal origin  The current medical regimen is effective;  continue present plan and medications.  -     PTH, intact; Future; Expected date: 07/02/2020    Severe obesity (BMI 35.0-39.9) with comorbidity  The patient is asked to make an attempt to improve diet and exercise patterns to aid in medical management of this problem.    Vitamin D deficiency  The current medical regimen is effective;  continue present plan and medications.  -     Vitamin D; Future; Expected date: 07/02/2020    Atherosclerosis of aorta  Stable. Statin therapy.  -     CBC auto differential; Future; Expected date: 07/02/2020    Atherosclerosis of aortic arch  Stable. Statin therapy.    Chronic midline low back pain with right-sided sciatica  The current medical regimen is effective;  continue present plan and medications.  Followed by PMR.  -     DULoxetine (CYMBALTA) 60 MG capsule; Take 1 capsule (60 mg total) by mouth once daily.  Dispense: 90 capsule; Refill: 1  -     meloxicam (MOBIC) 15 MG tablet; Take 1 tablet (15 mg total) by mouth once daily.  Dispense: 90 tablet; Refill: 1    Chronic neck pain  The current medical regimen is  effective;  continue present plan and medications.  Followed by PMR.    Gastroesophageal reflux disease without esophagitis  The current medical regimen is effective;  continue present plan and medications.  PRN use, well controlled.  -     pantoprazole (PROTONIX) 20 MG tablet; TAKE 1 TABLET BY MOUTH EVERY DAY TO PROTECT STOMACH DUE TO TAKING NSAIDS  Dispense: 90 tablet; Refill: 1    Ocular hypertension, bilateral  The current medical regimen is effective;  continue present plan and medications.  -     travoprost (TRAVATAN Z) 0.004 % ophthalmic solution; Place 1 drop into both eyes every evening.  Dispense: 2.5 mL; Refill: 1    Screening for malignant neoplasm of colon  -     Case request GI: COLONOSCOPY    Patient has verbalized understanding and is in agreement with plan of care.    Follow up in about 6 months (around 1/2/2021) for routine follow-up with PCP.

## 2020-07-07 ENCOUNTER — OFFICE VISIT (OUTPATIENT)
Dept: CARDIOLOGY | Facility: CLINIC | Age: 80
End: 2020-07-07
Payer: MEDICARE

## 2020-07-07 VITALS
WEIGHT: 200.63 LBS | DIASTOLIC BLOOD PRESSURE: 75 MMHG | OXYGEN SATURATION: 99 % | HEART RATE: 65 BPM | RESPIRATION RATE: 16 BRPM | SYSTOLIC BLOOD PRESSURE: 153 MMHG | BODY MASS INDEX: 36.69 KG/M2

## 2020-07-07 DIAGNOSIS — I10 ESSENTIAL HYPERTENSION: Primary | ICD-10-CM

## 2020-07-07 PROCEDURE — 99999 PR PBB SHADOW E&M-EST. PATIENT-LVL IV: ICD-10-PCS | Mod: PBBFAC,,, | Performed by: INTERNAL MEDICINE

## 2020-07-07 PROCEDURE — 1101F PT FALLS ASSESS-DOCD LE1/YR: CPT | Mod: CPTII,S$GLB,, | Performed by: INTERNAL MEDICINE

## 2020-07-07 PROCEDURE — 99999 PR PBB SHADOW E&M-EST. PATIENT-LVL IV: CPT | Mod: PBBFAC,,, | Performed by: INTERNAL MEDICINE

## 2020-07-07 PROCEDURE — 1101F PR PT FALLS ASSESS DOC 0-1 FALLS W/OUT INJ PAST YR: ICD-10-PCS | Mod: CPTII,S$GLB,, | Performed by: INTERNAL MEDICINE

## 2020-07-07 PROCEDURE — 3077F PR MOST RECENT SYSTOLIC BLOOD PRESSURE >= 140 MM HG: ICD-10-PCS | Mod: CPTII,S$GLB,, | Performed by: INTERNAL MEDICINE

## 2020-07-07 PROCEDURE — 3077F SYST BP >= 140 MM HG: CPT | Mod: CPTII,S$GLB,, | Performed by: INTERNAL MEDICINE

## 2020-07-07 PROCEDURE — 3078F DIAST BP <80 MM HG: CPT | Mod: CPTII,S$GLB,, | Performed by: INTERNAL MEDICINE

## 2020-07-07 PROCEDURE — 1126F AMNT PAIN NOTED NONE PRSNT: CPT | Mod: S$GLB,,, | Performed by: INTERNAL MEDICINE

## 2020-07-07 PROCEDURE — 1159F PR MEDICATION LIST DOCUMENTED IN MEDICAL RECORD: ICD-10-PCS | Mod: S$GLB,,, | Performed by: INTERNAL MEDICINE

## 2020-07-07 PROCEDURE — 99214 PR OFFICE/OUTPT VISIT, EST, LEVL IV, 30-39 MIN: ICD-10-PCS | Mod: S$GLB,,, | Performed by: INTERNAL MEDICINE

## 2020-07-07 PROCEDURE — 1159F MED LIST DOCD IN RCRD: CPT | Mod: S$GLB,,, | Performed by: INTERNAL MEDICINE

## 2020-07-07 PROCEDURE — 1126F PR PAIN SEVERITY QUANTIFIED, NO PAIN PRESENT: ICD-10-PCS | Mod: S$GLB,,, | Performed by: INTERNAL MEDICINE

## 2020-07-07 PROCEDURE — 3078F PR MOST RECENT DIASTOLIC BLOOD PRESSURE < 80 MM HG: ICD-10-PCS | Mod: CPTII,S$GLB,, | Performed by: INTERNAL MEDICINE

## 2020-07-07 PROCEDURE — 99214 OFFICE O/P EST MOD 30 MIN: CPT | Mod: S$GLB,,, | Performed by: INTERNAL MEDICINE

## 2020-07-07 NOTE — PROGRESS NOTES
CARDIOVASCULAR CONSULTATION    REASON FOR CONSULT:   Rachel Asif is a 80 y.o. female who presents for EVALUATION OF NEW ONSET PERIPHERAL EDEMA, dyspnea on exertion as well as chest tightness     HISTORY OF PRESENT ILLNESS:     Patient is a pleasant 79-year-old lady.  She states that over the past few months has started noticing significant bilateral peripheral edema.  Denies any orthopnea, PND but has dyspnea on exertion as well as chest tightness on exertion.  Denies any chest pains at rest.  BNP was checked and was found to be elevated.    Notes from January 2020    Patient here for follow-up.  After started Lasix her symptoms have improved considerably.  Swelling of feet has gone down as well as dyspnea on exertion has gone away as well as chest tightness on exertion has gone away.  Denies any orthopnea, PND.  Blood pressure is elevated in clinic today.  Will increase her Toprol-XL.    · Normal left ventricular systolic function. The estimated ejection fraction is 60%  · Mild concentric left ventricular hypertrophy.  · Indeterminate left ventricular diastolic function.  · Normal right ventricular systolic function.  · Moderate left atrial enlargement.  · Mild right atrial enlargement.  · Mild mitral regurgitation.  · Mild tricuspid regurgitation.  · Normal central venous pressure (3 mm Hg).  · The estimated PA systolic pressure is 38 mm Hg      The perfusion scan is free of evidence from myocardial ischemia or injury.    There is a  mild intensity fixed defect in the inferior wall of the left ventricle secondary to diaphragm attenuation.    Gated perfusion images showed an ejection fraction of 67 %    There is normal wall motion at rest and post stress.    The EKG portion of this study is negative for ischemia.    The patient reported no chest pain during the stress test.    There were no arrhythmias during stress.      Notes from February 2020:  Patient feeling much better.  Denies any chest pains at  rest on exertion, orthopnea, PND.  Blood pressure better controlled although still elevated.  States does not feel dizzy       March 2020:  Patient here for follow-up.  Blood pressure better controlled with hydralazine.  At home stays around 150-160 mm systolic.  Slightly better in the clinic today although still mildly elevated.  Denies any chest pain at rest on exertion, orthopnea, PND.  With better control and blood pressure her symptoms of dyspnea on exertion also getting better as per the patient.    Notes from July 2020:  Patient here for follow-up.  Denies any chest pains at rest on exertion, orthopnea, PND, swelling of feet.  Is doing fine.  Checks her blood pressure regularly at home and usually around 130 to 140 mm systolic as per the patient.    PAST MEDICAL HISTORY:     Past Medical History:   Diagnosis Date    Amblyopia     os    Anemia of other chronic disease     Atherosclerosis of aorta     noted on L-spine X-ray 4/14/2011    Atherosclerosis of aortic arch     - noted on CXR 5/2017    Chronic low back pain     followed by orthopaedics    DJD (degenerative joint disease)     Glaucoma     History of colonic polyps     History of vitamin D deficiency     Hyperlipidemia     Hypertension     Lumbar radiculopathy     Obesity     Obesity     OH (ocular hypertension)     Osteoarthritis     Osteoporosis, post-menopausal     currently on a drug holiday    PCO (posterior capsular opacification), right 2/27/2020    Postmenopausal status     Prediabetes     Secondary hyperparathyroidism of renal origin     Trochanteric bursitis        PAST SURGICAL HISTORY:     Past Surgical History:   Procedure Laterality Date    CATARACT EXTRACTION W/  INTRAOCULAR LENS IMPLANT Bilateral        ALLERGIES AND MEDICATION:     Review of patient's allergies indicates:   Allergen Reactions    No known allergies         Medication List          Accurate as of July 7, 2020 10:36 AM. If you have any questions,  ask your nurse or doctor.            CONTINUE taking these medications    amLODIPine 10 MG tablet  Commonly known as: NORVASC  TAKE 1 TABLET(10 MG) BY MOUTH EVERY DAY     aspirin 81 MG Chew     atorvastatin 20 MG tablet  Commonly known as: LIPITOR  TAKE 1 TABLET(20 MG) BY MOUTH EVERY DAY     diclofenac sodium 1 % Gel  Commonly known as: VOLTAREN  Apply topically 3 (three) times daily as needed.     DULoxetine 60 MG capsule  Commonly known as: CYMBALTA  Take 1 capsule (60 mg total) by mouth once daily.     furosemide 20 MG tablet  Commonly known as: LASIX  TAKE 1 TABLET(20 MG) BY MOUTH TWICE DAILY     INV hydrALAZINE 50 MG Tab  Commonly known as: APRESOLINE  Take 1 tablet (50 mg total) by mouth 3 (three) times daily.     meloxicam 15 MG tablet  Commonly known as: MOBIC  Take 1 tablet (15 mg total) by mouth once daily.     * metoprolol succinate 50 MG 24 hr tablet  Commonly known as: TOPROL-XL     * metoprolol succinate 100 MG 24 hr tablet  Commonly known as: TOPROL-XL  Take 1 tablet (100 mg total) by mouth once daily.     olmesartan-hydrochlorothiazide 40-25 mg per tablet  Commonly known as: BENICAR HCT  Take 1 tablet by mouth once daily.     pantoprazole 20 MG tablet  Commonly known as: PROTONIX  TAKE 1 TABLET BY MOUTH EVERY DAY TO PROTECT STOMACH DUE TO TAKING NSAIDS     SYSTANE OPHT     traMADoL 50 mg tablet  Commonly known as: ULTRAM  TAKE 1 TABLET(50 MG) BY MOUTH THREE TIMES DAILY AS NEEDED FOR PAIN     travoprost 0.004 % ophthalmic solution  Commonly known as: TRAVATAN Z  Place 1 drop into both eyes every evening.         * This list has 2 medication(s) that are the same as other medications prescribed for you. Read the directions carefully, and ask your doctor or other care provider to review them with you.                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)   Social Needs    Financial resource strain: Not on file    Food insecurity     Worry: Not on file     Inability: Not on file    Transportation needs     Medical: Not on file     Non-medical: Not on file   Tobacco Use    Smoking status: Never Smoker    Smokeless tobacco: Never Used   Substance and Sexual Activity    Alcohol use: No    Drug use: Not on file    Sexual activity: Not on file   Lifestyle    Physical activity     Days per week: Not on file     Minutes per session: Not on file    Stress: Not on file   Relationships    Social connections     Talks on phone: Not on file     Gets together: Not on file     Attends Tenriism service: Not on file     Active member of club or organization: Not on file     Attends meetings of clubs or organizations: Not on file     Relationship status: Not on file   Other Topics Concern    Not on file   Social History Narrative    Her  is . She is taking care of a sister who has brain damage from a car accident.       FAMILY HISTORY:     Family History   Problem Relation Age of Onset    Cataracts Mother     Hypertension Mother     Other Mother         complications from splenectomy after fall    Cataracts Father     Hypertension Father     Hypertension Sister     Edema Sister     Hypertension Brother     Diabetes Brother     Heart disease Brother     Glaucoma Maternal Grandmother     Hypertension Sister     Other Sister         shingles    Lupus Sister     Lung cancer Sister     Hypertension Sister     Other Sister         brain damage from MVA    Hypertension Sister     Hypertension Brother     Heart disease Brother     Hypertension Brother     Heart attack Brother     Hypertension Brother     Hypertension Brother     No Known Problems Brother     No Known Problems Brother     Hypertension Brother      Heart disease Brother     Cancer Neg Hx     Amblyopia Neg Hx     Blindness Neg Hx     Retinal detachment Neg Hx     Strabismus Neg Hx     Stroke Neg Hx        REVIEW OF SYSTEMS:   Review of Systems   Constitution: Negative.   HENT: Negative.    Eyes: Negative.    Respiratory: Negative.    Endocrine: Negative.    Hematologic/Lymphatic: Negative.    Skin: Negative.    Musculoskeletal: Negative.    Gastrointestinal: Negative.    Genitourinary: Negative.    Neurological: Negative.    Psychiatric/Behavioral: Negative.    Allergic/Immunologic: Negative.        A 10 point review of systems was performed and all the pertinent positives have been mentioned. Rest of review of systems was negative.        PHYSICAL EXAM:     Vitals:    07/07/20 1030   BP: (!) 153/75   Pulse: 65   Resp: 16    Body mass index is 36.69 kg/m².  Weight: 91 kg (200 lb 9.9 oz)         Physical Exam   Constitutional: She is oriented to person, place, and time. She appears well-developed and well-nourished.   HENT:   Head: Normocephalic.   Eyes: Pupils are equal, round, and reactive to light.   Neck: Normal range of motion. Neck supple.   Cardiovascular: Normal rate and regular rhythm.   Pulmonary/Chest: Effort normal and breath sounds normal. She has no rales.   Abdominal: Soft. Normal appearance and bowel sounds are normal. There is no abdominal tenderness.   Musculoskeletal: Normal range of motion.   Neurological: She is alert and oriented to person, place, and time.   Skin: Skin is warm.   Psychiatric: She has a normal mood and affect.         DATA:     Laboratory:  CBC:  Recent Labs   Lab 07/09/18  0751 08/20/19  0946   WBC 5.25 6.92   Hemoglobin 10.4 L 9.8 L   Hematocrit 33.7 L 32.7 L   Platelets 336 355 H       CHEMISTRIES:  Recent Labs   Lab 07/09/18  0751 08/20/19  0946 11/20/19  1022   Glucose 95 84 92   Sodium 140 143 143   Potassium 4.1 4.5 3.9   BUN, Bld 21 17 16   Creatinine 1.3 1.4 1.3   eGFR if African American 44.8 A 40.6 A 44.5 A    eGFR if non  38.8 A 35.3 A 38.6 A   Calcium 9.4 9.3 9.3       CARDIAC BIOMARKERS:        COAGS:        LIPIDS/LFTS:  Recent Labs   Lab 07/09/18  0751 08/20/19  0946 11/20/19  1022   Cholesterol 188 169  --    Triglycerides 85 74  --    HDL 78 H 80 H  --    LDL Cholesterol 93.0 74.2  --    Non-HDL Cholesterol 110 89  --    AST 21 24 28   ALT 16 23 21       Hemoglobin A1C   Date Value Ref Range Status   08/20/2019 5.8 (H) 4.0 - 5.6 % Final     Comment:     ADA Screening Guidelines:  5.7-6.4%  Consistent with prediabetes  >or=6.5%  Consistent with diabetes  High levels of fetal hemoglobin interfere with the HbA1C  assay. Heterozygous hemoglobin variants (HbS, HgC, etc)do  not significantly interfere with this assay.   However, presence of multiple variants may affect accuracy.     07/09/2018 6.0 (H) 4.0 - 5.6 % Final     Comment:     ADA Screening Guidelines:  5.7-6.4%  Consistent with prediabetes  >or=6.5%  Consistent with diabetes  High levels of fetal hemoglobin interfere with the HbA1C  assay. Heterozygous hemoglobin variants (HbS, HgC, etc)do  not significantly interfere with this assay.   However, presence of multiple variants may affect accuracy.     05/04/2017 6.0 4.5 - 6.2 % Final     Comment:     According to ADA guidelines, hemoglobin A1C <7.0% represents  optimal control in non-pregnant diabetic patients.  Different  metrics may apply to specific populations.   Standards of Medical Care in Diabetes - 2016.  For the purpose of screening for the presence of diabetes:  <5.7%     Consistent with the absence of diabetes  5.7-6.4%  Consistent with increasing risk for diabetes   (prediabetes)  >or=6.5%  Consistent with diabetes  Currently no consensus exists for use of hemoglobin A1C  for diagnosis of diabetes for children.         TSH  Recent Labs   Lab 12/19/19  0839   TSH 2.082       The ASCVD Risk score (Davonte ROCHE Jr., et al., 2013) failed to calculate for the following reasons:    The 2013  ASCVD risk score is only valid for ages 40 to 79           Cardiovascular Testing:    EKG: (personally reviewed tracing)  Normal sinus rhythm, low-voltage QRS, nonspecific ST changes.      ASSESSMENT AND PLAN     Patient Active Problem List   Diagnosis    Lumbar radiculopathy    Osteoarthritis    Benign hypertension with chronic kidney disease, stage III    Hyperlipidemia with target LDL less than 100    Chronic low back pain    Osteoporosis, post-menopausal    Anemia of other chronic disease    Postmenopausal status    DJD (degenerative joint disease)    History of colonic polyps    Severe obesity (BMI 35.0-39.9) with comorbidity    Prediabetes    Vitamin D deficiency    Atherosclerosis of aorta    Gastroesophageal reflux disease without esophagitis    Secondary hyperparathyroidism of renal origin    Pseudophakia - Both Eyes    Refractive error    Dry eye syndrome, bilateral    Ocular hypertension, bilateral    Amblyopia, left    Atherosclerosis of aortic arch    Vitreous detachment, bilateral    PCO (posterior capsular opacification), right       New onset dyspnea on exertion, chest tightness on exertion as well as bilateral peripheral edema.  Stress test did not reveal any significant ischemia.  2D echocardiogram showed normal left ventricular systolic function.  Symptoms have improved considerably starting Lasix.  Continue Lasix.  Asked the patient to maintain a low-salt diet as well as weigh herself daily.  Weight goes more than 3 lb over 2-3 days, asked her to increase her Lasix until her weight comes back to baseline.    Hypertension:  Continue current therapy    Follow-up in 3-4 months.        Thank you very much for involving me in the care of your patient.  Please do not hesitate to contact me if there are any questions.      Joceline Colon MD, FACC, [facility]  Interventional Cardiologist, [facility].           This note was dictated with the help of speech recognition software.   There might be un-intended errors and/or substitutions.

## 2020-07-09 DIAGNOSIS — Z12.11 COLON CANCER SCREENING: ICD-10-CM

## 2020-07-13 ENCOUNTER — OFFICE VISIT (OUTPATIENT)
Dept: PHYSICAL MEDICINE AND REHAB | Facility: CLINIC | Age: 80
End: 2020-07-13
Payer: MEDICARE

## 2020-07-13 VITALS
SYSTOLIC BLOOD PRESSURE: 129 MMHG | DIASTOLIC BLOOD PRESSURE: 72 MMHG | HEIGHT: 62 IN | HEART RATE: 74 BPM | BODY MASS INDEX: 39.36 KG/M2 | WEIGHT: 213.88 LBS

## 2020-07-13 DIAGNOSIS — E66.9 OBESITY (BMI 30.0-34.9): ICD-10-CM

## 2020-07-13 DIAGNOSIS — M17.0 PRIMARY OSTEOARTHRITIS OF BOTH KNEES: ICD-10-CM

## 2020-07-13 DIAGNOSIS — G89.29 CHRONIC NECK PAIN: ICD-10-CM

## 2020-07-13 DIAGNOSIS — G89.29 CHRONIC MIDLINE LOW BACK PAIN WITH RIGHT-SIDED SCIATICA: Primary | ICD-10-CM

## 2020-07-13 DIAGNOSIS — M54.41 CHRONIC MIDLINE LOW BACK PAIN WITH RIGHT-SIDED SCIATICA: Primary | ICD-10-CM

## 2020-07-13 DIAGNOSIS — M47.816 LUMBAR FACET ARTHROPATHY: ICD-10-CM

## 2020-07-13 DIAGNOSIS — M54.2 CHRONIC NECK PAIN: ICD-10-CM

## 2020-07-13 DIAGNOSIS — M47.22 OSTEOARTHRITIS OF SPINE WITH RADICULOPATHY, CERVICAL REGION: ICD-10-CM

## 2020-07-13 PROCEDURE — 1101F PT FALLS ASSESS-DOCD LE1/YR: CPT | Mod: CPTII,S$GLB,, | Performed by: PHYSICAL MEDICINE & REHABILITATION

## 2020-07-13 PROCEDURE — 1101F PR PT FALLS ASSESS DOC 0-1 FALLS W/OUT INJ PAST YR: ICD-10-PCS | Mod: CPTII,S$GLB,, | Performed by: PHYSICAL MEDICINE & REHABILITATION

## 2020-07-13 PROCEDURE — 3078F DIAST BP <80 MM HG: CPT | Mod: CPTII,S$GLB,, | Performed by: PHYSICAL MEDICINE & REHABILITATION

## 2020-07-13 PROCEDURE — 99999 PR PBB SHADOW E&M-EST. PATIENT-LVL II: ICD-10-PCS | Mod: PBBFAC,,, | Performed by: PHYSICAL MEDICINE & REHABILITATION

## 2020-07-13 PROCEDURE — 1126F AMNT PAIN NOTED NONE PRSNT: CPT | Mod: S$GLB,,, | Performed by: PHYSICAL MEDICINE & REHABILITATION

## 2020-07-13 PROCEDURE — 1159F PR MEDICATION LIST DOCUMENTED IN MEDICAL RECORD: ICD-10-PCS | Mod: S$GLB,,, | Performed by: PHYSICAL MEDICINE & REHABILITATION

## 2020-07-13 PROCEDURE — 3078F PR MOST RECENT DIASTOLIC BLOOD PRESSURE < 80 MM HG: ICD-10-PCS | Mod: CPTII,S$GLB,, | Performed by: PHYSICAL MEDICINE & REHABILITATION

## 2020-07-13 PROCEDURE — 99999 PR PBB SHADOW E&M-EST. PATIENT-LVL II: CPT | Mod: PBBFAC,,, | Performed by: PHYSICAL MEDICINE & REHABILITATION

## 2020-07-13 PROCEDURE — 99214 OFFICE O/P EST MOD 30 MIN: CPT | Mod: S$GLB,,, | Performed by: PHYSICAL MEDICINE & REHABILITATION

## 2020-07-13 PROCEDURE — 1159F MED LIST DOCD IN RCRD: CPT | Mod: S$GLB,,, | Performed by: PHYSICAL MEDICINE & REHABILITATION

## 2020-07-13 PROCEDURE — 3074F SYST BP LT 130 MM HG: CPT | Mod: CPTII,S$GLB,, | Performed by: PHYSICAL MEDICINE & REHABILITATION

## 2020-07-13 PROCEDURE — 3074F PR MOST RECENT SYSTOLIC BLOOD PRESSURE < 130 MM HG: ICD-10-PCS | Mod: CPTII,S$GLB,, | Performed by: PHYSICAL MEDICINE & REHABILITATION

## 2020-07-13 PROCEDURE — 99214 PR OFFICE/OUTPT VISIT, EST, LEVL IV, 30-39 MIN: ICD-10-PCS | Mod: S$GLB,,, | Performed by: PHYSICAL MEDICINE & REHABILITATION

## 2020-07-13 PROCEDURE — 1126F PR PAIN SEVERITY QUANTIFIED, NO PAIN PRESENT: ICD-10-PCS | Mod: S$GLB,,, | Performed by: PHYSICAL MEDICINE & REHABILITATION

## 2020-07-13 RX ORDER — TRAMADOL HYDROCHLORIDE 50 MG/1
50 TABLET ORAL 2 TIMES DAILY PRN
Qty: 60 TABLET | Refills: 3 | Status: SHIPPED | OUTPATIENT
Start: 2020-07-13 | End: 2020-09-08 | Stop reason: SDUPTHER

## 2020-07-13 NOTE — PROGRESS NOTES
Subjective:       Patient ID: Rachel Asif is a 80 y.o. female.    Chief Complaint: No chief complaint on file.    HPI    HISTORY OF PRESENT ILLNESS:  Ms. Asif is an 80-year-old black female with hypertension who is followed up in the [facility] Clinic for chronic low back pain with lumbar radiculopathy, chronic neck pain with history of cervical radiculopathy and OA of the knees.  Her last visit to the clinic was on 6/3/19.  She was maintained on meloxicam, Cymbalta, p.r.n. tramadol and diclofenac gel.    The patient is coming to the clinic for followup.  Her back pain has been under good control.  It is an intermittent aching pain in the lumbar spine and across her back.  It is usually localized.  Her maximum pain is 4-5/10 and minimum 1/10.  Today, it is 1/10.  The patient denies any lower extremity weakness or numbness.  She denies any bowel or bladder incontinence.    Her neck pain has been under good control.  It is an intermittent aching and stiffness pain in the cervical spine.  It is localized.  Her maximum pain is 3/10 and minimum 1/10.  Today, it is 1/10.  The patient denies any upper extremity weakness or hand numbness.      Her bilateral knee pain has been under good control.  It is an intermittent aching pain in both knees, recently worse on the left.  It is worse with prolonged standing or walking and better with rest.  Her maximum pain is 5/10 and minimum 3/10.  Today, it is 3/10. She denies any swelling or warmth.    She is currently taking meloxicam 15 mg p.o. once per day, Cymbalta 60 mg p.o. once per day and tramadol 50 mg p.r.n., usually twice per day.  She uses diclofenac gel intermittently for her knees.       Past Medical History:   Diagnosis Date    Amblyopia     os    Anemia of other chronic disease     Atherosclerosis of aorta     noted on L-spine X-ray 4/14/2011    Atherosclerosis of aortic arch     - noted on CXR 5/2017    Chronic low back pain     followed by  orthopaedics    DJD (degenerative joint disease)     Glaucoma     History of colonic polyps     History of vitamin D deficiency     Hyperlipidemia     Hypertension     Lumbar radiculopathy     Obesity     Obesity     OH (ocular hypertension)     Osteoarthritis     Osteoporosis, post-menopausal     currently on a drug holiday    PCO (posterior capsular opacification), right 2/27/2020    Postmenopausal status     Prediabetes     Secondary hyperparathyroidism of renal origin     Trochanteric bursitis          Review of Systems   Constitutional: Negative for fatigue.   Eyes: Positive for visual disturbance.   Respiratory: Negative for shortness of breath.    Cardiovascular: Negative for chest pain.   Gastrointestinal: Negative for blood in stool, constipation, nausea and vomiting.   Genitourinary: Negative for difficulty urinating.   Musculoskeletal: Positive for arthralgias, back pain and neck pain. Negative for gait problem.   Skin: Negative for rash.   Neurological: Negative for dizziness and headaches.   Psychiatric/Behavioral: Negative for behavioral problems and sleep disturbance.       Objective:      Physical Exam  Vitals signs reviewed.   Constitutional:       Appearance: She is well-developed.   HENT:      Head: Normocephalic and atraumatic.   Neck:      Musculoskeletal: Normal range of motion.      Comments: -ve tenderness.  Musculoskeletal:      Comments: BUE:  ROM:full.  Strength:    RUE: 5/5 at shoulder abduction, 5 elbow flexion, 5 elbow extension, 5 hand .   LUE: 5/5 at shoulder abduction, 5 elbow flexion, 5 elbow extension, 5 hand .  Sensation to pinprick:   RUE: intact.   LUE: intact.  DTR:    RUE: +1 biceps, +1 triceps.   LUE:  +1 biceps, +1 triceps.      BLE:  ROM:full.  Mild bilateral knee crepitus.  Strength:    RLE: 5/5 at hip flexion, 5 knee extension, 5 ankle DF, 5 PF.   LLE: 5/5 at hip flexion, 5 knee extension, 5 ankle DF, 5 PF.  Sensation to pinprick:     RLE: intact.       LLE: intact.   DTR:     RLE: +1 knee, +1 ankle.    LLE: +1 knee, +1 ankle.  SLR (sitting):      RLE: -ve.      LLE: -ve.     -ve tenderness over lumbar spine.    Gait: some waddling.     Skin:     General: Skin is warm.   Neurological:      Mental Status: She is alert.   Psychiatric:         Behavior: Behavior normal.             Assessment:       1. Chronic midline low back pain with right-sided sciatica    2. Lumbar facet arthropathy    3. Chronic neck pain    4. Osteoarthritis of spine with radiculopathy, cervical region    5. Primary osteoarthritis of both knees (moderate)    6. Obesity (BMI 30.0-34.9)        Plan:         - Continue meloxicam (MOBIC) 15 MG tablet; Take 1 tablet (15 mg total) by mouth daily as needed for Pain. 1 Tablet Oral Every day  - Continue duloxetine (CYMBALTA) 60 MG capsule; Take 1 capsule (60 mg total) by mouth once daily.  - Continue tramadol (ULTRAM) 50 mg tablet; Take 1 tablet (50 mg total) by mouth twice daily as needed for Pain.  - Continue diclofenac sodium (VOLTAREN) 1 % Gel; Apply topically 3 (three) times daily.  - Regular home exercise program was encouraged.  - Weight loss was encouraged.  She gained about 20 lb since her last visit.  - Follow up in about 5 months (around 12/13/2020).      This note was partly generated with [employer] voice recognition software. I apologize for any possible typographical errors.

## 2020-07-23 DIAGNOSIS — Z12.11 COLON CANCER SCREENING: Primary | ICD-10-CM

## 2020-08-10 ENCOUNTER — ANESTHESIA EVENT (OUTPATIENT)
Dept: ENDOSCOPY | Facility: HOSPITAL | Age: 80
End: 2020-08-10
Payer: MEDICARE

## 2020-08-10 ENCOUNTER — HOSPITAL ENCOUNTER (OUTPATIENT)
Facility: HOSPITAL | Age: 80
Discharge: HOME | End: 2020-08-10
Attending: INTERNAL MEDICINE | Admitting: INTERNAL MEDICINE
Payer: MEDICARE

## 2020-08-10 ENCOUNTER — ANESTHESIA (OUTPATIENT)
Dept: ENDOSCOPY | Facility: HOSPITAL | Age: 80
End: 2020-08-10
Payer: MEDICARE

## 2020-08-10 VITALS
DIASTOLIC BLOOD PRESSURE: 88 MMHG | HEART RATE: 68 BPM | SYSTOLIC BLOOD PRESSURE: 154 MMHG | TEMPERATURE: 98 F | RESPIRATION RATE: 18 BRPM | OXYGEN SATURATION: 98 %

## 2020-08-10 DIAGNOSIS — Z12.11 COLON CANCER SCREENING: ICD-10-CM

## 2020-08-10 LAB — SARS-COV-2 RDRP RESP QL NAA+PROBE: NEGATIVE

## 2020-08-10 PROCEDURE — 63600175 PHARM REV CODE 636 W HCPCS: Performed by: NURSE ANESTHETIST, CERTIFIED REGISTERED

## 2020-08-10 PROCEDURE — 37000008 HC ANESTHESIA 1ST 15 MINUTES: Performed by: INTERNAL MEDICINE

## 2020-08-10 PROCEDURE — 45380 COLONOSCOPY AND BIOPSY: CPT | Mod: 33,,, | Performed by: INTERNAL MEDICINE

## 2020-08-10 PROCEDURE — 88305 TISSUE EXAM BY PATHOLOGIST: CPT | Performed by: PATHOLOGY

## 2020-08-10 PROCEDURE — 88305 TISSUE EXAM BY PATHOLOGIST: ICD-10-PCS | Mod: 26,,, | Performed by: PATHOLOGY

## 2020-08-10 PROCEDURE — U0002 COVID-19 LAB TEST NON-CDC: HCPCS

## 2020-08-10 PROCEDURE — 25000003 PHARM REV CODE 250: Performed by: NURSE ANESTHETIST, CERTIFIED REGISTERED

## 2020-08-10 PROCEDURE — 25000003 PHARM REV CODE 250: Performed by: INTERNAL MEDICINE

## 2020-08-10 PROCEDURE — D9220A PRA ANESTHESIA: Mod: CRNA,,, | Performed by: NURSE ANESTHETIST, CERTIFIED REGISTERED

## 2020-08-10 PROCEDURE — D9220A PRA ANESTHESIA: Mod: ANES,,, | Performed by: ANESTHESIOLOGY

## 2020-08-10 PROCEDURE — D9220A PRA ANESTHESIA: ICD-10-PCS | Mod: ANES,,, | Performed by: ANESTHESIOLOGY

## 2020-08-10 PROCEDURE — 88305 TISSUE EXAM BY PATHOLOGIST: CPT | Mod: 26,,, | Performed by: PATHOLOGY

## 2020-08-10 PROCEDURE — D9220A PRA ANESTHESIA: ICD-10-PCS | Mod: CRNA,,, | Performed by: NURSE ANESTHETIST, CERTIFIED REGISTERED

## 2020-08-10 PROCEDURE — 27201012 HC FORCEPS, HOT/COLD, DISP: Performed by: INTERNAL MEDICINE

## 2020-08-10 PROCEDURE — 45380 PR COLONOSCOPY,BIOPSY: ICD-10-PCS | Mod: 33,,, | Performed by: INTERNAL MEDICINE

## 2020-08-10 PROCEDURE — 45380 COLONOSCOPY AND BIOPSY: CPT | Performed by: INTERNAL MEDICINE

## 2020-08-10 PROCEDURE — 37000009 HC ANESTHESIA EA ADD 15 MINS: Performed by: INTERNAL MEDICINE

## 2020-08-10 RX ORDER — PROPOFOL 10 MG/ML
VIAL (ML) INTRAVENOUS
Status: DISCONTINUED | OUTPATIENT
Start: 2020-08-10 | End: 2020-08-10

## 2020-08-10 RX ORDER — LIDOCAINE HYDROCHLORIDE 20 MG/ML
INJECTION, SOLUTION EPIDURAL; INFILTRATION; INTRACAUDAL; PERINEURAL
Status: DISCONTINUED
Start: 2020-08-10 | End: 2020-08-10 | Stop reason: HOSPADM

## 2020-08-10 RX ORDER — LIDOCAINE HCL/PF 100 MG/5ML
SYRINGE (ML) INTRAVENOUS
Status: DISCONTINUED | OUTPATIENT
Start: 2020-08-10 | End: 2020-08-10

## 2020-08-10 RX ORDER — SODIUM CHLORIDE 9 MG/ML
INJECTION, SOLUTION INTRAVENOUS ONCE
Status: COMPLETED | OUTPATIENT
Start: 2020-08-10 | End: 2020-08-10

## 2020-08-10 RX ORDER — PROPOFOL 10 MG/ML
INJECTION, EMULSION INTRAVENOUS
Status: DISCONTINUED
Start: 2020-08-10 | End: 2020-08-10 | Stop reason: HOSPADM

## 2020-08-10 RX ORDER — SODIUM CHLORIDE 9 MG/ML
INJECTION, SOLUTION INTRAVENOUS CONTINUOUS PRN
Status: DISCONTINUED | OUTPATIENT
Start: 2020-08-10 | End: 2020-08-10

## 2020-08-10 RX ADMIN — PROPOFOL 20 MG: 10 INJECTION, EMULSION INTRAVENOUS at 03:08

## 2020-08-10 RX ADMIN — PROPOFOL 60 MG: 10 INJECTION, EMULSION INTRAVENOUS at 03:08

## 2020-08-10 RX ADMIN — SODIUM CHLORIDE: 0.9 INJECTION, SOLUTION INTRAVENOUS at 01:08

## 2020-08-10 RX ADMIN — LIDOCAINE HYDROCHLORIDE 100 MG: 20 INJECTION, SOLUTION INTRAVENOUS at 03:08

## 2020-08-10 RX ADMIN — PROPOFOL 30 MG: 10 INJECTION, EMULSION INTRAVENOUS at 03:08

## 2020-08-10 NOTE — TRANSFER OF CARE
Anesthesia Transfer of Care Note    Patient: Rachel Asif    Procedure(s) Performed: Procedure(s) (LRB):  COLONOSCOPY (N/A)    Patient location: GI    Anesthesia Type: general    Transport from OR: Transported from OR on room air with adequate spontaneous ventilation    Post pain: adequate analgesia    Post assessment: no apparent anesthetic complications and tolerated procedure well    Post vital signs: stable    Level of consciousness: sedated and responds to stimulation    Nausea/Vomiting: no nausea/vomiting    Complications: none    Transfer of care protocol was followed      Last vitals:   Visit Vitals  BP (!) 136/91 (BP Location: Left arm)   Pulse 63   Temp 36.4 °C (97.5 °F) (Oral)   Resp 15   SpO2 100%   Breastfeeding No

## 2020-08-10 NOTE — DISCHARGE INSTRUCTIONS

## 2020-08-10 NOTE — ANESTHESIA PREPROCEDURE EVALUATION
08/10/2020    Pre-operative evaluation for Procedure(s) (LRB):  COLONOSCOPY (N/A)    Rachel Asif is a 80 y.o. female     Patient Active Problem List   Diagnosis    Lumbar radiculopathy    Osteoarthritis    Benign hypertension with chronic kidney disease, stage III    Hyperlipidemia with target LDL less than 100    Chronic low back pain    Osteoporosis, post-menopausal    Anemia of other chronic disease    Postmenopausal status    DJD (degenerative joint disease)    History of colonic polyps    Severe obesity (BMI 35.0-39.9) with comorbidity    Prediabetes    Vitamin D deficiency    Atherosclerosis of aorta    Gastroesophageal reflux disease without esophagitis    Secondary hyperparathyroidism of renal origin    Pseudophakia - Both Eyes    Refractive error    Dry eye syndrome, bilateral    Ocular hypertension, bilateral    Amblyopia, left    Atherosclerosis of aortic arch    Vitreous detachment, bilateral    PCO (posterior capsular opacification), right    Colon cancer screening       Review of patient's allergies indicates:   Allergen Reactions    No known allergies        No current facility-administered medications on file prior to encounter.      Current Outpatient Medications on File Prior to Encounter   Medication Sig Dispense Refill    amLODIPine (NORVASC) 10 MG tablet TAKE 1 TABLET(10 MG) BY MOUTH EVERY DAY 90 tablet 1    aspirin 81 MG chewable tablet Every day      atorvastatin (LIPITOR) 20 MG tablet TAKE 1 TABLET(20 MG) BY MOUTH EVERY DAY 90 tablet 1    diclofenac sodium (VOLTAREN) 1 % Gel Apply topically 3 (three) times daily as needed. 5 Tube 2    DULoxetine (CYMBALTA) 60 MG capsule Take 1 capsule (60 mg total) by mouth once daily. 90 capsule 1    furosemide (LASIX) 20 MG tablet TAKE 1 TABLET(20 MG) BY MOUTH TWICE DAILY 180 tablet 1    hydrALAZINE (APRESOLINE) 50  MG Tab Take 1 tablet (50 mg total) by mouth 3 (three) times daily. 270 tablet 1    meloxicam (MOBIC) 15 MG tablet Take 1 tablet (15 mg total) by mouth once daily. 90 tablet 1    metoprolol succinate (TOPROL-XL) 100 MG 24 hr tablet Take 1 tablet (100 mg total) by mouth once daily. 90 tablet 1    metoprolol succinate (TOPROL-XL) 50 MG 24 hr tablet       olmesartan-hydrochlorothiazide (BENICAR HCT) 40-25 mg per tablet Take 1 tablet by mouth once daily. 90 tablet 1    pantoprazole (PROTONIX) 20 MG tablet TAKE 1 TABLET BY MOUTH EVERY DAY TO PROTECT STOMACH DUE TO TAKING NSAIDS 90 tablet 1    PROPYLENE GLYCOL/ (SYSTANE OPHT) Weekly PRN      travoprost (TRAVATAN Z) 0.004 % ophthalmic solution Place 1 drop into both eyes every evening. 2.5 mL 1       Past Surgical History:   Procedure Laterality Date    CATARACT EXTRACTION W/  INTRAOCULAR LENS IMPLANT Bilateral      VITALS  Vitals:    08/10/20 1325   BP: (!) 157/67   Pulse: 65   Resp: 18   Temp: 36.7 °C (98 °F)       Anesthesia Evaluation    I have reviewed the Patient Summary Reports.    I have reviewed the Nursing Notes.       Review of Systems  Anesthesia Hx:  No problems with previous Anesthesia Denies Hx of Anesthetic complications  History of prior surgery of interest to airway management or planning: Denies Family Hx of Anesthesia complications.   Denies Personal Hx of Anesthesia complications.   Social:  Non-Smoker, No Alcohol Use    Hematology/Oncology:     Oncology Normal    -- Anemia:   EENT/Dental:EENT/Dental Normal   Cardiovascular:   Hypertension, well controlled hyperlipidemia    Pulmonary:  Pulmonary Normal    Renal/:   Chronic Renal Disease    Hepatic/GI:  Hepatic/GI Normal    Musculoskeletal:   Arthritis     Neurological:   Neuromuscular Disease,    Endocrine:  Metabolic Disorders, Obesity / BMI > 30  Dermatological:  Skin Normal    Psych:  Psychiatric Normal           Physical Exam  General:  Obesity    Airway/Jaw/Neck:  Airway Findings:  Mouth Opening: Normal Tongue: Normal  General Airway Assessment: Adult  Mallampati: III  Improves to II with phonation.  TM Distance: 4 - 6 cm        Eyes/Ears/Nose:  EYES/EARS/NOSE FINDINGS: Normal   Dental:  DENTAL FINDINGS: Normal   Chest/Lungs:  Chest/Lungs Findings: Normal Respiratory Rate     Heart/Vascular:  Heart Findings: Rate: Normal  Rhythm: Regular Rhythm  Heart murmur: negative Vascular Findings: Normal       Mental Status:  Mental Status Findings:  Cooperative, Alert and Oriented         Anesthesia Plan  Type of Anesthesia, risks & benefits discussed:  Anesthesia Type:  general  Patient's Preference:   Intra-op Monitoring Plan:   Intra-op Monitoring Plan Comments:   Post Op Pain Control Plan:   Post Op Pain Control Plan Comments:   Induction:   IV  Beta Blocker:         Informed Consent: Patient understands risks and agrees with Anesthesia plan.  Questions answered. Anesthesia consent signed with patient.  ASA Score: 3     Day of Surgery Review of History & Physical: I have interviewed and examined the patient. I have reviewed the patient's H&P dated:  There are no significant changes.      Anesthesia Plan Notes:           Ready For Surgery From Anesthesia Perspective.

## 2020-08-10 NOTE — H&P
Endoscopy Pre-Procedure H&P    Reason for Procedure: history of polyps    HPI:  Pt is a 80 y.o. female who presents for surveillance colonoscopy due to history of polyps.  No family history of CRC and no GI symptoms.  She had polyps on her last colon in 2014.    Past Medical History:   Diagnosis Date    Amblyopia     os    Anemia of other chronic disease     Atherosclerosis of aorta     noted on L-spine X-ray 4/14/2011    Atherosclerosis of aortic arch     - noted on CXR 5/2017    Chronic low back pain     followed by orthopaedics    DJD (degenerative joint disease)     Glaucoma     History of colonic polyps     History of vitamin D deficiency     Hyperlipidemia     Hypertension     Lumbar radiculopathy     Obesity     Obesity     OH (ocular hypertension)     Osteoarthritis     Osteoporosis, post-menopausal     currently on a drug holiday    PCO (posterior capsular opacification), right 2/27/2020    Postmenopausal status     Prediabetes     Secondary hyperparathyroidism of renal origin     Trochanteric bursitis        Past Surgical History:   Procedure Laterality Date    CATARACT EXTRACTION W/  INTRAOCULAR LENS IMPLANT Bilateral        Family History   Problem Relation Age of Onset    Cataracts Mother     Hypertension Mother     Other Mother         complications from splenectomy after fall    Cataracts Father     Hypertension Father     Hypertension Sister     Edema Sister     Hypertension Brother     Diabetes Brother     Heart disease Brother     Glaucoma Maternal Grandmother     Hypertension Sister     Other Sister         shingles    Lupus Sister     Lung cancer Sister     Hypertension Sister     Other Sister         brain damage from MVA    Hypertension Sister     Hypertension Brother     Heart disease Brother     Hypertension Brother     Heart attack Brother     Hypertension Brother     Hypertension Brother     No Known Problems Brother     No Known Problems  Brother     Hypertension Brother     Heart disease Brother     Cancer Neg Hx     Amblyopia Neg Hx     Blindness Neg Hx     Retinal detachment Neg Hx     Strabismus Neg Hx     Stroke Neg Hx        Social History     Tobacco Use    Smoking status: Never Smoker    Smokeless tobacco: Never Used   Substance Use Topics    Alcohol use: No    Drug use: Not on file       Review of patient's allergies indicates:   Allergen Reactions    No known allergies        No current facility-administered medications on file prior to encounter.      Current Outpatient Medications on File Prior to Encounter   Medication Sig Dispense Refill    amLODIPine (NORVASC) 10 MG tablet TAKE 1 TABLET(10 MG) BY MOUTH EVERY DAY 90 tablet 1    aspirin 81 MG chewable tablet Every day      atorvastatin (LIPITOR) 20 MG tablet TAKE 1 TABLET(20 MG) BY MOUTH EVERY DAY 90 tablet 1    diclofenac sodium (VOLTAREN) 1 % Gel Apply topically 3 (three) times daily as needed. 5 Tube 2    DULoxetine (CYMBALTA) 60 MG capsule Take 1 capsule (60 mg total) by mouth once daily. 90 capsule 1    furosemide (LASIX) 20 MG tablet TAKE 1 TABLET(20 MG) BY MOUTH TWICE DAILY 180 tablet 1    hydrALAZINE (APRESOLINE) 50 MG Tab Take 1 tablet (50 mg total) by mouth 3 (three) times daily. 270 tablet 1    meloxicam (MOBIC) 15 MG tablet Take 1 tablet (15 mg total) by mouth once daily. 90 tablet 1    metoprolol succinate (TOPROL-XL) 100 MG 24 hr tablet Take 1 tablet (100 mg total) by mouth once daily. 90 tablet 1    metoprolol succinate (TOPROL-XL) 50 MG 24 hr tablet       olmesartan-hydrochlorothiazide (BENICAR HCT) 40-25 mg per tablet Take 1 tablet by mouth once daily. 90 tablet 1    pantoprazole (PROTONIX) 20 MG tablet TAKE 1 TABLET BY MOUTH EVERY DAY TO PROTECT STOMACH DUE TO TAKING NSAIDS 90 tablet 1    PROPYLENE GLYCOL/ (SYSTANE OPHT) Weekly PRN      travoprost (TRAVATAN Z) 0.004 % ophthalmic solution Place 1 drop into both eyes every evening. 2.5 mL  1       ROS: Negative x 10    Patient Vitals for the past 24 hrs:   BP Temp Temp src Pulse Resp SpO2   08/10/20 1325 (!) 157/67 98 °F (36.7 °C) Oral 65 18 98 %     Gen: Well developed, well nourished, no apparent distress  HEENT: Anicteric, PERRL, MMM  CV: Regular rate and rhythm, no murmurs   Lungs: CTAB, no increased work of breathing  Abd: Soft, NT, ND, normal BS, no HSM  Ext: No C/C/E  Neuro: Alert and oriented to person, place, time; no weakness  Skin: No rashes/lesions  Psych: Normal mood and affect    Assessment:  Rachel Asif is a 80 y.o. female who presents for surveillance colonoscopy due to history of polyps.    Recommendations:  1. Colonoscopy  2. F/U with PCP     Bettina Gates MD

## 2020-08-10 NOTE — [employer] PATIENT INSTRUCTIONS
Discharge Summary/Instructions after an Endoscopic Procedure  Patient Name: Rachel Jung  Patient MRN: [id number]  Patient YOB: 1940  Monday, August 10, 2020  Bettina Gates MD  RESTRICTIONS:  During your procedure today, you received medications for sedation.  These   medications may affect your judgment, balance and coordination.  Therefore,   for 24 hours, you have the following restrictions:   - DO NOT drive a car, operate machinery, make legal/financial decisions,   sign important papers or drink alcohol.    ACTIVITY:  Today: no heavy lifting, straining or running due to procedural   sedation/anesthesia.  The following day: return to full activity including work.  DIET:  Eat and drink normally unless instructed otherwise.     TREATMENT FOR COMMON SIDE EFFECTS:  - Mild abdominal pain, nausea, belching, bloating or excessive gas:  rest,   eat lightly and use a heating pad.  - Sore Throat: treat with throat lozenges and/or gargle with warm salt   water.  - Because air was used during the procedure, expelling large amounts of air   from your rectum or belching is normal.  - If a bowel prep was taken, you may not have a bowel movement for 1-3 days.    This is normal.  SYMPTOMS TO WATCH FOR AND REPORT TO YOUR PHYSICIAN:  1. Abdominal pain or bloating, other than gas cramps.  2. Chest pain.  3. Back pain.  4. Signs of infection such as: chills or fever occurring within 24 hours   after the procedure.  5. Rectal bleeding, which would show as bright red, maroon, or black stools.   (A tablespoon of blood from the rectum is not serious, especially if   hemorrhoids are present.)  6. Vomiting.  7. Weakness or dizziness.  GO DIRECTLY TO THE NEAREST EMERGENCY ROOM IF YOU HAVE ANY OF THE FOLLOWING:      Difficulty breathing              Chills and/or fever over 101 F   Persistent vomiting and/or vomiting blood   Severe abdominal pain   Severe chest pain   Black, tarry stools   Bleeding- more than one  tablespoon   Any other symptom or condition that you feel may need urgent attention  Your doctor recommends these additional instructions:  If any biopsies were taken, your doctors clinic will contact you in 1 to 2   weeks with any results.  - Patient has a contact number available for emergencies.  The signs and   symptoms of potential delayed complications were discussed with the   patient.  Return to normal activities tomorrow.  Written discharge   instructions were provided to the patient.   - Discharge patient to home.   - Resume previous diet today.   - Await pathology results.   - Continue present medications.   - Return to primary care physician in 1 month.   - The findings and recommendations were discussed with the patient and their   family.   - No repeat colonoscopy due to age.  For questions, problems or results please call your physician - Bettina Gates MD at Work:  [phone].  [facility] can be reached at [phone]     IF A COMPLICATION OR EMERGENCY SITUATION ARISES AND YOU ARE UNABLE TO REACH   YOUR PHYSICIAN - GO DIRECTLY TO THE EMERGENCY ROOM.  Bettina Gates MD  8/10/2020 3:43:32 PM  This report has been verified and signed electronically.  [employer]

## 2020-08-11 NOTE — ANESTHESIA POSTPROCEDURE EVALUATION
Anesthesia Post Evaluation    Patient: Rachel Asif    Procedure(s) Performed: Procedure(s) (LRB):  COLONOSCOPY (N/A)    Final Anesthesia Type: general    Patient location during evaluation: GI PACU  Patient participation: Yes- Able to Participate  Level of consciousness: awake and alert and oriented  Post-procedure vital signs: reviewed and stable  Pain management: adequate  Airway patency: patent    PONV status at discharge: No PONV  Anesthetic complications: no      Cardiovascular status: blood pressure returned to baseline and hemodynamically stable  Respiratory status: unassisted, spontaneous ventilation and room air  Hydration status: euvolemic  Follow-up not needed.          Vitals Value Taken Time   /88 08/10/20 1616   Temp 36.4 °C (97.5 °F) 08/10/20 1546   Pulse 68 08/10/20 1616   Resp 18 08/10/20 1616   SpO2 98 % 08/10/20 1616         Event Time   Out of Recovery 16:18:36         Pain/Alycia Score: Alycia Score: 10 (8/10/2020  4:16 PM)

## 2020-08-12 LAB
FINAL PATHOLOGIC DIAGNOSIS: NORMAL
GROSS: NORMAL

## 2020-08-19 ENCOUNTER — LAB VISIT (OUTPATIENT)
Dept: LAB | Facility: HOSPITAL | Age: 80
End: 2020-08-19
Attending: NURSE PRACTITIONER
Payer: MEDICARE

## 2020-08-19 DIAGNOSIS — E55.9 VITAMIN D DEFICIENCY: ICD-10-CM

## 2020-08-19 DIAGNOSIS — N25.81 SECONDARY HYPERPARATHYROIDISM OF RENAL ORIGIN: ICD-10-CM

## 2020-08-19 DIAGNOSIS — I70.0 ATHEROSCLEROSIS OF AORTA: ICD-10-CM

## 2020-08-19 DIAGNOSIS — E78.5 HYPERLIPIDEMIA WITH TARGET LDL LESS THAN 100: ICD-10-CM

## 2020-08-19 DIAGNOSIS — Z12.11 COLON CANCER SCREENING: ICD-10-CM

## 2020-08-19 DIAGNOSIS — I12.9 BENIGN HYPERTENSION WITH CHRONIC KIDNEY DISEASE, STAGE III: ICD-10-CM

## 2020-08-19 DIAGNOSIS — R73.03 PREDIABETES: ICD-10-CM

## 2020-08-19 DIAGNOSIS — N18.30 BENIGN HYPERTENSION WITH CHRONIC KIDNEY DISEASE, STAGE III: ICD-10-CM

## 2020-08-19 LAB
25(OH)D3+25(OH)D2 SERPL-MCNC: 13 NG/ML (ref 30–96)
ALBUMIN SERPL BCP-MCNC: 3.6 G/DL (ref 3.5–5.2)
ALP SERPL-CCNC: 58 U/L (ref 55–135)
ALT SERPL W/O P-5'-P-CCNC: 14 U/L (ref 10–44)
ANION GAP SERPL CALC-SCNC: 9 MMOL/L (ref 8–16)
AST SERPL-CCNC: 18 U/L (ref 10–40)
BASOPHILS # BLD AUTO: 0.05 K/UL (ref 0–0.2)
BASOPHILS NFR BLD: 0.8 % (ref 0–1.9)
BILIRUB SERPL-MCNC: 0.3 MG/DL (ref 0.1–1)
BUN SERPL-MCNC: 26 MG/DL (ref 8–23)
CALCIUM SERPL-MCNC: 9.2 MG/DL (ref 8.7–10.5)
CHLORIDE SERPL-SCNC: 112 MMOL/L (ref 95–110)
CHOLEST SERPL-MCNC: 221 MG/DL (ref 120–199)
CHOLEST/HDLC SERPL: 3.5 {RATIO} (ref 2–5)
CO2 SERPL-SCNC: 22 MMOL/L (ref 23–29)
CREAT SERPL-MCNC: 2.1 MG/DL (ref 0.5–1.4)
DIFFERENTIAL METHOD: ABNORMAL
EOSINOPHIL # BLD AUTO: 0.2 K/UL (ref 0–0.5)
EOSINOPHIL NFR BLD: 2.9 % (ref 0–8)
ERYTHROCYTE [DISTWIDTH] IN BLOOD BY AUTOMATED COUNT: 13.6 % (ref 11.5–14.5)
EST. GFR  (AFRICAN AMERICAN): 24.7 ML/MIN/1.73 M^2
EST. GFR  (NON AFRICAN AMERICAN): 21.4 ML/MIN/1.73 M^2
ESTIMATED AVG GLUCOSE: 117 MG/DL (ref 68–131)
GLUCOSE SERPL-MCNC: 87 MG/DL (ref 70–110)
HBA1C MFR BLD HPLC: 5.7 % (ref 4–5.6)
HCT VFR BLD AUTO: 33.7 % (ref 37–48.5)
HDLC SERPL-MCNC: 64 MG/DL (ref 40–75)
HDLC SERPL: 29 % (ref 20–50)
HGB BLD-MCNC: 9.9 G/DL (ref 12–16)
IMM GRANULOCYTES # BLD AUTO: 0.01 K/UL (ref 0–0.04)
IMM GRANULOCYTES NFR BLD AUTO: 0.2 % (ref 0–0.5)
LDLC SERPL CALC-MCNC: 130.2 MG/DL (ref 63–159)
LYMPHOCYTES # BLD AUTO: 1.7 K/UL (ref 1–4.8)
LYMPHOCYTES NFR BLD: 27.8 % (ref 18–48)
MCH RBC QN AUTO: 30.3 PG (ref 27–31)
MCHC RBC AUTO-ENTMCNC: 29.4 G/DL (ref 32–36)
MCV RBC AUTO: 103 FL (ref 82–98)
MONOCYTES # BLD AUTO: 0.7 K/UL (ref 0.3–1)
MONOCYTES NFR BLD: 10.9 % (ref 4–15)
NEUTROPHILS # BLD AUTO: 3.4 K/UL (ref 1.8–7.7)
NEUTROPHILS NFR BLD: 57.4 % (ref 38–73)
NONHDLC SERPL-MCNC: 157 MG/DL
NRBC BLD-RTO: 0 /100 WBC
PLATELET # BLD AUTO: 302 K/UL (ref 150–350)
PMV BLD AUTO: 10.8 FL (ref 9.2–12.9)
POTASSIUM SERPL-SCNC: 4.6 MMOL/L (ref 3.5–5.1)
PROT SERPL-MCNC: 7.6 G/DL (ref 6–8.4)
PTH-INTACT SERPL-MCNC: 130 PG/ML (ref 9–77)
RBC # BLD AUTO: 3.27 M/UL (ref 4–5.4)
SODIUM SERPL-SCNC: 143 MMOL/L (ref 136–145)
TRIGL SERPL-MCNC: 134 MG/DL (ref 30–150)
WBC # BLD AUTO: 5.94 K/UL (ref 3.9–12.7)

## 2020-08-19 PROCEDURE — 80053 COMPREHEN METABOLIC PANEL: CPT

## 2020-08-19 PROCEDURE — 83970 ASSAY OF PARATHORMONE: CPT

## 2020-08-19 PROCEDURE — 85025 COMPLETE CBC W/AUTO DIFF WBC: CPT

## 2020-08-19 PROCEDURE — 82306 VITAMIN D 25 HYDROXY: CPT

## 2020-08-19 PROCEDURE — 36415 COLL VENOUS BLD VENIPUNCTURE: CPT | Mod: PO

## 2020-08-19 PROCEDURE — 80061 LIPID PANEL: CPT

## 2020-08-19 PROCEDURE — 83036 HEMOGLOBIN GLYCOSYLATED A1C: CPT

## 2020-08-20 ENCOUNTER — TELEPHONE (OUTPATIENT)
Dept: FAMILY MEDICINE | Facility: CLINIC | Age: 80
End: 2020-08-20

## 2020-08-20 DIAGNOSIS — N18.9 ACUTE KIDNEY INJURY SUPERIMPOSED ON CHRONIC KIDNEY DISEASE: Primary | ICD-10-CM

## 2020-08-20 DIAGNOSIS — N17.9 ACUTE KIDNEY INJURY SUPERIMPOSED ON CHRONIC KIDNEY DISEASE: Primary | ICD-10-CM

## 2020-08-20 NOTE — TELEPHONE ENCOUNTER
Please let the patient know that her labs show worsening of her kidney function. This is likely related to the Lasix she has been taking for the swelling. I would like to be sure she is not taking any anti-inflammatory medications and is staying well hydrated. I would like her to see a kidney specialist for further evaluation, and I am placing a referral now.    Thanks!  Lindsay Jenkins, CLARISSA-C

## 2020-08-20 NOTE — TELEPHONE ENCOUNTER
----- Message from Jonna Westfall sent at 8/20/2020  4:47 PM CDT -----  Type:  Patient Returning Call    Who Called: Self     Who Left Message for Patient: Staff     Does the patient know what this is regarding?: Call back     Would the patient rather a call back or a response via [facility]? Call back     Best Call Back Number: [phone]

## 2020-08-20 NOTE — TELEPHONE ENCOUNTER
----- Message from Oneyda Pace MD sent at 8/20/2020  4:23 PM CDT -----  I am sure the increased lasix use is contributing. If she is not already established with a nephrologist I agree with a referral for further evaluation.  Oneyda  ----- Message -----  From: Lindsay Jenkins NP  Sent: 8/20/2020   4:07 PM CDT  To: Oneyda Pace MD    Hi Dr. Pace,    I was hoping to get your input on her labs. Significant worsening of her renal function, and I am wondering if this is related to the Lasix addition as well as instructions to increase her dose PRN. I was also going to see if she had been fasting prior to labs. Thoughts on repeating BMP non-fasting with urine for FeUrea and get her in with nephrology? Happy to do whatever, thanks for your input.    Thanks!  TORRIE Calderon

## 2020-09-08 ENCOUNTER — TELEPHONE (OUTPATIENT)
Dept: FAMILY MEDICINE | Facility: CLINIC | Age: 80
End: 2020-09-08

## 2020-09-08 DIAGNOSIS — G89.29 CHRONIC NECK PAIN: ICD-10-CM

## 2020-09-08 DIAGNOSIS — M54.2 CHRONIC NECK PAIN: ICD-10-CM

## 2020-09-08 DIAGNOSIS — E78.5 HYPERLIPIDEMIA WITH TARGET LDL LESS THAN 100: ICD-10-CM

## 2020-09-08 DIAGNOSIS — N18.30 BENIGN HYPERTENSION WITH CHRONIC KIDNEY DISEASE, STAGE III: ICD-10-CM

## 2020-09-08 DIAGNOSIS — I12.9 BENIGN HYPERTENSION WITH CHRONIC KIDNEY DISEASE, STAGE III: ICD-10-CM

## 2020-09-08 DIAGNOSIS — M54.41 CHRONIC MIDLINE LOW BACK PAIN WITH RIGHT-SIDED SCIATICA: ICD-10-CM

## 2020-09-08 DIAGNOSIS — G89.29 CHRONIC MIDLINE LOW BACK PAIN WITH RIGHT-SIDED SCIATICA: ICD-10-CM

## 2020-09-08 RX ORDER — AMLODIPINE BESYLATE 10 MG/1
TABLET ORAL
Qty: 90 TABLET | Refills: 1 | Status: SHIPPED | OUTPATIENT
Start: 2020-09-08 | End: 2021-01-11 | Stop reason: SDUPTHER

## 2020-09-08 RX ORDER — ATORVASTATIN CALCIUM 20 MG/1
TABLET, FILM COATED ORAL
Qty: 90 TABLET | Refills: 1 | Status: SHIPPED | OUTPATIENT
Start: 2020-09-08 | End: 2021-01-11 | Stop reason: SDUPTHER

## 2020-09-08 RX ORDER — METOPROLOL SUCCINATE 100 MG/1
100 TABLET, EXTENDED RELEASE ORAL DAILY
Qty: 90 TABLET | Refills: 1 | Status: SHIPPED | OUTPATIENT
Start: 2020-09-08 | End: 2021-01-11 | Stop reason: ALTCHOICE

## 2020-09-08 RX ORDER — TRAMADOL HYDROCHLORIDE 50 MG/1
50 TABLET ORAL 2 TIMES DAILY PRN
Qty: 60 TABLET | Refills: 3 | Status: SHIPPED | OUTPATIENT
Start: 2020-09-08 | End: 2021-01-11 | Stop reason: SDUPTHER

## 2020-09-08 NOTE — TELEPHONE ENCOUNTER
Call pharmacy and they informed me that patient came on 76/2020 and picked up all medications that was refilled on order on  7/2/2020. Patient was informed of this and she said that she did not  any medications and now is out of all her medications. Advise her to return call to pharmacy to speak with manager to get this resolved.

## 2020-09-08 NOTE — TELEPHONE ENCOUNTER
----- Message from Jennifer Radford sent at 9/8/2020  9:38 AM CDT -----  Regarding: Patient Call back  Contact: Patient  Type: Patient Call Back    Who called:Patient    What is the request in detail: Pt is requesting a call back in regards to refilling her prescriptions (pt not sure of the names)    Can the clinic reply by [facility]?    Would the patient rather a call back or a response via [facility]? Call back     Best call back number: [phone]        [id number] - [place] - [address] [place] [address] [address]  [facility] [place] [place] [address]  Phone: [phone] Fax: [phone]

## 2020-09-08 NOTE — TELEPHONE ENCOUNTER
----- Message from Rylan Sherwood sent at 9/8/2020  9:49 AM CDT -----  Regarding: REFILL  Contact: SELF  Pt states she need a refill on her traMADoL (ULTRAM) 50 mg tablet Please send prescription to    [id number] - [place], [place] - [address] [phone] (Phone)  [phone] (Fax)    Pt is completely out of her medication Pt ask for a call    Contact info  [phone] (home)

## 2020-09-15 NOTE — PROGRESS NOTES
Subjective:       Patient ID: Rachel Asif is a 80 y.o. AA female who presents for new evaluation of  CKD    HPI     Patient is new to me. New to clinic.  Prior pertinent chart reviewed since this is patient's first appointment with me.    Patient presents for new eval of CHRISTOPHER superimposed on CKD.  Baseline creatinine of 1.3-1.4 since 2015. No labs available from Nov 2019 to August 2019, when patient was found to have sCr of 2.1 mg/dL.    Per PMR note in July 2020, had been taking meloxicam 15 mg daily daily since June 2019; reports takes it intermittently now.    Home BPs: 130-140/40-70s    Cardiology initially started lasix in December 2019 when patient presented with new peripheral edema, JOHNSON, and chest tightness; lasix improved these symptoms. Lasix increased and added PRN dosing in July; patient says she had increased swelling around this time (also says it was the same time she started amlodipine). LVEF in December 2019 was 60%.    Significant hx includes HLD, atherosclerosis of aorta, anemia of chronic disease, prediabetes, vitamin D deficiency, hyperparathyroidism, GERD, OA, HTN recently diagnosed.      The patient denies or new antibiotics, recreational drugs, recent episode of dehydration, diarrhea, nausea or vomiting, acute illness, hospitalization or exposure to IV radiocontrast. On PPI daily. Occasionally uses herbal supplements but does not remember which ones.    Significant family hx includes: HTN, edema, lupus (sister), DM, lung cancer, heart disease, MI; no known kidney disease in family    Last renal US: none in EMR    Review of Systems   Constitutional: Negative for fatigue.   HENT: Negative for facial swelling and mouth sores.    Respiratory: Negative for shortness of breath.    Cardiovascular: Positive for leg swelling. Negative for chest pain and palpitations.   Gastrointestinal: Negative for constipation, diarrhea, nausea and vomiting.   Genitourinary: Negative for difficulty urinating,  dysuria, flank pain, frequency, hematuria and urgency.   Musculoskeletal: Negative for back pain.   Skin: Negative for rash.   Neurological: Negative for dizziness and syncope.   Psychiatric/Behavioral: Negative for sleep disturbance.       Objective:       Blood pressure 134/80, pulse 70, weight 89 kg (196 lb 3.4 oz), SpO2 100 %.  Physical Exam  Constitutional:       General: She is not in acute distress.     Appearance: She is well-developed.   Neck:      Musculoskeletal: Neck supple.   Cardiovascular:      Rate and Rhythm: Normal rate and regular rhythm.      Heart sounds: Normal heart sounds. No murmur. No friction rub. No gallop.    Pulmonary:      Effort: Pulmonary effort is normal. No respiratory distress.      Breath sounds: Normal breath sounds.   Abdominal:      General: Bowel sounds are normal. There is no distension.      Palpations: Abdomen is soft.      Tenderness: There is no abdominal tenderness. There is no right CVA tenderness or left CVA tenderness.   Musculoskeletal:      Right lower leg: Edema (mild) present.      Left lower leg: Edema (mild) present.   Skin:     General: Skin is warm and dry.      Findings: No lesion or rash.   Neurological:      Mental Status: She is alert and oriented to person, place, and time.   Psychiatric:         Mood and Affect: Mood normal.         Behavior: Behavior normal.         Thought Content: Thought content normal.         Judgment: Judgment normal.           Lab Results   Component Value Date    CREATININE 2.1 (H) 08/19/2020     No results found for: UTPCR  Lab Results   Component Value Date     08/19/2020    K 4.6 08/19/2020    CO2 22 (L) 08/19/2020     (H) 08/19/2020     Lab Results   Component Value Date    .0 (H) 08/19/2020    CALCIUM 9.2 08/19/2020     Lab Results   Component Value Date    HGB 9.9 (L) 08/19/2020    WBC 5.94 08/19/2020    HCT 33.7 (L) 08/19/2020      Lab Results   Component Value Date    HGBA1C 5.7 (H) 08/19/2020    PLT  302 08/19/2020    BUN 26 (H) 08/19/2020     Lab Results   Component Value Date    LDLCALC 130.2 08/19/2020         Assessment:       1. CKD (chronic kidney disease) stage 3, GFR 30-59 ml/min    2. Hypertension, unspecified type    3. Hyperparathyroidism    4. Prediabetes    5. Vitamin D deficiency    6. Acute kidney injury superimposed on chronic kidney disease        Plan:   CKD stage 3B c eGFR 40-44 mL/min c superimposed CHRISTOPHER - CKD likely secondary to  age-related nephron loss and NSAID use, also possibly atherosclerotic disease (atherosclerosis noted to aorta). CHRISTOPHER could be caused by combination of long-term daily NSAID use and diuresis.  Need to trend labs to determine if this is CHRISTOPHER vs true progression of CKD.  Educated patient to control BP, BG, remain well-hydrated, and avoid NSAIDs to prevent progression of CKD.  -US, UA, UPCR, RFP, CBC  - SPEP, KAYLEIGH, free light chains  UPCR Need to obtain. On olmesartan.   Acid-base Mild acidosis correlated with CHRISTOPHER; will monitor.   Renal osteodystrophy Ca okay. PTH mildly elevated. Vit D low. Need supplement.    Anemia Hgb marginally below goal with CHRISTOPHER, but had been at this level a year ago prior to CHRISTOPHER. Will check iron levels   DM Pre-diabetic.   Lipid Management Defer.   ESRD planning Anticipatory guidance provided about timing of dialysis. Start discussions and planning when eGFR is about 20 mL/min; most patients start dialysis between 5-10 mL/min.     HTN - WNL today on furosemide 20 mg daily with a second dose PRN for swelling, hydralazine 50 mg daily, metoprolol succinate 50 mg, olmesartan-HCTZ 40-25    Edema - says she typically takes extra PRN lasix dose 3x/week. Recent worsening correlated with starting amlodipine. Educated her of SE of amlodipine and to notify us if it becomes bothersome or worse.    All questions patient had were answered.  Asked if further questions. None. F/u in clinic in 3 mos with labs and urine prior to next visit or sooner if needed.  ER  for emergency concerns.    Summary of Plan:  1. UA, UPCR, RFP, CBC, SPEP, KAYLEIGH, free light chains  2. Baseline renal US  3. Start ergocalciferol 50,000 weekly x 12 weeks then monthly  4. avoid NSAID/ bactrim/ IV contrast/ gadolinium/ aminoglycoside where possible  5. RTC in 3 mos

## 2020-09-16 ENCOUNTER — PATIENT OUTREACH (OUTPATIENT)
Dept: ADMINISTRATIVE | Facility: OTHER | Age: 80
End: 2020-09-16

## 2020-09-17 ENCOUNTER — OFFICE VISIT (OUTPATIENT)
Dept: NEPHROLOGY | Facility: CLINIC | Age: 80
End: 2020-09-17
Payer: MEDICARE

## 2020-09-17 ENCOUNTER — LAB VISIT (OUTPATIENT)
Dept: LAB | Facility: HOSPITAL | Age: 80
End: 2020-09-17
Payer: MEDICARE

## 2020-09-17 VITALS
WEIGHT: 196.19 LBS | OXYGEN SATURATION: 100 % | HEART RATE: 70 BPM | SYSTOLIC BLOOD PRESSURE: 134 MMHG | BODY MASS INDEX: 35.89 KG/M2 | DIASTOLIC BLOOD PRESSURE: 80 MMHG

## 2020-09-17 DIAGNOSIS — E55.9 VITAMIN D DEFICIENCY: ICD-10-CM

## 2020-09-17 DIAGNOSIS — N18.30 CKD (CHRONIC KIDNEY DISEASE) STAGE 3, GFR 30-59 ML/MIN: ICD-10-CM

## 2020-09-17 DIAGNOSIS — N17.9 ACUTE KIDNEY INJURY SUPERIMPOSED ON CHRONIC KIDNEY DISEASE: ICD-10-CM

## 2020-09-17 DIAGNOSIS — I10 HYPERTENSION, UNSPECIFIED TYPE: ICD-10-CM

## 2020-09-17 DIAGNOSIS — E21.3 HYPERPARATHYROIDISM: ICD-10-CM

## 2020-09-17 DIAGNOSIS — R60.9 EDEMA, UNSPECIFIED TYPE: ICD-10-CM

## 2020-09-17 DIAGNOSIS — D64.9 ANEMIA, UNSPECIFIED TYPE: ICD-10-CM

## 2020-09-17 DIAGNOSIS — N18.9 ACUTE KIDNEY INJURY SUPERIMPOSED ON CHRONIC KIDNEY DISEASE: ICD-10-CM

## 2020-09-17 DIAGNOSIS — N18.30 CKD (CHRONIC KIDNEY DISEASE) STAGE 3, GFR 30-59 ML/MIN: Primary | ICD-10-CM

## 2020-09-17 DIAGNOSIS — R73.03 PREDIABETES: ICD-10-CM

## 2020-09-17 LAB
ALBUMIN SERPL BCP-MCNC: 3.9 G/DL (ref 3.5–5.2)
ANION GAP SERPL CALC-SCNC: 10 MMOL/L (ref 8–16)
BASOPHILS # BLD AUTO: 0.05 K/UL (ref 0–0.2)
BASOPHILS NFR BLD: 0.7 % (ref 0–1.9)
BUN SERPL-MCNC: 17 MG/DL (ref 8–23)
CALCIUM SERPL-MCNC: 9.4 MG/DL (ref 8.7–10.5)
CHLORIDE SERPL-SCNC: 101 MMOL/L (ref 95–110)
CO2 SERPL-SCNC: 27 MMOL/L (ref 23–29)
CREAT SERPL-MCNC: 1.8 MG/DL (ref 0.5–1.4)
DIFFERENTIAL METHOD: ABNORMAL
EOSINOPHIL # BLD AUTO: 0.1 K/UL (ref 0–0.5)
EOSINOPHIL NFR BLD: 1 % (ref 0–8)
ERYTHROCYTE [DISTWIDTH] IN BLOOD BY AUTOMATED COUNT: 13 % (ref 11.5–14.5)
EST. GFR  (AFRICAN AMERICAN): 29.8 ML/MIN/1.73 M^2
EST. GFR  (NON AFRICAN AMERICAN): 25.8 ML/MIN/1.73 M^2
FERRITIN SERPL-MCNC: 44 NG/ML (ref 20–300)
GLUCOSE SERPL-MCNC: 109 MG/DL (ref 70–110)
HCT VFR BLD AUTO: 36.7 % (ref 37–48.5)
HGB BLD-MCNC: 11.3 G/DL (ref 12–16)
IMM GRANULOCYTES # BLD AUTO: 0.02 K/UL (ref 0–0.04)
IMM GRANULOCYTES NFR BLD AUTO: 0.3 % (ref 0–0.5)
IRON SERPL-MCNC: 41 UG/DL (ref 30–160)
LYMPHOCYTES # BLD AUTO: 1.2 K/UL (ref 1–4.8)
LYMPHOCYTES NFR BLD: 17.6 % (ref 18–48)
MCH RBC QN AUTO: 29.1 PG (ref 27–31)
MCHC RBC AUTO-ENTMCNC: 30.8 G/DL (ref 32–36)
MCV RBC AUTO: 95 FL (ref 82–98)
MONOCYTES # BLD AUTO: 0.7 K/UL (ref 0.3–1)
MONOCYTES NFR BLD: 9.4 % (ref 4–15)
NEUTROPHILS # BLD AUTO: 5 K/UL (ref 1.8–7.7)
NEUTROPHILS NFR BLD: 71 % (ref 38–73)
NRBC BLD-RTO: 0 /100 WBC
PHOSPHATE SERPL-MCNC: 3.8 MG/DL (ref 2.7–4.5)
PLATELET # BLD AUTO: 388 K/UL (ref 150–350)
PMV BLD AUTO: 9.4 FL (ref 9.2–12.9)
POTASSIUM SERPL-SCNC: 4 MMOL/L (ref 3.5–5.1)
RBC # BLD AUTO: 3.88 M/UL (ref 4–5.4)
SATURATED IRON: 10 % (ref 20–50)
SODIUM SERPL-SCNC: 138 MMOL/L (ref 136–145)
TOTAL IRON BINDING CAPACITY: 404 UG/DL (ref 250–450)
TRANSFERRIN SERPL-MCNC: 273 MG/DL (ref 200–375)
WBC # BLD AUTO: 7 K/UL (ref 3.9–12.7)

## 2020-09-17 PROCEDURE — 84165 PATHOLOGIST INTERPRETATION SPE: ICD-10-PCS | Mod: 26,,, | Performed by: PATHOLOGY

## 2020-09-17 PROCEDURE — 1126F AMNT PAIN NOTED NONE PRSNT: CPT | Mod: S$GLB,,, | Performed by: NURSE PRACTITIONER

## 2020-09-17 PROCEDURE — 1159F MED LIST DOCD IN RCRD: CPT | Mod: S$GLB,,, | Performed by: NURSE PRACTITIONER

## 2020-09-17 PROCEDURE — 1101F PT FALLS ASSESS-DOCD LE1/YR: CPT | Mod: CPTII,S$GLB,, | Performed by: NURSE PRACTITIONER

## 2020-09-17 PROCEDURE — 84165 PROTEIN E-PHORESIS SERUM: CPT

## 2020-09-17 PROCEDURE — 80069 RENAL FUNCTION PANEL: CPT

## 2020-09-17 PROCEDURE — 3079F DIAST BP 80-89 MM HG: CPT | Mod: CPTII,S$GLB,, | Performed by: NURSE PRACTITIONER

## 2020-09-17 PROCEDURE — 83520 IMMUNOASSAY QUANT NOS NONAB: CPT

## 2020-09-17 PROCEDURE — 99204 PR OFFICE/OUTPT VISIT, NEW, LEVL IV, 45-59 MIN: ICD-10-PCS | Mod: S$GLB,,, | Performed by: NURSE PRACTITIONER

## 2020-09-17 PROCEDURE — 1126F PR PAIN SEVERITY QUANTIFIED, NO PAIN PRESENT: ICD-10-PCS | Mod: S$GLB,,, | Performed by: NURSE PRACTITIONER

## 2020-09-17 PROCEDURE — 86334 IMMUNOFIX E-PHORESIS SERUM: CPT

## 2020-09-17 PROCEDURE — 99999 PR PBB SHADOW E&M-EST. PATIENT-LVL V: ICD-10-PCS | Mod: PBBFAC,,, | Performed by: NURSE PRACTITIONER

## 2020-09-17 PROCEDURE — 99999 PR PBB SHADOW E&M-EST. PATIENT-LVL V: CPT | Mod: PBBFAC,,, | Performed by: NURSE PRACTITIONER

## 2020-09-17 PROCEDURE — 86334 PATHOLOGIST INTERPRETATION IFE: ICD-10-PCS | Mod: 26,,, | Performed by: PATHOLOGY

## 2020-09-17 PROCEDURE — 1159F PR MEDICATION LIST DOCUMENTED IN MEDICAL RECORD: ICD-10-PCS | Mod: S$GLB,,, | Performed by: NURSE PRACTITIONER

## 2020-09-17 PROCEDURE — 3075F PR MOST RECENT SYSTOLIC BLOOD PRESS GE 130-139MM HG: ICD-10-PCS | Mod: CPTII,S$GLB,, | Performed by: NURSE PRACTITIONER

## 2020-09-17 PROCEDURE — 3075F SYST BP GE 130 - 139MM HG: CPT | Mod: CPTII,S$GLB,, | Performed by: NURSE PRACTITIONER

## 2020-09-17 PROCEDURE — 84165 PROTEIN E-PHORESIS SERUM: CPT | Mod: 26,,, | Performed by: PATHOLOGY

## 2020-09-17 PROCEDURE — 36415 COLL VENOUS BLD VENIPUNCTURE: CPT

## 2020-09-17 PROCEDURE — 99204 OFFICE O/P NEW MOD 45 MIN: CPT | Mod: S$GLB,,, | Performed by: NURSE PRACTITIONER

## 2020-09-17 PROCEDURE — 1101F PR PT FALLS ASSESS DOC 0-1 FALLS W/OUT INJ PAST YR: ICD-10-PCS | Mod: CPTII,S$GLB,, | Performed by: NURSE PRACTITIONER

## 2020-09-17 PROCEDURE — 86334 IMMUNOFIX E-PHORESIS SERUM: CPT | Mod: 26,,, | Performed by: PATHOLOGY

## 2020-09-17 PROCEDURE — 83540 ASSAY OF IRON: CPT

## 2020-09-17 PROCEDURE — 85025 COMPLETE CBC W/AUTO DIFF WBC: CPT

## 2020-09-17 PROCEDURE — 82728 ASSAY OF FERRITIN: CPT

## 2020-09-17 PROCEDURE — 3079F PR MOST RECENT DIASTOLIC BLOOD PRESSURE 80-89 MM HG: ICD-10-PCS | Mod: CPTII,S$GLB,, | Performed by: NURSE PRACTITIONER

## 2020-09-17 RX ORDER — ERGOCALCIFEROL 1.25 MG/1
50000 CAPSULE ORAL
Qty: 3 CAPSULE | Refills: 3 | Status: SHIPPED | OUTPATIENT
Start: 2020-12-17 | End: 2020-12-17

## 2020-09-17 RX ORDER — POTASSIUM CHLORIDE 20 MEQ/1
20 TABLET, EXTENDED RELEASE ORAL DAILY
COMMUNITY
Start: 2020-07-02 | End: 2021-01-11 | Stop reason: SDUPTHER

## 2020-09-17 RX ORDER — ERGOCALCIFEROL 1.25 MG/1
50000 CAPSULE ORAL
Qty: 12 CAPSULE | Refills: 0 | Status: SHIPPED | OUTPATIENT
Start: 2020-09-17 | End: 2021-08-11

## 2020-09-17 RX ORDER — ERGOCALCIFEROL 1.25 MG/1
50000 CAPSULE ORAL
Qty: 3 CAPSULE | Refills: 3 | Status: SHIPPED | OUTPATIENT
Start: 2020-09-17 | End: 2020-09-17

## 2020-09-17 NOTE — LETTER
September 17, 2020      Lindsay Jenkins NP  [address]  [place] [address]           [facility] - Nephrology 5th Fl  [address]  [facility] [address]  Phone: [phone]  Fax: [phone]          Patient: Rachel Asif   MR Number: [id number]   YOB: 1940   Date of Visit: 9/17/2020       Dear Lindsay Jenkins:    Thank you for referring Rachel Asif to me for evaluation. Attached you will find relevant portions of my assessment and plan of care.    If you have questions, please do not hesitate to call me. I look forward to following Rachel Asif along with you.    Sincerely,    Kamini Pressley NP    Enclosure  CC:  No Recipients    If you would like to receive this communication electronically, please contact [email or web address] or [phone] to request more information on [employer] Link access.    For providers and/or their staff who would like to refer a patient to [facility], please contact us through our one-stop-shop provider referral line, [facility], at [phone].    If you feel you have received this communication in error or would no longer like to receive these types of communications, please e-mail [email or web address]

## 2020-09-17 NOTE — PATIENT INSTRUCTIONS
Start vitamin D weekly for 12 weeks. Then switch to monthly.  Get kidney ultrasound and bloodwork.  Continue low sodium diet.  Avoid NSAID (ibuprofen, advil, motrin, aleve, naprosyn, naproxen, Stanback, Goody's Powder). Tylenol is okay.  Avoid bactrim/ IV contrast/ gadolinium/ aminoglycoside where possible.  Avoid herbal supplements.  Stay hydrated.  Return to clinic in 3 months with labs (bloodwork and urine) or sooner if needed.  Go to the ER with any emergency concerns.      Chronic Kidney Disease (CKD)     The role of the kidneys is to remove waste products and extra water from the blood.  When the kidneys do not work as they should, waste products begin to build up in the blood. This is called chronic kidney disease (CKD). CKD means thaPlace chronic kidney disease patient instructions here. t you have kidney damage or a decrease in kidney function lasting at least 3 months. CKD allows extra water, waste, and toxins to build up in the body. This can eventually become life-threatening. You might need dialysis or a kidney transplant to stay alive. This most severe form is called end stage renal disease.  Diabetes is the leading causes of chronic renal failure. Other causes include high blood pressure, hardening of the arteries (atherosclerosis), lupus, inflammation of the blood vessels (vasculitis), and past viral or bacterial infections. Certain over-the-counter pain medicines can cause renal failure when taken often over a long period of time. These include aspirin, ibuprofen, and related anti-inflammatory medicines called NSAIDs (nonsteroidal anti-inflammatory drugs).  Home care  The following guidelines will help you care for yourself at home:  · If you have diabetes, talk with your healthcare provider about keeping your blood sugar under control. Ask if you need to make and changes to your diet, lifestyle, or medicines.  · If you have high blood pressure:  ¨ Take prescribed medicine to lower your blood  pressure to the recommended goal of less than 130/80.  ¨ Start a regular exercise program that you enjoy. Check with your healthcare provider to be sure your planned exercise program is right for you.  ¨ Eat less salt (sodium). Your healthcare provider can tell you how much salt per day is safe for you.  · If you are overweight, talk with your healthcare provider about a weight loss plan.  · If you smoke, you must quit. Smoking makes kidney disease worse. Talk with your healthcare provider about ways to help you quit.  For more information, visit the following links:  ¨ www.smokefree.gov/sites/default/files/pdf/clearing-the-air-accessible.pdf  ¨ www.smokefree.gov  ¨ www.cancer.org/healthy/stayawayfromtobacco/guidetoquittingsmoking/  · Most people with CKD need to follow a special diet.  Be sure you understand yours. In general, you will need to limit protein, salt, potassium, and phosphorus. You also need to limit how much fluid you drink.   · CKD is a risk factor for heart disease. Talk with your healthcare provider about any other risk factors you might have and what you can do to lessen them.  · Talk with your healthcare provider about any medicines you are taking to find out if they need to be reduced or stopped.  · Don't use the following over-the-counter medicines, or consult your healthcare provider before using:  ¨ Aspirin and NSAIDs such as ibuprofen or naproxen. Using acetaminophen for fever or pain is OK.  ¨ Laxatives and antacids containing magnesium or aluminum  ¨ Fleet or phospho soda enemas containing phosphorus  ¨ Certain stomach acid-blocking medicine such as cimetidine or ranitidine   ¨ Decongestants containing pseudoephedrine   ¨ Herbal supplements  Follow-up care  Follow up with your healthcare provider, or as advised. Contact one of the following for more information:  · American Association of Kidney Patients 989-005-0662 www.aakp.org  · National Kidney Foundation 276-489-5732  www.kidney.org  · American Kidney Fund 959-197-4273 www.kidneyfund.org  · National Kidney Disease Education Program 866-4KIDNEY www.nkdep.nih.gov  If an X-ray, ECG (cardiogram), or other diagnostic test was taken, you will be told of any new findings that may affect your care.  Call 911  Call 911 if you have any of the following:  · Severe weakness, dizziness, fainting, drowsiness, or confusion  · Chest pain or shortness of breath  · Heart beating fast, slow, or irregularly  When to seek medical advice  Call your healthcare provider right away if any of these occur:  · Nausea or vomiting  · Fever of 100.4°F (38°C) or higher, or as directed by your healthcare provider  · Unexpected weight gain or swelling in the legs, ankles, or around the eyes  · Decrease or absent urine output  Date Last Reviewed: 9/1/2016  © 3853-8990 [employer]. [address] [place] [address], [place] [address]. All rights reserved. This information is not intended as a substitute for professional medical care. Always follow your healthcare professional's instructions.

## 2020-09-18 ENCOUNTER — TELEPHONE (OUTPATIENT)
Dept: NEPHROLOGY | Facility: CLINIC | Age: 80
End: 2020-09-18

## 2020-09-18 ENCOUNTER — HOSPITAL ENCOUNTER (OUTPATIENT)
Dept: RADIOLOGY | Facility: HOSPITAL | Age: 80
Discharge: HOME | End: 2020-09-18
Attending: NURSE PRACTITIONER
Payer: MEDICARE

## 2020-09-18 DIAGNOSIS — N17.9 ACUTE KIDNEY INJURY SUPERIMPOSED ON CHRONIC KIDNEY DISEASE: ICD-10-CM

## 2020-09-18 DIAGNOSIS — R31.9 HEMATURIA, UNSPECIFIED TYPE: Primary | ICD-10-CM

## 2020-09-18 DIAGNOSIS — N18.30 CKD (CHRONIC KIDNEY DISEASE) STAGE 3, GFR 30-59 ML/MIN: ICD-10-CM

## 2020-09-18 DIAGNOSIS — N18.9 ACUTE KIDNEY INJURY SUPERIMPOSED ON CHRONIC KIDNEY DISEASE: ICD-10-CM

## 2020-09-18 LAB
ALBUMIN SERPL ELPH-MCNC: 4.07 G/DL (ref 3.35–5.55)
ALPHA1 GLOB SERPL ELPH-MCNC: 0.34 G/DL (ref 0.17–0.41)
ALPHA2 GLOB SERPL ELPH-MCNC: 0.92 G/DL (ref 0.43–0.99)
B-GLOBULIN SERPL ELPH-MCNC: 0.99 G/DL (ref 0.5–1.1)
GAMMA GLOB SERPL ELPH-MCNC: 1.39 G/DL (ref 0.67–1.58)
INTERPRETATION SERPL IFE-IMP: NORMAL
KAPPA LC SER QL IA: 5.01 MG/DL (ref 0.33–1.94)
KAPPA LC/LAMBDA SER IA: 1.7 (ref 0.26–1.65)
LAMBDA LC SER QL IA: 2.95 MG/DL (ref 0.57–2.63)
PATHOLOGIST INTERPRETATION IFE: NORMAL
PATHOLOGIST INTERPRETATION SPE: NORMAL
PROT SERPL-MCNC: 7.7 G/DL (ref 6–8.4)

## 2020-09-18 PROCEDURE — 76770 US RETROPERITONEAL COMPLETE: ICD-10-PCS | Mod: 26,,, | Performed by: RADIOLOGY

## 2020-09-18 PROCEDURE — 76770 US EXAM ABDO BACK WALL COMP: CPT | Mod: 26,,, | Performed by: RADIOLOGY

## 2020-09-18 PROCEDURE — 76770 US EXAM ABDO BACK WALL COMP: CPT | Mod: TC

## 2020-09-18 NOTE — TELEPHONE ENCOUNTER
Called pt to discuss lab results. Recommend starting Ferrous Sulfate over the counter 325 mg BID. Educated on side effects.  Notified of urology referral for microscopic hematuria. She verbalized understanding.

## 2020-11-11 ENCOUNTER — TELEPHONE (OUTPATIENT)
Dept: FAMILY MEDICINE | Facility: CLINIC | Age: 80
End: 2020-11-11

## 2020-11-11 NOTE — TELEPHONE ENCOUNTER
----- Message from Oneyda Pace MD sent at 11/11/2020  9:02 AM CST -----  Please call patient to update flu shot.   She will be due for her annual with me in January.

## 2020-11-11 NOTE — TELEPHONE ENCOUNTER
Spoke with patient she states she has not yet received flu vaccine as of yet. Patient does have plans on getting the vaccine. Advised patient to follow up with us once she has received vaccine to update her chart. Annual visit scheduled 1/11/21 at 10:00 AM. Patient verbalized understanding.

## 2020-11-12 ENCOUNTER — OFFICE VISIT (OUTPATIENT)
Dept: CARDIOLOGY | Facility: CLINIC | Age: 80
End: 2020-11-12
Payer: MEDICARE

## 2020-11-12 VITALS
BODY MASS INDEX: 36.07 KG/M2 | HEART RATE: 74 BPM | OXYGEN SATURATION: 98 % | WEIGHT: 196 LBS | SYSTOLIC BLOOD PRESSURE: 124 MMHG | DIASTOLIC BLOOD PRESSURE: 64 MMHG | HEIGHT: 62 IN

## 2020-11-12 DIAGNOSIS — I12.9 BENIGN HYPERTENSION WITH CHRONIC KIDNEY DISEASE, STAGE III: ICD-10-CM

## 2020-11-12 DIAGNOSIS — M54.16 LUMBAR RADICULOPATHY: ICD-10-CM

## 2020-11-12 DIAGNOSIS — R06.02 SOB (SHORTNESS OF BREATH): ICD-10-CM

## 2020-11-12 DIAGNOSIS — M17.0 PRIMARY OSTEOARTHRITIS OF BOTH KNEES: ICD-10-CM

## 2020-11-12 DIAGNOSIS — N18.30 BENIGN HYPERTENSION WITH CHRONIC KIDNEY DISEASE, STAGE III: ICD-10-CM

## 2020-11-12 DIAGNOSIS — H40.053 OCULAR HYPERTENSION, BILATERAL: ICD-10-CM

## 2020-11-12 DIAGNOSIS — Z78.0 POSTMENOPAUSAL STATUS: ICD-10-CM

## 2020-11-12 DIAGNOSIS — I70.0 ATHEROSCLEROSIS OF AORTIC ARCH: Primary | ICD-10-CM

## 2020-11-12 DIAGNOSIS — M19.90 OSTEOARTHRITIS, UNSPECIFIED OSTEOARTHRITIS TYPE, UNSPECIFIED SITE: ICD-10-CM

## 2020-11-12 DIAGNOSIS — E78.5 HYPERLIPIDEMIA WITH TARGET LDL LESS THAN 100: ICD-10-CM

## 2020-11-12 PROCEDURE — 1101F PR PT FALLS ASSESS DOC 0-1 FALLS W/OUT INJ PAST YR: ICD-10-PCS | Mod: CPTII,S$GLB,, | Performed by: INTERNAL MEDICINE

## 2020-11-12 PROCEDURE — 99999 PR PBB SHADOW E&M-EST. PATIENT-LVL IV: CPT | Mod: PBBFAC,,, | Performed by: INTERNAL MEDICINE

## 2020-11-12 PROCEDURE — 3074F SYST BP LT 130 MM HG: CPT | Mod: CPTII,S$GLB,, | Performed by: INTERNAL MEDICINE

## 2020-11-12 PROCEDURE — 99214 OFFICE O/P EST MOD 30 MIN: CPT | Mod: 25,S$GLB,, | Performed by: INTERNAL MEDICINE

## 2020-11-12 PROCEDURE — 3288F FALL RISK ASSESSMENT DOCD: CPT | Mod: CPTII,S$GLB,, | Performed by: INTERNAL MEDICINE

## 2020-11-12 PROCEDURE — 99214 PR OFFICE/OUTPT VISIT, EST, LEVL IV, 30-39 MIN: ICD-10-PCS | Mod: 25,S$GLB,, | Performed by: INTERNAL MEDICINE

## 2020-11-12 PROCEDURE — 3288F PR FALLS RISK ASSESSMENT DOCUMENTED: ICD-10-PCS | Mod: CPTII,S$GLB,, | Performed by: INTERNAL MEDICINE

## 2020-11-12 PROCEDURE — 1159F MED LIST DOCD IN RCRD: CPT | Mod: S$GLB,,, | Performed by: INTERNAL MEDICINE

## 2020-11-12 PROCEDURE — 93000 EKG 12-LEAD: ICD-10-PCS | Mod: S$GLB,,, | Performed by: INTERNAL MEDICINE

## 2020-11-12 PROCEDURE — 93000 ELECTROCARDIOGRAM COMPLETE: CPT | Mod: S$GLB,,, | Performed by: INTERNAL MEDICINE

## 2020-11-12 PROCEDURE — 1101F PT FALLS ASSESS-DOCD LE1/YR: CPT | Mod: CPTII,S$GLB,, | Performed by: INTERNAL MEDICINE

## 2020-11-12 PROCEDURE — 1159F PR MEDICATION LIST DOCUMENTED IN MEDICAL RECORD: ICD-10-PCS | Mod: S$GLB,,, | Performed by: INTERNAL MEDICINE

## 2020-11-12 PROCEDURE — 3074F PR MOST RECENT SYSTOLIC BLOOD PRESSURE < 130 MM HG: ICD-10-PCS | Mod: CPTII,S$GLB,, | Performed by: INTERNAL MEDICINE

## 2020-11-12 PROCEDURE — 1126F AMNT PAIN NOTED NONE PRSNT: CPT | Mod: S$GLB,,, | Performed by: INTERNAL MEDICINE

## 2020-11-12 PROCEDURE — 99999 PR PBB SHADOW E&M-EST. PATIENT-LVL IV: ICD-10-PCS | Mod: PBBFAC,,, | Performed by: INTERNAL MEDICINE

## 2020-11-12 PROCEDURE — 3078F PR MOST RECENT DIASTOLIC BLOOD PRESSURE < 80 MM HG: ICD-10-PCS | Mod: CPTII,S$GLB,, | Performed by: INTERNAL MEDICINE

## 2020-11-12 PROCEDURE — 3078F DIAST BP <80 MM HG: CPT | Mod: CPTII,S$GLB,, | Performed by: INTERNAL MEDICINE

## 2020-11-12 PROCEDURE — 1126F PR PAIN SEVERITY QUANTIFIED, NO PAIN PRESENT: ICD-10-PCS | Mod: S$GLB,,, | Performed by: INTERNAL MEDICINE

## 2020-11-12 NOTE — PROGRESS NOTES
CARDIOVASCULAR CONSULTATION    REASON FOR CONSULT:   Rachel Asif is a 80 y.o. female who presents for EVALUATION OF NEW ONSET PERIPHERAL EDEMA, dyspnea on exertion as well as chest tightness     HISTORY OF PRESENT ILLNESS:     Patient is a pleasant 79-year-old lady.  She states that over the past few months has started noticing significant bilateral peripheral edema.  Denies any orthopnea, PND but has dyspnea on exertion as well as chest tightness on exertion.  Denies any chest pains at rest.  BNP was checked and was found to be elevated.    Notes from January 2020    Patient here for follow-up.  After started Lasix her symptoms have improved considerably.  Swelling of feet has gone down as well as dyspnea on exertion has gone away as well as chest tightness on exertion has gone away.  Denies any orthopnea, PND.  Blood pressure is elevated in clinic today.  Will increase her Toprol-XL.    · Normal left ventricular systolic function. The estimated ejection fraction is 60%  · Mild concentric left ventricular hypertrophy.  · Indeterminate left ventricular diastolic function.  · Normal right ventricular systolic function.  · Moderate left atrial enlargement.  · Mild right atrial enlargement.  · Mild mitral regurgitation.  · Mild tricuspid regurgitation.  · Normal central venous pressure (3 mm Hg).  · The estimated PA systolic pressure is 38 mm Hg      The perfusion scan is free of evidence from myocardial ischemia or injury.    There is a  mild intensity fixed defect in the inferior wall of the left ventricle secondary to diaphragm attenuation.    Gated perfusion images showed an ejection fraction of 67 %    There is normal wall motion at rest and post stress.    The EKG portion of this study is negative for ischemia.    The patient reported no chest pain during the stress test.    There were no arrhythmias during stress.      Notes from February 2020:  Patient feeling much better.  Denies any chest pains  at rest on exertion, orthopnea, PND.  Blood pressure better controlled although still elevated.  States does not feel dizzy       March 2020:  Patient here for follow-up.  Blood pressure better controlled with hydralazine.  At home stays around 150-160 mm systolic.  Slightly better in the clinic today although still mildly elevated.  Denies any chest pain at rest on exertion, orthopnea, PND.  With better control and blood pressure her symptoms of dyspnea on exertion also getting better as per the patient.    Notes from July 2020:  Patient here for follow-up.  Denies any chest pains at rest on exertion, orthopnea, PND, swelling of feet.  Is doing fine.  Checks her blood pressure regularly at home and usually around 130 to 140 mm systolic as per the patient.    Notes from November 2020:  Patient here for follow-up.  Denies any chest pains at rest on exertion, orthopnea, PND.  EKG done in the clinic was personally reviewed and shows normal sinus rhythm, nonspecific T-wave abnormalities.    PAST MEDICAL HISTORY:     Past Medical History:   Diagnosis Date    Amblyopia     os    Anemia of other chronic disease     Atherosclerosis of aorta     noted on L-spine X-ray 4/14/2011    Atherosclerosis of aortic arch     - noted on CXR 5/2017    Chronic low back pain     followed by orthopaedics    DJD (degenerative joint disease)     Glaucoma     History of colonic polyps     History of vitamin D deficiency     Hyperlipidemia     Hypertension     Lumbar radiculopathy     Obesity     Obesity     OH (ocular hypertension)     Osteoarthritis     Osteoporosis, post-menopausal     currently on a drug holiday    PCO (posterior capsular opacification), right 2/27/2020    Postmenopausal status     Prediabetes     Secondary hyperparathyroidism of renal origin     Trochanteric bursitis        PAST SURGICAL HISTORY:     Past Surgical History:   Procedure Laterality Date    CATARACT EXTRACTION W/  INTRAOCULAR LENS  IMPLANT Bilateral     COLONOSCOPY N/A 8/10/2020    Procedure: COLONOSCOPY;  Surgeon: Bettina Gates MD;  Location: [facility];  Service: Endoscopy;  Laterality: N/A;       ALLERGIES AND MEDICATION:     Review of patient's allergies indicates:   Allergen Reactions    No known allergies         Medication List          Accurate as of November 12, 2020 10:18 AM. If you have any questions, ask your nurse or doctor.            CHANGE how you take these medications    INV hydrALAZINE 50 MG Tab  Commonly known as: APRESOLINE  Take 1 tablet (50 mg total) by mouth 3 (three) times daily.  What changed: when to take this        CONTINUE taking these medications    amLODIPine 10 MG tablet  Commonly known as: NORVASC  TAKE 1 TABLET(10 MG) BY MOUTH EVERY DAY     aspirin 81 MG Chew     atorvastatin 20 MG tablet  Commonly known as: LIPITOR  TAKE 1 TABLET(20 MG) BY MOUTH EVERY DAY     diclofenac sodium 1 % Gel  Commonly known as: VOLTAREN  Apply topically 3 (three) times daily as needed.     DULoxetine 60 MG capsule  Commonly known as: CYMBALTA  Take 1 capsule (60 mg total) by mouth once daily.     * ergocalciferol 50,000 unit Cap  Commonly known as: ERGOCALCIFEROL  Take 1 capsule (50,000 Units total) by mouth every 7 days.     * ergocalciferol 50,000 unit Cap  Commonly known as: ERGOCALCIFEROL  Take 1 capsule (50,000 Units total) by mouth every 30 days.  Start taking on: December 17, 2020     furosemide 20 MG tablet  Commonly known as: LASIX  TAKE 1 TABLET(20 MG) BY MOUTH TWICE DAILY     * metoprolol succinate 50 MG 24 hr tablet  Commonly known as: TOPROL-XL     * metoprolol succinate 100 MG 24 hr tablet  Commonly known as: TOPROL-XL  Take 1 tablet (100 mg total) by mouth once daily.     olmesartan-hydrochlorothiazide 40-25 mg per tablet  Commonly known as: BENICAR HCT  Take 1 tablet by mouth once daily.     pantoprazole 20 MG tablet  Commonly known as: PROTONIX  TAKE 1 TABLET BY MOUTH EVERY DAY TO PROTECT STOMACH DUE TO TAKING  NSAIDS     potassium chloride SA 20 MEQ tablet  Commonly known as: K-DUR,KLOR-CON     SYSTANE OPHT     traMADoL 50 mg tablet  Commonly known as: ULTRAM  Take 1 tablet (50 mg total) by mouth 2 (two) times daily as needed for Pain.     travoprost 0.004 % ophthalmic solution  Commonly known as: TRAVATAN Z  INSTILL 1 DROP IN BOTH EYES EVERY EVENING         * This list has 4 medication(s) that are the same as other medications prescribed for you. Read the directions carefully, and ask your doctor or other care provider to review them with you.                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[employer])   Social Needs    Financial resource strain: Not on file    Food insecurity     Worry: Not on file     Inability: Not on file    Transportation needs     Medical: Not on file     Non-medical: Not on file   Tobacco Use    Smoking status: Never Smoker    Smokeless tobacco: Never Used   Substance and Sexual Activity    Alcohol use: No    Drug use: Not on file    Sexual activity: Not on file   Lifestyle    Physical activity     Days per week: Not on file     Minutes per session: Not on file    Stress: Not on file   Relationships    Social connections     Talks on phone: Not on file     Gets together: Not on file     Attends Mosque service: Not on file     Active member of club or organization: Not on file     Attends meetings of clubs or organizations: Not on file     Relationship status: Not on file   Other Topics Concern    Not on file   Social History Narrative    Her  is . She is taking care of a sister who has brain damage from a car accident.       FAMILY HISTORY:     Family History   Problem Relation Age of Onset     "Cataracts Mother     Hypertension Mother     Other Mother         complications from splenectomy after fall    Cataracts Father     Hypertension Father     Hypertension Sister     Edema Sister     Hypertension Brother     Diabetes Brother     Heart disease Brother     Glaucoma Maternal Grandmother     Hypertension Sister     Other Sister         shingles    Lupus Sister     Lung cancer Sister     Hypertension Sister     Other Sister         brain damage from MVA    Hypertension Sister     Hypertension Brother     Heart disease Brother     Hypertension Brother     Heart attack Brother     Hypertension Brother     Hypertension Brother     No Known Problems Brother     No Known Problems Brother     Hypertension Brother     Heart disease Brother     Cancer Neg Hx     Amblyopia Neg Hx     Blindness Neg Hx     Retinal detachment Neg Hx     Strabismus Neg Hx     Stroke Neg Hx        REVIEW OF SYSTEMS:   Review of Systems   Constitution: Negative.   HENT: Negative.    Eyes: Negative.    Respiratory: Negative.    Endocrine: Negative.    Hematologic/Lymphatic: Negative.    Skin: Negative.    Musculoskeletal: Negative.    Gastrointestinal: Negative.    Genitourinary: Negative.    Neurological: Negative.    Psychiatric/Behavioral: Negative.    Allergic/Immunologic: Negative.        A 10 point review of systems was performed and all the pertinent positives have been mentioned. Rest of review of systems was negative.        PHYSICAL EXAM:     Vitals:    11/12/20 0953   BP: 124/64   Pulse: 74    Body mass index is 35.85 kg/m².  Weight: 88.9 kg (196 lb)   Height: 5' 2" (157.5 cm)     Physical Exam   Constitutional: She is oriented to person, place, and time. She appears well-developed and well-nourished.   HENT:   Head: Normocephalic.   Eyes: Pupils are equal, round, and reactive to light.   Neck: Normal range of motion. Neck supple.   Cardiovascular: Normal rate and regular rhythm. "   Pulmonary/Chest: Effort normal and breath sounds normal. She has no rales.   Abdominal: Soft. Normal appearance and bowel sounds are normal. There is no abdominal tenderness.   Musculoskeletal: Normal range of motion.   Neurological: She is alert and oriented to person, place, and time.   Skin: Skin is warm.   Psychiatric: She has a normal mood and affect.         DATA:     Laboratory:  CBC:  Recent Labs   Lab 08/20/19  0946 08/19/20  0720 09/17/20  1206   WBC 6.92 5.94 7.00   Hemoglobin 9.8 L 9.9 L 11.3 L   Hematocrit 32.7 L 33.7 L 36.7 L   Platelets 355 H 302 388 H       CHEMISTRIES:  Recent Labs   Lab 11/20/19  1022 08/19/20  0720 09/17/20  1206   Glucose 92 87 109   Sodium 143 143 138   Potassium 3.9 4.6 4.0   BUN 16 26 H 17   Creatinine 1.3 2.1 H 1.8 H   eGFR if  44.5 A 24.7 A 29.8 A   eGFR if non  38.6 A 21.4 A 25.8 A   Calcium 9.3 9.2 9.4       CARDIAC BIOMARKERS:        COAGS:        LIPIDS/LFTS:  Recent Labs   Lab 07/09/18  0751 08/20/19  0946 11/20/19  1022 08/19/20  0720   Cholesterol 188 169  --  221 H   Triglycerides 85 74  --  134   HDL 78 H 80 H  --  64   LDL Cholesterol 93.0 74.2  --  130.2   Non-HDL Cholesterol 110 89  --  157   AST 21 24 28 18   ALT 16 23 21 14       Hemoglobin A1C   Date Value Ref Range Status   08/19/2020 5.7 (H) 4.0 - 5.6 % Final     Comment:     ADA Screening Guidelines:  5.7-6.4%  Consistent with prediabetes  >or=6.5%  Consistent with diabetes  High levels of fetal hemoglobin interfere with the HbA1C  assay. Heterozygous hemoglobin variants (HbS, HgC, etc)do  not significantly interfere with this assay.   However, presence of multiple variants may affect accuracy.     08/20/2019 5.8 (H) 4.0 - 5.6 % Final     Comment:     ADA Screening Guidelines:  5.7-6.4%  Consistent with prediabetes  >or=6.5%  Consistent with diabetes  High levels of fetal hemoglobin interfere with the HbA1C  assay. Heterozygous hemoglobin variants (HbS, HgC, etc)do  not  significantly interfere with this assay.   However, presence of multiple variants may affect accuracy.     07/09/2018 6.0 (H) 4.0 - 5.6 % Final     Comment:     ADA Screening Guidelines:  5.7-6.4%  Consistent with prediabetes  >or=6.5%  Consistent with diabetes  High levels of fetal hemoglobin interfere with the HbA1C  assay. Heterozygous hemoglobin variants (HbS, HgC, etc)do  not significantly interfere with this assay.   However, presence of multiple variants may affect accuracy.         TSH  Recent Labs   Lab 12/19/19  0839   TSH 2.082       The ASCVD Risk score (Davonte ROCHE Jr., et al., 2013) failed to calculate for the following reasons:    The 2013 ASCVD risk score is only valid for ages 40 to 79           Cardiovascular Testing:    EKG: (personally reviewed tracing)  Normal sinus rhythm, low-voltage QRS, nonspecific ST changes.      ASSESSMENT AND PLAN     Patient Active Problem List   Diagnosis    Lumbar radiculopathy    Osteoarthritis    Benign hypertension with chronic kidney disease, stage III    Hyperlipidemia with target LDL less than 100    Chronic low back pain    Osteoporosis, post-menopausal    Anemia of other chronic disease    Postmenopausal status    DJD (degenerative joint disease)    History of colonic polyps    Severe obesity (BMI 35.0-39.9) with comorbidity    Prediabetes    Vitamin D deficiency    Atherosclerosis of aorta    Gastroesophageal reflux disease without esophagitis    Secondary hyperparathyroidism of renal origin    Pseudophakia - Both Eyes    Refractive error    Dry eye syndrome, bilateral    Ocular hypertension, bilateral    Amblyopia, left    Atherosclerosis of aortic arch    Vitreous detachment, bilateral    PCO (posterior capsular opacification), right       New onset dyspnea on exertion, chest tightness on exertion as well as bilateral peripheral edema.  Stress test did not reveal any significant ischemia.  2D echocardiogram showed normal left ventricular  systolic function.  Symptoms have improved considerably starting Lasix.  Continue Lasix.  Asked the patient to maintain a low-salt diet as well as weigh herself daily.  Weight goes more than 3 lb over 2-3 days, asked her to increase her Lasix until her weight comes back to baseline.    Hypertension:  Continue current therapy    Follow-up in 3-4 months.        Thank you very much for involving me in the care of your patient.  Please do not hesitate to contact me if there are any questions.      Joceline Colon MD, FACC, [facility]  Interventional Cardiologist, [facility].           This note was dictated with the help of speech recognition software.  There might be un-intended errors and/or substitutions.

## 2020-11-20 ENCOUNTER — OFFICE VISIT (OUTPATIENT)
Dept: UROLOGY | Facility: CLINIC | Age: 80
End: 2020-11-20
Payer: MEDICARE

## 2020-11-20 VITALS
BODY MASS INDEX: 36.07 KG/M2 | DIASTOLIC BLOOD PRESSURE: 80 MMHG | SYSTOLIC BLOOD PRESSURE: 132 MMHG | WEIGHT: 196 LBS | HEIGHT: 62 IN

## 2020-11-20 DIAGNOSIS — R31.29 MICROSCOPIC HEMATURIA: ICD-10-CM

## 2020-11-20 LAB
BILIRUB SERPL-MCNC: ABNORMAL MG/DL
BLOOD URINE, POC: ABNORMAL
COLOR, POC UA: YELLOW
GLUCOSE UR QL STRIP: ABNORMAL
KETONES UR QL STRIP: ABNORMAL
LEUKOCYTE ESTERASE URINE, POC: ABNORMAL
NITRITE, POC UA: ABNORMAL
PH, POC UA: 6
PROTEIN, POC: ABNORMAL
SPECIFIC GRAVITY, POC UA: 1015
UROBILINOGEN, POC UA: ABNORMAL

## 2020-11-20 PROCEDURE — 3288F PR FALLS RISK ASSESSMENT DOCUMENTED: ICD-10-PCS | Mod: CPTII,S$GLB,, | Performed by: NURSE PRACTITIONER

## 2020-11-20 PROCEDURE — 87086 URINE CULTURE/COLONY COUNT: CPT

## 2020-11-20 PROCEDURE — 1101F PT FALLS ASSESS-DOCD LE1/YR: CPT | Mod: CPTII,S$GLB,, | Performed by: NURSE PRACTITIONER

## 2020-11-20 PROCEDURE — 1159F MED LIST DOCD IN RCRD: CPT | Mod: S$GLB,,, | Performed by: NURSE PRACTITIONER

## 2020-11-20 PROCEDURE — 99203 PR OFFICE/OUTPT VISIT, NEW, LEVL III, 30-44 MIN: ICD-10-PCS | Mod: 25,S$GLB,, | Performed by: NURSE PRACTITIONER

## 2020-11-20 PROCEDURE — 3288F FALL RISK ASSESSMENT DOCD: CPT | Mod: CPTII,S$GLB,, | Performed by: NURSE PRACTITIONER

## 2020-11-20 PROCEDURE — 1126F AMNT PAIN NOTED NONE PRSNT: CPT | Mod: S$GLB,,, | Performed by: NURSE PRACTITIONER

## 2020-11-20 PROCEDURE — 1126F PR PAIN SEVERITY QUANTIFIED, NO PAIN PRESENT: ICD-10-PCS | Mod: S$GLB,,, | Performed by: NURSE PRACTITIONER

## 2020-11-20 PROCEDURE — 1101F PR PT FALLS ASSESS DOC 0-1 FALLS W/OUT INJ PAST YR: ICD-10-PCS | Mod: CPTII,S$GLB,, | Performed by: NURSE PRACTITIONER

## 2020-11-20 PROCEDURE — 81001 PR  URINALYSIS, AUTO, W/SCOPE: ICD-10-PCS | Mod: S$GLB,,, | Performed by: NURSE PRACTITIONER

## 2020-11-20 PROCEDURE — 3079F DIAST BP 80-89 MM HG: CPT | Mod: CPTII,S$GLB,, | Performed by: NURSE PRACTITIONER

## 2020-11-20 PROCEDURE — 81001 URINALYSIS AUTO W/SCOPE: CPT | Mod: S$GLB,,, | Performed by: NURSE PRACTITIONER

## 2020-11-20 PROCEDURE — 3079F PR MOST RECENT DIASTOLIC BLOOD PRESSURE 80-89 MM HG: ICD-10-PCS | Mod: CPTII,S$GLB,, | Performed by: NURSE PRACTITIONER

## 2020-11-20 PROCEDURE — 99203 OFFICE O/P NEW LOW 30 MIN: CPT | Mod: 25,S$GLB,, | Performed by: NURSE PRACTITIONER

## 2020-11-20 PROCEDURE — 99999 PR PBB SHADOW E&M-EST. PATIENT-LVL IV: ICD-10-PCS | Mod: PBBFAC,,, | Performed by: NURSE PRACTITIONER

## 2020-11-20 PROCEDURE — 99999 PR PBB SHADOW E&M-EST. PATIENT-LVL IV: CPT | Mod: PBBFAC,,, | Performed by: NURSE PRACTITIONER

## 2020-11-20 PROCEDURE — 3075F PR MOST RECENT SYSTOLIC BLOOD PRESS GE 130-139MM HG: ICD-10-PCS | Mod: CPTII,S$GLB,, | Performed by: NURSE PRACTITIONER

## 2020-11-20 PROCEDURE — 3075F SYST BP GE 130 - 139MM HG: CPT | Mod: CPTII,S$GLB,, | Performed by: NURSE PRACTITIONER

## 2020-11-20 PROCEDURE — 1159F PR MEDICATION LIST DOCUMENTED IN MEDICAL RECORD: ICD-10-PCS | Mod: S$GLB,,, | Performed by: NURSE PRACTITIONER

## 2020-11-20 NOTE — PROGRESS NOTES
Subjective:       Patient ID: Rachel Asif is a 80 y.o. female who is a new patient was referred by Kamini Pressley NP for hematuria    Chief Complaint:   Chief Complaint   Patient presents with    Other     Pt. is here but doesn't really know why she's here .. she states she got a call and was told she had an appt..        Hematuria  Patient complains of microscopic hematuria. Never gross hematuria. Onset of hematuria was 2 months ago and was unknown in onset. There is not a history of nephrolithiasis. There is not a history of urologic trauma. Other urologic symptoms include none. Patient admits to history of no risk factors for cancer. Patient denies history of chronic Kirk catheter,  surgeries, occupational exposure, sexually transmitted diseases, tobacco use, trauma and urolithiasis. Prior workup has been DESIRAE'S, [place]. She is a retired nursing assistant.  Denies family hx of  malignancy    Micro UA 9/17/20--7 RBCs, 28 WBCs    YULIA 9/18/20-- no renal masses, no stones, no hydronephrosis. Chronic medical renal disease    PMHx: HTN, Prediabetes, GERD, HLD, OA    ACTIVE MEDICAL ISSUES:  Patient Active Problem List   Diagnosis    Lumbar radiculopathy    Osteoarthritis    Benign hypertension with chronic kidney disease, stage III    Hyperlipidemia with target LDL less than 100    Chronic low back pain    Osteoporosis, post-menopausal    Anemia of other chronic disease    Postmenopausal status    DJD (degenerative joint disease)    History of colonic polyps    Severe obesity (BMI 35.0-39.9) with comorbidity    Prediabetes    Vitamin D deficiency    Atherosclerosis of aorta    Gastroesophageal reflux disease without esophagitis    Secondary hyperparathyroidism of renal origin    Pseudophakia - Both Eyes    Refractive error    Dry eye syndrome, bilateral    Ocular hypertension, bilateral    Amblyopia, left    Atherosclerosis of aortic arch    Vitreous detachment, bilateral    PCO  (posterior capsular opacification), right       PAST MEDICAL HISTORY  Past Medical History:   Diagnosis Date    Amblyopia     os    Anemia of other chronic disease     Atherosclerosis of aorta     noted on L-spine X-ray 4/14/2011    Atherosclerosis of aortic arch     - noted on CXR 5/2017    Chronic low back pain     followed by orthopaedics    DJD (degenerative joint disease)     Glaucoma     History of colonic polyps     History of vitamin D deficiency     Hyperlipidemia     Hypertension     Lumbar radiculopathy     Obesity     Obesity     OH (ocular hypertension)     Osteoarthritis     Osteoporosis, post-menopausal     currently on a drug holiday    PCO (posterior capsular opacification), right 2/27/2020    Postmenopausal status     Prediabetes     Secondary hyperparathyroidism of renal origin     Trochanteric bursitis        PAST SURGICAL HISTORY:  Past Surgical History:   Procedure Laterality Date    CATARACT EXTRACTION W/  INTRAOCULAR LENS IMPLANT Bilateral     COLONOSCOPY N/A 8/10/2020    Procedure: COLONOSCOPY;  Surgeon: Bettina Gates MD;  Location: [facility];  Service: Endoscopy;  Laterality: N/A;       SOCIAL HISTORY:  Social History     Tobacco Use    Smoking status: Never Smoker    Smokeless tobacco: Never Used   Substance Use Topics    Alcohol use: No    Drug use: Not on file       FAMILY HISTORY:  Family History   Problem Relation Age of Onset    Cataracts Mother     Hypertension Mother     Other Mother         complications from splenectomy after fall    Cataracts Father     Hypertension Father     Hypertension Sister     Edema Sister     Hypertension Brother     Diabetes Brother     Heart disease Brother     Glaucoma Maternal Grandmother     Hypertension Sister     Other Sister         shingles    Lupus Sister     Lung cancer Sister     Hypertension Sister     Other Sister         brain damage from MVA    Hypertension Sister     Hypertension Brother      Heart disease Brother     Hypertension Brother     Heart attack Brother     Hypertension Brother     Hypertension Brother     No Known Problems Brother     No Known Problems Brother     Hypertension Brother     Heart disease Brother     Cancer Neg Hx     Amblyopia Neg Hx     Blindness Neg Hx     Retinal detachment Neg Hx     Strabismus Neg Hx     Stroke Neg Hx        ALLERGIES AND MEDICATIONS: updated and reviewed.  Review of patient's allergies indicates:   Allergen Reactions    No known allergies      Current Outpatient Medications   Medication Sig    amLODIPine (NORVASC) 10 MG tablet TAKE 1 TABLET(10 MG) BY MOUTH EVERY DAY    aspirin 81 MG chewable tablet Every day    atorvastatin (LIPITOR) 20 MG tablet TAKE 1 TABLET(20 MG) BY MOUTH EVERY DAY    diclofenac sodium (VOLTAREN) 1 % Gel Apply topically 3 (three) times daily as needed.    DULoxetine (CYMBALTA) 60 MG capsule Take 1 capsule (60 mg total) by mouth once daily.    ergocalciferol (ERGOCALCIFEROL) 50,000 unit Cap Take 1 capsule (50,000 Units total) by mouth every 7 days.    [START ON 12/17/2020] ergocalciferol (ERGOCALCIFEROL) 50,000 unit Cap Take 1 capsule (50,000 Units total) by mouth every 30 days.    furosemide (LASIX) 20 MG tablet TAKE 1 TABLET(20 MG) BY MOUTH TWICE DAILY    INV hydrALAZINE (APRESOLINE) 50 MG Tab Take 1 tablet (50 mg total) by mouth 3 (three) times daily. (Patient taking differently: Take 50 mg by mouth every 12 (twelve) hours. )    metoprolol succinate (TOPROL-XL) 100 MG 24 hr tablet Take 1 tablet (100 mg total) by mouth once daily.    metoprolol succinate (TOPROL-XL) 50 MG 24 hr tablet Take 50 mg by mouth once daily.     olmesartan-hydrochlorothiazide (BENICAR HCT) 40-25 mg per tablet Take 1 tablet by mouth once daily.    pantoprazole (PROTONIX) 20 MG tablet TAKE 1 TABLET BY MOUTH EVERY DAY TO PROTECT STOMACH DUE TO TAKING NSAIDS    potassium chloride SA (K-DUR,KLOR-CON) 20 MEQ tablet Take 20 mEq by mouth  "once daily.     PROPYLENE GLYCOL/ (SYSTANE OPHT) Weekly PRN    travoprost (TRAVATAN Z) 0.004 % ophthalmic solution INSTILL 1 DROP IN BOTH EYES EVERY EVENING    traMADoL (ULTRAM) 50 mg tablet Take 1 tablet (50 mg total) by mouth 2 (two) times daily as needed for Pain.     No current facility-administered medications for this visit.        Review of Systems   Constitutional: Negative for activity change, chills, fatigue, fever and unexpected weight change.   Eyes: Negative for discharge, redness and visual disturbance.   Respiratory: Negative for cough, shortness of breath and wheezing.    Cardiovascular: Negative for chest pain and leg swelling.   Gastrointestinal: Negative for abdominal distention, abdominal pain, constipation, diarrhea, nausea and vomiting.   Genitourinary: Positive for hematuria (microscopic). Negative for decreased urine volume, difficulty urinating, dysuria, flank pain, frequency, pelvic pain, urgency, vaginal bleeding and vaginal discharge.   Musculoskeletal: Negative for arthralgias, joint swelling and myalgias.   Skin: Negative for color change and rash.   Neurological: Negative for dizziness and light-headedness.   Psychiatric/Behavioral: Negative for behavioral problems and confusion. The patient is not nervous/anxious.        Objective:      Vitals:    11/20/20 0845   BP: 132/80   Weight: 88.9 kg (196 lb)   Height: 5' 2" (1.575 m)        Physical Exam  Constitutional:       Appearance: She is well-developed.   HENT:      Head: Normocephalic and atraumatic.      Nose: Nose normal.   Eyes:      General:         Right eye: No discharge.         Left eye: No discharge.      Conjunctiva/sclera: Conjunctivae normal.   Neck:      Musculoskeletal: Normal range of motion and neck supple.      Thyroid: No thyromegaly.      Trachea: No tracheal deviation.   Cardiovascular:      Rate and Rhythm: Normal rate and regular rhythm.   Pulmonary:      Effort: Pulmonary effort is normal. No " respiratory distress.      Breath sounds: No wheezing.   Abdominal:      General: There is no distension.      Palpations: Abdomen is soft.      Tenderness: There is no abdominal tenderness.      Hernia: No hernia is present.   Genitourinary:     Comments: Patient declined exam  Musculoskeletal: Normal range of motion.   Skin:     General: Skin is warm and dry.      Findings: No erythema or rash.   Neurological:      Mental Status: She is alert and oriented to person, place, and time.   Psychiatric:         Behavior: Behavior normal.         Judgment: Judgment normal.         Urine dipstick shows trace LE.      Narrative & Impression    EXAMINATION:  US RETROPERITONEAL COMPLETE     CLINICAL HISTORY:  Chronic kidney disease, stage 3 (moderate)     TECHNIQUE:  Ultrasound of the kidneys and urinary bladder was performed including color flow and Doppler evaluation of the kidneys.     COMPARISON:  No priors.     FINDINGS:  Right kidney: The right kidney measures 8.4 cm in length.  No cortical thinning.  No loss of corticomedullary distinction. Resistive index measures 0.82.  No mass. No renal stone. No hydronephrosis.     Left kidney: The left kidney measures 8.4 cm in length.  No cortical thinning. No loss of corticomedullary distinction. Resistive index measures 0.81.  No mass. No renal stone. No hydronephrosis.     Splenic RI is 0.76.     The bladder is partially distended at the time of scanning and has an unremarkable appearance.     Impression:     1. Kidneys are small in size bilaterally with increased resistive indices within the intrarenal segmental arteries consistent with chronic medical renal disease with no evidence of solid masses, nephrolithiasis or hydronephrosis.     Electronically signed by resident: Kat Sue  Date:                                            09/18/2020  Time:                                           11:13     Electronically signed by: Jame Ochoa MD  Date:                                             09/18/2020  Time:                                           11:38    Encounter            Reviewed with patient    Assessment:       1. Microscopic hematuria          Plan:       1. Microscopic hematuria   - Discussed etiology and workup of hematuria   -No risk factors  -YULIA--no renal masses, no stones. Essentially normal  -Patient given option to proceed with office cystoscopy to complete hematuria work up vs hold on this procedure due UA today being without hematuria and she has no risk factors  -She would like to proceed with office cystoscopy  - Ambulatory referral/consult to Urology  - POCT urinalysis, dipstick or tablet reag  - Cystoscopy; Future  - Urine culture            Follow up for office cystoscopy.

## 2020-11-20 NOTE — LETTER
November 20, 2020      Kamini Pressley, CLARISSA  [address]  [facility] [address]           [facility]  [address]  [place] [address]  Phone: [phone]  Fax: [phone]          Patient: Rachel Asif   MR Number: [id number]   YOB: 1940   Date of Visit: 11/20/2020       Dear Kamini Pressley:    Thank you for referring Rachel Asif to me for evaluation. Attached you will find relevant portions of my assessment and plan of care.    If you have questions, please do not hesitate to call me. I look forward to following Rachel Asif along with you.    Sincerely,    Mishel Elaine NP    Enclosure  CC:  No Recipients    If you would like to receive this communication electronically, please contact [email or web address] or [phone] to request more information on [employer] access.    For providers and/or their staff who would like to refer a patient to [facility], please contact us through our one-stop-shop provider referral line, [facility], at [phone].    If you feel you have received this communication in error or would no longer like to receive these types of communications, please e-mail [email or web address]

## 2020-11-22 LAB — BACTERIA UR CULT: NORMAL

## 2020-11-30 ENCOUNTER — OFFICE VISIT (OUTPATIENT)
Dept: PHYSICAL MEDICINE AND REHAB | Facility: CLINIC | Age: 80
End: 2020-11-30
Payer: MEDICARE

## 2020-11-30 VITALS
HEART RATE: 72 BPM | DIASTOLIC BLOOD PRESSURE: 69 MMHG | BODY MASS INDEX: 36.54 KG/M2 | WEIGHT: 206.25 LBS | HEIGHT: 63 IN | SYSTOLIC BLOOD PRESSURE: 144 MMHG

## 2020-11-30 DIAGNOSIS — M47.22 OSTEOARTHRITIS OF SPINE WITH RADICULOPATHY, CERVICAL REGION: ICD-10-CM

## 2020-11-30 DIAGNOSIS — M17.0 PRIMARY OSTEOARTHRITIS OF BOTH KNEES: ICD-10-CM

## 2020-11-30 DIAGNOSIS — E66.9 OBESITY (BMI 30.0-34.9): ICD-10-CM

## 2020-11-30 DIAGNOSIS — M47.816 LUMBAR FACET ARTHROPATHY: ICD-10-CM

## 2020-11-30 DIAGNOSIS — M54.2 CHRONIC NECK PAIN: ICD-10-CM

## 2020-11-30 DIAGNOSIS — M54.41 CHRONIC MIDLINE LOW BACK PAIN WITH RIGHT-SIDED SCIATICA: Primary | ICD-10-CM

## 2020-11-30 DIAGNOSIS — G89.29 CHRONIC NECK PAIN: ICD-10-CM

## 2020-11-30 DIAGNOSIS — G89.29 CHRONIC MIDLINE LOW BACK PAIN WITH RIGHT-SIDED SCIATICA: Primary | ICD-10-CM

## 2020-11-30 PROCEDURE — 3078F PR MOST RECENT DIASTOLIC BLOOD PRESSURE < 80 MM HG: ICD-10-PCS | Mod: CPTII,S$GLB,, | Performed by: PHYSICAL MEDICINE & REHABILITATION

## 2020-11-30 PROCEDURE — 99999 PR PBB SHADOW E&M-EST. PATIENT-LVL II: CPT | Mod: PBBFAC,,, | Performed by: PHYSICAL MEDICINE & REHABILITATION

## 2020-11-30 PROCEDURE — 1159F MED LIST DOCD IN RCRD: CPT | Mod: S$GLB,,, | Performed by: PHYSICAL MEDICINE & REHABILITATION

## 2020-11-30 PROCEDURE — 99999 PR PBB SHADOW E&M-EST. PATIENT-LVL II: ICD-10-PCS | Mod: PBBFAC,,, | Performed by: PHYSICAL MEDICINE & REHABILITATION

## 2020-11-30 PROCEDURE — 3077F PR MOST RECENT SYSTOLIC BLOOD PRESSURE >= 140 MM HG: ICD-10-PCS | Mod: CPTII,S$GLB,, | Performed by: PHYSICAL MEDICINE & REHABILITATION

## 2020-11-30 PROCEDURE — 3077F SYST BP >= 140 MM HG: CPT | Mod: CPTII,S$GLB,, | Performed by: PHYSICAL MEDICINE & REHABILITATION

## 2020-11-30 PROCEDURE — 3078F DIAST BP <80 MM HG: CPT | Mod: CPTII,S$GLB,, | Performed by: PHYSICAL MEDICINE & REHABILITATION

## 2020-11-30 PROCEDURE — 99214 OFFICE O/P EST MOD 30 MIN: CPT | Mod: S$GLB,,, | Performed by: PHYSICAL MEDICINE & REHABILITATION

## 2020-11-30 PROCEDURE — 1159F PR MEDICATION LIST DOCUMENTED IN MEDICAL RECORD: ICD-10-PCS | Mod: S$GLB,,, | Performed by: PHYSICAL MEDICINE & REHABILITATION

## 2020-11-30 PROCEDURE — 1126F AMNT PAIN NOTED NONE PRSNT: CPT | Mod: S$GLB,,, | Performed by: PHYSICAL MEDICINE & REHABILITATION

## 2020-11-30 PROCEDURE — 99214 PR OFFICE/OUTPT VISIT, EST, LEVL IV, 30-39 MIN: ICD-10-PCS | Mod: S$GLB,,, | Performed by: PHYSICAL MEDICINE & REHABILITATION

## 2020-11-30 PROCEDURE — 1126F PR PAIN SEVERITY QUANTIFIED, NO PAIN PRESENT: ICD-10-PCS | Mod: S$GLB,,, | Performed by: PHYSICAL MEDICINE & REHABILITATION

## 2020-11-30 NOTE — PROGRESS NOTES
Subjective:       Patient ID: Rachel Asif is a 80 y.o. female.    Chief Complaint: No chief complaint on file.    HPI    HISTORY OF PRESENT ILLNESS:  Ms. Asif is an 80-year-old black female with hypertension who is followed up in the [facility] Medicine Clinic for chronic low back pain with lumbar radiculopathy, chronic neck pain with history of cervical radiculopathy and OA of the knees.  Her last visit to the clinic was on 7/13/20.  She was maintained on meloxicam, Cymbalta, p.r.n. tramadol and diclofenac gel.    The patient is coming to the clinic for followup.  Her back pain has been  under good control.  It is an intermittent aching pain in the lumbar spine and across her back.  It is usually localized.  Her maximum pain is 4/10 and minimum 1/10.  Today, it is 1/10.  The patient denies any lower extremity weakness or numbness.  She denies any bowel or bladder incontinence.    Her neck pain has been stable.  It is an intermittent aching and stiffness pain in the cervical spine.  It is localized.  Her maximum pain is 5-6/10 and minimum 2/10.  Today, it is 2/10.  The patient denies any upper extremity weakness or hand numbness.      Her bilateral knee pain has been under good control.     She is currently taking:  - meloxicam 15 mg p.o. once per day couple days per week  - Cymbalta 60 mg p.o. once per day  - tramadol 50 mg p.r.n., usually once but occasionally twice per day  - diclofenac gel intermittently for her knees.       Past Medical History:   Diagnosis Date    Amblyopia     os    Anemia of other chronic disease     Atherosclerosis of aorta     noted on L-spine X-ray 4/14/2011    Atherosclerosis of aortic arch     - noted on CXR 5/2017    Chronic low back pain     followed by orthopaedics    DJD (degenerative joint disease)     Glaucoma     History of colonic polyps     History of vitamin D deficiency     Hyperlipidemia     Hypertension     Lumbar radiculopathy     Obesity     Obesity      OH (ocular hypertension)     Osteoarthritis     Osteoporosis, post-menopausal     currently on a drug holiday    PCO (posterior capsular opacification), right 2/27/2020    Postmenopausal status     Prediabetes     Secondary hyperparathyroidism of renal origin     Trochanteric bursitis          Review of Systems   Constitutional: Negative for chills and fever.   Eyes: Positive for visual disturbance.   Respiratory: Negative for shortness of breath.    Cardiovascular: Negative for chest pain.   Gastrointestinal: Negative for blood in stool, constipation, nausea and vomiting.   Genitourinary: Negative for difficulty urinating.   Musculoskeletal: Positive for arthralgias, back pain and neck pain. Negative for gait problem.   Neurological: Negative for dizziness and headaches.   Psychiatric/Behavioral: Negative for behavioral problems and sleep disturbance.       Objective:      Physical Exam  Vitals signs reviewed.   Constitutional:       Appearance: She is well-developed.   HENT:      Head: Normocephalic and atraumatic.   Neck:      Musculoskeletal: Normal range of motion.      Comments: -ve tenderness.  Musculoskeletal:      Comments: BUE:  ROM:full.  Strength:    RUE: 5/5 at shoulder abduction, 5 elbow flexion, 5 elbow extension, 5 hand .   LUE: 5/5 at shoulder abduction, 5 elbow flexion, 5 elbow extension, 5 hand .  Sensation to pinprick:   RUE: intact.   LUE: intact.  DTR:    RUE: +1 biceps, +1 triceps.   LUE:  +1 biceps, +1 triceps.        BLE:  ROM:full.  Mild bilateral knee crepitus.  Strength:    RLE: 5/5 at hip flexion, 5 knee extension, 5 ankle DF, 5 PF.   LLE: 5/5 at hip flexion, 5 knee extension, 5 ankle DF, 5 PF.  Sensation to pinprick:     RLE: intact.      LLE: intact.   DTR:     RLE: +1 knee, +1 ankle.    LLE: +1 knee, +1 ankle.  Clonus:    Rt ankle: -ve.    Lt ankle: -ve.  SLR (sitting):      RLE: -ve.      LLE: -ve.     -ve tenderness over lumbar spine.    Gait: some waddling.      Skin:     General: Skin is warm.   Neurological:      Mental Status: She is alert.   Psychiatric:         Behavior: Behavior normal.             Assessment:       1. Chronic midline low back pain with right-sided sciatica    2. Lumbar facet arthropathy    3. Chronic neck pain    4. Osteoarthritis of spine with radiculopathy, cervical region    5. Primary osteoarthritis of both knees (moderate)    6. Obesity (BMI 30.0-34.9)        Plan:         - Continue meloxicam (MOBIC) 15 MG tablet; Take 1 tablet (15 mg total) by mouth daily as needed for Pain.  May take more consistently daily when pain flares up.  - Continue duloxetine (CYMBALTA) 60 MG capsule; Take 1 capsule (60 mg total) by mouth once daily.  - Continue tramadol (ULTRAM) 50 mg tablet; Take 1 tablet (50 mg total) by mouth twice daily as needed for Pain.  - Continue diclofenac sodium (VOLTAREN) 1 % Gel; Apply topically 3 (three) times daily.  - Regular home exercise program was encouraged.  - The patient was commended on her weight loss (206.2 lbs today vs 213.8 lbs last visit 7/13/20) and encouraged to continue with weight loss efforts  - Follow up in about 6 months (around 5/30/2021).    This was a 25 minute visit, more than 50% of which was spent counseling the patient about the diagnosis and the treatment plan.    This note was partly generated with [employer] voice recognition software. I apologize for any possible typographical errors.

## 2020-12-14 NOTE — PROGRESS NOTES
"    Subjective:       Patient ID: Rachel Asif is a 80 y.o. AA female who presents for new evaluation of  CKD    HPI     Last seen by me Sept. 2020.    Patient presents for f/u CKD.  Baseline creatinine of 1.3-1.4 since 2015. No labs available from Nov 2019 to August 2019, when patient was found to have sCr of 2.1 mg/dL, which improved to 1.8. No recent labs.    Per PMR note in July 2020, had been taking meloxicam 15 mg daily daily since June 2019; reports takes it intermittently now.    Home BPs: 130-140/40-70s    Cardiology initially started lasix in December 2019 when patient presented with new peripheral edema, JOHNSON, and chest tightness; lasix improved these symptoms. Lasix increased and added PRN dosing in July; patient says she had increased swelling around this time (also says it was the same time she started amlodipine). LVEF in December 2019 was 60%.    Significant hx includes HLD, atherosclerosis of aorta, anemia of chronic disease, prediabetes, vitamin D deficiency, hyperparathyroidism, GERD, OA, HTN recently diagnosed.      The patient denies NSAID use or new antibiotics, recreational drugs, recent episode of dehydration, diarrhea, nausea or vomiting, acute illness, hospitalization or exposure to IV radiocontrast. On PPI daily.     Significant family hx includes: HTN, edema, lupus (sister), DM, lung cancer, heart disease, MI; no known kidney disease in family    Last renal US: Sept 2020, reviewed. Small kidneys bilaterally.    Review of Systems   Respiratory: Positive for shortness of breath (with walking distances).    Cardiovascular: Negative for chest pain, palpitations and leg swelling.   Gastrointestinal: Negative for diarrhea, nausea and vomiting.   Genitourinary: Negative for difficulty urinating, dysuria, flank pain, frequency, hematuria and urgency.   Neurological: Negative for dizziness.       Objective:       Blood pressure 132/60, pulse 70, height 5' 3" (1.6 m), weight 89.8 kg (197 lb " 15.6 oz), SpO2 98 %.  Physical Exam  Constitutional:       General: She is not in acute distress.     Appearance: She is well-developed. She is obese. She is not ill-appearing or diaphoretic.   Neck:      Musculoskeletal: Neck supple.   Cardiovascular:      Rate and Rhythm: Normal rate and regular rhythm.      Heart sounds: Normal heart sounds. No murmur. No friction rub. No gallop.    Pulmonary:      Effort: Pulmonary effort is normal. No respiratory distress.      Breath sounds: Normal breath sounds. No wheezing or rales.   Abdominal:      Palpations: Abdomen is soft.      Tenderness: There is no right CVA tenderness or left CVA tenderness.   Musculoskeletal:      Right lower leg: Edema (mild) present.      Left lower leg: Edema (mild) present.   Skin:     General: Skin is warm and dry.      Findings: No lesion or rash.   Neurological:      Mental Status: She is alert and oriented to person, place, and time.   Psychiatric:         Mood and Affect: Mood normal.         Behavior: Behavior normal.         Thought Content: Thought content normal.         Judgment: Judgment normal.           Lab Results   Component Value Date    CREATININE 1.8 (H) 09/17/2020     Prot/Creat Ratio, Urine   Date Value Ref Range Status   09/17/2020 0.11 0.00 - 0.20 Final     Lab Results   Component Value Date     09/17/2020    K 4.0 09/17/2020    CO2 27 09/17/2020     09/17/2020     Lab Results   Component Value Date    .0 (H) 08/19/2020    CALCIUM 9.4 09/17/2020    PHOS 3.8 09/17/2020     Lab Results   Component Value Date    HGB 11.3 (L) 09/17/2020    WBC 7.00 09/17/2020    HCT 36.7 (L) 09/17/2020      Lab Results   Component Value Date    HGBA1C 5.7 (H) 08/19/2020     (H) 09/17/2020    BUN 17 09/17/2020     Lab Results   Component Value Date    LDLCALC 130.2 08/19/2020         Assessment:       1. Stage 3 chronic kidney disease, unspecified whether stage 3a or 3b CKD    2. Hypertension, unspecified type    3.  Edema, unspecified type    4. Hyperparathyroidism    5. Low vitamin D level        Plan:   CKD stage 3B c eGFR 40-44 mL/min c superimposed CHRISTOPHER - CKD likely secondary to  age-related nephron loss and NSAID use, also possibly atherosclerotic disease (atherosclerosis noted to aorta). CHRISTOPHER could be caused by combination of long-term daily NSAID use and diuresis (need repeat labs to determine if CHRISTOPHER resolved or if this is new baseline).    Educated patient to control BP, BG, remain well-hydrated, and avoid NSAIDs to prevent progression of CKD.   - No paraproteins or monoclonal peaks in Sept 2020.  UPCR Previously non-proteinuric. Needs repeat On olmesartan.   Acid-base WNL at last check. On K supplement.   Renal osteodystrophy Ca okay. PTH mildly elevated. Has been on ergocalciferol weekly x 12 weeks.    Anemia Hgb okay at last check.   DM Pre-diabetic.   Lipid Management Defer.   ESRD planning Anticipatory guidance provided about timing of dialysis. Start discussions and planning when eGFR is about 20 mL/min; most patients start dialysis between 5-10 mL/min.     HTN - WNL today on furosemide 20 mg every other day, hydralazine 50 mg BID PRN, metoprolol succinate 50 mg, olmesartan-HCTZ 40-25    Edema - mild; has reduced lasix use to every other day     All questions patient had were answered.  Asked if further questions. None. F/u in clinic in 3 mos with labs and urine prior to next visit or sooner if needed.  ER for emergency concerns.    Summary of Plan:  1. CBC, RFP, UA, UPCR, PTH, Vitamin D  2 avoid NSAID/ bactrim/ IV contrast/ gadolinium/ aminoglycoside where possible  3. RTC in 3 mos

## 2020-12-16 ENCOUNTER — OFFICE VISIT (OUTPATIENT)
Dept: NEPHROLOGY | Facility: CLINIC | Age: 80
End: 2020-12-16
Payer: MEDICARE

## 2020-12-16 ENCOUNTER — LAB VISIT (OUTPATIENT)
Dept: LAB | Facility: HOSPITAL | Age: 80
End: 2020-12-16
Payer: MEDICARE

## 2020-12-16 VITALS
OXYGEN SATURATION: 98 % | HEART RATE: 70 BPM | WEIGHT: 198 LBS | SYSTOLIC BLOOD PRESSURE: 132 MMHG | HEIGHT: 63 IN | BODY MASS INDEX: 35.08 KG/M2 | DIASTOLIC BLOOD PRESSURE: 60 MMHG

## 2020-12-16 DIAGNOSIS — R60.9 EDEMA, UNSPECIFIED TYPE: ICD-10-CM

## 2020-12-16 DIAGNOSIS — I10 HYPERTENSION, UNSPECIFIED TYPE: ICD-10-CM

## 2020-12-16 DIAGNOSIS — E21.3 HYPERPARATHYROIDISM: ICD-10-CM

## 2020-12-16 DIAGNOSIS — N18.30 STAGE 3 CHRONIC KIDNEY DISEASE, UNSPECIFIED WHETHER STAGE 3A OR 3B CKD: ICD-10-CM

## 2020-12-16 DIAGNOSIS — N18.30 STAGE 3 CHRONIC KIDNEY DISEASE, UNSPECIFIED WHETHER STAGE 3A OR 3B CKD: Primary | ICD-10-CM

## 2020-12-16 DIAGNOSIS — R79.89 LOW VITAMIN D LEVEL: ICD-10-CM

## 2020-12-16 LAB
25(OH)D3+25(OH)D2 SERPL-MCNC: 17 NG/ML (ref 30–96)
ALBUMIN SERPL BCP-MCNC: 3.8 G/DL (ref 3.5–5.2)
ANION GAP SERPL CALC-SCNC: 12 MMOL/L (ref 8–16)
BUN SERPL-MCNC: 14 MG/DL (ref 8–23)
CALCIUM SERPL-MCNC: 9.3 MG/DL (ref 8.7–10.5)
CHLORIDE SERPL-SCNC: 105 MMOL/L (ref 95–110)
CO2 SERPL-SCNC: 23 MMOL/L (ref 23–29)
CREAT SERPL-MCNC: 1.5 MG/DL (ref 0.5–1.4)
EST. GFR  (AFRICAN AMERICAN): 37.1 ML/MIN/1.73 M^2
EST. GFR  (NON AFRICAN AMERICAN): 32.2 ML/MIN/1.73 M^2
GLUCOSE SERPL-MCNC: 100 MG/DL (ref 70–110)
PHOSPHATE SERPL-MCNC: 3 MG/DL (ref 2.7–4.5)
POTASSIUM SERPL-SCNC: 3.9 MMOL/L (ref 3.5–5.1)
PTH-INTACT SERPL-MCNC: 208 PG/ML (ref 9–77)
SODIUM SERPL-SCNC: 140 MMOL/L (ref 136–145)

## 2020-12-16 PROCEDURE — 3288F FALL RISK ASSESSMENT DOCD: CPT | Mod: CPTII,S$GLB,, | Performed by: NURSE PRACTITIONER

## 2020-12-16 PROCEDURE — 82306 VITAMIN D 25 HYDROXY: CPT

## 2020-12-16 PROCEDURE — 99999 PR PBB SHADOW E&M-EST. PATIENT-LVL V: ICD-10-PCS | Mod: PBBFAC,,, | Performed by: NURSE PRACTITIONER

## 2020-12-16 PROCEDURE — 3078F PR MOST RECENT DIASTOLIC BLOOD PRESSURE < 80 MM HG: ICD-10-PCS | Mod: CPTII,S$GLB,, | Performed by: NURSE PRACTITIONER

## 2020-12-16 PROCEDURE — 1126F PR PAIN SEVERITY QUANTIFIED, NO PAIN PRESENT: ICD-10-PCS | Mod: S$GLB,,, | Performed by: NURSE PRACTITIONER

## 2020-12-16 PROCEDURE — 36415 COLL VENOUS BLD VENIPUNCTURE: CPT

## 2020-12-16 PROCEDURE — 3288F PR FALLS RISK ASSESSMENT DOCUMENTED: ICD-10-PCS | Mod: CPTII,S$GLB,, | Performed by: NURSE PRACTITIONER

## 2020-12-16 PROCEDURE — 80069 RENAL FUNCTION PANEL: CPT

## 2020-12-16 PROCEDURE — 1159F PR MEDICATION LIST DOCUMENTED IN MEDICAL RECORD: ICD-10-PCS | Mod: S$GLB,,, | Performed by: NURSE PRACTITIONER

## 2020-12-16 PROCEDURE — 83970 ASSAY OF PARATHORMONE: CPT

## 2020-12-16 PROCEDURE — 1101F PR PT FALLS ASSESS DOC 0-1 FALLS W/OUT INJ PAST YR: ICD-10-PCS | Mod: CPTII,S$GLB,, | Performed by: NURSE PRACTITIONER

## 2020-12-16 PROCEDURE — 3078F DIAST BP <80 MM HG: CPT | Mod: CPTII,S$GLB,, | Performed by: NURSE PRACTITIONER

## 2020-12-16 PROCEDURE — 99214 PR OFFICE/OUTPT VISIT, EST, LEVL IV, 30-39 MIN: ICD-10-PCS | Mod: S$GLB,,, | Performed by: NURSE PRACTITIONER

## 2020-12-16 PROCEDURE — 99999 PR PBB SHADOW E&M-EST. PATIENT-LVL V: CPT | Mod: PBBFAC,,, | Performed by: NURSE PRACTITIONER

## 2020-12-16 PROCEDURE — 1159F MED LIST DOCD IN RCRD: CPT | Mod: S$GLB,,, | Performed by: NURSE PRACTITIONER

## 2020-12-16 PROCEDURE — 99214 OFFICE O/P EST MOD 30 MIN: CPT | Mod: S$GLB,,, | Performed by: NURSE PRACTITIONER

## 2020-12-16 PROCEDURE — 3075F SYST BP GE 130 - 139MM HG: CPT | Mod: CPTII,S$GLB,, | Performed by: NURSE PRACTITIONER

## 2020-12-16 PROCEDURE — 1101F PT FALLS ASSESS-DOCD LE1/YR: CPT | Mod: CPTII,S$GLB,, | Performed by: NURSE PRACTITIONER

## 2020-12-16 PROCEDURE — 3075F PR MOST RECENT SYSTOLIC BLOOD PRESS GE 130-139MM HG: ICD-10-PCS | Mod: CPTII,S$GLB,, | Performed by: NURSE PRACTITIONER

## 2020-12-16 PROCEDURE — 1126F AMNT PAIN NOTED NONE PRSNT: CPT | Mod: S$GLB,,, | Performed by: NURSE PRACTITIONER

## 2020-12-16 NOTE — PATIENT INSTRUCTIONS
Continue low sodium diet.  Avoid NSAID (ibuprofen, advil, motrin, aleve, naprosyn, naproxen, Stanback, Goody's Powder). Tylenol is okay.  Avoid bactrim/ IV contrast/ gadolinium/ aminoglycoside where possible.  Avoid herbal supplements.  Stay hydrated.  Return to clinic in 3 months with labs (bloodwork and urine) or sooner if needed.  Go to the ER with any emergency concerns.

## 2020-12-17 ENCOUNTER — TELEPHONE (OUTPATIENT)
Dept: NEPHROLOGY | Facility: CLINIC | Age: 80
End: 2020-12-17

## 2020-12-17 DIAGNOSIS — E55.9 VITAMIN D DEFICIENCY: ICD-10-CM

## 2020-12-17 RX ORDER — ERGOCALCIFEROL 1.25 MG/1
50000 CAPSULE ORAL
Qty: 12 CAPSULE | Refills: 0 | Status: SHIPPED | OUTPATIENT
Start: 2020-12-17 | End: 2021-03-17

## 2020-12-17 NOTE — PROGRESS NOTES
Pls inform pt that kidney function continues to improve. Now working 37%.  Vitamin D is still low. I want her to continue her weekly vitamin D. I'll update the prescription. No protien in the urine which is good, but she did have some bacteria.  Pls ask her to notify us if she is having symptoms of UTI (and review them with her).  If no symptoms, she does not need treatment.

## 2020-12-18 ENCOUNTER — PROCEDURE VISIT (OUTPATIENT)
Dept: UROLOGY | Facility: CLINIC | Age: 80
End: 2020-12-18
Payer: MEDICARE

## 2020-12-18 DIAGNOSIS — R31.29 MICROSCOPIC HEMATURIA: ICD-10-CM

## 2020-12-18 PROCEDURE — 52000 CYSTOURETHROSCOPY: CPT | Mod: S$GLB,,, | Performed by: STUDENT IN AN ORGANIZED HEALTH CARE EDUCATION/TRAINING PROGRAM

## 2020-12-18 PROCEDURE — 52000 CYSTOSCOPY: ICD-10-PCS | Mod: S$GLB,,, | Performed by: STUDENT IN AN ORGANIZED HEALTH CARE EDUCATION/TRAINING PROGRAM

## 2020-12-18 NOTE — PATIENT INSTRUCTIONS
Discussed with patient that Renal US is not the most sensitive study for work up for hematuria, with her age and CKD  She is not able to receive a contrasted CT scan. Patient is okay with the limitations of Renal US. She is not interested in a major surgery if a mass was identified.

## 2020-12-18 NOTE — PROCEDURES
"Cystoscopy    Date/Time: 12/18/2020 9:30 AM  Performed by: Isabel Hearn MD  Authorized by: Mishel Elaine NP     Consent Done?:  Yes (Written)  Time out: Immediately prior to procedure a "time out" was called to verify the correct patient, procedure, equipment, support staff and site/side marked as required.    Indications comment:  Microscopic hematuria  Position:  Dorsal lithotomy  Anesthesia:  Intraurethral instillation  Patient sedated?: No      Scope type:  Flexible cystoscope  External exam performed: Mild rectocele noted.    Digital exam performed: Yes (No tendernes or mass palpable)    Urethra normal: Yes  Bladder neck normal: Bladder neck normal   Bladder normal: Yes      Patient tolerance:  Patient tolerated the procedure well with no immediate complications     No evidence of mass in bladder or urethra.   No gross blood present.     Discussed with patient that Renal US is not the most sensitive study for work up for hematuria, with her age and CKD  She is not able to receive a contrasted CT scan. Patient is okay with the limitations of Renal US. She is not interested in a major surgery if a mass was identified.     RTC 6 months with Referring provider      "

## 2020-12-28 ENCOUNTER — TELEPHONE (OUTPATIENT)
Dept: PHYSICAL MEDICINE AND REHAB | Facility: CLINIC | Age: 80
End: 2020-12-28

## 2020-12-28 NOTE — TELEPHONE ENCOUNTER
----- Message from Leigh Summers sent at 12/28/2020  9:12 AM CST -----  Regarding: appt  Contact: pt@ [phone] or   Pt calling to schedule appt, says she was told 5/29/21, not showing in Epic, was unable to schedule appt. Please call.

## 2020-12-31 ENCOUNTER — TELEPHONE (OUTPATIENT)
Dept: NEPHROLOGY | Facility: CLINIC | Age: 80
End: 2020-12-31

## 2021-01-11 ENCOUNTER — OFFICE VISIT (OUTPATIENT)
Dept: FAMILY MEDICINE | Facility: CLINIC | Age: 81
End: 2021-01-11
Payer: MEDICARE

## 2021-01-11 VITALS
HEIGHT: 63 IN | BODY MASS INDEX: 33.96 KG/M2 | OXYGEN SATURATION: 98 % | TEMPERATURE: 98 F | SYSTOLIC BLOOD PRESSURE: 124 MMHG | HEART RATE: 62 BPM | DIASTOLIC BLOOD PRESSURE: 44 MMHG | WEIGHT: 191.69 LBS

## 2021-01-11 DIAGNOSIS — M54.2 CHRONIC NECK PAIN: ICD-10-CM

## 2021-01-11 DIAGNOSIS — R73.03 PREDIABETES: ICD-10-CM

## 2021-01-11 DIAGNOSIS — M81.0 OSTEOPOROSIS, POST-MENOPAUSAL: ICD-10-CM

## 2021-01-11 DIAGNOSIS — D63.8 ANEMIA OF CHRONIC DISEASE: ICD-10-CM

## 2021-01-11 DIAGNOSIS — E66.01 SEVERE OBESITY (BMI 35.0-39.9) WITH COMORBIDITY: ICD-10-CM

## 2021-01-11 DIAGNOSIS — Z00.00 ROUTINE MEDICAL EXAM: Primary | ICD-10-CM

## 2021-01-11 DIAGNOSIS — E78.5 HYPERLIPIDEMIA WITH TARGET LDL LESS THAN 100: ICD-10-CM

## 2021-01-11 DIAGNOSIS — I12.9 BENIGN HYPERTENSION WITH CHRONIC KIDNEY DISEASE, STAGE IV: ICD-10-CM

## 2021-01-11 DIAGNOSIS — K21.9 GASTROESOPHAGEAL REFLUX DISEASE WITHOUT ESOPHAGITIS: ICD-10-CM

## 2021-01-11 DIAGNOSIS — M54.41 CHRONIC MIDLINE LOW BACK PAIN WITH RIGHT-SIDED SCIATICA: ICD-10-CM

## 2021-01-11 DIAGNOSIS — Z71.89 ADVANCED DIRECTIVES, COUNSELING/DISCUSSION: ICD-10-CM

## 2021-01-11 DIAGNOSIS — N25.81 SECONDARY HYPERPARATHYROIDISM OF RENAL ORIGIN: ICD-10-CM

## 2021-01-11 DIAGNOSIS — G89.29 CHRONIC MIDLINE LOW BACK PAIN WITH RIGHT-SIDED SCIATICA: ICD-10-CM

## 2021-01-11 DIAGNOSIS — N18.4 BENIGN HYPERTENSION WITH CHRONIC KIDNEY DISEASE, STAGE IV: ICD-10-CM

## 2021-01-11 DIAGNOSIS — Z23 FLU VACCINE NEED: ICD-10-CM

## 2021-01-11 DIAGNOSIS — G89.29 CHRONIC NECK PAIN: ICD-10-CM

## 2021-01-11 DIAGNOSIS — I70.0 ATHEROSCLEROSIS OF AORTA: ICD-10-CM

## 2021-01-11 PROCEDURE — 99999 PR PBB SHADOW E&M-EST. PATIENT-LVL IV: ICD-10-PCS | Mod: PBBFAC,,, | Performed by: INTERNAL MEDICINE

## 2021-01-11 PROCEDURE — 90694 VACC AIIV4 NO PRSRV 0.5ML IM: CPT | Mod: S$GLB,,, | Performed by: INTERNAL MEDICINE

## 2021-01-11 PROCEDURE — 90694 FLU VACCINE - QUADRIVALENT - ADJUVANTED: ICD-10-PCS | Mod: S$GLB,,, | Performed by: INTERNAL MEDICINE

## 2021-01-11 PROCEDURE — 3078F PR MOST RECENT DIASTOLIC BLOOD PRESSURE < 80 MM HG: ICD-10-PCS | Mod: CPTII,S$GLB,, | Performed by: INTERNAL MEDICINE

## 2021-01-11 PROCEDURE — 99397 PR PREVENTIVE VISIT,EST,65 & OVER: ICD-10-PCS | Mod: 25,S$GLB,, | Performed by: INTERNAL MEDICINE

## 2021-01-11 PROCEDURE — 3288F PR FALLS RISK ASSESSMENT DOCUMENTED: ICD-10-PCS | Mod: CPTII,S$GLB,, | Performed by: INTERNAL MEDICINE

## 2021-01-11 PROCEDURE — 1126F AMNT PAIN NOTED NONE PRSNT: CPT | Mod: S$GLB,,, | Performed by: INTERNAL MEDICINE

## 2021-01-11 PROCEDURE — G0008 FLU VACCINE - QUADRIVALENT - ADJUVANTED: ICD-10-PCS | Mod: S$GLB,,, | Performed by: INTERNAL MEDICINE

## 2021-01-11 PROCEDURE — 99999 PR PBB SHADOW E&M-EST. PATIENT-LVL IV: CPT | Mod: PBBFAC,,, | Performed by: INTERNAL MEDICINE

## 2021-01-11 PROCEDURE — 99397 PER PM REEVAL EST PAT 65+ YR: CPT | Mod: 25,S$GLB,, | Performed by: INTERNAL MEDICINE

## 2021-01-11 PROCEDURE — 1126F PR PAIN SEVERITY QUANTIFIED, NO PAIN PRESENT: ICD-10-PCS | Mod: S$GLB,,, | Performed by: INTERNAL MEDICINE

## 2021-01-11 PROCEDURE — 3074F SYST BP LT 130 MM HG: CPT | Mod: CPTII,S$GLB,, | Performed by: INTERNAL MEDICINE

## 2021-01-11 PROCEDURE — G0008 ADMIN INFLUENZA VIRUS VAC: HCPCS | Mod: S$GLB,,, | Performed by: INTERNAL MEDICINE

## 2021-01-11 PROCEDURE — 3078F DIAST BP <80 MM HG: CPT | Mod: CPTII,S$GLB,, | Performed by: INTERNAL MEDICINE

## 2021-01-11 PROCEDURE — 3074F PR MOST RECENT SYSTOLIC BLOOD PRESSURE < 130 MM HG: ICD-10-PCS | Mod: CPTII,S$GLB,, | Performed by: INTERNAL MEDICINE

## 2021-01-11 PROCEDURE — 1101F PT FALLS ASSESS-DOCD LE1/YR: CPT | Mod: CPTII,S$GLB,, | Performed by: INTERNAL MEDICINE

## 2021-01-11 PROCEDURE — 3288F FALL RISK ASSESSMENT DOCD: CPT | Mod: CPTII,S$GLB,, | Performed by: INTERNAL MEDICINE

## 2021-01-11 PROCEDURE — 1101F PR PT FALLS ASSESS DOC 0-1 FALLS W/OUT INJ PAST YR: ICD-10-PCS | Mod: CPTII,S$GLB,, | Performed by: INTERNAL MEDICINE

## 2021-01-11 RX ORDER — POTASSIUM CHLORIDE 20 MEQ/1
20 TABLET, EXTENDED RELEASE ORAL DAILY
Qty: 90 TABLET | Refills: 1 | Status: SHIPPED | OUTPATIENT
Start: 2021-01-11 | End: 2021-04-07

## 2021-01-11 RX ORDER — FUROSEMIDE 20 MG/1
TABLET ORAL
Qty: 180 TABLET | Refills: 1 | Status: SHIPPED | OUTPATIENT
Start: 2021-01-11 | End: 2021-11-01

## 2021-01-11 RX ORDER — PANTOPRAZOLE SODIUM 20 MG/1
TABLET, DELAYED RELEASE ORAL
Qty: 90 TABLET | Refills: 0 | Status: SHIPPED | OUTPATIENT
Start: 2021-01-11 | End: 2021-04-07

## 2021-01-11 RX ORDER — OLMESARTAN MEDOXOMIL AND HYDROCHLOROTHIAZIDE 40/25 40; 25 MG/1; MG/1
1 TABLET ORAL DAILY
Qty: 90 TABLET | Refills: 1 | Status: SHIPPED | OUTPATIENT
Start: 2021-01-11 | End: 2022-02-09

## 2021-01-11 RX ORDER — AMLODIPINE BESYLATE 10 MG/1
TABLET ORAL
Qty: 90 TABLET | Refills: 1 | Status: SHIPPED | OUTPATIENT
Start: 2021-01-11 | End: 2022-03-11

## 2021-01-11 RX ORDER — ATORVASTATIN CALCIUM 20 MG/1
TABLET, FILM COATED ORAL
Qty: 90 TABLET | Refills: 1 | Status: SHIPPED | OUTPATIENT
Start: 2021-01-11 | End: 2022-05-18 | Stop reason: SDUPTHER

## 2021-01-11 RX ORDER — METOPROLOL SUCCINATE 50 MG/1
50 TABLET, EXTENDED RELEASE ORAL DAILY
Qty: 90 TABLET | Refills: 1 | Status: SHIPPED | OUTPATIENT
Start: 2021-01-11 | End: 2021-11-01

## 2021-01-11 RX ORDER — TRAMADOL HYDROCHLORIDE 50 MG/1
50 TABLET ORAL 2 TIMES DAILY PRN
Qty: 60 TABLET | Refills: 3 | Status: SHIPPED | OUTPATIENT
Start: 2021-01-11 | End: 2021-11-01

## 2021-02-13 ENCOUNTER — IMMUNIZATION (OUTPATIENT)
Dept: PHARMACY | Facility: CLINIC | Age: 81
End: 2021-02-13
Payer: MEDICARE

## 2021-02-13 DIAGNOSIS — Z23 NEED FOR VACCINATION: Primary | ICD-10-CM

## 2021-03-13 ENCOUNTER — IMMUNIZATION (OUTPATIENT)
Dept: PHARMACY | Facility: CLINIC | Age: 81
End: 2021-03-13
Payer: MEDICARE

## 2021-03-13 DIAGNOSIS — Z23 NEED FOR VACCINATION: Primary | ICD-10-CM

## 2021-03-16 ENCOUNTER — LAB VISIT (OUTPATIENT)
Dept: LAB | Facility: HOSPITAL | Age: 81
End: 2021-03-16
Payer: MEDICARE

## 2021-03-16 ENCOUNTER — OFFICE VISIT (OUTPATIENT)
Dept: NEPHROLOGY | Facility: CLINIC | Age: 81
End: 2021-03-16
Payer: MEDICARE

## 2021-03-16 VITALS
SYSTOLIC BLOOD PRESSURE: 130 MMHG | HEIGHT: 63 IN | HEART RATE: 70 BPM | BODY MASS INDEX: 34.25 KG/M2 | DIASTOLIC BLOOD PRESSURE: 70 MMHG | WEIGHT: 193.31 LBS | OXYGEN SATURATION: 99 %

## 2021-03-16 DIAGNOSIS — E55.9 VITAMIN D DEFICIENCY: ICD-10-CM

## 2021-03-16 DIAGNOSIS — E21.3 HYPERPARATHYROIDISM: ICD-10-CM

## 2021-03-16 DIAGNOSIS — I10 HYPERTENSION, UNSPECIFIED TYPE: ICD-10-CM

## 2021-03-16 DIAGNOSIS — R73.03 PREDIABETES: ICD-10-CM

## 2021-03-16 DIAGNOSIS — D64.9 ANEMIA, UNSPECIFIED TYPE: ICD-10-CM

## 2021-03-16 DIAGNOSIS — N18.32 STAGE 3B CHRONIC KIDNEY DISEASE: Primary | ICD-10-CM

## 2021-03-16 DIAGNOSIS — N18.32 STAGE 3B CHRONIC KIDNEY DISEASE: ICD-10-CM

## 2021-03-16 LAB
25(OH)D3+25(OH)D2 SERPL-MCNC: 20 NG/ML (ref 30–96)
ALBUMIN SERPL BCP-MCNC: 3.8 G/DL (ref 3.5–5.2)
ANION GAP SERPL CALC-SCNC: 8 MMOL/L (ref 8–16)
BASOPHILS # BLD AUTO: 0.03 K/UL (ref 0–0.2)
BASOPHILS NFR BLD: 0.5 % (ref 0–1.9)
BUN SERPL-MCNC: 12 MG/DL (ref 8–23)
CALCIUM SERPL-MCNC: 9.4 MG/DL (ref 8.7–10.5)
CHLORIDE SERPL-SCNC: 106 MMOL/L (ref 95–110)
CO2 SERPL-SCNC: 27 MMOL/L (ref 23–29)
CREAT SERPL-MCNC: 1.4 MG/DL (ref 0.5–1.4)
DIFFERENTIAL METHOD: ABNORMAL
EOSINOPHIL # BLD AUTO: 0.2 K/UL (ref 0–0.5)
EOSINOPHIL NFR BLD: 3.1 % (ref 0–8)
ERYTHROCYTE [DISTWIDTH] IN BLOOD BY AUTOMATED COUNT: 14.3 % (ref 11.5–14.5)
EST. GFR  (AFRICAN AMERICAN): 40.4 ML/MIN/1.73 M^2
EST. GFR  (NON AFRICAN AMERICAN): 35 ML/MIN/1.73 M^2
GLUCOSE SERPL-MCNC: 102 MG/DL (ref 70–110)
HCT VFR BLD AUTO: 36.2 % (ref 37–48.5)
HGB BLD-MCNC: 11.1 G/DL (ref 12–16)
IMM GRANULOCYTES # BLD AUTO: 0 K/UL (ref 0–0.04)
IMM GRANULOCYTES NFR BLD AUTO: 0 % (ref 0–0.5)
LYMPHOCYTES # BLD AUTO: 1 K/UL (ref 1–4.8)
LYMPHOCYTES NFR BLD: 17.2 % (ref 18–48)
MCH RBC QN AUTO: 28.6 PG (ref 27–31)
MCHC RBC AUTO-ENTMCNC: 30.7 G/DL (ref 32–36)
MCV RBC AUTO: 93 FL (ref 82–98)
MONOCYTES # BLD AUTO: 0.8 K/UL (ref 0.3–1)
MONOCYTES NFR BLD: 14.5 % (ref 4–15)
NEUTROPHILS # BLD AUTO: 3.6 K/UL (ref 1.8–7.7)
NEUTROPHILS NFR BLD: 64.7 % (ref 38–73)
NRBC BLD-RTO: 0 /100 WBC
PHOSPHATE SERPL-MCNC: 3.1 MG/DL (ref 2.7–4.5)
PLATELET # BLD AUTO: 316 K/UL (ref 150–350)
PMV BLD AUTO: 10.1 FL (ref 9.2–12.9)
POTASSIUM SERPL-SCNC: 4.2 MMOL/L (ref 3.5–5.1)
PTH-INTACT SERPL-MCNC: 109 PG/ML (ref 9–77)
RBC # BLD AUTO: 3.88 M/UL (ref 4–5.4)
SODIUM SERPL-SCNC: 141 MMOL/L (ref 136–145)
WBC # BLD AUTO: 5.51 K/UL (ref 3.9–12.7)

## 2021-03-16 PROCEDURE — 1101F PT FALLS ASSESS-DOCD LE1/YR: CPT | Mod: CPTII,S$GLB,, | Performed by: NURSE PRACTITIONER

## 2021-03-16 PROCEDURE — 99214 OFFICE O/P EST MOD 30 MIN: CPT | Mod: S$GLB,,, | Performed by: NURSE PRACTITIONER

## 2021-03-16 PROCEDURE — 3288F PR FALLS RISK ASSESSMENT DOCUMENTED: ICD-10-PCS | Mod: CPTII,S$GLB,, | Performed by: NURSE PRACTITIONER

## 2021-03-16 PROCEDURE — 36415 COLL VENOUS BLD VENIPUNCTURE: CPT | Performed by: NURSE PRACTITIONER

## 2021-03-16 PROCEDURE — 80069 RENAL FUNCTION PANEL: CPT | Performed by: NURSE PRACTITIONER

## 2021-03-16 PROCEDURE — 83970 ASSAY OF PARATHORMONE: CPT | Performed by: NURSE PRACTITIONER

## 2021-03-16 PROCEDURE — 99214 PR OFFICE/OUTPT VISIT, EST, LEVL IV, 30-39 MIN: ICD-10-PCS | Mod: S$GLB,,, | Performed by: NURSE PRACTITIONER

## 2021-03-16 PROCEDURE — 1159F PR MEDICATION LIST DOCUMENTED IN MEDICAL RECORD: ICD-10-PCS | Mod: S$GLB,,, | Performed by: NURSE PRACTITIONER

## 2021-03-16 PROCEDURE — 1101F PR PT FALLS ASSESS DOC 0-1 FALLS W/OUT INJ PAST YR: ICD-10-PCS | Mod: CPTII,S$GLB,, | Performed by: NURSE PRACTITIONER

## 2021-03-16 PROCEDURE — 3288F FALL RISK ASSESSMENT DOCD: CPT | Mod: CPTII,S$GLB,, | Performed by: NURSE PRACTITIONER

## 2021-03-16 PROCEDURE — 85025 COMPLETE CBC W/AUTO DIFF WBC: CPT | Performed by: NURSE PRACTITIONER

## 2021-03-16 PROCEDURE — 1126F PR PAIN SEVERITY QUANTIFIED, NO PAIN PRESENT: ICD-10-PCS | Mod: S$GLB,,, | Performed by: NURSE PRACTITIONER

## 2021-03-16 PROCEDURE — 82306 VITAMIN D 25 HYDROXY: CPT | Performed by: NURSE PRACTITIONER

## 2021-03-16 PROCEDURE — 3078F DIAST BP <80 MM HG: CPT | Mod: CPTII,S$GLB,, | Performed by: NURSE PRACTITIONER

## 2021-03-16 PROCEDURE — 99999 PR PBB SHADOW E&M-EST. PATIENT-LVL IV: ICD-10-PCS | Mod: PBBFAC,,, | Performed by: NURSE PRACTITIONER

## 2021-03-16 PROCEDURE — 3075F PR MOST RECENT SYSTOLIC BLOOD PRESS GE 130-139MM HG: ICD-10-PCS | Mod: CPTII,S$GLB,, | Performed by: NURSE PRACTITIONER

## 2021-03-16 PROCEDURE — 99999 PR PBB SHADOW E&M-EST. PATIENT-LVL IV: CPT | Mod: PBBFAC,,, | Performed by: NURSE PRACTITIONER

## 2021-03-16 PROCEDURE — 1126F AMNT PAIN NOTED NONE PRSNT: CPT | Mod: S$GLB,,, | Performed by: NURSE PRACTITIONER

## 2021-03-16 PROCEDURE — 3075F SYST BP GE 130 - 139MM HG: CPT | Mod: CPTII,S$GLB,, | Performed by: NURSE PRACTITIONER

## 2021-03-16 PROCEDURE — 3078F PR MOST RECENT DIASTOLIC BLOOD PRESSURE < 80 MM HG: ICD-10-PCS | Mod: CPTII,S$GLB,, | Performed by: NURSE PRACTITIONER

## 2021-03-16 PROCEDURE — 1159F MED LIST DOCD IN RCRD: CPT | Mod: S$GLB,,, | Performed by: NURSE PRACTITIONER

## 2021-03-17 ENCOUNTER — TELEPHONE (OUTPATIENT)
Dept: NEPHROLOGY | Facility: CLINIC | Age: 81
End: 2021-03-17

## 2021-05-10 DIAGNOSIS — H40.053 BILATERAL OCULAR HYPERTENSION: Primary | ICD-10-CM

## 2021-05-18 ENCOUNTER — OFFICE VISIT (OUTPATIENT)
Dept: PHYSICAL MEDICINE AND REHAB | Facility: CLINIC | Age: 81
End: 2021-05-18
Payer: MEDICARE

## 2021-05-18 VITALS
WEIGHT: 187.38 LBS | HEART RATE: 130 BPM | HEIGHT: 62 IN | BODY MASS INDEX: 34.48 KG/M2 | DIASTOLIC BLOOD PRESSURE: 100 MMHG | SYSTOLIC BLOOD PRESSURE: 209 MMHG

## 2021-05-18 DIAGNOSIS — M47.22 OSTEOARTHRITIS OF SPINE WITH RADICULOPATHY, CERVICAL REGION: ICD-10-CM

## 2021-05-18 DIAGNOSIS — M54.2 CHRONIC NECK PAIN: ICD-10-CM

## 2021-05-18 DIAGNOSIS — E66.9 OBESITY (BMI 30.0-34.9): ICD-10-CM

## 2021-05-18 DIAGNOSIS — G89.29 CHRONIC PAIN OF RIGHT KNEE: ICD-10-CM

## 2021-05-18 DIAGNOSIS — M17.0 PRIMARY OSTEOARTHRITIS OF BOTH KNEES: ICD-10-CM

## 2021-05-18 DIAGNOSIS — M47.816 LUMBAR FACET ARTHROPATHY: ICD-10-CM

## 2021-05-18 DIAGNOSIS — M54.41 CHRONIC MIDLINE LOW BACK PAIN WITH RIGHT-SIDED SCIATICA: Primary | ICD-10-CM

## 2021-05-18 DIAGNOSIS — G89.29 CHRONIC MIDLINE LOW BACK PAIN WITH RIGHT-SIDED SCIATICA: Primary | ICD-10-CM

## 2021-05-18 DIAGNOSIS — M25.561 CHRONIC PAIN OF RIGHT KNEE: ICD-10-CM

## 2021-05-18 DIAGNOSIS — G89.29 CHRONIC NECK PAIN: ICD-10-CM

## 2021-05-18 PROCEDURE — 99999 PR PBB SHADOW E&M-EST. PATIENT-LVL II: ICD-10-PCS | Mod: PBBFAC,,, | Performed by: PHYSICAL MEDICINE & REHABILITATION

## 2021-05-18 PROCEDURE — 1159F MED LIST DOCD IN RCRD: CPT | Mod: S$GLB,,, | Performed by: PHYSICAL MEDICINE & REHABILITATION

## 2021-05-18 PROCEDURE — 99214 PR OFFICE/OUTPT VISIT, EST, LEVL IV, 30-39 MIN: ICD-10-PCS | Mod: S$GLB,,, | Performed by: PHYSICAL MEDICINE & REHABILITATION

## 2021-05-18 PROCEDURE — 99999 PR PBB SHADOW E&M-EST. PATIENT-LVL II: CPT | Mod: PBBFAC,,, | Performed by: PHYSICAL MEDICINE & REHABILITATION

## 2021-05-18 PROCEDURE — 1126F PR PAIN SEVERITY QUANTIFIED, NO PAIN PRESENT: ICD-10-PCS | Mod: S$GLB,,, | Performed by: PHYSICAL MEDICINE & REHABILITATION

## 2021-05-18 PROCEDURE — 1126F AMNT PAIN NOTED NONE PRSNT: CPT | Mod: S$GLB,,, | Performed by: PHYSICAL MEDICINE & REHABILITATION

## 2021-05-18 PROCEDURE — 99214 OFFICE O/P EST MOD 30 MIN: CPT | Mod: S$GLB,,, | Performed by: PHYSICAL MEDICINE & REHABILITATION

## 2021-05-18 PROCEDURE — 1159F PR MEDICATION LIST DOCUMENTED IN MEDICAL RECORD: ICD-10-PCS | Mod: S$GLB,,, | Performed by: PHYSICAL MEDICINE & REHABILITATION

## 2021-05-18 RX ORDER — DICLOFENAC SODIUM 10 MG/G
GEL TOPICAL 3 TIMES DAILY PRN
Qty: 5 TUBE | Refills: 2 | Status: SHIPPED | OUTPATIENT
Start: 2021-05-18 | End: 2022-05-16 | Stop reason: SDUPTHER

## 2021-05-18 RX ORDER — DULOXETIN HYDROCHLORIDE 60 MG/1
60 CAPSULE, DELAYED RELEASE ORAL DAILY
Qty: 90 CAPSULE | Refills: 1 | Status: SHIPPED | OUTPATIENT
Start: 2021-05-18 | End: 2021-11-18 | Stop reason: SDUPTHER

## 2021-05-31 ENCOUNTER — EXTERNAL CHRONIC CARE MANAGEMENT (OUTPATIENT)
Dept: PRIMARY CARE CLINIC | Facility: CLINIC | Age: 81
End: 2021-05-31
Payer: MEDICARE

## 2021-05-31 PROCEDURE — 99490 PR CHRONIC CARE MGMT, 1ST 20 MIN: ICD-10-PCS | Mod: S$GLB,,, | Performed by: INTERNAL MEDICINE

## 2021-05-31 PROCEDURE — 99490 CHRNC CARE MGMT STAFF 1ST 20: CPT | Mod: S$GLB,,, | Performed by: INTERNAL MEDICINE

## 2021-06-07 ENCOUNTER — TELEPHONE (OUTPATIENT)
Dept: FAMILY MEDICINE | Facility: CLINIC | Age: 81
End: 2021-06-07

## 2021-06-18 ENCOUNTER — OFFICE VISIT (OUTPATIENT)
Dept: UROLOGY | Facility: CLINIC | Age: 81
End: 2021-06-18
Payer: MEDICARE

## 2021-06-18 VITALS — BODY MASS INDEX: 35.57 KG/M2 | WEIGHT: 193.31 LBS | HEIGHT: 62 IN

## 2021-06-18 DIAGNOSIS — R31.29 MICROSCOPIC HEMATURIA: Primary | ICD-10-CM

## 2021-06-18 LAB
BILIRUB SERPL-MCNC: NORMAL MG/DL
BLOOD URINE, POC: NORMAL
COLOR, POC UA: YELLOW
GLUCOSE UR QL STRIP: NORMAL
KETONES UR QL STRIP: NORMAL
LEUKOCYTE ESTERASE URINE, POC: NORMAL
NITRITE, POC UA: NORMAL
PH, POC UA: 5
PROTEIN, POC: NORMAL
SPECIFIC GRAVITY, POC UA: 1000
UROBILINOGEN, POC UA: NORMAL

## 2021-06-18 PROCEDURE — 1159F MED LIST DOCD IN RCRD: CPT | Mod: S$GLB,,, | Performed by: NURSE PRACTITIONER

## 2021-06-18 PROCEDURE — 1159F PR MEDICATION LIST DOCUMENTED IN MEDICAL RECORD: ICD-10-PCS | Mod: S$GLB,,, | Performed by: NURSE PRACTITIONER

## 2021-06-18 PROCEDURE — 99999 PR PBB SHADOW E&M-EST. PATIENT-LVL III: ICD-10-PCS | Mod: PBBFAC,,, | Performed by: NURSE PRACTITIONER

## 2021-06-18 PROCEDURE — 99213 OFFICE O/P EST LOW 20 MIN: CPT | Mod: 25,S$GLB,, | Performed by: NURSE PRACTITIONER

## 2021-06-18 PROCEDURE — 1126F PR PAIN SEVERITY QUANTIFIED, NO PAIN PRESENT: ICD-10-PCS | Mod: S$GLB,,, | Performed by: NURSE PRACTITIONER

## 2021-06-18 PROCEDURE — 3288F FALL RISK ASSESSMENT DOCD: CPT | Mod: CPTII,S$GLB,, | Performed by: NURSE PRACTITIONER

## 2021-06-18 PROCEDURE — 3288F PR FALLS RISK ASSESSMENT DOCUMENTED: ICD-10-PCS | Mod: CPTII,S$GLB,, | Performed by: NURSE PRACTITIONER

## 2021-06-18 PROCEDURE — 1101F PR PT FALLS ASSESS DOC 0-1 FALLS W/OUT INJ PAST YR: ICD-10-PCS | Mod: CPTII,S$GLB,, | Performed by: NURSE PRACTITIONER

## 2021-06-18 PROCEDURE — 1126F AMNT PAIN NOTED NONE PRSNT: CPT | Mod: S$GLB,,, | Performed by: NURSE PRACTITIONER

## 2021-06-18 PROCEDURE — 99999 PR PBB SHADOW E&M-EST. PATIENT-LVL III: CPT | Mod: PBBFAC,,, | Performed by: NURSE PRACTITIONER

## 2021-06-18 PROCEDURE — 81001 POCT URINALYSIS, DIPSTICK OR TABLET REAGENT, AUTOMATED, WITH MICROSCOP: ICD-10-PCS | Mod: S$GLB,,, | Performed by: NURSE PRACTITIONER

## 2021-06-18 PROCEDURE — 1101F PT FALLS ASSESS-DOCD LE1/YR: CPT | Mod: CPTII,S$GLB,, | Performed by: NURSE PRACTITIONER

## 2021-06-18 PROCEDURE — 81001 URINALYSIS AUTO W/SCOPE: CPT | Mod: S$GLB,,, | Performed by: NURSE PRACTITIONER

## 2021-06-18 PROCEDURE — 99213 PR OFFICE/OUTPT VISIT, EST, LEVL III, 20-29 MIN: ICD-10-PCS | Mod: 25,S$GLB,, | Performed by: NURSE PRACTITIONER

## 2021-06-21 ENCOUNTER — LAB VISIT (OUTPATIENT)
Dept: LAB | Facility: HOSPITAL | Age: 81
End: 2021-06-21
Payer: MEDICARE

## 2021-06-21 ENCOUNTER — OFFICE VISIT (OUTPATIENT)
Dept: NEPHROLOGY | Facility: CLINIC | Age: 81
End: 2021-06-21
Payer: MEDICARE

## 2021-06-21 VITALS
SYSTOLIC BLOOD PRESSURE: 120 MMHG | OXYGEN SATURATION: 98 % | WEIGHT: 191.13 LBS | BODY MASS INDEX: 37.53 KG/M2 | HEIGHT: 60 IN | HEART RATE: 79 BPM | DIASTOLIC BLOOD PRESSURE: 60 MMHG

## 2021-06-21 DIAGNOSIS — I10 HYPERTENSION, UNSPECIFIED TYPE: ICD-10-CM

## 2021-06-21 DIAGNOSIS — R73.03 PREDIABETES: ICD-10-CM

## 2021-06-21 DIAGNOSIS — E55.9 VITAMIN D DEFICIENCY: ICD-10-CM

## 2021-06-21 DIAGNOSIS — E21.3 HYPERPARATHYROIDISM: ICD-10-CM

## 2021-06-21 DIAGNOSIS — N18.30 STAGE 3 CHRONIC KIDNEY DISEASE, UNSPECIFIED WHETHER STAGE 3A OR 3B CKD: Primary | ICD-10-CM

## 2021-06-21 DIAGNOSIS — N18.30 STAGE 3 CHRONIC KIDNEY DISEASE, UNSPECIFIED WHETHER STAGE 3A OR 3B CKD: ICD-10-CM

## 2021-06-21 LAB
ALBUMIN SERPL BCP-MCNC: 3.9 G/DL (ref 3.5–5.2)
ANION GAP SERPL CALC-SCNC: 13 MMOL/L (ref 8–16)
BASOPHILS # BLD AUTO: 0.05 K/UL (ref 0–0.2)
BASOPHILS NFR BLD: 0.6 % (ref 0–1.9)
BUN SERPL-MCNC: 22 MG/DL (ref 8–23)
CALCIUM SERPL-MCNC: 9.8 MG/DL (ref 8.7–10.5)
CHLORIDE SERPL-SCNC: 102 MMOL/L (ref 95–110)
CO2 SERPL-SCNC: 26 MMOL/L (ref 23–29)
CREAT SERPL-MCNC: 1.8 MG/DL (ref 0.5–1.4)
DIFFERENTIAL METHOD: ABNORMAL
EOSINOPHIL # BLD AUTO: 0 K/UL (ref 0–0.5)
EOSINOPHIL NFR BLD: 0.4 % (ref 0–8)
ERYTHROCYTE [DISTWIDTH] IN BLOOD BY AUTOMATED COUNT: 14.8 % (ref 11.5–14.5)
EST. GFR  (AFRICAN AMERICAN): 29.8 ML/MIN/1.73 M^2
EST. GFR  (NON AFRICAN AMERICAN): 25.8 ML/MIN/1.73 M^2
GLUCOSE SERPL-MCNC: 98 MG/DL (ref 70–110)
HCT VFR BLD AUTO: 37 % (ref 37–48.5)
HGB BLD-MCNC: 11.5 G/DL (ref 12–16)
IMM GRANULOCYTES # BLD AUTO: 0.02 K/UL (ref 0–0.04)
IMM GRANULOCYTES NFR BLD AUTO: 0.3 % (ref 0–0.5)
LYMPHOCYTES # BLD AUTO: 1.5 K/UL (ref 1–4.8)
LYMPHOCYTES NFR BLD: 19.9 % (ref 18–48)
MCH RBC QN AUTO: 28.8 PG (ref 27–31)
MCHC RBC AUTO-ENTMCNC: 31.1 G/DL (ref 32–36)
MCV RBC AUTO: 93 FL (ref 82–98)
MONOCYTES # BLD AUTO: 0.9 K/UL (ref 0.3–1)
MONOCYTES NFR BLD: 11.1 % (ref 4–15)
NEUTROPHILS # BLD AUTO: 5.2 K/UL (ref 1.8–7.7)
NEUTROPHILS NFR BLD: 67.7 % (ref 38–73)
NRBC BLD-RTO: 0 /100 WBC
PHOSPHATE SERPL-MCNC: 3.7 MG/DL (ref 2.7–4.5)
PLATELET # BLD AUTO: 373 K/UL (ref 150–450)
PMV BLD AUTO: 10.1 FL (ref 9.2–12.9)
POTASSIUM SERPL-SCNC: 3.6 MMOL/L (ref 3.5–5.1)
PTH-INTACT SERPL-MCNC: 172 PG/ML (ref 9–77)
RBC # BLD AUTO: 4 M/UL (ref 4–5.4)
SODIUM SERPL-SCNC: 141 MMOL/L (ref 136–145)
WBC # BLD AUTO: 7.72 K/UL (ref 3.9–12.7)

## 2021-06-21 PROCEDURE — 83970 ASSAY OF PARATHORMONE: CPT | Performed by: NURSE PRACTITIONER

## 2021-06-21 PROCEDURE — 99999 PR PBB SHADOW E&M-EST. PATIENT-LVL IV: ICD-10-PCS | Mod: PBBFAC,,, | Performed by: NURSE PRACTITIONER

## 2021-06-21 PROCEDURE — 85025 COMPLETE CBC W/AUTO DIFF WBC: CPT | Performed by: NURSE PRACTITIONER

## 2021-06-21 PROCEDURE — 36415 COLL VENOUS BLD VENIPUNCTURE: CPT | Performed by: NURSE PRACTITIONER

## 2021-06-21 PROCEDURE — 1126F AMNT PAIN NOTED NONE PRSNT: CPT | Mod: S$GLB,,, | Performed by: NURSE PRACTITIONER

## 2021-06-21 PROCEDURE — 1159F PR MEDICATION LIST DOCUMENTED IN MEDICAL RECORD: ICD-10-PCS | Mod: S$GLB,,, | Performed by: NURSE PRACTITIONER

## 2021-06-21 PROCEDURE — 1159F MED LIST DOCD IN RCRD: CPT | Mod: S$GLB,,, | Performed by: NURSE PRACTITIONER

## 2021-06-21 PROCEDURE — 1101F PR PT FALLS ASSESS DOC 0-1 FALLS W/OUT INJ PAST YR: ICD-10-PCS | Mod: CPTII,S$GLB,, | Performed by: NURSE PRACTITIONER

## 2021-06-21 PROCEDURE — 99214 OFFICE O/P EST MOD 30 MIN: CPT | Mod: S$GLB,,, | Performed by: NURSE PRACTITIONER

## 2021-06-21 PROCEDURE — 99999 PR PBB SHADOW E&M-EST. PATIENT-LVL IV: CPT | Mod: PBBFAC,,, | Performed by: NURSE PRACTITIONER

## 2021-06-21 PROCEDURE — 3288F FALL RISK ASSESSMENT DOCD: CPT | Mod: CPTII,S$GLB,, | Performed by: NURSE PRACTITIONER

## 2021-06-21 PROCEDURE — 80069 RENAL FUNCTION PANEL: CPT | Performed by: NURSE PRACTITIONER

## 2021-06-21 PROCEDURE — 3288F PR FALLS RISK ASSESSMENT DOCUMENTED: ICD-10-PCS | Mod: CPTII,S$GLB,, | Performed by: NURSE PRACTITIONER

## 2021-06-21 PROCEDURE — 1126F PR PAIN SEVERITY QUANTIFIED, NO PAIN PRESENT: ICD-10-PCS | Mod: S$GLB,,, | Performed by: NURSE PRACTITIONER

## 2021-06-21 PROCEDURE — 99214 PR OFFICE/OUTPT VISIT, EST, LEVL IV, 30-39 MIN: ICD-10-PCS | Mod: S$GLB,,, | Performed by: NURSE PRACTITIONER

## 2021-06-21 PROCEDURE — 1101F PT FALLS ASSESS-DOCD LE1/YR: CPT | Mod: CPTII,S$GLB,, | Performed by: NURSE PRACTITIONER

## 2021-06-22 DIAGNOSIS — N17.9 AKI (ACUTE KIDNEY INJURY): Primary | ICD-10-CM

## 2021-06-23 ENCOUNTER — TELEPHONE (OUTPATIENT)
Dept: NEPHROLOGY | Facility: CLINIC | Age: 81
End: 2021-06-23

## 2021-06-25 ENCOUNTER — CLINICAL SUPPORT (OUTPATIENT)
Dept: OPHTHALMOLOGY | Facility: CLINIC | Age: 81
End: 2021-06-25
Payer: MEDICARE

## 2021-06-25 ENCOUNTER — OFFICE VISIT (OUTPATIENT)
Dept: OPHTHALMOLOGY | Facility: CLINIC | Age: 81
End: 2021-06-25
Payer: MEDICARE

## 2021-06-25 DIAGNOSIS — H04.123 DRY EYE SYNDROME, BILATERAL: ICD-10-CM

## 2021-06-25 DIAGNOSIS — H43.813 VITREOUS DETACHMENT, BILATERAL: ICD-10-CM

## 2021-06-25 DIAGNOSIS — H40.053 BILATERAL OCULAR HYPERTENSION: ICD-10-CM

## 2021-06-25 DIAGNOSIS — Z96.1 PSEUDOPHAKIA: ICD-10-CM

## 2021-06-25 DIAGNOSIS — H40.053 OCULAR HYPERTENSION, BILATERAL: Primary | ICD-10-CM

## 2021-06-25 DIAGNOSIS — I10 ESSENTIAL HYPERTENSION: ICD-10-CM

## 2021-06-25 DIAGNOSIS — H26.491 PCO (POSTERIOR CAPSULAR OPACIFICATION), RIGHT: ICD-10-CM

## 2021-06-25 PROCEDURE — 92014 COMPRE OPH EXAM EST PT 1/>: CPT | Mod: S$GLB,,, | Performed by: OPHTHALMOLOGY

## 2021-06-25 PROCEDURE — 1126F PR PAIN SEVERITY QUANTIFIED, NO PAIN PRESENT: ICD-10-PCS | Mod: S$GLB,,, | Performed by: OPHTHALMOLOGY

## 2021-06-25 PROCEDURE — 1101F PT FALLS ASSESS-DOCD LE1/YR: CPT | Mod: CPTII,S$GLB,, | Performed by: OPHTHALMOLOGY

## 2021-06-25 PROCEDURE — 3288F PR FALLS RISK ASSESSMENT DOCUMENTED: ICD-10-PCS | Mod: CPTII,S$GLB,, | Performed by: OPHTHALMOLOGY

## 2021-06-25 PROCEDURE — 92014 PR EYE EXAM, EST PATIENT,COMPREHESV: ICD-10-PCS | Mod: S$GLB,,, | Performed by: OPHTHALMOLOGY

## 2021-06-25 PROCEDURE — 1126F AMNT PAIN NOTED NONE PRSNT: CPT | Mod: S$GLB,,, | Performed by: OPHTHALMOLOGY

## 2021-06-25 PROCEDURE — 99999 PR PBB SHADOW E&M-EST. PATIENT-LVL III: ICD-10-PCS | Mod: PBBFAC,,, | Performed by: OPHTHALMOLOGY

## 2021-06-25 PROCEDURE — 1101F PR PT FALLS ASSESS DOC 0-1 FALLS W/OUT INJ PAST YR: ICD-10-PCS | Mod: CPTII,S$GLB,, | Performed by: OPHTHALMOLOGY

## 2021-06-25 PROCEDURE — 99999 PR PBB SHADOW E&M-EST. PATIENT-LVL III: CPT | Mod: PBBFAC,,, | Performed by: OPHTHALMOLOGY

## 2021-06-25 PROCEDURE — 3288F FALL RISK ASSESSMENT DOCD: CPT | Mod: CPTII,S$GLB,, | Performed by: OPHTHALMOLOGY

## 2021-06-25 RX ORDER — TRAVOPROST OPHTHALMIC SOLUTION 0.04 MG/ML
1 SOLUTION OPHTHALMIC NIGHTLY
Qty: 2.5 ML | Refills: 6 | Status: SHIPPED | OUTPATIENT
Start: 2021-06-25

## 2021-07-28 NOTE — PROGRESS NOTES
07/28/21 0815   Quick Adds   Type of Visit Initial Occupational Therapy Evaluation   Living Environment   People in home spouse   Current Living Arrangements house   Living Environment Comments pt can live on main level of home which is handicap accessible   Self-Care   Usual Activity Tolerance good   Current Activity Tolerance good   Equipment Currently Used at Home shower chair;raised toilet seat;grab bar, toilet;grab bar, tub/shower;cane, straight;walker, rolling   Activity/Exercise/Self-Care Comment Indep with all ADL/IADLs prior to surgery   General Information   Onset of Illness/Injury or Date of Surgery 07/27/21   Referring Physician Erasto Max MD   Patient/Family Therapy Goal Statement (OT) To go home   Existing Precautions/Restrictions no hip IR;no hip ADD past midline   Left Lower Extremity (Weight-bearing Status) weight-bearing as tolerated (WBAT)   Cognitive Status Examination   Orientation Status orientation to person, place and time   Affect/Mental Status (Cognitive) WNL   Follows Commands WNL   Sensory   Sensory Quick Adds No deficits were identified   Range of Motion Comprehensive   General Range of Motion no range of motion deficits identified   Strength Comprehensive (MMT)   General Manual Muscle Testing (MMT) Assessment no strength deficits identified   Muscle Tone Assessment   Muscle Tone Quick Adds No deficits were identified   Coordination   Upper Extremity Coordination No deficits were identified   Bed Mobility   Bed Mobility scooting/bridging;supine-sit;sit-supine;rolling right   Rolling Right [place] (Bed Mobility) independent   Scooting/Bridging [place] (Bed Mobility) independent   Supine-Sit [place] (Bed Mobility) independent   Sit-Supine [place] (Bed Mobility) independent   Comment (Bed Mobility) educ in tech/positioning   Transfers   Transfers No deficits identified   Balance   Balance Assessment no deficits were identified   Activities of Daily Living  Subjective:       Patient ID: Rachel Asif is a 77 y.o. female.    Chief Complaint: No chief complaint on file.    HPI    HISTORY OF PRESENT ILLNESS:  Ms. Asif is a 77-year-old black female who is   followed up in the [facility] Medicine Clinic for chronic low back pain with   lumbar radiculopathy, chronic neck pain and chronic right knee pain.  Her last   visit to the clinic was on 04/03/2017.  X-rays of the knees were ordered.  She   was maintained on meloxicam, Cymbalta, gabapentin and p.r.n. tramadol.    The patient is coming today to the clinic for followup.  Her back pain has been   stable.  It is an intermittent aching pain in the lumbar spine.  She has   occasional radiation to the right knee.  Her pain is worse with activity and   better with rest.  Her maximum pain is 6/10 and minimum 2/10.  Today, it is   6/10.  The patient complains of chronic right lower extremity weakness, but   partly due to right knee pain.  She denies any bowel or bladder incontinence.    Her neck pain has also been stable.  It is an almost is an almost constant   aching pain in the cervical spine.  She has occasional radiation to the right   hand with numbness.  Her pain is worse with activity and better with rest.  Her   maximum pain is 6-7/10 and minimum 4/10.  Today, it is 6/10.  The patient   complains of mild right hand weakness.    She continues to complain of right knee pain.  It is an almost constant aching   pain aggravated by standing and walking and better with sitting down or lying   down.  Her maximum pain is 8-9/10 and minimum 4/10.  Today, it is 6/10.  The   patient did not get the x-rays ordered last time done.    She is currently taking Cymbalta 60 mg p.o. once per day, gabapentin 300 mg p.o.   daily but intermittently, often twice per week.  She takes meloxicam 15 mg p.o.   once per day.  She takes tramadol 50 mg p.r.n., usually twice per day.      MS/HN  dd: 08/03/2017 10:55:32 (CDT)  td: 08/03/2017    BADL Assessment lower body dressing   Lower Body Dressing Assessment   [place] Level (Lower Body Dressing) modified independence   Assistive Devices (Lower Body Dressing) reacher;sock-aid   Position (Lower Body Dressing) supported sitting;supported standing   Clinical Impression   Criteria for Skilled Therapeutic Interventions Met (OT) yes   OT Diagnosis L WADE   OT Problem List-Impairments impacting ADL post-surgical precautions;activity tolerance impaired;flexibility   Assessment of Occupational Performance 3-5 Performance Deficits   Planned Therapy Interventions (OT) ADL retraining;transfer training   Clinical Decision Making Complexity (OT) moderate complexity   Therapy Frequency (OT) Daily   Predicted Duration of Therapy 3 days   Anticipated Equipment Needs Upon Discharge (OT)   (sock aid)   Risk & Benefits of therapy have been explained patient   OT Discharge Planning    OT Discharge Recommendation (DC Rec) Home with assist   OT Rationale for DC Rec Decreased flexibility and surgical precautions   Therapy Certification   Start of Care Date 07/28/21   Certification date from 07/28/21   Certification date to 08/28/21   Medical Diagnosis L WADE   Total Evaluation Time (Minutes)   Total Evaluation Time (Minutes) 5      23:13:23 (CDT)  Doc ID   #[id number]  Job ID #[id number]    CC:         Review of Systems   Constitutional: Negative for fatigue.   Eyes: Negative for visual disturbance.   Respiratory: Negative for shortness of breath.    Cardiovascular: Negative for chest pain.   Gastrointestinal: Negative for blood in stool, constipation, nausea and vomiting.   Genitourinary: Negative for difficulty urinating.   Musculoskeletal: Positive for arthralgias, back pain and neck pain. Negative for gait problem.   Skin: Negative for rash.   Neurological: Negative for dizziness and headaches.   Psychiatric/Behavioral: Negative for behavioral problems and sleep disturbance.       Objective:      Physical Exam   Constitutional: She appears well-developed and well-nourished.   HENT:   Head: Normocephalic and atraumatic.   Neck: Normal range of motion.   Mild pain at end range.   Musculoskeletal:   BUE:  ROM:full.  Strength:    RUE: 5/5 at shoulder abduction, 5 elbow flexion, 5 elbow extension, 5 hand .   LUE: 5/5 at shoulder abduction, 5 elbow flexion, 5 elbow extension, 5 hand .  Sensation to pinprick:   RUE: intact.   LUE: intact.      BLE:  ROM:full.  Mild bilateral knee crepitus.  Strength:    RLE: 5/5 at hip flexion, 5 knee extension, 5 ankle DF, 5 PF.   LLE: 5/5 at hip flexion, 5 knee extension, 5 ankle DF, 5 PF.  Sensation to pinprick:     RLE: intact.      LLE: intact.   SLR (sitting):      RLE: +ve.      LLE: -ve.   Mild tenderness over lumbar spine, R>L hips.     Neurological: She is alert.   Skin: Skin is warm.   Psychiatric: She has a normal mood and affect. Her behavior is normal.   Vitals reviewed.              Assessment:       1. Chronic midline low back pain with right-sided sciatica    2. Lumbar facet arthropathy    3. Chronic neck pain    4. DJD (degenerative joint disease), cervical    5. Chronic pain of right knee        Plan:       - X-ray AP Standing Knees with Both Lateral; Future  - Continue meloxicam (MOBIC) 15 MG  tablet; Take 1 tablet (15 mg total) by mouth daily as needed for Pain. 1 Tablet Oral Every day  - Increase duloxetine (CYMBALTA) 60 MG capsule; Take 1 capsule (60 mg total) by mouth twice daily.  - Take consistently: gabapentin (NEURONTIN) 300 MG capsule; Take 1 capsule (300 mg total) by mouth at bedtime..  - Continue tramadol (ULTRAM) 50 mg tablet; Take 1 tablet (50 mg total) by mouth 3 (three) times daily as needed for Pain.  - Continue diclofenac sodium (VOLTAREN) 1 % Gel; Apply topically 3 (three) times daily.  - Regular home exercise program was encouraged.  - Weight loss was encouraged.   - Return in about 3 months (around 11/3/2017).

## 2021-10-13 ENCOUNTER — LAB VISIT (OUTPATIENT)
Dept: LAB | Facility: HOSPITAL | Age: 81
End: 2021-10-13
Attending: NURSE PRACTITIONER
Payer: MEDICARE

## 2021-10-13 DIAGNOSIS — N18.30 STAGE 3 CHRONIC KIDNEY DISEASE, UNSPECIFIED WHETHER STAGE 3A OR 3B CKD: ICD-10-CM

## 2021-10-13 LAB
BILIRUB UR QL STRIP: NEGATIVE
CLARITY UR REFRACT.AUTO: CLEAR
COLOR UR AUTO: NORMAL
CREAT UR-MCNC: 63 MG/DL (ref 15–325)
GLUCOSE UR QL STRIP: NEGATIVE
HGB UR QL STRIP: NEGATIVE
KETONES UR QL STRIP: NEGATIVE
LEUKOCYTE ESTERASE UR QL STRIP: NEGATIVE
NITRITE UR QL STRIP: NEGATIVE
PH UR STRIP: 5 [PH] (ref 5–8)
PROT UR QL STRIP: NEGATIVE
PROT UR-MCNC: <7 MG/DL (ref 0–15)
PROT/CREAT UR: NORMAL MG/G{CREAT} (ref 0–0.2)
SP GR UR STRIP: 1.01 (ref 1–1.03)
URN SPEC COLLECT METH UR: NORMAL

## 2021-10-13 PROCEDURE — 81003 URINALYSIS AUTO W/O SCOPE: CPT | Performed by: NURSE PRACTITIONER

## 2021-10-13 PROCEDURE — 82570 ASSAY OF URINE CREATININE: CPT | Performed by: NURSE PRACTITIONER

## 2021-10-20 ENCOUNTER — OFFICE VISIT (OUTPATIENT)
Dept: NEPHROLOGY | Facility: CLINIC | Age: 81
End: 2021-10-20
Payer: MEDICARE

## 2021-10-20 VITALS
OXYGEN SATURATION: 99 % | BODY MASS INDEX: 37.18 KG/M2 | DIASTOLIC BLOOD PRESSURE: 62 MMHG | HEART RATE: 80 BPM | HEIGHT: 60 IN | WEIGHT: 189.38 LBS | SYSTOLIC BLOOD PRESSURE: 150 MMHG

## 2021-10-20 DIAGNOSIS — R73.03 PREDIABETES: ICD-10-CM

## 2021-10-20 DIAGNOSIS — E21.3 HYPERPARATHYROIDISM: ICD-10-CM

## 2021-10-20 DIAGNOSIS — N18.30 STAGE 3 CHRONIC KIDNEY DISEASE, UNSPECIFIED WHETHER STAGE 3A OR 3B CKD: Primary | ICD-10-CM

## 2021-10-20 DIAGNOSIS — D64.9 ANEMIA, UNSPECIFIED TYPE: ICD-10-CM

## 2021-10-20 DIAGNOSIS — I10 HYPERTENSION, UNSPECIFIED TYPE: ICD-10-CM

## 2021-10-20 DIAGNOSIS — E55.9 VITAMIN D DEFICIENCY: ICD-10-CM

## 2021-10-20 PROCEDURE — 1159F PR MEDICATION LIST DOCUMENTED IN MEDICAL RECORD: ICD-10-PCS | Mod: CPTII,S$GLB,, | Performed by: NURSE PRACTITIONER

## 2021-10-20 PROCEDURE — 3288F PR FALLS RISK ASSESSMENT DOCUMENTED: ICD-10-PCS | Mod: CPTII,S$GLB,, | Performed by: NURSE PRACTITIONER

## 2021-10-20 PROCEDURE — 99999 PR PBB SHADOW E&M-EST. PATIENT-LVL IV: CPT | Mod: PBBFAC,,, | Performed by: NURSE PRACTITIONER

## 2021-10-20 PROCEDURE — 99999 PR PBB SHADOW E&M-EST. PATIENT-LVL IV: ICD-10-PCS | Mod: PBBFAC,,, | Performed by: NURSE PRACTITIONER

## 2021-10-20 PROCEDURE — 1101F PT FALLS ASSESS-DOCD LE1/YR: CPT | Mod: CPTII,S$GLB,, | Performed by: NURSE PRACTITIONER

## 2021-10-20 PROCEDURE — 1126F PR PAIN SEVERITY QUANTIFIED, NO PAIN PRESENT: ICD-10-PCS | Mod: CPTII,S$GLB,, | Performed by: NURSE PRACTITIONER

## 2021-10-20 PROCEDURE — 1160F RVW MEDS BY RX/DR IN RCRD: CPT | Mod: CPTII,S$GLB,, | Performed by: NURSE PRACTITIONER

## 2021-10-20 PROCEDURE — 1101F PR PT FALLS ASSESS DOC 0-1 FALLS W/OUT INJ PAST YR: ICD-10-PCS | Mod: CPTII,S$GLB,, | Performed by: NURSE PRACTITIONER

## 2021-10-20 PROCEDURE — 3077F PR MOST RECENT SYSTOLIC BLOOD PRESSURE >= 140 MM HG: ICD-10-PCS | Mod: CPTII,S$GLB,, | Performed by: NURSE PRACTITIONER

## 2021-10-20 PROCEDURE — 1159F MED LIST DOCD IN RCRD: CPT | Mod: CPTII,S$GLB,, | Performed by: NURSE PRACTITIONER

## 2021-10-20 PROCEDURE — 99214 OFFICE O/P EST MOD 30 MIN: CPT | Mod: S$GLB,,, | Performed by: NURSE PRACTITIONER

## 2021-10-20 PROCEDURE — 3288F FALL RISK ASSESSMENT DOCD: CPT | Mod: CPTII,S$GLB,, | Performed by: NURSE PRACTITIONER

## 2021-10-20 PROCEDURE — 99214 PR OFFICE/OUTPT VISIT, EST, LEVL IV, 30-39 MIN: ICD-10-PCS | Mod: S$GLB,,, | Performed by: NURSE PRACTITIONER

## 2021-10-20 PROCEDURE — 1126F AMNT PAIN NOTED NONE PRSNT: CPT | Mod: CPTII,S$GLB,, | Performed by: NURSE PRACTITIONER

## 2021-10-20 PROCEDURE — 3077F SYST BP >= 140 MM HG: CPT | Mod: CPTII,S$GLB,, | Performed by: NURSE PRACTITIONER

## 2021-10-20 PROCEDURE — 1160F PR REVIEW ALL MEDS BY PRESCRIBER/CLIN PHARMACIST DOCUMENTED: ICD-10-PCS | Mod: CPTII,S$GLB,, | Performed by: NURSE PRACTITIONER

## 2021-10-20 PROCEDURE — 3078F DIAST BP <80 MM HG: CPT | Mod: CPTII,S$GLB,, | Performed by: NURSE PRACTITIONER

## 2021-10-20 PROCEDURE — 3078F PR MOST RECENT DIASTOLIC BLOOD PRESSURE < 80 MM HG: ICD-10-PCS | Mod: CPTII,S$GLB,, | Performed by: NURSE PRACTITIONER

## 2021-10-31 DIAGNOSIS — E11.9 DIABETES MELLITUS WITHOUT COMPLICATION: Primary | ICD-10-CM

## 2021-10-31 DIAGNOSIS — M54.41 CHRONIC MIDLINE LOW BACK PAIN WITH RIGHT-SIDED SCIATICA: ICD-10-CM

## 2021-10-31 DIAGNOSIS — Z78.0 MENOPAUSE: ICD-10-CM

## 2021-10-31 DIAGNOSIS — G89.29 CHRONIC MIDLINE LOW BACK PAIN WITH RIGHT-SIDED SCIATICA: ICD-10-CM

## 2021-10-31 DIAGNOSIS — M54.2 CHRONIC NECK PAIN: ICD-10-CM

## 2021-10-31 DIAGNOSIS — N18.4 BENIGN HYPERTENSION WITH CHRONIC KIDNEY DISEASE, STAGE IV: ICD-10-CM

## 2021-10-31 DIAGNOSIS — I12.9 BENIGN HYPERTENSION WITH CHRONIC KIDNEY DISEASE, STAGE IV: ICD-10-CM

## 2021-10-31 DIAGNOSIS — G89.29 CHRONIC NECK PAIN: ICD-10-CM

## 2021-11-01 RX ORDER — FUROSEMIDE 20 MG/1
TABLET ORAL
Qty: 180 TABLET | Refills: 0 | Status: SHIPPED | OUTPATIENT
Start: 2021-11-01 | End: 2022-05-18 | Stop reason: SDUPTHER

## 2021-11-01 RX ORDER — METOPROLOL SUCCINATE 50 MG/1
TABLET, EXTENDED RELEASE ORAL
Qty: 90 TABLET | Refills: 0 | Status: SHIPPED | OUTPATIENT
Start: 2021-11-01 | End: 2022-05-18 | Stop reason: SDUPTHER

## 2021-11-01 RX ORDER — TRAMADOL HYDROCHLORIDE 50 MG/1
TABLET ORAL
Qty: 60 TABLET | Refills: 0 | Status: SHIPPED | OUTPATIENT
Start: 2021-11-01 | End: 2021-11-16 | Stop reason: SDUPTHER

## 2021-11-12 ENCOUNTER — LAB VISIT (OUTPATIENT)
Dept: LAB | Facility: HOSPITAL | Age: 81
End: 2021-11-12
Attending: INTERNAL MEDICINE
Payer: MEDICARE

## 2021-11-12 DIAGNOSIS — E11.9 DIABETES MELLITUS WITHOUT COMPLICATION: ICD-10-CM

## 2021-11-12 LAB
CHOLEST SERPL-MCNC: 182 MG/DL (ref 120–199)
CHOLEST/HDLC SERPL: 2.6 {RATIO} (ref 2–5)
ESTIMATED AVG GLUCOSE: 120 MG/DL (ref 68–131)
HBA1C MFR BLD: 5.8 % (ref 4–5.6)
HDLC SERPL-MCNC: 71 MG/DL (ref 40–75)
HDLC SERPL: 39 % (ref 20–50)
LDLC SERPL CALC-MCNC: 86.6 MG/DL (ref 63–159)
NONHDLC SERPL-MCNC: 111 MG/DL
TRIGL SERPL-MCNC: 122 MG/DL (ref 30–150)

## 2021-11-12 PROCEDURE — 83036 HEMOGLOBIN GLYCOSYLATED A1C: CPT | Performed by: INTERNAL MEDICINE

## 2021-11-12 PROCEDURE — 36415 COLL VENOUS BLD VENIPUNCTURE: CPT | Mod: PO | Performed by: INTERNAL MEDICINE

## 2021-11-12 PROCEDURE — 80061 LIPID PANEL: CPT | Performed by: INTERNAL MEDICINE

## 2021-11-15 ENCOUNTER — TELEPHONE (OUTPATIENT)
Dept: FAMILY MEDICINE | Facility: CLINIC | Age: 81
End: 2021-11-15
Payer: MEDICARE

## 2021-11-16 ENCOUNTER — OFFICE VISIT (OUTPATIENT)
Dept: FAMILY MEDICINE | Facility: CLINIC | Age: 81
End: 2021-11-16
Payer: MEDICARE

## 2021-11-16 VITALS
DIASTOLIC BLOOD PRESSURE: 68 MMHG | SYSTOLIC BLOOD PRESSURE: 140 MMHG | WEIGHT: 190.5 LBS | BODY MASS INDEX: 37.4 KG/M2 | TEMPERATURE: 99 F | HEART RATE: 73 BPM | OXYGEN SATURATION: 99 % | HEIGHT: 60 IN

## 2021-11-16 DIAGNOSIS — K21.9 GASTROESOPHAGEAL REFLUX DISEASE WITHOUT ESOPHAGITIS: ICD-10-CM

## 2021-11-16 DIAGNOSIS — G89.29 CHRONIC MIDLINE LOW BACK PAIN WITH RIGHT-SIDED SCIATICA: ICD-10-CM

## 2021-11-16 DIAGNOSIS — G89.29 CHRONIC NECK PAIN: ICD-10-CM

## 2021-11-16 DIAGNOSIS — N25.81 SECONDARY HYPERPARATHYROIDISM OF RENAL ORIGIN: ICD-10-CM

## 2021-11-16 DIAGNOSIS — E78.5 HYPERLIPIDEMIA WITH TARGET LDL LESS THAN 100: Primary | ICD-10-CM

## 2021-11-16 DIAGNOSIS — M54.41 CHRONIC MIDLINE LOW BACK PAIN WITH RIGHT-SIDED SCIATICA: ICD-10-CM

## 2021-11-16 DIAGNOSIS — I12.9 BENIGN HYPERTENSION WITH CHRONIC KIDNEY DISEASE, STAGE IV: ICD-10-CM

## 2021-11-16 DIAGNOSIS — M54.2 CHRONIC NECK PAIN: ICD-10-CM

## 2021-11-16 DIAGNOSIS — M81.0 OSTEOPOROSIS, POST-MENOPAUSAL: ICD-10-CM

## 2021-11-16 DIAGNOSIS — E66.01 SEVERE OBESITY (BMI 35.0-39.9) WITH COMORBIDITY: ICD-10-CM

## 2021-11-16 DIAGNOSIS — N18.4 BENIGN HYPERTENSION WITH CHRONIC KIDNEY DISEASE, STAGE IV: ICD-10-CM

## 2021-11-16 DIAGNOSIS — D63.8 ANEMIA OF CHRONIC DISEASE: ICD-10-CM

## 2021-11-16 DIAGNOSIS — I70.0 ATHEROSCLEROSIS OF AORTA: ICD-10-CM

## 2021-11-16 DIAGNOSIS — Z23 FLU VACCINE NEED: ICD-10-CM

## 2021-11-16 DIAGNOSIS — R73.03 PREDIABETES: ICD-10-CM

## 2021-11-16 PROCEDURE — 1159F PR MEDICATION LIST DOCUMENTED IN MEDICAL RECORD: ICD-10-PCS | Mod: CPTII,S$GLB,, | Performed by: NURSE PRACTITIONER

## 2021-11-16 PROCEDURE — 3077F SYST BP >= 140 MM HG: CPT | Mod: CPTII,S$GLB,, | Performed by: NURSE PRACTITIONER

## 2021-11-16 PROCEDURE — 99999 PR PBB SHADOW E&M-EST. PATIENT-LVL IV: CPT | Mod: PBBFAC,,, | Performed by: NURSE PRACTITIONER

## 2021-11-16 PROCEDURE — 99999 PR PBB SHADOW E&M-EST. PATIENT-LVL IV: ICD-10-PCS | Mod: PBBFAC,,, | Performed by: NURSE PRACTITIONER

## 2021-11-16 PROCEDURE — G0008 ADMIN INFLUENZA VIRUS VAC: HCPCS | Mod: S$GLB,,, | Performed by: NURSE PRACTITIONER

## 2021-11-16 PROCEDURE — 3077F PR MOST RECENT SYSTOLIC BLOOD PRESSURE >= 140 MM HG: ICD-10-PCS | Mod: CPTII,S$GLB,, | Performed by: NURSE PRACTITIONER

## 2021-11-16 PROCEDURE — 1126F AMNT PAIN NOTED NONE PRSNT: CPT | Mod: CPTII,S$GLB,, | Performed by: NURSE PRACTITIONER

## 2021-11-16 PROCEDURE — 99214 PR OFFICE/OUTPT VISIT, EST, LEVL IV, 30-39 MIN: ICD-10-PCS | Mod: S$GLB,,, | Performed by: NURSE PRACTITIONER

## 2021-11-16 PROCEDURE — 3078F PR MOST RECENT DIASTOLIC BLOOD PRESSURE < 80 MM HG: ICD-10-PCS | Mod: CPTII,S$GLB,, | Performed by: NURSE PRACTITIONER

## 2021-11-16 PROCEDURE — G0008 FLU VACCINE - QUADRIVALENT - ADJUVANTED: ICD-10-PCS | Mod: S$GLB,,, | Performed by: NURSE PRACTITIONER

## 2021-11-16 PROCEDURE — 1159F MED LIST DOCD IN RCRD: CPT | Mod: CPTII,S$GLB,, | Performed by: NURSE PRACTITIONER

## 2021-11-16 PROCEDURE — 3288F FALL RISK ASSESSMENT DOCD: CPT | Mod: CPTII,S$GLB,, | Performed by: NURSE PRACTITIONER

## 2021-11-16 PROCEDURE — 1101F PT FALLS ASSESS-DOCD LE1/YR: CPT | Mod: CPTII,S$GLB,, | Performed by: NURSE PRACTITIONER

## 2021-11-16 PROCEDURE — 99214 OFFICE O/P EST MOD 30 MIN: CPT | Mod: S$GLB,,, | Performed by: NURSE PRACTITIONER

## 2021-11-16 PROCEDURE — 3288F PR FALLS RISK ASSESSMENT DOCUMENTED: ICD-10-PCS | Mod: CPTII,S$GLB,, | Performed by: NURSE PRACTITIONER

## 2021-11-16 PROCEDURE — 90694 FLU VACCINE - QUADRIVALENT - ADJUVANTED: ICD-10-PCS | Mod: S$GLB,,, | Performed by: NURSE PRACTITIONER

## 2021-11-16 PROCEDURE — 3078F DIAST BP <80 MM HG: CPT | Mod: CPTII,S$GLB,, | Performed by: NURSE PRACTITIONER

## 2021-11-16 PROCEDURE — 1126F PR PAIN SEVERITY QUANTIFIED, NO PAIN PRESENT: ICD-10-PCS | Mod: CPTII,S$GLB,, | Performed by: NURSE PRACTITIONER

## 2021-11-16 PROCEDURE — 1101F PR PT FALLS ASSESS DOC 0-1 FALLS W/OUT INJ PAST YR: ICD-10-PCS | Mod: CPTII,S$GLB,, | Performed by: NURSE PRACTITIONER

## 2021-11-16 PROCEDURE — 90694 VACC AIIV4 NO PRSRV 0.5ML IM: CPT | Mod: S$GLB,,, | Performed by: NURSE PRACTITIONER

## 2021-11-16 RX ORDER — TRAMADOL HYDROCHLORIDE 50 MG/1
TABLET ORAL
Qty: 60 TABLET | Refills: 0 | Status: SHIPPED | OUTPATIENT
Start: 2021-11-16 | End: 2021-11-18 | Stop reason: SDUPTHER

## 2021-11-16 RX ORDER — TRAMADOL HYDROCHLORIDE 50 MG/1
TABLET ORAL
Qty: 60 TABLET | Refills: 0 | Status: CANCELLED | OUTPATIENT
Start: 2021-11-16

## 2021-11-18 ENCOUNTER — OFFICE VISIT (OUTPATIENT)
Dept: PHYSICAL MEDICINE AND REHAB | Facility: CLINIC | Age: 81
End: 2021-11-18
Payer: MEDICARE

## 2021-11-18 VITALS
BODY MASS INDEX: 35.17 KG/M2 | WEIGHT: 191.13 LBS | SYSTOLIC BLOOD PRESSURE: 139 MMHG | HEART RATE: 83 BPM | HEIGHT: 62 IN | DIASTOLIC BLOOD PRESSURE: 79 MMHG

## 2021-11-18 DIAGNOSIS — M54.2 CHRONIC NECK PAIN: ICD-10-CM

## 2021-11-18 DIAGNOSIS — M47.816 LUMBAR FACET ARTHROPATHY: ICD-10-CM

## 2021-11-18 DIAGNOSIS — M17.0 PRIMARY OSTEOARTHRITIS OF BOTH KNEES: ICD-10-CM

## 2021-11-18 DIAGNOSIS — M47.22 OSTEOARTHRITIS OF SPINE WITH RADICULOPATHY, CERVICAL REGION: ICD-10-CM

## 2021-11-18 DIAGNOSIS — G89.29 CHRONIC NECK PAIN: ICD-10-CM

## 2021-11-18 DIAGNOSIS — E66.9 OBESITY (BMI 30.0-34.9): ICD-10-CM

## 2021-11-18 DIAGNOSIS — M54.41 CHRONIC MIDLINE LOW BACK PAIN WITH RIGHT-SIDED SCIATICA: Primary | ICD-10-CM

## 2021-11-18 DIAGNOSIS — G89.29 CHRONIC MIDLINE LOW BACK PAIN WITH RIGHT-SIDED SCIATICA: Primary | ICD-10-CM

## 2021-11-18 PROCEDURE — 99214 PR OFFICE/OUTPT VISIT, EST, LEVL IV, 30-39 MIN: ICD-10-PCS | Mod: S$GLB,,, | Performed by: PHYSICAL MEDICINE & REHABILITATION

## 2021-11-18 PROCEDURE — 99999 PR PBB SHADOW E&M-EST. PATIENT-LVL III: ICD-10-PCS | Mod: PBBFAC,,, | Performed by: PHYSICAL MEDICINE & REHABILITATION

## 2021-11-18 PROCEDURE — 99999 PR PBB SHADOW E&M-EST. PATIENT-LVL III: CPT | Mod: PBBFAC,,, | Performed by: PHYSICAL MEDICINE & REHABILITATION

## 2021-11-18 PROCEDURE — 99214 OFFICE O/P EST MOD 30 MIN: CPT | Mod: S$GLB,,, | Performed by: PHYSICAL MEDICINE & REHABILITATION

## 2021-11-18 PROCEDURE — 1126F AMNT PAIN NOTED NONE PRSNT: CPT | Mod: CPTII,S$GLB,, | Performed by: PHYSICAL MEDICINE & REHABILITATION

## 2021-11-18 PROCEDURE — 3075F SYST BP GE 130 - 139MM HG: CPT | Mod: CPTII,S$GLB,, | Performed by: PHYSICAL MEDICINE & REHABILITATION

## 2021-11-18 PROCEDURE — 3078F PR MOST RECENT DIASTOLIC BLOOD PRESSURE < 80 MM HG: ICD-10-PCS | Mod: CPTII,S$GLB,, | Performed by: PHYSICAL MEDICINE & REHABILITATION

## 2021-11-18 PROCEDURE — 3078F DIAST BP <80 MM HG: CPT | Mod: CPTII,S$GLB,, | Performed by: PHYSICAL MEDICINE & REHABILITATION

## 2021-11-18 PROCEDURE — 1126F PR PAIN SEVERITY QUANTIFIED, NO PAIN PRESENT: ICD-10-PCS | Mod: CPTII,S$GLB,, | Performed by: PHYSICAL MEDICINE & REHABILITATION

## 2021-11-18 PROCEDURE — 3075F PR MOST RECENT SYSTOLIC BLOOD PRESS GE 130-139MM HG: ICD-10-PCS | Mod: CPTII,S$GLB,, | Performed by: PHYSICAL MEDICINE & REHABILITATION

## 2021-11-18 RX ORDER — TRAMADOL HYDROCHLORIDE 50 MG/1
TABLET ORAL
Qty: 60 TABLET | Refills: 0 | Status: SHIPPED | OUTPATIENT
Start: 2021-11-18 | End: 2022-05-16 | Stop reason: SDUPTHER

## 2021-11-18 RX ORDER — DULOXETIN HYDROCHLORIDE 30 MG/1
30 CAPSULE, DELAYED RELEASE ORAL DAILY
Qty: 30 CAPSULE | Refills: 3 | Status: SHIPPED | OUTPATIENT
Start: 2021-11-18 | End: 2022-11-25 | Stop reason: SDUPTHER

## 2021-12-15 ENCOUNTER — PATIENT MESSAGE (OUTPATIENT)
Dept: ADMINISTRATIVE | Facility: HOSPITAL | Age: 81
End: 2021-12-15
Payer: MEDICARE

## 2021-12-16 ENCOUNTER — HOSPITAL ENCOUNTER (OUTPATIENT)
Dept: RADIOLOGY | Facility: CLINIC | Age: 81
Discharge: HOME | End: 2021-12-16
Attending: INTERNAL MEDICINE
Payer: MEDICARE

## 2021-12-16 DIAGNOSIS — Z78.0 MENOPAUSE: ICD-10-CM

## 2021-12-16 PROCEDURE — 77080 DXA BONE DENSITY AXIAL: CPT | Mod: TC,PO

## 2021-12-16 PROCEDURE — 77080 DXA BONE DENSITY AXIAL: CPT | Mod: 26,,, | Performed by: INTERNAL MEDICINE

## 2021-12-16 PROCEDURE — 77080 DEXA BONE DENSITY SPINE HIP: ICD-10-PCS | Mod: 26,,, | Performed by: INTERNAL MEDICINE

## 2021-12-23 DIAGNOSIS — M81.0 OSTEOPOROSIS, POST-MENOPAUSAL: Primary | ICD-10-CM

## 2022-01-26 DIAGNOSIS — N18.4 BENIGN HYPERTENSION WITH CHRONIC KIDNEY DISEASE, STAGE IV: ICD-10-CM

## 2022-01-26 DIAGNOSIS — I12.9 BENIGN HYPERTENSION WITH CHRONIC KIDNEY DISEASE, STAGE IV: ICD-10-CM

## 2022-01-26 NOTE — TELEPHONE ENCOUNTER
Care Due:                  Date            Visit Type   Department     Provider  --------------------------------------------------------------------------------                                             [facility]   MED/ INTERNAL  Laurentia Kacey  Last Visit: 01-      PATIENT      MED/ PEDS      Derekkelthalia Pace                                           [facility]                                           MED/ INTERNAL  Laurentia Kacey  Next Visit: 05-      None         MED/ PEDS      Derekkeler  Sanjeev                                                            Last  Test          Frequency    Reason                     Performed    Due Date  --------------------------------------------------------------------------------    Office Visit  12 months..  atorvastatin,              01- 01-                             olmesartan-hydrochlorothi                             azide....................    CMP.........  12 months..  atorvastatin.............  Not Found    Overdue    Powered by [employer]. Reference number: [id number].   1/26/2022 4:07:19 AM CST

## 2022-02-09 RX ORDER — OLMESARTAN MEDOXOMIL AND HYDROCHLOROTHIAZIDE 40/25 40; 25 MG/1; MG/1
1 TABLET ORAL DAILY
Qty: 90 TABLET | Refills: 1 | Status: SHIPPED | OUTPATIENT
Start: 2022-02-09 | End: 2022-05-18 | Stop reason: SDUPTHER

## 2022-02-09 NOTE — TELEPHONE ENCOUNTER
Refill Routing Note   Medication(s) are not appropriate for processing by [facility] for the following reason(s):      - Drug-Drug Interaction (Duplicate Therapy: furosemide, olmesartan-hydrochlorothiazide)  - Drug-Disease Interaction (olmesartan-hydrochlorothiazide and Benign hypertension with chronic kidney disease, stage IV)    OR action(s):  Defer Medication-related problems identified:   Therapeutic duplication  Drug-disease interaction  Requires labs  Requires appointment     Medication Therapy Plan: DDI; Needs OV; Labs(CMP)  --->Care Gap information included in message below if applicable.   Medication reconciliation completed: No   Automatic Epic Generated Protocol Data:        Requested Prescriptions   Pending Prescriptions Disp Refills    olmesartan-hydrochlorothiazide (BENICAR HCT) 40-25 mg per tablet [Pharmacy Med Name: OLMESARTAN MEDOX/HCTZ 40-25MG TAB] 90 tablet 0     Sig: Take 1 tablet by mouth once daily.       Cardiovascular: ARB + Diuretic Combos Passed - 1/26/2022  4:06 AM        Passed - Patient is at least 18 years old        Passed - Last BP in normal range within 360 days     BP Readings from Last 1 Encounters:   11/18/21 139/79               Passed - Valid encounter within last 15 months     Recent Visits  Date Type Provider Dept   01/11/21 Office Visit Oneyda Pace MD [facility] Family Med/ Internal Med/ Peds   Showing recent visits within past 720 days and meeting all other requirements  Future Appointments  No visits were found meeting these conditions.  Showing future appointments within next 150 days and meeting all other requirements      Future Appointments              In 2 days SPECIMEN, CHAMPION [place] [facility], [place]    In 2 days SPECIMENJAYCEE Marrero    In 3 months Lisa Villavicencio MD [id number], [facility]    In 3 months MD Rogelio [facility]farlandeboni - [facility]                Passed - K in normal range and  within 360 days     Potassium   Date Value Ref Range Status   10/13/2021 4.4 3.5 - 5.1 mmol/L Final   06/21/2021 3.6 3.5 - 5.1 mmol/L Final   03/16/2021 4.2 3.5 - 5.1 mmol/L Final              Passed - Na is between 130 and 148 and within 360 days     Sodium   Date Value Ref Range Status   10/13/2021 143 136 - 145 mmol/L Final   06/21/2021 141 136 - 145 mmol/L Final   03/16/2021 141 136 - 145 mmol/L Final              Passed - Cr is 1.39 or below and within 360 days     Lab Results   Component Value Date    CREATININE 1.1 10/13/2021    CREATININE 1.8 (H) 06/21/2021    CREATININE 1.4 03/16/2021              Passed - eGFR within 360 days     Lab Results   Component Value Date    EGFRNONAA 46.5 (A) 10/13/2021    EGFRNONAA 25.8 (A) 06/21/2021    EGFRNONAA 35.0 (A) 03/16/2021                      Appointments  past 12m or future 3m with PCP    Date Provider   Last Visit   1/11/2021 Oneyda Pace MD   Next Visit   5/18/2022 Oneyda Pace MD   ED visits in past 90 days: 0        Note composed:5:29 PM 02/09/2022

## 2022-02-11 ENCOUNTER — LAB VISIT (OUTPATIENT)
Dept: LAB | Facility: HOSPITAL | Age: 82
End: 2022-02-11
Payer: MEDICARE

## 2022-02-11 DIAGNOSIS — N18.30 STAGE 3 CHRONIC KIDNEY DISEASE, UNSPECIFIED WHETHER STAGE 3A OR 3B CKD: ICD-10-CM

## 2022-02-11 LAB
BACTERIA #/AREA URNS AUTO: ABNORMAL /HPF
BILIRUB UR QL STRIP: NEGATIVE
CLARITY UR REFRACT.AUTO: ABNORMAL
COLOR UR AUTO: YELLOW
CREAT UR-MCNC: 160 MG/DL (ref 15–325)
GLUCOSE UR QL STRIP: NEGATIVE
HGB UR QL STRIP: ABNORMAL
HYALINE CASTS UR QL AUTO: 7 /LPF
KETONES UR QL STRIP: NEGATIVE
LEUKOCYTE ESTERASE UR QL STRIP: NEGATIVE
MICROSCOPIC COMMENT: ABNORMAL
NITRITE UR QL STRIP: NEGATIVE
PH UR STRIP: 5 [PH] (ref 5–8)
PROT UR QL STRIP: NEGATIVE
PROT UR-MCNC: <7 MG/DL (ref 0–15)
PROT/CREAT UR: NORMAL MG/G{CREAT} (ref 0–0.2)
RBC #/AREA URNS AUTO: 2 /HPF (ref 0–4)
SP GR UR STRIP: 1.02 (ref 1–1.03)
SQUAMOUS #/AREA URNS AUTO: 3 /HPF
URN SPEC COLLECT METH UR: ABNORMAL
WBC #/AREA URNS AUTO: 2 /HPF (ref 0–5)

## 2022-02-11 PROCEDURE — 81001 URINALYSIS AUTO W/SCOPE: CPT | Performed by: NURSE PRACTITIONER

## 2022-02-11 PROCEDURE — 82570 ASSAY OF URINE CREATININE: CPT | Performed by: NURSE PRACTITIONER

## 2022-02-16 ENCOUNTER — TELEPHONE (OUTPATIENT)
Dept: NEPHROLOGY | Facility: CLINIC | Age: 82
End: 2022-02-16
Payer: MEDICARE

## 2022-03-04 ENCOUNTER — OFFICE VISIT (OUTPATIENT)
Dept: NEPHROLOGY | Facility: CLINIC | Age: 82
End: 2022-03-04
Payer: MEDICARE

## 2022-03-04 VITALS
OXYGEN SATURATION: 100 % | DIASTOLIC BLOOD PRESSURE: 70 MMHG | HEIGHT: 62 IN | SYSTOLIC BLOOD PRESSURE: 140 MMHG | BODY MASS INDEX: 32.57 KG/M2 | WEIGHT: 177 LBS | HEART RATE: 83 BPM

## 2022-03-04 DIAGNOSIS — E21.3 HYPERPARATHYROIDISM: ICD-10-CM

## 2022-03-04 DIAGNOSIS — I10 HYPERTENSION, UNSPECIFIED TYPE: ICD-10-CM

## 2022-03-04 DIAGNOSIS — D64.9 ANEMIA, UNSPECIFIED TYPE: ICD-10-CM

## 2022-03-04 DIAGNOSIS — N18.30 STAGE 3 CHRONIC KIDNEY DISEASE, UNSPECIFIED WHETHER STAGE 3A OR 3B CKD: Primary | ICD-10-CM

## 2022-03-04 DIAGNOSIS — E55.9 VITAMIN D DEFICIENCY: ICD-10-CM

## 2022-03-04 DIAGNOSIS — R73.03 PREDIABETES: ICD-10-CM

## 2022-03-04 PROCEDURE — 1160F PR REVIEW ALL MEDS BY PRESCRIBER/CLIN PHARMACIST DOCUMENTED: ICD-10-PCS | Mod: CPTII,S$GLB,, | Performed by: NURSE PRACTITIONER

## 2022-03-04 PROCEDURE — 99214 PR OFFICE/OUTPT VISIT, EST, LEVL IV, 30-39 MIN: ICD-10-PCS | Mod: S$GLB,,, | Performed by: NURSE PRACTITIONER

## 2022-03-04 PROCEDURE — 1126F PR PAIN SEVERITY QUANTIFIED, NO PAIN PRESENT: ICD-10-PCS | Mod: CPTII,S$GLB,, | Performed by: NURSE PRACTITIONER

## 2022-03-04 PROCEDURE — 1159F PR MEDICATION LIST DOCUMENTED IN MEDICAL RECORD: ICD-10-PCS | Mod: CPTII,S$GLB,, | Performed by: NURSE PRACTITIONER

## 2022-03-04 PROCEDURE — 99999 PR PBB SHADOW E&M-EST. PATIENT-LVL IV: ICD-10-PCS | Mod: PBBFAC,,, | Performed by: NURSE PRACTITIONER

## 2022-03-04 PROCEDURE — 3078F PR MOST RECENT DIASTOLIC BLOOD PRESSURE < 80 MM HG: ICD-10-PCS | Mod: CPTII,S$GLB,, | Performed by: NURSE PRACTITIONER

## 2022-03-04 PROCEDURE — 1101F PR PT FALLS ASSESS DOC 0-1 FALLS W/OUT INJ PAST YR: ICD-10-PCS | Mod: CPTII,S$GLB,, | Performed by: NURSE PRACTITIONER

## 2022-03-04 PROCEDURE — 3288F PR FALLS RISK ASSESSMENT DOCUMENTED: ICD-10-PCS | Mod: CPTII,S$GLB,, | Performed by: NURSE PRACTITIONER

## 2022-03-04 PROCEDURE — 1126F AMNT PAIN NOTED NONE PRSNT: CPT | Mod: CPTII,S$GLB,, | Performed by: NURSE PRACTITIONER

## 2022-03-04 PROCEDURE — 99999 PR PBB SHADOW E&M-EST. PATIENT-LVL IV: CPT | Mod: PBBFAC,,, | Performed by: NURSE PRACTITIONER

## 2022-03-04 PROCEDURE — 1160F RVW MEDS BY RX/DR IN RCRD: CPT | Mod: CPTII,S$GLB,, | Performed by: NURSE PRACTITIONER

## 2022-03-04 PROCEDURE — 3288F FALL RISK ASSESSMENT DOCD: CPT | Mod: CPTII,S$GLB,, | Performed by: NURSE PRACTITIONER

## 2022-03-04 PROCEDURE — 3077F SYST BP >= 140 MM HG: CPT | Mod: CPTII,S$GLB,, | Performed by: NURSE PRACTITIONER

## 2022-03-04 PROCEDURE — 1159F MED LIST DOCD IN RCRD: CPT | Mod: CPTII,S$GLB,, | Performed by: NURSE PRACTITIONER

## 2022-03-04 PROCEDURE — 99214 OFFICE O/P EST MOD 30 MIN: CPT | Mod: S$GLB,,, | Performed by: NURSE PRACTITIONER

## 2022-03-04 PROCEDURE — 1101F PT FALLS ASSESS-DOCD LE1/YR: CPT | Mod: CPTII,S$GLB,, | Performed by: NURSE PRACTITIONER

## 2022-03-04 PROCEDURE — 3078F DIAST BP <80 MM HG: CPT | Mod: CPTII,S$GLB,, | Performed by: NURSE PRACTITIONER

## 2022-03-04 PROCEDURE — 3077F PR MOST RECENT SYSTOLIC BLOOD PRESSURE >= 140 MM HG: ICD-10-PCS | Mod: CPTII,S$GLB,, | Performed by: NURSE PRACTITIONER

## 2022-03-04 NOTE — PROGRESS NOTES
Subjective:       Patient ID: Rachel Asif is a 81 y.o. AA female who presents for new evaluation of  CKD    HPI     Last seen by me March. 2021.    Patient presents for f/u CKD.  Baseline creatinine of 1.3-1.4 since 2015. No labs available from Nov 2019 to August 2019, when patient was found to have sCr of 2.1 mg/dL, which improved to 1.8 --> 1.5. No recent labs.    Per PMR note in July 2020, had been taking meloxicam 15 mg daily daily since June 2019; now off completely.    Home BPs: 120s/80s    Cardiology initially started lasix in December 2019 when patient presented with new peripheral edema, JOHNSON, and chest tightness; lasix improved these symptoms. Lasix increased and added PRN dosing in July; patient says she had increased swelling around this time (also says it was the same time she started amlodipine). LVEF in December 2019 was 60%.    Significant hx includes HLD, atherosclerosis of aorta, anemia of chronic disease, prediabetes, vitamin D deficiency, hyperparathyroidism, GERD, OA, HTN recently diagnosed.      The patient denies NSAID. No recent hospitalizations. On PPI daily.     Significant family hx includes: HTN, edema, lupus (sister), DM, lung cancer, heart disease, MI; no known kidney disease in family    Last renal US: Sept 2020, reviewed. Small kidneys bilaterally.    Update 10/20/21:  Last seen by me in June 2021.  Presents for f/u of CKD.  Baseline sCr difficult to determine. Possibly 1.3-1.4 mg/dL, but most recent sCr level was 1.1 mg/dL.  Home BPs: 130-140s/80s p meds  Denies recent hospitalizations and denies NSAID use.    Update 3/4/22:  Presents for f/u of CKD.  Baseline sCr difficult to determine. Possibly 1.3-1.4 mg/dL.  Home BPs: 130-140/40-50s before and after meds  Denies recent hospitalizations and denies NSAID use.  On PPI PRN.    Review of Systems   Respiratory: Positive for shortness of breath (with far exertion).    Cardiovascular: Negative for leg swelling.  "  Gastrointestinal: Negative for diarrhea, nausea and vomiting.   Genitourinary: Negative for difficulty urinating, dysuria and hematuria.   Neurological: Negative for dizziness.       Objective:       Blood pressure (!) 140/70, pulse 83, height 5' 2" (1.575 m), weight 80.3 kg (177 lb 0.5 oz), SpO2 100 %.  Physical Exam  Constitutional:       General: She is not in acute distress.     Appearance: Normal appearance. She is well-developed. She is obese. She is not ill-appearing or diaphoretic.   Cardiovascular:      Rate and Rhythm: Normal rate and regular rhythm.   Pulmonary:      Effort: Pulmonary effort is normal.   Abdominal:      Tenderness: There is no right CVA tenderness or left CVA tenderness.   Musculoskeletal:      Cervical back: Neck supple.      Right lower leg: Edema (trace?) present.      Left lower leg: Edema (trace?) present.   Skin:     General: Skin is warm and dry.   Neurological:      Mental Status: She is alert and oriented to person, place, and time.   Psychiatric:         Mood and Affect: Mood normal.         Behavior: Behavior normal.         Thought Content: Thought content normal.         Judgment: Judgment normal.           Lab Results   Component Value Date    CREATININE 1.4 02/11/2022     Prot/Creat Ratio, Urine   Date Value Ref Range Status   02/11/2022 Unable to calculate 0.00 - 0.20 Final   10/13/2021 Unable to calculate 0.00 - 0.20 Final   06/21/2021 0.08 0.00 - 0.20 Final     Lab Results   Component Value Date     02/11/2022    K 4.4 02/11/2022    CO2 24 02/11/2022     02/11/2022     Lab Results   Component Value Date    .0 (H) 02/11/2022    CALCIUM 9.5 02/11/2022    PHOS 3.6 02/11/2022     Lab Results   Component Value Date    HGB 11.2 (L) 02/11/2022    WBC 6.92 02/11/2022    HCT 36.8 (L) 02/11/2022      Lab Results   Component Value Date    HGBA1C 5.8 (H) 11/12/2021     02/11/2022    BUN 15 02/11/2022     Lab Results   Component Value Date    LDLCALC 86.6 " 11/12/2021         Assessment:       1. Stage 3 chronic kidney disease, unspecified whether stage 3a or 3b CKD    2. Hypertension, unspecified type    3. Hyperparathyroidism    4. Vitamin D deficiency    5. Prediabetes    6. Anemia, unspecified type        Plan:   CKD stage 3B c eGFR 37-53.7 mL/min - CKD likely secondary to  age-related nephron loss and NSAID use, also possibly atherosclerotic disease (atherosclerosis noted to aorta). Most recent sCr has improved from baseline.  Educated patient to control BP, BG, remain well-hydrated, and avoid NSAIDs to prevent progression of CKD.   - No paraproteins or monoclonal peaks in Sept 2020.  UPCR Non-proteinuric.On olmesartan.   Acid-base WNL at last check. On K supplement.   Renal osteodystrophy Ca, phos okay. PTH elevated. Vitamin D low. Has been on ergocalciferol weekly. Has been referred to endocrinology for osteoporosis.   Anemia Hgb at goal for CKD   DM Pre-diabetic.   Lipid Management On statin.   ESRD planning Anticipatory guidance provided about timing of dialysis. Start discussions and planning when eGFR is about 20 mL/min; most patients start dialysis between 5-10 mL/min.       HTN - Okay at home on amlodipine 10 mg, furosemide 20 mg BID, hydralazine 50 mg BID PRN (has not been needing), metoprolol succinate 50 mg, olmesartan-HCTZ 40-25    All questions patient had were answered.  Asked if further questions. None. F/u in clinic in 6 mos with labs and urine prior to next visit or sooner if needed.  ER for emergency concerns.    Summary of Plan:  1. Continue ARB and ergocalciferol  2 avoid NSAID/ bactrim/ IV contrast/ gadolinium/ aminoglycoside where possible  3. RTC in 6 mos

## 2022-03-04 NOTE — PATIENT INSTRUCTIONS
Continue low sodium diet.  Avoid NSAID (ibuprofen, advil, motrin, aleve, naprosyn, naproxen, [place], Goody's Powder). Tylenol is okay.  Avoid bactrim/ IV contrast/ gadolinium/ aminoglycoside where possible.  Avoid herbal supplements.  Stay hydrated.  Return to clinic in 6 months with labs (bloodwork and urine) or sooner if needed.  Go to the ER with any emergency concerns.

## 2022-03-11 DIAGNOSIS — I12.9 BENIGN HYPERTENSION WITH CHRONIC KIDNEY DISEASE, STAGE IV: ICD-10-CM

## 2022-03-11 DIAGNOSIS — N18.4 BENIGN HYPERTENSION WITH CHRONIC KIDNEY DISEASE, STAGE IV: ICD-10-CM

## 2022-03-11 RX ORDER — POTASSIUM CHLORIDE 20 MEQ/1
TABLET, EXTENDED RELEASE ORAL
Qty: 90 TABLET | Refills: 0 | Status: SHIPPED | OUTPATIENT
Start: 2022-03-11 | End: 2022-06-16

## 2022-03-11 NOTE — TELEPHONE ENCOUNTER
Care Due:                  Date            Visit Type   Department     Provider  --------------------------------------------------------------------------------                                             [facility]                              ESTABLISHED   MED/ INTERNAL  Catarino Erazo  Last Visit: 01-      PATIENT      MED/ PEDS      Hemal [place] -         [facility]                              PRIMARY      MED/ INTERNAL  [facility] Clare Kacey  Next Visit: 05-      CARE ([facility])   MED/ PEDS      Hemal Pace                                                            Last  Test          Frequency    Reason                     Performed    Due Date  --------------------------------------------------------------------------------    CMP.........  12 months..  atorvastatin.............  08- 08-    Powered by [employer]. Reference number: [id number].   3/11/2022 10:39:07 AM CST

## 2022-03-11 NOTE — TELEPHONE ENCOUNTER

## 2022-05-10 ENCOUNTER — TELEPHONE (OUTPATIENT)
Dept: OPHTHALMOLOGY | Facility: CLINIC | Age: 82
End: 2022-05-10
Payer: MEDICARE

## 2022-05-16 ENCOUNTER — OFFICE VISIT (OUTPATIENT)
Dept: PHYSICAL MEDICINE AND REHAB | Facility: CLINIC | Age: 82
End: 2022-05-16
Payer: MEDICARE

## 2022-05-16 VITALS
DIASTOLIC BLOOD PRESSURE: 77 MMHG | HEIGHT: 62 IN | WEIGHT: 191.13 LBS | BODY MASS INDEX: 35.17 KG/M2 | SYSTOLIC BLOOD PRESSURE: 159 MMHG | HEART RATE: 75 BPM

## 2022-05-16 DIAGNOSIS — M54.41 CHRONIC MIDLINE LOW BACK PAIN WITH RIGHT-SIDED SCIATICA: Primary | ICD-10-CM

## 2022-05-16 DIAGNOSIS — M25.561 CHRONIC PAIN OF RIGHT KNEE: ICD-10-CM

## 2022-05-16 DIAGNOSIS — M17.0 PRIMARY OSTEOARTHRITIS OF BOTH KNEES: ICD-10-CM

## 2022-05-16 DIAGNOSIS — G89.29 CHRONIC MIDLINE LOW BACK PAIN WITH RIGHT-SIDED SCIATICA: Primary | ICD-10-CM

## 2022-05-16 DIAGNOSIS — G89.29 CHRONIC NECK PAIN: ICD-10-CM

## 2022-05-16 DIAGNOSIS — M47.816 LUMBAR FACET ARTHROPATHY: ICD-10-CM

## 2022-05-16 DIAGNOSIS — E66.9 OBESITY (BMI 30.0-34.9): ICD-10-CM

## 2022-05-16 DIAGNOSIS — G89.29 CHRONIC PAIN OF RIGHT KNEE: ICD-10-CM

## 2022-05-16 DIAGNOSIS — M54.2 CHRONIC NECK PAIN: ICD-10-CM

## 2022-05-16 DIAGNOSIS — M47.22 OSTEOARTHRITIS OF SPINE WITH RADICULOPATHY, CERVICAL REGION: ICD-10-CM

## 2022-05-16 PROCEDURE — 99999 PR PBB SHADOW E&M-EST. PATIENT-LVL II: CPT | Mod: PBBFAC,,, | Performed by: PHYSICAL MEDICINE & REHABILITATION

## 2022-05-16 PROCEDURE — 1126F AMNT PAIN NOTED NONE PRSNT: CPT | Mod: CPTII,S$GLB,, | Performed by: PHYSICAL MEDICINE & REHABILITATION

## 2022-05-16 PROCEDURE — 1126F PR PAIN SEVERITY QUANTIFIED, NO PAIN PRESENT: ICD-10-PCS | Mod: CPTII,S$GLB,, | Performed by: PHYSICAL MEDICINE & REHABILITATION

## 2022-05-16 PROCEDURE — 99999 PR PBB SHADOW E&M-EST. PATIENT-LVL II: ICD-10-PCS | Mod: PBBFAC,,, | Performed by: PHYSICAL MEDICINE & REHABILITATION

## 2022-05-16 PROCEDURE — 3078F PR MOST RECENT DIASTOLIC BLOOD PRESSURE < 80 MM HG: ICD-10-PCS | Mod: CPTII,S$GLB,, | Performed by: PHYSICAL MEDICINE & REHABILITATION

## 2022-05-16 PROCEDURE — 3078F DIAST BP <80 MM HG: CPT | Mod: CPTII,S$GLB,, | Performed by: PHYSICAL MEDICINE & REHABILITATION

## 2022-05-16 PROCEDURE — 99214 OFFICE O/P EST MOD 30 MIN: CPT | Mod: S$GLB,,, | Performed by: PHYSICAL MEDICINE & REHABILITATION

## 2022-05-16 PROCEDURE — 3077F SYST BP >= 140 MM HG: CPT | Mod: CPTII,S$GLB,, | Performed by: PHYSICAL MEDICINE & REHABILITATION

## 2022-05-16 PROCEDURE — 99214 PR OFFICE/OUTPT VISIT, EST, LEVL IV, 30-39 MIN: ICD-10-PCS | Mod: S$GLB,,, | Performed by: PHYSICAL MEDICINE & REHABILITATION

## 2022-05-16 PROCEDURE — 3077F PR MOST RECENT SYSTOLIC BLOOD PRESSURE >= 140 MM HG: ICD-10-PCS | Mod: CPTII,S$GLB,, | Performed by: PHYSICAL MEDICINE & REHABILITATION

## 2022-05-16 RX ORDER — DICLOFENAC SODIUM 10 MG/G
GEL TOPICAL 3 TIMES DAILY PRN
Qty: 5 EACH | Refills: 2 | Status: SHIPPED | OUTPATIENT
Start: 2022-05-16

## 2022-05-16 RX ORDER — TRAMADOL HYDROCHLORIDE 50 MG/1
50 TABLET ORAL 3 TIMES DAILY PRN
Qty: 90 TABLET | Refills: 1 | Status: SHIPPED | OUTPATIENT
Start: 2022-05-16 | End: 2022-11-25 | Stop reason: SDUPTHER

## 2022-05-16 NOTE — PROGRESS NOTES
Subjective:       Patient ID: Rachel Asif is a 81 y.o. female.    Chief Complaint: No chief complaint on file.    HPI    HISTORY OF PRESENT ILLNESS:  Ms. Asif is an 81-year-old black female with hypertension and osteoarthritis who is followed up in the [facility] for chronic low back pain with lumbar radiculopathy, chronic neck pain with history of cervical radiculopathy and OA of the knees.  Her last visit to the clinic was on 11/18/2021.  She was maintained on Cymbalta, p.r.n. tramadol and diclofenac gel.    The patient is coming to the clinic for followup.  Her back pain has been under good control.  It is an intermittent aching pain in the lumbar spine and across her back.  It is usually localized.  Her maximum pain is 3-4/10 and minimum 0/10.  Today, it is 0/10.  The patient denies any lower extremity weakness or numbness.  She denies any bowel or bladder incontinence.    Her neck pain has been under good control.  It is an intermittent aching and stiffness pain in the cervical spine.  It is localized.  Her maximum pain is 3-4/10 and minimum 0/10.  Today, it is 0/10.  The patient denies any upper extremity weakness or hand numbness.      Her bilateral knee pain has been under good control. Her maximum pain is 3-4/10 and minimum 0/10; today it is 0/10.    She is currently taking:  - duloxetine 30 mg po qd.  - tramadol 50 mg p.r.n., usually 2-3 times per week  - diclofenac gel intermittently for her knees.   She was previously on meloxicam, but it was discontinued due to renal function impairment.  She is trying to stay active.  She rides her bicycle regularly.      Past Medical History:   Diagnosis Date    Amblyopia     os    Anemia of other chronic disease     Atherosclerosis of aorta     noted on L-spine X-ray 4/14/2011    Atherosclerosis of aortic arch     - noted on CXR 5/2017    Chronic low back pain     followed by orthopaedics    DJD (degenerative joint disease)      Glaucoma     History of colonic polyps     History of vitamin D deficiency     Hyperlipidemia     Hypertension     Lumbar radiculopathy     Obesity     Obesity     OH (ocular hypertension)     Osteoarthritis     Osteoporosis, post-menopausal     currently on a drug holiday    PCO (posterior capsular opacification), right 2/27/2020    Postmenopausal status     Prediabetes     Secondary hyperparathyroidism of renal origin     Trochanteric bursitis          Review of Systems   Constitutional: Negative for chills and fever.   Eyes: Negative for visual disturbance.   Respiratory: Negative for shortness of breath.    Cardiovascular: Negative for chest pain.   Gastrointestinal: Negative for blood in stool, constipation, nausea and vomiting.   Genitourinary: Negative for difficulty urinating.   Musculoskeletal: Positive for arthralgias, back pain and neck pain. Negative for gait problem.   Neurological: Negative for dizziness and headaches.   Psychiatric/Behavioral: Negative for behavioral problems and sleep disturbance.       Objective:      Physical Exam  Vitals reviewed.   Constitutional:       Appearance: She is well-developed.   HENT:      Head: Normocephalic and atraumatic.   Neck:      Comments: -ve tenderness.  Musculoskeletal:      Cervical back: Normal range of motion.      Comments: BUE:  ROM:full.  Strength:    RUE: 5/5 at shoulder abduction, 5 elbow flexion, 5 elbow extension, 5 hand .   LUE: 5/5 at shoulder abduction, 5 elbow flexion, 5 elbow extension, 5 hand .  Sensation to pinprick:   RUE: intact.   LUE: intact.        BLE:  ROM:full.  Mild bilateral knee crepitus.  Strength:    RLE: 5/5 at hip flexion, 5 knee extension, 5 ankle DF, 5 PF.   LLE: 5/5 at hip flexion, 5 knee extension, 5 ankle DF, 5 PF.  Sensation to pinprick:     RLE: intact.      LLE: intact.   SLR (sitting):      RLE: -ve.      LLE: -ve.     -ve tenderness over lumbar spine.    Gait: some waddling.     Skin:      General: Skin is warm.   Neurological:      Mental Status: She is alert.   Psychiatric:         Behavior: Behavior normal.             Assessment:       1. Chronic midline low back pain with right-sided sciatica    2. Lumbar facet arthropathy    3. Chronic neck pain    4. Osteoarthritis of spine with radiculopathy, cervical region    5. Primary osteoarthritis of both knees (moderate)    6. Obesity (BMI 30.0-34.9)        Plan:     - Continue duloxetine (Cymbalta) 30 mg capsule, 1 capsule by mouth once daily  - Continue diclofenac sodium (VOLTAREN) 1 % Gel; Apply topically 3 (three) times daily as needed.  - Continue traMADoL (ULTRAM) 50 mg tablet; Take 1 tablet (50 mg total) by mouth daily as needed for Pain.  - Regular home exercise program was encouraged.  - Follow up in about 4 months (around 9/16/2022).    This was a 25 minute visit, more than 50% of which was spent counseling the patient about the diagnosis and the treatment plan.    This note was partly generated with [employer] voice recognition software. I apologize for any possible typographical errors.

## 2022-05-18 ENCOUNTER — OFFICE VISIT (OUTPATIENT)
Dept: FAMILY MEDICINE | Facility: CLINIC | Age: 82
End: 2022-05-18
Payer: MEDICARE

## 2022-05-18 VITALS
OXYGEN SATURATION: 97 % | DIASTOLIC BLOOD PRESSURE: 60 MMHG | WEIGHT: 191.38 LBS | TEMPERATURE: 98 F | HEIGHT: 62 IN | SYSTOLIC BLOOD PRESSURE: 134 MMHG | HEART RATE: 87 BPM | BODY MASS INDEX: 35.22 KG/M2

## 2022-05-18 DIAGNOSIS — I70.0 ATHEROSCLEROSIS OF AORTA: ICD-10-CM

## 2022-05-18 DIAGNOSIS — N18.4 BENIGN HYPERTENSION WITH CHRONIC KIDNEY DISEASE, STAGE IV: ICD-10-CM

## 2022-05-18 DIAGNOSIS — K21.9 GASTROESOPHAGEAL REFLUX DISEASE WITHOUT ESOPHAGITIS: ICD-10-CM

## 2022-05-18 DIAGNOSIS — I12.9 BENIGN HYPERTENSION WITH CHRONIC KIDNEY DISEASE, STAGE IV: ICD-10-CM

## 2022-05-18 DIAGNOSIS — E55.9 VITAMIN D DEFICIENCY: ICD-10-CM

## 2022-05-18 DIAGNOSIS — N25.81 SECONDARY HYPERPARATHYROIDISM OF RENAL ORIGIN: ICD-10-CM

## 2022-05-18 DIAGNOSIS — M17.0 PRIMARY OSTEOARTHRITIS OF BOTH KNEES: ICD-10-CM

## 2022-05-18 DIAGNOSIS — Z00.00 ROUTINE MEDICAL EXAM: Primary | ICD-10-CM

## 2022-05-18 DIAGNOSIS — M81.0 OSTEOPOROSIS, POST-MENOPAUSAL: ICD-10-CM

## 2022-05-18 DIAGNOSIS — E66.01 SEVERE OBESITY (BMI 35.0-39.9) WITH COMORBIDITY: ICD-10-CM

## 2022-05-18 DIAGNOSIS — R73.03 PREDIABETES: ICD-10-CM

## 2022-05-18 DIAGNOSIS — E78.5 HYPERLIPIDEMIA WITH TARGET LDL LESS THAN 100: ICD-10-CM

## 2022-05-18 PROCEDURE — 3288F FALL RISK ASSESSMENT DOCD: CPT | Mod: CPTII,S$GLB,, | Performed by: INTERNAL MEDICINE

## 2022-05-18 PROCEDURE — 1160F PR REVIEW ALL MEDS BY PRESCRIBER/CLIN PHARMACIST DOCUMENTED: ICD-10-PCS | Mod: CPTII,S$GLB,, | Performed by: INTERNAL MEDICINE

## 2022-05-18 PROCEDURE — 1126F PR PAIN SEVERITY QUANTIFIED, NO PAIN PRESENT: ICD-10-PCS | Mod: CPTII,S$GLB,, | Performed by: INTERNAL MEDICINE

## 2022-05-18 PROCEDURE — 1101F PR PT FALLS ASSESS DOC 0-1 FALLS W/OUT INJ PAST YR: ICD-10-PCS | Mod: CPTII,S$GLB,, | Performed by: INTERNAL MEDICINE

## 2022-05-18 PROCEDURE — 1159F PR MEDICATION LIST DOCUMENTED IN MEDICAL RECORD: ICD-10-PCS | Mod: CPTII,S$GLB,, | Performed by: INTERNAL MEDICINE

## 2022-05-18 PROCEDURE — 1126F AMNT PAIN NOTED NONE PRSNT: CPT | Mod: CPTII,S$GLB,, | Performed by: INTERNAL MEDICINE

## 2022-05-18 PROCEDURE — 3288F PR FALLS RISK ASSESSMENT DOCUMENTED: ICD-10-PCS | Mod: CPTII,S$GLB,, | Performed by: INTERNAL MEDICINE

## 2022-05-18 PROCEDURE — 3078F DIAST BP <80 MM HG: CPT | Mod: CPTII,S$GLB,, | Performed by: INTERNAL MEDICINE

## 2022-05-18 PROCEDURE — 1159F MED LIST DOCD IN RCRD: CPT | Mod: CPTII,S$GLB,, | Performed by: INTERNAL MEDICINE

## 2022-05-18 PROCEDURE — 99999 PR PBB SHADOW E&M-EST. PATIENT-LVL III: ICD-10-PCS | Mod: PBBFAC,,, | Performed by: INTERNAL MEDICINE

## 2022-05-18 PROCEDURE — 3075F PR MOST RECENT SYSTOLIC BLOOD PRESS GE 130-139MM HG: ICD-10-PCS | Mod: CPTII,S$GLB,, | Performed by: INTERNAL MEDICINE

## 2022-05-18 PROCEDURE — 99397 PR PREVENTIVE VISIT,EST,65 & OVER: ICD-10-PCS | Mod: S$GLB,,, | Performed by: INTERNAL MEDICINE

## 2022-05-18 PROCEDURE — 99397 PER PM REEVAL EST PAT 65+ YR: CPT | Mod: S$GLB,,, | Performed by: INTERNAL MEDICINE

## 2022-05-18 PROCEDURE — 1160F RVW MEDS BY RX/DR IN RCRD: CPT | Mod: CPTII,S$GLB,, | Performed by: INTERNAL MEDICINE

## 2022-05-18 PROCEDURE — 1101F PT FALLS ASSESS-DOCD LE1/YR: CPT | Mod: CPTII,S$GLB,, | Performed by: INTERNAL MEDICINE

## 2022-05-18 PROCEDURE — 3075F SYST BP GE 130 - 139MM HG: CPT | Mod: CPTII,S$GLB,, | Performed by: INTERNAL MEDICINE

## 2022-05-18 PROCEDURE — 99999 PR PBB SHADOW E&M-EST. PATIENT-LVL III: CPT | Mod: PBBFAC,,, | Performed by: INTERNAL MEDICINE

## 2022-05-18 PROCEDURE — 3078F PR MOST RECENT DIASTOLIC BLOOD PRESSURE < 80 MM HG: ICD-10-PCS | Mod: CPTII,S$GLB,, | Performed by: INTERNAL MEDICINE

## 2022-05-18 RX ORDER — FUROSEMIDE 20 MG/1
20 TABLET ORAL 2 TIMES DAILY
Qty: 180 TABLET | Refills: 0 | Status: SHIPPED | OUTPATIENT
Start: 2022-05-18 | End: 2022-11-25 | Stop reason: SDUPTHER

## 2022-05-18 RX ORDER — OLMESARTAN MEDOXOMIL AND HYDROCHLOROTHIAZIDE 40/25 40; 25 MG/1; MG/1
1 TABLET ORAL DAILY
Qty: 90 TABLET | Refills: 1 | Status: SHIPPED | OUTPATIENT
Start: 2022-05-18 | End: 2022-11-25 | Stop reason: SDUPTHER

## 2022-05-18 RX ORDER — METOPROLOL SUCCINATE 50 MG/1
50 TABLET, EXTENDED RELEASE ORAL DAILY
Qty: 90 TABLET | Refills: 0 | Status: SHIPPED | OUTPATIENT
Start: 2022-05-18 | End: 2022-11-25 | Stop reason: SDUPTHER

## 2022-05-18 RX ORDER — ATORVASTATIN CALCIUM 20 MG/1
TABLET, FILM COATED ORAL
Qty: 90 TABLET | Refills: 1 | Status: SHIPPED | OUTPATIENT
Start: 2022-05-18 | End: 2022-11-29

## 2022-05-18 RX ORDER — PANTOPRAZOLE SODIUM 20 MG/1
TABLET, DELAYED RELEASE ORAL
Qty: 90 TABLET | Refills: 0 | Status: SHIPPED | OUTPATIENT
Start: 2022-05-18 | End: 2023-02-19

## 2022-05-18 NOTE — PROGRESS NOTES
HISTORY OF PRESENT ILLNESS:  Rachel Asif is a 81 y.o. female who presents to the clinic today for a routine physical exam. Her last physical exam was approximately 1 years(s) ago.        PAST MEDICAL HISTORY:  Past Medical History:   Diagnosis Date    Amblyopia     os    Anemia of other chronic disease     Atherosclerosis of aorta     noted on L-spine X-ray 2011    Atherosclerosis of aortic arch     - noted on CXR 2017    Chronic low back pain     followed by orthopaedics    DJD (degenerative joint disease)     Glaucoma     History of colonic polyps     History of vitamin D deficiency     Hyperlipidemia     Hypertension     Lumbar radiculopathy     Obesity     Obesity     OH (ocular hypertension)     Osteoarthritis     Osteoporosis, post-menopausal     currently on a drug holiday    PCO (posterior capsular opacification), right 2020    Postmenopausal status     Prediabetes     Secondary hyperparathyroidism of renal origin     Trochanteric bursitis        PAST SURGICAL HISTORY:  Past Surgical History:   Procedure Laterality Date    CATARACT EXTRACTION W/  INTRAOCULAR LENS IMPLANT Bilateral     COLONOSCOPY N/A 8/10/2020    Procedure: COLONOSCOPY;  Surgeon: Bettina Gates MD;  Location: [facility];  Service: Endoscopy;  Laterality: N/A;       SOCIAL HISTORY:  Social History     Socioeconomic History    Marital status:    Occupational History    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 She is taking care of a sister who has brain damage from a car accident.       FAMILY HISTORY:  Family History   Problem Relation Age of Onset    Cataracts Mother     Hypertension Mother     Other Mother         complications from  splenectomy after fall    Cataracts Father     Hypertension Father     Hypertension Sister     Edema Sister     Hypertension Brother     Diabetes Brother     Heart disease Brother     Glaucoma Maternal Grandmother     Hypertension Sister     Other Sister         shingles    Lupus Sister     Lung cancer Sister     Hypertension Sister     Other Sister         brain damage from MVA    Hypertension Sister     Hypertension Brother     Heart disease Brother     Hypertension Brother     Heart attack Brother     Hypertension Brother     Hypertension Brother     No Known Problems Brother     No Known Problems Brother     Hypertension Brother     Heart disease Brother     Cancer Neg Hx     Amblyopia Neg Hx     Blindness Neg Hx     Retinal detachment Neg Hx     Strabismus Neg Hx     Stroke Neg Hx        ALLERGIES AND MEDICATIONS: updated and reviewed.  Review of patient's allergies indicates:   Allergen Reactions    No known allergies      Medication List with Changes/Refills   Current Medications    AMLODIPINE (NORVASC) 10 MG TABLET    TAKE 1 TABLET(10 MG) BY MOUTH EVERY DAY    ASPIRIN 81 MG CHEWABLE TABLET    Every day    DICLOFENAC SODIUM (VOLTAREN) 1 % GEL    Apply topically 3 (three) times daily as needed.    DULOXETINE (CYMBALTA) 30 MG CAPSULE    Take 1 capsule (30 mg total) by mouth once daily.    ERGOCALCIFEROL (ERGOCALCIFEROL) 50,000 UNIT CAP    TAKE 1 CAPSULE BY MOUTH EVERY 7 DAYS    HYDRALAZINE (APRESOLINE) 50 MG TABLET    TAKE 1 TABLET(50 MG) BY MOUTH THREE TIMES DAILY    POTASSIUM CHLORIDE SA (K-DUR,KLOR-CON) 20 MEQ TABLET    TAKE 1 TABLET(20 MEQ) BY MOUTH EVERY DAY    PROPYLENE GLYCOL/ (SYSTANE OPHT)    Weekly PRN    TRAMADOL (ULTRAM) 50 MG TABLET    Take 1 tablet (50 mg total) by mouth 3 (three) times daily as needed for Pain.    TRAVOPROST (TRAVATAN Z) 0.004 % OPHTHALMIC SOLUTION    Place 1 drop into both eyes every evening.   Changed and/or Refilled Medications     Modified Medication Previous Medication    ATORVASTATIN (LIPITOR) 20 MG TABLET atorvastatin (LIPITOR) 20 MG tablet       TAKE 1 TABLET(20 MG) BY MOUTH EVERY DAY    TAKE 1 TABLET(20 MG) BY MOUTH EVERY DAY    FUROSEMIDE (LASIX) 20 MG TABLET furosemide (LASIX) 20 MG tablet       Take 1 tablet (20 mg total) by mouth 2 (two) times a day.    TAKE 1 TABLET(20 MG) BY MOUTH TWICE DAILY    METOPROLOL SUCCINATE (TOPROL-XL) 50 MG 24 HR TABLET metoprolol succinate (TOPROL-XL) 50 MG 24 hr tablet       Take 1 tablet (50 mg total) by mouth once daily.    TAKE 1 TABLET(50 MG) BY MOUTH EVERY DAY    OLMESARTAN-HYDROCHLOROTHIAZIDE (BENICAR HCT) 40-25 MG PER TABLET olmesartan-hydrochlorothiazide (BENICAR HCT) 40-25 mg per tablet       Take 1 tablet by mouth once daily.    Take 1 tablet by mouth once daily.    PANTOPRAZOLE (PROTONIX) 20 MG TABLET pantoprazole (PROTONIX) 20 MG tablet       TAKE 1 TABLET BY MOUTH EVERY DAY TO PROTECT STOMACH DO TO TAKING NSAIDS    TAKE 1 TABLET BY MOUTH EVERY DAY TO PROTECT STOMACH DO TO TAKING NSAIDS   Discontinued Medications    FLU VAC 2021 [id number],PF, (FLUAD QUAD) 60 MCG (15 MCG X 4)/0.5 ML SYRG    inject into muscle by [facility]          CARE TEAM:  Patient Care Team:  Oneyda Pace MD as PCP - General (Internal Medicine)  Yasmin Meyer LPN as Licensed Practical Nurse           SCREENING HISTORY:  Health Maintenance       Date Due Completion Date    COVID-19 Vaccine (4 - Booster for Moderna series) 05/19/2022 1/19/2022    Lipid Panel 11/12/2022 11/12/2021    Hemoglobin A1c 11/12/2022 11/12/2021    DEXA Scan 12/16/2023 12/16/2021    Override on 10/18/2011: Done    TETANUS VACCINE 06/20/2026 6/20/2016            REVIEW OF SYSTEMS:   The patient reports: fair dietary habits.  The patient reports : that they do not exercise but she tries to stay active.  Review of Systems   Constitutional: Negative for chills, fatigue, fever and unexpected weight change.   HENT: Negative for congestion and  "postnasal drip.    Eyes: Negative for pain and visual disturbance.   Respiratory: Negative for cough, shortness of breath and wheezing.    Cardiovascular: Negative for chest pain, palpitations and leg swelling.   Gastrointestinal: Negative for abdominal pain, constipation, diarrhea, nausea and vomiting.   Genitourinary: Negative for dysuria.   Musculoskeletal: Positive for arthralgias (- chronic; knees). Negative for back pain.   Skin: Negative for rash.   Neurological: Negative for weakness and headaches.   Psychiatric/Behavioral: Negative for dysphoric mood and sleep disturbance. The patient is not nervous/anxious.       ROS (Optional)-: no pelvic pain  Breast ROS (Optional)-: negative for breast lumps/discharge            Physical Examination:   Vitals:    05/18/22 1444   BP: 134/60   Pulse: 87   Temp: 98.1 °F (36.7 °C)     Weight: 86.8 kg (191 lb 5.8 oz)   Height: 5' 2" (157.5 cm)   Body mass index is 35 kg/m².      Patient did not require to have a chaperone present during the exam today.    General appearance - alert, well appearing, and in no distress, obese  Psychiatric - alert, oriented to person, place, and time, normal behavior, speech, dress, motor activity and thought process  Eyes - pupils equal and reactive, extraocular eye movements intact, sclera anicteric  Mouth - not examined; patient wearing mask due to Covid 19 pandemic  Neck - supple, no significant adenopathy, carotids upstroke normal bilaterally, no bruits  Lymphatics - no palpable cervical lymphadenopathy  Chest - clear to auscultation, no wheezes, rales or rhonchi, symmetric air entry  Heart - normal rate and regular rhythm, no gallops noted  Back exam - mild limit in range of motion noted on exam due to age and body habitus, no significant pain with motion noted during exam, in depth exam deferred  Neurological - alert, normal speech, no focal findings, cranial nerves II through XII intact  Musculoskeletal - patient noted to have " Moderate osteoarthritic changes to both knee joints. No joint effusions noted., no muscular tenderness noted  Extremities - no pedal edema noted  Skin - normal coloration, no suspicious skin lesions      Labs:  No labs needed at this time      ASSESSMENT AND PLAN:  1. Routine medical exam  Counseled on age appropriate medical preventative services including age appropriate cancer screenings, age appropriate eye and dental exams, over all nutritional health, need for a consistent exercise regimen, and an over all push towards maintaining a vigorous and active lifestyle.  Counseled on age appropriate vaccines and discussed upcoming health care needs based on age/gender. Discussed good sleep hygiene and stress management.    2. Benign hypertension with chronic kidney disease, stage IV  Discussed sodium restriction, maintaining ideal body weight and regular exercise program as physiologic means to achieve blood pressure control. The patient will strive towards this.   The current medical regimen is effective;  continue present plan and medications. Recommended patient to check home readings to monitor and see me for followup as scheduled or sooner as needed.   Patient was educated that both decongestant and anti-inflammatory medication may raise blood pressure.  Stable decreased kidney function. Observe. Patient counseled to avoid/minimize the use of anti-inflammatory  Medication. Discussed to stay well hydrated. Also discussed with patient that good control of blood pressure and/or diabetes, if present, will help to prevent progression. She is followed by nephrology.  The patient is not active on the digital hypertension program.  - olmesartan-hydrochlorothiazide (BENICAR HCT) 40-25 mg per tablet; Take 1 tablet by mouth once daily.  Dispense: 90 tablet; Refill: 1  - metoprolol succinate (TOPROL-XL) 50 MG 24 hr tablet; Take 1 tablet (50 mg total) by mouth once daily.  Dispense: 90 tablet; Refill: 0  - furosemide (LASIX)  20 MG tablet; Take 1 tablet (20 mg total) by mouth 2 (two) times a day.  Dispense: 180 tablet; Refill: 0    3. Hyperlipidemia with target LDL less than 100  We discussed low fat diet and regular exercise.The current medical regimen is effective;  continue present plan and medications.   - atorvastatin (LIPITOR) 20 MG tablet; TAKE 1 TABLET(20 MG) BY MOUTH EVERY DAY  Dispense: 90 tablet; Refill: 1    4. Prediabetes  Stable. We discussed low sugar/low carbohydrate diet and regular exercise to prevent progression. No need for prescription medication at this time.    5. Secondary hyperparathyroidism of renal origin  Stable. Asymptomatic. Observe.    6. Atherosclerosis of aorta  Patient with Atherosclerosis of the Aorta.  Stable/asymptomatic. Currently stable on lipid lowering medication and blood pressure monitoring.    7. Gastroesophageal reflux disease without esophagitis  Symptoms controlled: yes - but she has been taking medication daily.   Reflux precautions discussed (eliminate tobacco if a smoker; minimize caffeine, chocolate and red/white peppermint intake; avoid heavy and spicy meals; don't lay down within 2-3 hours after eating; minimize the intake of NSAIDs). Medication as needed.   Patient asked to take medication breaks, if possible - discussed chronic use can limit calcium absorption (which can lead to osteopenia/osteoporosis), increases the risk for intestinal infections, and can cause kidney damage. There are also some newer studies that show possible increased risk of mortality.  - pantoprazole (PROTONIX) 20 MG tablet; TAKE 1 TABLET BY MOUTH EVERY DAY TO PROTECT STOMACH DO TO TAKING NSAIDS  Dispense: 90 tablet; Refill: 0    8. Osteoporosis, post-menopausal/9. Vitamin D deficiency  We discussed adequate calcium and vitamin D supplementation. We discussed fall precautions. She is up to date on her BMD.  Last bone density test 12/21 recommended prescription treatment.  We will work with her to see an  endocrinologist for further recommendations.    10. Primary osteoarthritis of both knees  The current medical regimen is effective;  continue present plan and medications.     11. Severe obesity (BMI 35.0-39.9) with comorbidity  The patient is asked to make an attempt to improve diet and exercise patterns to aid in medical management of this problem.          No orders of the defined types were placed in this encounter.     Follow up in about 6 months (around 11/18/2022), or if symptoms worsen or fail to improve, for follow up chronic medical conditions.. or sooner as needed.

## 2022-05-27 ENCOUNTER — OFFICE VISIT (OUTPATIENT)
Dept: OPHTHALMOLOGY | Facility: CLINIC | Age: 82
End: 2022-05-27
Payer: MEDICARE

## 2022-05-27 DIAGNOSIS — H04.123 DRY EYE SYNDROME, BILATERAL: ICD-10-CM

## 2022-05-27 DIAGNOSIS — H53.002 AMBLYOPIA, LEFT: ICD-10-CM

## 2022-05-27 DIAGNOSIS — Z96.1 PSEUDOPHAKIA: ICD-10-CM

## 2022-05-27 DIAGNOSIS — I10 ESSENTIAL HYPERTENSION: ICD-10-CM

## 2022-05-27 DIAGNOSIS — H40.053 OCULAR HYPERTENSION, BILATERAL: Primary | ICD-10-CM

## 2022-05-27 DIAGNOSIS — H26.491 PCO (POSTERIOR CAPSULAR OPACIFICATION), RIGHT: ICD-10-CM

## 2022-05-27 DIAGNOSIS — H43.813 VITREOUS DETACHMENT, BILATERAL: ICD-10-CM

## 2022-05-27 PROCEDURE — 1159F PR MEDICATION LIST DOCUMENTED IN MEDICAL RECORD: ICD-10-PCS | Mod: CPTII,S$GLB,, | Performed by: OPHTHALMOLOGY

## 2022-05-27 PROCEDURE — 1101F PT FALLS ASSESS-DOCD LE1/YR: CPT | Mod: CPTII,S$GLB,, | Performed by: OPHTHALMOLOGY

## 2022-05-27 PROCEDURE — 92014 COMPRE OPH EXAM EST PT 1/>: CPT | Mod: S$GLB,,, | Performed by: OPHTHALMOLOGY

## 2022-05-27 PROCEDURE — 1160F RVW MEDS BY RX/DR IN RCRD: CPT | Mod: CPTII,S$GLB,, | Performed by: OPHTHALMOLOGY

## 2022-05-27 PROCEDURE — 3288F PR FALLS RISK ASSESSMENT DOCUMENTED: ICD-10-PCS | Mod: CPTII,S$GLB,, | Performed by: OPHTHALMOLOGY

## 2022-05-27 PROCEDURE — 99999 PR PBB SHADOW E&M-EST. PATIENT-LVL III: ICD-10-PCS | Mod: PBBFAC,,, | Performed by: OPHTHALMOLOGY

## 2022-05-27 PROCEDURE — 99999 PR PBB SHADOW E&M-EST. PATIENT-LVL III: CPT | Mod: PBBFAC,,, | Performed by: OPHTHALMOLOGY

## 2022-05-27 PROCEDURE — 3288F FALL RISK ASSESSMENT DOCD: CPT | Mod: CPTII,S$GLB,, | Performed by: OPHTHALMOLOGY

## 2022-05-27 PROCEDURE — 92014 PR EYE EXAM, EST PATIENT,COMPREHESV: ICD-10-PCS | Mod: S$GLB,,, | Performed by: OPHTHALMOLOGY

## 2022-05-27 PROCEDURE — 1159F MED LIST DOCD IN RCRD: CPT | Mod: CPTII,S$GLB,, | Performed by: OPHTHALMOLOGY

## 2022-05-27 PROCEDURE — 1101F PR PT FALLS ASSESS DOC 0-1 FALLS W/OUT INJ PAST YR: ICD-10-PCS | Mod: CPTII,S$GLB,, | Performed by: OPHTHALMOLOGY

## 2022-05-27 PROCEDURE — 1160F PR REVIEW ALL MEDS BY PRESCRIBER/CLIN PHARMACIST DOCUMENTED: ICD-10-PCS | Mod: CPTII,S$GLB,, | Performed by: OPHTHALMOLOGY

## 2022-05-28 NOTE — PROGRESS NOTES
Subjective:       Patient ID: Rachel Asif is a 81 y.o. female.    Chief Complaint: Concerns About Ocular Health, Diabetic Eye Exam, and Glaucoma    HPI     Dls: 6/25/21 Dr. Duval    82 y/o female presents today for diabetic and hypertensive eye exam.   Pt states no changes in vision. Pt wears bifocal glasses states she is   doing well with glasses.     LBS controlled     + tearing  No itching  No burning  No pain  No ha's  No floaters  No flashes    Eye meds  Travatan OU BID last dose this am     Last edited by Edna Neri MA on 5/27/2022  1:29 PM. (History)             Assessment:       1. Ocular hypertension, bilateral    2. PCO (posterior capsular opacification), right    3. Dry eye syndrome, bilateral    4. Vitreous detachment, bilateral    5. Essential hypertension    6. Amblyopia, left    7. Pseudophakia - Both Eyes        Plan:       OHT OU-IOP's are acceptable OU & ON's appear stable OU.  Early PCO OD- Not Visually Significant.  SABINO-Doing well.  PVD's OU-Stable.  HTN-No retinopathy OU.  Amblyopia OS-Stable.      Decrease Travatan to qhs OU.  AT's.  Control HTN.  RTC 6 mos for IOP's, HVF's & OCT's.

## 2022-06-16 DIAGNOSIS — I12.9 BENIGN HYPERTENSION WITH CHRONIC KIDNEY DISEASE, STAGE IV: ICD-10-CM

## 2022-06-16 DIAGNOSIS — N18.4 BENIGN HYPERTENSION WITH CHRONIC KIDNEY DISEASE, STAGE IV: ICD-10-CM

## 2022-06-16 RX ORDER — POTASSIUM CHLORIDE 20 MEQ/1
TABLET, EXTENDED RELEASE ORAL
Qty: 90 TABLET | Refills: 0 | Status: SHIPPED | OUTPATIENT
Start: 2022-06-16 | End: 2022-11-25 | Stop reason: SDUPTHER

## 2022-06-16 NOTE — TELEPHONE ENCOUNTER
Care Due:                  Date            Visit Type   Department     Provider  --------------------------------------------------------------------------------                                [facility]                              PRIMARY      MED[facility]  [facility]  Last Visit: 05-      CARE ([facility])   MED/ PEDS      Hemal Pace                              [facility]                              PRIMARY      MED/ INTERNAL  [facility] [place]  Next Visit: 11-      CARE ([facility])   MED/ PEDS      Hemal Pace                                                            Last  Test          Frequency    Reason                     Performed    Due Date  --------------------------------------------------------------------------------    CMP.........  12 months..  atorvastatin.............  08- 08-    [employer] Embedded Care Gaps. Reference number: [id number]. 6/16/2022   8:27:17 AM CDT

## 2022-08-12 ENCOUNTER — TELEPHONE (OUTPATIENT)
Dept: NEPHROLOGY | Facility: CLINIC | Age: 82
End: 2022-08-12
Payer: MEDICARE

## 2022-08-12 DIAGNOSIS — N18.30 STAGE 3 CHRONIC KIDNEY DISEASE, UNSPECIFIED WHETHER STAGE 3A OR 3B CKD: Primary | ICD-10-CM

## 2022-09-06 ENCOUNTER — LAB VISIT (OUTPATIENT)
Dept: LAB | Facility: HOSPITAL | Age: 82
End: 2022-09-06
Attending: INTERNAL MEDICINE
Payer: MEDICARE

## 2022-09-06 DIAGNOSIS — N18.30 STAGE 3 CHRONIC KIDNEY DISEASE, UNSPECIFIED WHETHER STAGE 3A OR 3B CKD: ICD-10-CM

## 2022-09-06 LAB
ALBUMIN SERPL BCP-MCNC: 3.7 G/DL (ref 3.5–5.2)
ANION GAP SERPL CALC-SCNC: 9 MMOL/L (ref 8–16)
ANION GAP SERPL CALC-SCNC: 9 MMOL/L (ref 8–16)
BASOPHILS # BLD AUTO: 0.03 K/UL (ref 0–0.2)
BASOPHILS NFR BLD: 0.4 % (ref 0–1.9)
BUN SERPL-MCNC: 19 MG/DL (ref 8–23)
BUN SERPL-MCNC: 19 MG/DL (ref 8–23)
CALCIUM SERPL-MCNC: 9.2 MG/DL (ref 8.7–10.5)
CALCIUM SERPL-MCNC: 9.2 MG/DL (ref 8.7–10.5)
CHLORIDE SERPL-SCNC: 109 MMOL/L (ref 95–110)
CHLORIDE SERPL-SCNC: 109 MMOL/L (ref 95–110)
CO2 SERPL-SCNC: 24 MMOL/L (ref 23–29)
CO2 SERPL-SCNC: 24 MMOL/L (ref 23–29)
CREAT SERPL-MCNC: 1.5 MG/DL (ref 0.5–1.4)
CREAT SERPL-MCNC: 1.5 MG/DL (ref 0.5–1.4)
DIFFERENTIAL METHOD: ABNORMAL
EOSINOPHIL # BLD AUTO: 0.1 K/UL (ref 0–0.5)
EOSINOPHIL NFR BLD: 0.7 % (ref 0–8)
ERYTHROCYTE [DISTWIDTH] IN BLOOD BY AUTOMATED COUNT: 13.7 % (ref 11.5–14.5)
EST. GFR  (NO RACE VARIABLE): 34.2 ML/MIN/1.73 M^2
EST. GFR  (NO RACE VARIABLE): 34.2 ML/MIN/1.73 M^2
GLUCOSE SERPL-MCNC: 114 MG/DL (ref 70–110)
GLUCOSE SERPL-MCNC: 114 MG/DL (ref 70–110)
HCT VFR BLD AUTO: 36.2 % (ref 37–48.5)
HGB BLD-MCNC: 11.1 G/DL (ref 12–16)
IMM GRANULOCYTES # BLD AUTO: 0.04 K/UL (ref 0–0.04)
IMM GRANULOCYTES NFR BLD AUTO: 0.5 % (ref 0–0.5)
LYMPHOCYTES # BLD AUTO: 1.6 K/UL (ref 1–4.8)
LYMPHOCYTES NFR BLD: 21.9 % (ref 18–48)
MCH RBC QN AUTO: 29.8 PG (ref 27–31)
MCHC RBC AUTO-ENTMCNC: 30.7 G/DL (ref 32–36)
MCV RBC AUTO: 97 FL (ref 82–98)
MONOCYTES # BLD AUTO: 0.6 K/UL (ref 0.3–1)
MONOCYTES NFR BLD: 7.5 % (ref 4–15)
NEUTROPHILS # BLD AUTO: 5 K/UL (ref 1.8–7.7)
NEUTROPHILS NFR BLD: 69 % (ref 38–73)
NRBC BLD-RTO: 0 /100 WBC
PHOSPHATE SERPL-MCNC: 3.1 MG/DL (ref 2.7–4.5)
PLATELET # BLD AUTO: 323 K/UL (ref 150–450)
PMV BLD AUTO: 10.2 FL (ref 9.2–12.9)
POTASSIUM SERPL-SCNC: 3.7 MMOL/L (ref 3.5–5.1)
POTASSIUM SERPL-SCNC: 3.7 MMOL/L (ref 3.5–5.1)
PTH-INTACT SERPL-MCNC: 204.3 PG/ML (ref 9–77)
RBC # BLD AUTO: 3.73 M/UL (ref 4–5.4)
SODIUM SERPL-SCNC: 142 MMOL/L (ref 136–145)
SODIUM SERPL-SCNC: 142 MMOL/L (ref 136–145)
WBC # BLD AUTO: 7.29 K/UL (ref 3.9–12.7)

## 2022-09-06 PROCEDURE — 80069 RENAL FUNCTION PANEL: CPT | Performed by: NURSE PRACTITIONER

## 2022-09-06 PROCEDURE — 85025 COMPLETE CBC W/AUTO DIFF WBC: CPT | Performed by: NURSE PRACTITIONER

## 2022-09-06 PROCEDURE — 83970 ASSAY OF PARATHORMONE: CPT | Performed by: NURSE PRACTITIONER

## 2022-09-06 PROCEDURE — 36415 COLL VENOUS BLD VENIPUNCTURE: CPT | Mod: PO | Performed by: NURSE PRACTITIONER

## 2022-09-07 ENCOUNTER — OFFICE VISIT (OUTPATIENT)
Dept: NEPHROLOGY | Facility: CLINIC | Age: 82
End: 2022-09-07
Payer: MEDICARE

## 2022-09-07 VITALS
DIASTOLIC BLOOD PRESSURE: 86 MMHG | SYSTOLIC BLOOD PRESSURE: 153 MMHG | HEIGHT: 62 IN | BODY MASS INDEX: 34.89 KG/M2 | OXYGEN SATURATION: 99 % | HEART RATE: 100 BPM | WEIGHT: 189.63 LBS

## 2022-09-07 DIAGNOSIS — E87.6 HYPOKALEMIA: ICD-10-CM

## 2022-09-07 DIAGNOSIS — I10 HYPERTENSION, UNSPECIFIED TYPE: ICD-10-CM

## 2022-09-07 DIAGNOSIS — R73.03 PREDIABETES: ICD-10-CM

## 2022-09-07 DIAGNOSIS — N25.81 SECONDARY HYPERPARATHYROIDISM: ICD-10-CM

## 2022-09-07 DIAGNOSIS — N18.30 STAGE 3 CHRONIC KIDNEY DISEASE, UNSPECIFIED WHETHER STAGE 3A OR 3B CKD: Primary | ICD-10-CM

## 2022-09-07 PROCEDURE — 1126F PR PAIN SEVERITY QUANTIFIED, NO PAIN PRESENT: ICD-10-PCS | Mod: CPTII,S$GLB,, | Performed by: NURSE PRACTITIONER

## 2022-09-07 PROCEDURE — 99214 OFFICE O/P EST MOD 30 MIN: CPT | Mod: S$GLB,,, | Performed by: NURSE PRACTITIONER

## 2022-09-07 PROCEDURE — 99214 PR OFFICE/OUTPT VISIT, EST, LEVL IV, 30-39 MIN: ICD-10-PCS | Mod: S$GLB,,, | Performed by: NURSE PRACTITIONER

## 2022-09-07 PROCEDURE — 1101F PR PT FALLS ASSESS DOC 0-1 FALLS W/OUT INJ PAST YR: ICD-10-PCS | Mod: CPTII,S$GLB,, | Performed by: NURSE PRACTITIONER

## 2022-09-07 PROCEDURE — 3077F PR MOST RECENT SYSTOLIC BLOOD PRESSURE >= 140 MM HG: ICD-10-PCS | Mod: CPTII,S$GLB,, | Performed by: NURSE PRACTITIONER

## 2022-09-07 PROCEDURE — 3079F PR MOST RECENT DIASTOLIC BLOOD PRESSURE 80-89 MM HG: ICD-10-PCS | Mod: CPTII,S$GLB,, | Performed by: NURSE PRACTITIONER

## 2022-09-07 PROCEDURE — 1126F AMNT PAIN NOTED NONE PRSNT: CPT | Mod: CPTII,S$GLB,, | Performed by: NURSE PRACTITIONER

## 2022-09-07 PROCEDURE — 1101F PT FALLS ASSESS-DOCD LE1/YR: CPT | Mod: CPTII,S$GLB,, | Performed by: NURSE PRACTITIONER

## 2022-09-07 PROCEDURE — 3077F SYST BP >= 140 MM HG: CPT | Mod: CPTII,S$GLB,, | Performed by: NURSE PRACTITIONER

## 2022-09-07 PROCEDURE — 1159F PR MEDICATION LIST DOCUMENTED IN MEDICAL RECORD: ICD-10-PCS | Mod: CPTII,S$GLB,, | Performed by: NURSE PRACTITIONER

## 2022-09-07 PROCEDURE — 3288F FALL RISK ASSESSMENT DOCD: CPT | Mod: CPTII,S$GLB,, | Performed by: NURSE PRACTITIONER

## 2022-09-07 PROCEDURE — 1159F MED LIST DOCD IN RCRD: CPT | Mod: CPTII,S$GLB,, | Performed by: NURSE PRACTITIONER

## 2022-09-07 PROCEDURE — 3288F PR FALLS RISK ASSESSMENT DOCUMENTED: ICD-10-PCS | Mod: CPTII,S$GLB,, | Performed by: NURSE PRACTITIONER

## 2022-09-07 PROCEDURE — 3079F DIAST BP 80-89 MM HG: CPT | Mod: CPTII,S$GLB,, | Performed by: NURSE PRACTITIONER

## 2022-09-07 PROCEDURE — 1160F PR REVIEW ALL MEDS BY PRESCRIBER/CLIN PHARMACIST DOCUMENTED: ICD-10-PCS | Mod: CPTII,S$GLB,, | Performed by: NURSE PRACTITIONER

## 2022-09-07 PROCEDURE — 99999 PR PBB SHADOW E&M-EST. PATIENT-LVL IV: ICD-10-PCS | Mod: PBBFAC,,, | Performed by: NURSE PRACTITIONER

## 2022-09-07 PROCEDURE — 1160F RVW MEDS BY RX/DR IN RCRD: CPT | Mod: CPTII,S$GLB,, | Performed by: NURSE PRACTITIONER

## 2022-09-07 PROCEDURE — 99999 PR PBB SHADOW E&M-EST. PATIENT-LVL IV: CPT | Mod: PBBFAC,,, | Performed by: NURSE PRACTITIONER

## 2022-09-07 NOTE — PROGRESS NOTES
Subjective:       Patient ID: Rachel Asif is a 82 y.o. AA female who presents for new evaluation of  CKD    HPI     Last seen by me March. 2021.    Patient presents for f/u CKD.  Baseline creatinine of 1.3-1.4 since 2015. No labs available from Nov 2019 to August 2019, when patient was found to have sCr of 2.1 mg/dL, which improved to 1.8 --> 1.5. No recent labs.    Per PMR note in July 2020, had been taking meloxicam 15 mg daily daily since June 2019; now off completely.    Home BPs: 120s/80s    Cardiology initially started lasix in December 2019 when patient presented with new peripheral edema, JOHNSON, and chest tightness; lasix improved these symptoms. Lasix increased and added PRN dosing in July; patient says she had increased swelling around this time (also says it was the same time she started amlodipine). LVEF in December 2019 was 60%.    Significant hx includes HLD, atherosclerosis of aorta, anemia of chronic disease, prediabetes, vitamin D deficiency, hyperparathyroidism, GERD, OA, HTN recently diagnosed.      The patient denies NSAID. No recent hospitalizations. On PPI daily.     Significant family hx includes: HTN, edema, lupus (sister), DM, lung cancer, heart disease, MI; no known kidney disease in family    Last renal US: Sept 2020, reviewed. Small kidneys bilaterally.    Update 10/20/21:  Last seen by me in June 2021.  Presents for f/u of CKD.  Baseline sCr difficult to determine. Possibly 1.3-1.4 mg/dL, but most recent sCr level was 1.1 mg/dL.  Home BPs: 130-140s/80s p meds  Denies recent hospitalizations and denies NSAID use.    Update 3/4/22:  Presents for f/u of CKD.  Baseline sCr difficult to determine. Possibly 1.3-1.4 mg/dL.  Home BPs: 130-140/40-50s before and after meds  Denies recent hospitalizations and denies NSAID use.  On PPI PRN.    Update 9/7/22:  Presents for f/u of CKD.  Baseline sCr difficult to determine. Possibly 1.3-1.4 mg/dL. Most recent sCr was 1.5.  Home BPs:  "130-140/80-90s  Denies NSAID use.    Review of Systems   Respiratory:  Negative for shortness of breath.    Cardiovascular:  Negative for leg swelling.   Gastrointestinal:  Negative for diarrhea, nausea and vomiting.   Genitourinary:  Negative for difficulty urinating, dysuria and hematuria.   Neurological:  Negative for dizziness.     Objective:       Blood pressure (!) 153/86, pulse 100, height 5' 2" (1.575 m), weight 86 kg (189 lb 9.5 oz), SpO2 99 %.  Physical Exam  Constitutional:       General: She is not in acute distress.     Appearance: Normal appearance. She is well-developed. She is obese. She is not ill-appearing or diaphoretic.   Cardiovascular:      Rate and Rhythm: Tachycardia present.   Pulmonary:      Effort: Pulmonary effort is normal.   Abdominal:      Tenderness: There is no right CVA tenderness or left CVA tenderness.   Musculoskeletal:      Cervical back: Neck supple.      Right lower leg: No edema.      Left lower leg: No edema.   Skin:     General: Skin is warm and dry.   Neurological:      Mental Status: She is alert and oriented to person, place, and time.   Psychiatric:         Mood and Affect: Mood normal.         Behavior: Behavior normal.         Thought Content: Thought content normal.         Judgment: Judgment normal.         Lab Results   Component Value Date    CREATININE 1.5 (H) 09/06/2022    CREATININE 1.5 (H) 09/06/2022     Prot/Creat Ratio, Urine   Date Value Ref Range Status   09/06/2022 0.11 0.00 - 0.20 Final   02/11/2022 Unable to calculate 0.00 - 0.20 Final   10/13/2021 Unable to calculate 0.00 - 0.20 Final     Lab Results   Component Value Date     09/06/2022     09/06/2022    K 3.7 09/06/2022    K 3.7 09/06/2022    CO2 24 09/06/2022    CO2 24 09/06/2022     09/06/2022     09/06/2022     Lab Results   Component Value Date    .3 (H) 09/06/2022    CALCIUM 9.2 09/06/2022    CALCIUM 9.2 09/06/2022    PHOS 3.1 09/06/2022     Lab Results   Component " Value Date    HGB 11.1 (L) 09/06/2022    WBC 7.29 09/06/2022    HCT 36.2 (L) 09/06/2022      Lab Results   Component Value Date    HGBA1C 5.8 (H) 11/12/2021     09/06/2022    BUN 19 09/06/2022    BUN 19 09/06/2022     Lab Results   Component Value Date    LDLCALC 86.6 11/12/2021         Assessment:       1. Stage 3 chronic kidney disease, unspecified whether stage 3a or 3b CKD    2. Prediabetes    3. Hypertension, unspecified type    4. Hypokalemia          Plan:   CKD stage 3B c eGFR 34-40 mL/min - CKD likely secondary to  age-related nephron loss and NSAID use, also possibly atherosclerotic disease (atherosclerosis noted to aorta). sCr stable.  Educated patient to control BP, BG, remain well-hydrated, and avoid NSAIDs to prevent progression of CKD.   - No paraproteins or monoclonal peaks in Sept 2020.  UPCR Non-proteinuric. On olmesartan.   Acid-base WNL.   Renal osteodystrophy Ca, phos okay. PTH elevated. Vitamin D low previously. Has been on ergocalciferol weekly. Has been referred to endocrinology for osteoporosis but was never seen.   Anemia Hgb at goal for CKD   DM Pre-diabetic.   Lipid Management On statin.   ESRD planning Anticipatory guidance provided about timing of dialysis. Start discussions and planning when eGFR is about 20 mL/min; most patients start dialysis between 5-10 mL/min.       HTN - High in clinic but okay at home on amlodipine 10 mg, furosemide 20 mg BID, hydralazine 50 mg BID PRN if SBP is over 150-160 (takes less than once a month), metoprolol succinate 50 mg, olmesartan-HCTZ 40-25  - If more control is needed, recommend switching hctz to chlorthalidone  - Avoiding overcontrol of BP, especially due to pt's advanced age    Hypokalemia - Okay on KCl    All questions patient had were answered.  Asked if further questions. None. F/u in clinic in 6 mos with labs and urine prior to next visit or sooner if needed.  ER for emergency concerns.    Summary of Plan:  1. Continue ARB and  ergocalciferol  2 avoid NSAID/ bactrim/ IV contrast/ gadolinium/ aminoglycoside where possible  3. RTC in 6 mos

## 2022-09-30 ENCOUNTER — PATIENT MESSAGE (OUTPATIENT)
Dept: FAMILY MEDICINE | Facility: CLINIC | Age: 82
End: 2022-09-30
Payer: MEDICARE

## 2022-11-11 ENCOUNTER — TELEPHONE (OUTPATIENT)
Dept: FAMILY MEDICINE | Facility: CLINIC | Age: 82
End: 2022-11-11
Payer: MEDICARE

## 2022-11-11 DIAGNOSIS — E78.5 HYPERLIPIDEMIA WITH TARGET LDL LESS THAN 100: Primary | ICD-10-CM

## 2022-11-11 DIAGNOSIS — R73.03 PREDIABETES: ICD-10-CM

## 2022-11-21 ENCOUNTER — LAB VISIT (OUTPATIENT)
Dept: LAB | Facility: HOSPITAL | Age: 82
End: 2022-11-21
Attending: INTERNAL MEDICINE
Payer: MEDICARE

## 2022-11-21 DIAGNOSIS — R73.03 PREDIABETES: ICD-10-CM

## 2022-11-21 DIAGNOSIS — N18.30 STAGE 3 CHRONIC KIDNEY DISEASE, UNSPECIFIED WHETHER STAGE 3A OR 3B CKD: ICD-10-CM

## 2022-11-21 DIAGNOSIS — E78.5 HYPERLIPIDEMIA WITH TARGET LDL LESS THAN 100: ICD-10-CM

## 2022-11-21 LAB
ALBUMIN SERPL BCP-MCNC: 3.5 G/DL (ref 3.5–5.2)
ALBUMIN SERPL BCP-MCNC: 3.5 G/DL (ref 3.5–5.2)
ALP SERPL-CCNC: 59 U/L (ref 55–135)
ALT SERPL W/O P-5'-P-CCNC: 24 U/L (ref 10–44)
ANION GAP SERPL CALC-SCNC: 9 MMOL/L (ref 8–16)
AST SERPL-CCNC: 22 U/L (ref 10–40)
BASOPHILS # BLD AUTO: 0.04 K/UL (ref 0–0.2)
BASOPHILS NFR BLD: 0.5 % (ref 0–1.9)
BILIRUB DIRECT SERPL-MCNC: 0.2 MG/DL (ref 0.1–0.3)
BILIRUB SERPL-MCNC: 0.5 MG/DL (ref 0.1–1)
BILIRUB UR QL STRIP: NEGATIVE
BUN SERPL-MCNC: 24 MG/DL (ref 8–23)
CALCIUM SERPL-MCNC: 9.4 MG/DL (ref 8.7–10.5)
CHLORIDE SERPL-SCNC: 109 MMOL/L (ref 95–110)
CHOLEST SERPL-MCNC: 190 MG/DL (ref 120–199)
CHOLEST/HDLC SERPL: 3 {RATIO} (ref 2–5)
CLARITY UR REFRACT.AUTO: CLEAR
CO2 SERPL-SCNC: 24 MMOL/L (ref 23–29)
COLOR UR AUTO: YELLOW
CREAT SERPL-MCNC: 1.6 MG/DL (ref 0.5–1.4)
CREAT UR-MCNC: 104 MG/DL (ref 15–325)
DIFFERENTIAL METHOD: ABNORMAL
EOSINOPHIL # BLD AUTO: 0.1 K/UL (ref 0–0.5)
EOSINOPHIL NFR BLD: 1 % (ref 0–8)
ERYTHROCYTE [DISTWIDTH] IN BLOOD BY AUTOMATED COUNT: 13.7 % (ref 11.5–14.5)
EST. GFR  (NO RACE VARIABLE): 31.6 ML/MIN/1.73 M^2
ESTIMATED AVG GLUCOSE: 131 MG/DL (ref 68–131)
GLUCOSE SERPL-MCNC: 85 MG/DL (ref 70–110)
GLUCOSE UR QL STRIP: NEGATIVE
HBA1C MFR BLD: 6.2 % (ref 4–5.6)
HCT VFR BLD AUTO: 36.3 % (ref 37–48.5)
HDLC SERPL-MCNC: 63 MG/DL (ref 40–75)
HDLC SERPL: 33.2 % (ref 20–50)
HGB BLD-MCNC: 11.1 G/DL (ref 12–16)
HGB UR QL STRIP: NEGATIVE
HYALINE CASTS UR QL AUTO: 1 /LPF
IMM GRANULOCYTES # BLD AUTO: 0.02 K/UL (ref 0–0.04)
IMM GRANULOCYTES NFR BLD AUTO: 0.3 % (ref 0–0.5)
KETONES UR QL STRIP: NEGATIVE
LDLC SERPL CALC-MCNC: 98.6 MG/DL (ref 63–159)
LEUKOCYTE ESTERASE UR QL STRIP: ABNORMAL
LYMPHOCYTES # BLD AUTO: 1.5 K/UL (ref 1–4.8)
LYMPHOCYTES NFR BLD: 19.1 % (ref 18–48)
MCH RBC QN AUTO: 29.9 PG (ref 27–31)
MCHC RBC AUTO-ENTMCNC: 30.6 G/DL (ref 32–36)
MCV RBC AUTO: 98 FL (ref 82–98)
MICROSCOPIC COMMENT: NORMAL
MONOCYTES # BLD AUTO: 0.8 K/UL (ref 0.3–1)
MONOCYTES NFR BLD: 9.5 % (ref 4–15)
NEUTROPHILS # BLD AUTO: 5.5 K/UL (ref 1.8–7.7)
NEUTROPHILS NFR BLD: 69.6 % (ref 38–73)
NITRITE UR QL STRIP: NEGATIVE
NONHDLC SERPL-MCNC: 127 MG/DL
NRBC BLD-RTO: 0 /100 WBC
PH UR STRIP: 5 [PH] (ref 5–8)
PHOSPHATE SERPL-MCNC: 3.4 MG/DL (ref 2.7–4.5)
PLATELET # BLD AUTO: 338 K/UL (ref 150–450)
PMV BLD AUTO: 10.4 FL (ref 9.2–12.9)
POTASSIUM SERPL-SCNC: 3.7 MMOL/L (ref 3.5–5.1)
PROT SERPL-MCNC: 7.3 G/DL (ref 6–8.4)
PROT UR QL STRIP: NEGATIVE
PROT UR-MCNC: <7 MG/DL (ref 0–15)
PROT/CREAT UR: NORMAL MG/G{CREAT} (ref 0–0.2)
PTH-INTACT SERPL-MCNC: 164.5 PG/ML (ref 9–77)
RBC # BLD AUTO: 3.71 M/UL (ref 4–5.4)
RBC #/AREA URNS AUTO: 1 /HPF (ref 0–4)
SODIUM SERPL-SCNC: 142 MMOL/L (ref 136–145)
SP GR UR STRIP: 1.01 (ref 1–1.03)
SQUAMOUS #/AREA URNS AUTO: 0 /HPF
TRIGL SERPL-MCNC: 142 MG/DL (ref 30–150)
URN SPEC COLLECT METH UR: ABNORMAL
WBC # BLD AUTO: 7.9 K/UL (ref 3.9–12.7)
WBC #/AREA URNS AUTO: 2 /HPF (ref 0–5)

## 2022-11-21 PROCEDURE — 83970 ASSAY OF PARATHORMONE: CPT | Performed by: NURSE PRACTITIONER

## 2022-11-21 PROCEDURE — 81001 URINALYSIS AUTO W/SCOPE: CPT | Performed by: NURSE PRACTITIONER

## 2022-11-21 PROCEDURE — 85025 COMPLETE CBC W/AUTO DIFF WBC: CPT | Performed by: NURSE PRACTITIONER

## 2022-11-21 PROCEDURE — 80069 RENAL FUNCTION PANEL: CPT | Performed by: NURSE PRACTITIONER

## 2022-11-21 PROCEDURE — 83036 HEMOGLOBIN GLYCOSYLATED A1C: CPT | Performed by: INTERNAL MEDICINE

## 2022-11-21 PROCEDURE — 80061 LIPID PANEL: CPT | Performed by: INTERNAL MEDICINE

## 2022-11-21 PROCEDURE — 82570 ASSAY OF URINE CREATININE: CPT | Performed by: NURSE PRACTITIONER

## 2022-11-21 PROCEDURE — 80076 HEPATIC FUNCTION PANEL: CPT | Performed by: INTERNAL MEDICINE

## 2022-11-21 PROCEDURE — 36415 COLL VENOUS BLD VENIPUNCTURE: CPT | Mod: PO | Performed by: NURSE PRACTITIONER

## 2022-11-25 ENCOUNTER — OFFICE VISIT (OUTPATIENT)
Dept: FAMILY MEDICINE | Facility: CLINIC | Age: 82
End: 2022-11-25
Payer: MEDICARE

## 2022-11-25 VITALS
WEIGHT: 199.31 LBS | BODY MASS INDEX: 36.68 KG/M2 | DIASTOLIC BLOOD PRESSURE: 80 MMHG | HEIGHT: 62 IN | HEART RATE: 79 BPM | OXYGEN SATURATION: 98 % | TEMPERATURE: 99 F | SYSTOLIC BLOOD PRESSURE: 130 MMHG

## 2022-11-25 DIAGNOSIS — G89.29 CHRONIC NECK PAIN: ICD-10-CM

## 2022-11-25 DIAGNOSIS — J06.9 VIRAL URI WITH COUGH: ICD-10-CM

## 2022-11-25 DIAGNOSIS — R73.03 PREDIABETES: ICD-10-CM

## 2022-11-25 DIAGNOSIS — G89.29 CHRONIC MIDLINE LOW BACK PAIN WITH RIGHT-SIDED SCIATICA: ICD-10-CM

## 2022-11-25 DIAGNOSIS — D63.8 ANEMIA OF CHRONIC DISEASE: ICD-10-CM

## 2022-11-25 DIAGNOSIS — Z23 FLU VACCINE NEED: ICD-10-CM

## 2022-11-25 DIAGNOSIS — M54.41 CHRONIC MIDLINE LOW BACK PAIN WITH RIGHT-SIDED SCIATICA: ICD-10-CM

## 2022-11-25 DIAGNOSIS — N25.81 SECONDARY HYPERPARATHYROIDISM OF RENAL ORIGIN: ICD-10-CM

## 2022-11-25 DIAGNOSIS — E55.9 VITAMIN D DEFICIENCY: ICD-10-CM

## 2022-11-25 DIAGNOSIS — K21.9 GASTROESOPHAGEAL REFLUX DISEASE WITHOUT ESOPHAGITIS: ICD-10-CM

## 2022-11-25 DIAGNOSIS — I12.9 BENIGN HYPERTENSION WITH CHRONIC KIDNEY DISEASE, STAGE IV: Primary | ICD-10-CM

## 2022-11-25 DIAGNOSIS — M54.2 CHRONIC NECK PAIN: ICD-10-CM

## 2022-11-25 DIAGNOSIS — M81.0 OSTEOPOROSIS, POST-MENOPAUSAL: ICD-10-CM

## 2022-11-25 DIAGNOSIS — I70.0 ATHEROSCLEROSIS OF AORTA: ICD-10-CM

## 2022-11-25 DIAGNOSIS — E66.01 SEVERE OBESITY (BMI 35.0-39.9) WITH COMORBIDITY: ICD-10-CM

## 2022-11-25 DIAGNOSIS — N18.4 BENIGN HYPERTENSION WITH CHRONIC KIDNEY DISEASE, STAGE IV: Primary | ICD-10-CM

## 2022-11-25 DIAGNOSIS — E78.5 HYPERLIPIDEMIA WITH TARGET LDL LESS THAN 100: ICD-10-CM

## 2022-11-25 PROCEDURE — 3075F PR MOST RECENT SYSTOLIC BLOOD PRESS GE 130-139MM HG: ICD-10-PCS | Mod: CPTII,S$GLB,, | Performed by: INTERNAL MEDICINE

## 2022-11-25 PROCEDURE — 90694 VACC AIIV4 NO PRSRV 0.5ML IM: CPT | Mod: S$GLB,,, | Performed by: INTERNAL MEDICINE

## 2022-11-25 PROCEDURE — 1101F PR PT FALLS ASSESS DOC 0-1 FALLS W/OUT INJ PAST YR: ICD-10-PCS | Mod: CPTII,S$GLB,, | Performed by: INTERNAL MEDICINE

## 2022-11-25 PROCEDURE — 3079F DIAST BP 80-89 MM HG: CPT | Mod: CPTII,S$GLB,, | Performed by: INTERNAL MEDICINE

## 2022-11-25 PROCEDURE — G0008 FLU VACCINE - QUADRIVALENT - ADJUVANTED: ICD-10-PCS | Mod: S$GLB,,, | Performed by: INTERNAL MEDICINE

## 2022-11-25 PROCEDURE — 1101F PT FALLS ASSESS-DOCD LE1/YR: CPT | Mod: CPTII,S$GLB,, | Performed by: INTERNAL MEDICINE

## 2022-11-25 PROCEDURE — 99999 PR PBB SHADOW E&M-EST. PATIENT-LVL V: ICD-10-PCS | Mod: PBBFAC,,, | Performed by: INTERNAL MEDICINE

## 2022-11-25 PROCEDURE — G0008 ADMIN INFLUENZA VIRUS VAC: HCPCS | Mod: S$GLB,,, | Performed by: INTERNAL MEDICINE

## 2022-11-25 PROCEDURE — 1160F RVW MEDS BY RX/DR IN RCRD: CPT | Mod: CPTII,S$GLB,, | Performed by: INTERNAL MEDICINE

## 2022-11-25 PROCEDURE — 90694 FLU VACCINE - QUADRIVALENT - ADJUVANTED: ICD-10-PCS | Mod: S$GLB,,, | Performed by: INTERNAL MEDICINE

## 2022-11-25 PROCEDURE — 99214 PR OFFICE/OUTPT VISIT, EST, LEVL IV, 30-39 MIN: ICD-10-PCS | Mod: 25,S$GLB,, | Performed by: INTERNAL MEDICINE

## 2022-11-25 PROCEDURE — 1126F PR PAIN SEVERITY QUANTIFIED, NO PAIN PRESENT: ICD-10-PCS | Mod: CPTII,S$GLB,, | Performed by: INTERNAL MEDICINE

## 2022-11-25 PROCEDURE — 3075F SYST BP GE 130 - 139MM HG: CPT | Mod: CPTII,S$GLB,, | Performed by: INTERNAL MEDICINE

## 2022-11-25 PROCEDURE — 3288F FALL RISK ASSESSMENT DOCD: CPT | Mod: CPTII,S$GLB,, | Performed by: INTERNAL MEDICINE

## 2022-11-25 PROCEDURE — 3079F PR MOST RECENT DIASTOLIC BLOOD PRESSURE 80-89 MM HG: ICD-10-PCS | Mod: CPTII,S$GLB,, | Performed by: INTERNAL MEDICINE

## 2022-11-25 PROCEDURE — 99214 OFFICE O/P EST MOD 30 MIN: CPT | Mod: 25,S$GLB,, | Performed by: INTERNAL MEDICINE

## 2022-11-25 PROCEDURE — 1126F AMNT PAIN NOTED NONE PRSNT: CPT | Mod: CPTII,S$GLB,, | Performed by: INTERNAL MEDICINE

## 2022-11-25 PROCEDURE — 1160F PR REVIEW ALL MEDS BY PRESCRIBER/CLIN PHARMACIST DOCUMENTED: ICD-10-PCS | Mod: CPTII,S$GLB,, | Performed by: INTERNAL MEDICINE

## 2022-11-25 PROCEDURE — 1159F PR MEDICATION LIST DOCUMENTED IN MEDICAL RECORD: ICD-10-PCS | Mod: CPTII,S$GLB,, | Performed by: INTERNAL MEDICINE

## 2022-11-25 PROCEDURE — 3288F PR FALLS RISK ASSESSMENT DOCUMENTED: ICD-10-PCS | Mod: CPTII,S$GLB,, | Performed by: INTERNAL MEDICINE

## 2022-11-25 PROCEDURE — 99999 PR PBB SHADOW E&M-EST. PATIENT-LVL V: CPT | Mod: PBBFAC,,, | Performed by: INTERNAL MEDICINE

## 2022-11-25 PROCEDURE — 1159F MED LIST DOCD IN RCRD: CPT | Mod: CPTII,S$GLB,, | Performed by: INTERNAL MEDICINE

## 2022-11-25 RX ORDER — DULOXETIN HYDROCHLORIDE 30 MG/1
30 CAPSULE, DELAYED RELEASE ORAL DAILY
Qty: 30 CAPSULE | Refills: 3 | Status: SHIPPED | OUTPATIENT
Start: 2022-11-25 | End: 2023-09-19 | Stop reason: SDUPTHER

## 2022-11-25 RX ORDER — METOPROLOL SUCCINATE 50 MG/1
50 TABLET, EXTENDED RELEASE ORAL DAILY
Qty: 90 TABLET | Refills: 1 | Status: SHIPPED | OUTPATIENT
Start: 2022-11-25 | End: 2023-05-22

## 2022-11-25 RX ORDER — OLMESARTAN MEDOXOMIL AND HYDROCHLOROTHIAZIDE 40/25 40; 25 MG/1; MG/1
1 TABLET ORAL DAILY
Qty: 90 TABLET | Refills: 1 | Status: SHIPPED | OUTPATIENT
Start: 2022-11-25 | End: 2023-05-22

## 2022-11-25 RX ORDER — HYDRALAZINE HYDROCHLORIDE 50 MG/1
50 TABLET, FILM COATED ORAL 3 TIMES DAILY
Qty: 270 TABLET | Refills: 1 | Status: SHIPPED | OUTPATIENT
Start: 2022-11-25 | End: 2023-05-22

## 2022-11-25 RX ORDER — TRAMADOL HYDROCHLORIDE 50 MG/1
50 TABLET ORAL 3 TIMES DAILY PRN
Qty: 90 TABLET | Refills: 1 | Status: SHIPPED | OUTPATIENT
Start: 2022-11-25 | End: 2023-02-16 | Stop reason: SDUPTHER

## 2022-11-25 RX ORDER — POTASSIUM CHLORIDE 20 MEQ/1
20 TABLET, EXTENDED RELEASE ORAL DAILY
Qty: 90 TABLET | Refills: 1 | Status: SHIPPED | OUTPATIENT
Start: 2022-11-25 | End: 2023-05-22

## 2022-11-25 RX ORDER — FUROSEMIDE 20 MG/1
20 TABLET ORAL 2 TIMES DAILY
Qty: 180 TABLET | Refills: 1 | Status: SHIPPED | OUTPATIENT
Start: 2022-11-25 | End: 2023-05-22

## 2022-11-25 NOTE — PATIENT INSTRUCTIONS
Nyquil for high blood pressure at night.  Use coricidin HBP for nasal congestion and/or mucinex DM for cough.

## 2022-11-25 NOTE — TELEPHONE ENCOUNTER
No new care gaps identified.  [facility] Embedded Care Gaps. Reference number: [id number]. 11/25/2022   2:17:28 PM CST

## 2022-11-25 NOTE — PROGRESS NOTES
274}  HISTORY OF PRESENT ILLNESS:  Rachel Asif is a 82 y.o. female who presents to the clinic today for Follow-up  .     The patient presents to clinic today for follow-up of her hypertension complicated by chronic kidney disease stage 4, hyperlipidemia, and prediabetes.  She states her blood pressures at home are generally well controlled at 130/80.  She reports having taken NyQuil yesterday because she developed a nonproductive cough yesterday.  She is requesting antibiotics.  She denies fever, shortness of breath, or wheezing.  She has not yet seen endocrinology to discuss treatment of her osteoporosis.  She is requesting a refill on her medication.      PAST MEDICAL HISTORY:  Past Medical History:   Diagnosis Date    Amblyopia     os    Anemia of other chronic disease     Atherosclerosis of aorta     noted on L-spine X-ray 4/14/2011    Atherosclerosis of aortic arch     - noted on CXR 5/2017    Chronic low back pain     followed by orthopaedics    DJD (degenerative joint disease)     Glaucoma     History of colonic polyps     History of vitamin D deficiency     Hyperlipidemia     Hypertension     Lumbar radiculopathy     Obesity     Obesity     OH (ocular hypertension)     Osteoarthritis     Osteoporosis, post-menopausal     currently on a drug holiday    PCO (posterior capsular opacification), right 2/27/2020    Postmenopausal status     Prediabetes     Secondary hyperparathyroidism of renal origin     Trochanteric bursitis        PAST SURGICAL HISTORY:  Past Surgical History:   Procedure Laterality Date    CATARACT EXTRACTION W/  INTRAOCULAR LENS IMPLANT Bilateral     COLONOSCOPY N/A 8/10/2020    Procedure: COLONOSCOPY;  Surgeon: Bettina Gates MD;  Location: [facility];  Service: Endoscopy;  Laterality: N/A;       SOCIAL HISTORY:  Social History     Socioeconomic History    Marital status:    Occupational History    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 She is taking care of a sister who has brain damage from a car accident.       FAMILY HISTORY:  Family History   Problem Relation Age of Onset    Cataracts Mother     Hypertension Mother     Other Mother         complications from splenectomy after fall    Glaucoma Mother     Cataracts Father     Hypertension Father     Glaucoma Father     Hypertension Sister     Edema Sister     Hypertension Brother     Diabetes Brother     Heart disease Brother     Glaucoma Maternal Grandmother     Hypertension Sister     Other Sister         shingles    Lupus Sister     Lung cancer Sister     Hypertension Sister     Other Sister         brain damage from MVA    Hypertension Sister     Hypertension Brother     Heart disease Brother     Hypertension Brother     Heart attack Brother     Hypertension Brother     Hypertension Brother     No Known Problems Brother     No Known Problems Brother     Hypertension Brother     Heart disease Brother     No Known Problems Maternal Aunt     No Known Problems Maternal Uncle     No Known Problems Paternal Aunt     No Known Problems Paternal Uncle     No Known Problems Maternal Grandfather     No Known Problems Paternal Grandmother     No Known Problems Paternal Grandfather     Cancer Neg Hx     Amblyopia Neg Hx     Blindness Neg Hx     Retinal detachment Neg Hx     Strabismus Neg Hx     Stroke Neg Hx        ALLERGIES AND MEDICATIONS: updated and reviewed.  Review of patient's allergies indicates:   Allergen Reactions    No known allergies      Medication List with Changes/Refills   Current Medications    AMLODIPINE (NORVASC) 10 MG TABLET    TAKE 1 TABLET(10 MG) BY MOUTH EVERY DAY    ASPIRIN 81 MG CHEWABLE TABLET    Every day    ATORVASTATIN (LIPITOR) 20 MG TABLET    TAKE 1 TABLET(20 MG) BY MOUTH EVERY DAY     DICLOFENAC SODIUM (VOLTAREN) 1 % GEL    Apply topically 3 (three) times daily as needed.    DULOXETINE (CYMBALTA) 30 MG CAPSULE    Take 1 capsule (30 mg total) by mouth once daily.    ERGOCALCIFEROL (ERGOCALCIFEROL) 50,000 UNIT CAP    TAKE 1 CAPSULE BY MOUTH EVERY 7 DAYS    PANTOPRAZOLE (PROTONIX) 20 MG TABLET    TAKE 1 TABLET BY MOUTH EVERY DAY TO PROTECT STOMACH DO TO TAKING NSAIDS    PROPYLENE GLYCOL/ (SYSTANE OPHT)    Weekly PRN    TRAMADOL (ULTRAM) 50 MG TABLET    Take 1 tablet (50 mg total) by mouth 3 (three) times daily as needed for Pain.    TRAVOPROST (TRAVATAN Z) 0.004 % OPHTHALMIC SOLUTION    Place 1 drop into both eyes every evening.   Changed and/or Refilled Medications    Modified Medication Previous Medication    FUROSEMIDE (LASIX) 20 MG TABLET furosemide (LASIX) 20 MG tablet       Take 1 tablet (20 mg total) by mouth 2 (two) times a day.    Take 1 tablet (20 mg total) by mouth 2 (two) times a day.    HYDRALAZINE (APRESOLINE) 50 MG TABLET hydrALAZINE (APRESOLINE) 50 MG tablet       Take 1 tablet (50 mg total) by mouth 3 (three) times daily.    TAKE 1 TABLET(50 MG) BY MOUTH THREE TIMES DAILY    METOPROLOL SUCCINATE (TOPROL-XL) 50 MG 24 HR TABLET metoprolol succinate (TOPROL-XL) 50 MG 24 hr tablet       Take 1 tablet (50 mg total) by mouth once daily.    Take 1 tablet (50 mg total) by mouth once daily.    OLMESARTAN-HYDROCHLOROTHIAZIDE (BENICAR HCT) 40-25 MG PER TABLET olmesartan-hydrochlorothiazide (BENICAR HCT) 40-25 mg per tablet       Take 1 tablet by mouth once daily.    Take 1 tablet by mouth once daily.    POTASSIUM CHLORIDE SA (K-DUR,KLOR-CON) 20 MEQ TABLET potassium chloride SA (K-DUR,KLOR-CON) 20 MEQ tablet       Take 1 tablet (20 mEq total) by mouth once daily.    TAKE 1 TABLET(20 MEQ) BY MOUTH EVERY DAY         CARE TEAM:  Patient Care Team:  Oneyda Pace MD as PCP - General (Internal Medicine)  Yasmin Meyer LPN as Licensed Practical Nurse         REVIEW OF  "SYSTEMS:  Review of Systems   Constitutional:  Negative for chills and fever.   HENT:  Negative for congestion and postnasal drip.    Respiratory:  Positive for cough. Negative for shortness of breath and wheezing.    Gastrointestinal:  Negative for abdominal pain.   Genitourinary:  Negative for dysuria.   Musculoskeletal:  Negative for gait problem.       PHYSICAL EXAM: 274}  Vitals:    11/25/22 1408   BP: 130/80   Pulse:    Temp:      Weight: 90.4 kg (199 lb 4.7 oz)   Height: 5' 2" (157.5 cm)   Body mass index is 36.45 kg/m².      General appearance - alert, well appearing, and in no distress, obese  Psychiatric - alert, oriented to person, place, and time, normal behavior, speech, dress, motor activity and thought process  Eyes - pupils equal and reactive, extraocular eye movements intact, sclera anicteric  Mouth - not examined  Chest - clear to auscultation, no wheezes, rales or rhonchi, symmetric air entry  Heart - normal rate and regular rhythm  Neurological - alert, normal speech, no focal findings; cranial nerves II through XII intact  Musculoskeletal - patient noted to have Moderate osteoarthritic changes to both knee joints. No joint effusions noted.  Extremities - no pedal edema noted, lymphedema noted - mild  Skin - normal coloration, no suspicious skin lesions      Labs:  Results for orders placed or performed in visit on 11/21/22   CBC Auto Differential   Result Value Ref Range    WBC 7.90 3.90 - 12.70 K/uL    RBC 3.71 (L) 4.00 - 5.40 M/uL    Hemoglobin 11.1 (L) 12.0 - 16.0 g/dL    Hematocrit 36.3 (L) 37.0 - 48.5 %    MCV 98 82 - 98 fL    MCH 29.9 27.0 - 31.0 pg    MCHC 30.6 (L) 32.0 - 36.0 g/dL    RDW 13.7 11.5 - 14.5 %    Platelets 338 150 - 450 K/uL    MPV 10.4 9.2 - 12.9 fL    Immature Granulocytes 0.3 0.0 - 0.5 %    Gran # (ANC) 5.5 1.8 - 7.7 K/uL    Immature Grans (Abs) 0.02 0.00 - 0.04 K/uL    Lymph # 1.5 1.0 - 4.8 K/uL    Mono # 0.8 0.3 - 1.0 K/uL    Eos # 0.1 0.0 - 0.5 K/uL    Baso # 0.04 0.00 " - 0.20 K/uL    nRBC 0 0 /100 WBC    Gran % 69.6 38.0 - 73.0 %    Lymph % 19.1 18.0 - 48.0 %    Mono % 9.5 4.0 - 15.0 %    Eosinophil % 1.0 0.0 - 8.0 %    Basophil % 0.5 0.0 - 1.9 %    Differential Method Automated    Renal Function Panel   Result Value Ref Range    Glucose 85 70 - 110 mg/dL    Sodium 142 136 - 145 mmol/L    Potassium 3.7 3.5 - 5.1 mmol/L    Chloride 109 95 - 110 mmol/L    CO2 24 23 - 29 mmol/L    BUN 24 (H) 8 - 23 mg/dL    Calcium 9.4 8.7 - 10.5 mg/dL    Creatinine 1.6 (H) 0.5 - 1.4 mg/dL    Albumin 3.5 3.5 - 5.2 g/dL    Phosphorus 3.4 2.7 - 4.5 mg/dL    eGFR 31.6 (A) >60 mL/min/1.73 m^2    Anion Gap 9 8 - 16 mmol/L   Urinalysis   Result Value Ref Range    Specimen UA Urine, Clean Catch     Color, UA Yellow Yellow, Straw, Jenni    Appearance, UA Clear Clear    pH, UA 5.0 5.0 - 8.0    Specific Gravity, UA 1.015 1.005 - 1.030    Protein, UA Negative Negative    Glucose, UA Negative Negative    Ketones, UA Negative Negative    Bilirubin (UA) Negative Negative    Occult Blood UA Negative Negative    Nitrite, UA Negative Negative    Leukocytes, UA Trace (A) Negative   Protein/Creatinine Ratio, Urine   Result Value Ref Range    Protein, Urine Random <7 0 - 15 mg/dL    Creatinine, Urine 104.0 15.0 - 325.0 mg/dL    Prot/Creat Ratio, Urine Unable to calculate 0.00 - 0.20   PTH, Intact   Result Value Ref Range    PTH, Intact 164.5 (H) 9.0 - 77.0 pg/mL   Lipid Panel   Result Value Ref Range    Cholesterol 190 120 - 199 mg/dL    Triglycerides 142 30 - 150 mg/dL    HDL 63 40 - 75 mg/dL    LDL Cholesterol 98.6 63.0 - 159.0 mg/dL    HDL/Cholesterol Ratio 33.2 20.0 - 50.0 %    Total Cholesterol/HDL Ratio 3.0 2.0 - 5.0    Non-HDL Cholesterol 127 mg/dL   Hemoglobin A1C   Result Value Ref Range    Hemoglobin A1C 6.2 (H) 4.0 - 5.6 %    Estimated Avg Glucose 131 68 - 131 mg/dL   Hepatic Function Panel   Result Value Ref Range    Total Protein 7.3 6.0 - 8.4 g/dL    Albumin 3.5 3.5 - 5.2 g/dL    Total Bilirubin 0.5 0.1 -  1.0 mg/dL    Bilirubin, Direct 0.2 0.1 - 0.3 mg/dL    AST 22 10 - 40 U/L    ALT 24 10 - 44 U/L    Alkaline Phosphatase 59 55 - 135 U/L   Urinalysis Microscopic   Result Value Ref Range    RBC, UA 1 0 - 4 /hpf    WBC, UA 2 0 - 5 /hpf    Squam Epithel, UA 0 /hpf    Hyaline Casts, UA 1 0-1/lpf /lpf    Microscopic Comment SEE COMMENT           ASSESSMENT AND PLAN:  274}  1. Benign hypertension with chronic kidney disease, stage IV  BP Readings from Last 1 Encounters:   11/25/22 130/80      Discussed sodium restriction, maintaining ideal body weight and regular exercise program as physiologic means to achieve blood pressure control. The patient will strive towards this.   The current medical regimen is effective;  continue present plan and medications. Recommended patient to check home readings to monitor and see me for followup as scheduled or sooner as needed.   Patient was educated that both decongestant and anti-inflammatory medication may raise blood pressure.  Stable decreased kidney function. Observe. Patient counseled to avoid/minimize the use of anti-inflammatory  Medication. Discussed to stay well hydrated. Also discussed with patient that good control of blood pressure and/or diabetes, if present, will help to prevent progression. Followed by nephrology.  The patient is not active on the digital hypertension program.    -     furosemide (LASIX) 20 MG tablet; Take 1 tablet (20 mg total) by mouth 2 (two) times a day.  Dispense: 180 tablet; Refill: 1  -     hydrALAZINE (APRESOLINE) 50 MG tablet; Take 1 tablet (50 mg total) by mouth 3 (three) times daily.  Dispense: 270 tablet; Refill: 1  -     metoprolol succinate (TOPROL-XL) 50 MG 24 hr tablet; Take 1 tablet (50 mg total) by mouth once daily.  Dispense: 90 tablet; Refill: 1  -     olmesartan-hydrochlorothiazide (BENICAR HCT) 40-25 mg per tablet; Take 1 tablet by mouth once daily.  Dispense: 90 tablet; Refill: 1  -     potassium chloride SA (K-DUR,KLOR-CON) 20  MEQ tablet; Take 1 tablet (20 mEq total) by mouth once daily.  Dispense: 90 tablet; Refill: 1    2. Hyperlipidemia with target LDL less than 100  Lab Results   Component Value Date    CHOL 190 11/21/2022     Lab Results   Component Value Date    HDL 63 11/21/2022     Lab Results   Component Value Date    LDLCALC 98.6 11/21/2022     Lab Results   Component Value Date    TRIG 142 11/21/2022     Lab Results   Component Value Date    LDLCALC 98.6 11/21/2022     We discussed low fat diet and regular exercise.The current medical regimen is effective;  continue present plan and medications.       3. Atherosclerosis of aorta  Patient with Atherosclerosis of the Aorta.  Stable/asymptomatic. Currently stable on lipid lowering medication and blood pressure monitoring.    Overview:  noted on L-spine X-ray 4/14/2011      4. Anemia of chronic disease  Stable. Asymptomatic. Observe.      5. Secondary hyperparathyroidism of renal origin  Stable. Asymptomatic. Observe.      6. Prediabetes  Lab Results   Component Value Date    HGBA1C 6.2 (H) 11/21/2022     Stable. We discussed low sugar/low carbohydrate diet and regular exercise to prevent progression. No need for prescription medication at this time.  Check A1c yearly.      7. Gastroesophageal reflux disease without esophagitis  Symptoms controlled: yes - takes medication occasionally as needed.   Reflux precautions discussed (eliminate tobacco if a smoker; minimize caffeine, chocolate and red/white peppermint intake; avoid heavy and spicy meals; don't lay down within 2-3 hours after eating; minimize the intake of NSAIDs). Medication as needed.   Patient asked to take medication breaks, if possible - discussed chronic use can limit calcium absorption (which can lead to osteopenia/osteoporosis), increases the risk for intestinal infections, and can cause kidney damage. There are also some newer studies that show possible increased risk of mortality.      8. Osteoporosis,  post-menopausal/9. Vitamin D deficiency  We discussed adequate calcium and vitamin D supplementation. We discussed fall precautions. She is up to date on her BMD. Has not yet seen endocrinology to discuss tx options - another consult order placed.    Overview:  currently on a drug holiday  - 5/2017 - no need for Rx treatment at this time.  -12/2021 BMD recommends Rx tx    Orders:  -     Ambulatory referral/consult to Endocrinology; Future; Expected date: 12/02/2022      10. Severe obesity (BMI 35.0-39.9) with comorbidity  BMI Readings from Last 3 Encounters:   11/25/22 36.45 kg/m²   09/07/22 34.68 kg/m²   05/18/22 35.00 kg/m²     The patient is asked to continue to make an attempt to improve diet and exercise patterns to aid in medical management of this problem.       11. Flu vaccine need    -     Influenza (FLUAD) - Quadrivalent (Adjuvanted) *Preferred* (65+) (PF)    12. Chronic midline low back pain with right-sided sciatica  Stable. Medication as needed.      13. Viral URI with cough  I discussed with the patient that she  has a viral illness that will need to run its course.  No need for antibiotics at this time.  We discussed the utilization of over-the-counter medications for symptomatic treatment.  The patient will call me or return to the clinic if symptoms worsen or persist for reevaluation.  Discussed otc medication safe to take in patients with high blood pressure.            Orders Placed This Encounter   Procedures    Influenza (FLUAD) - Quadrivalent (Adjuvanted) *Preferred* (65+) (PF)    Ambulatory referral/consult to Endocrinology      Follow up in about 6 months (around 5/25/2023), or if symptoms worsen or fail to improve, for annual exam. or sooner as needed.

## 2022-11-28 ENCOUNTER — TELEPHONE (OUTPATIENT)
Dept: NEPHROLOGY | Facility: CLINIC | Age: 82
End: 2022-11-28
Payer: MEDICARE

## 2022-11-28 NOTE — TELEPHONE ENCOUNTER
----- Message from Kamini Pressley NP sent at 11/28/2022  2:57 PM CST -----  Pls inform pt that kidney function is stable.

## 2022-11-29 DIAGNOSIS — E78.5 HYPERLIPIDEMIA WITH TARGET LDL LESS THAN 100: ICD-10-CM

## 2022-11-29 RX ORDER — ATORVASTATIN CALCIUM 20 MG/1
TABLET, FILM COATED ORAL
Qty: 90 TABLET | Refills: 3 | Status: SHIPPED | OUTPATIENT
Start: 2022-11-29 | End: 2023-12-04 | Stop reason: SDUPTHER

## 2022-11-29 NOTE — TELEPHONE ENCOUNTER
No new care gaps identified.  [facility] Embedded Care Gaps. Reference number: [id number]. 11/29/2022   2:52:27 PM CST

## 2022-11-30 NOTE — TELEPHONE ENCOUNTER
Refill Decision Note   Rachel Asif  is requesting a refill authorization.  Brief Assessment and Rationale for Refill:  Approve     Medication Therapy Plan:       Medication Reconciliation Completed: No   Comments:     No Care Gaps recommended.     Note composed:7:13 PM 11/29/2022

## 2022-12-02 ENCOUNTER — OFFICE VISIT (OUTPATIENT)
Dept: ENDOCRINOLOGY | Facility: CLINIC | Age: 82
End: 2022-12-02
Payer: MEDICARE

## 2022-12-02 VITALS
HEART RATE: 68 BPM | SYSTOLIC BLOOD PRESSURE: 135 MMHG | BODY MASS INDEX: 35.55 KG/M2 | HEIGHT: 62 IN | DIASTOLIC BLOOD PRESSURE: 72 MMHG | TEMPERATURE: 99 F | WEIGHT: 193.19 LBS

## 2022-12-02 DIAGNOSIS — R73.03 PREDIABETES: ICD-10-CM

## 2022-12-02 DIAGNOSIS — M81.0 OSTEOPOROSIS, POST-MENOPAUSAL: ICD-10-CM

## 2022-12-02 DIAGNOSIS — E55.9 VITAMIN D DEFICIENCY: Primary | ICD-10-CM

## 2022-12-02 PROCEDURE — 99204 PR OFFICE/OUTPT VISIT, NEW, LEVL IV, 45-59 MIN: ICD-10-PCS | Mod: S$GLB,,, | Performed by: HOSPITALIST

## 2022-12-02 PROCEDURE — 1126F AMNT PAIN NOTED NONE PRSNT: CPT | Mod: CPTII,S$GLB,, | Performed by: HOSPITALIST

## 2022-12-02 PROCEDURE — 1101F PT FALLS ASSESS-DOCD LE1/YR: CPT | Mod: CPTII,S$GLB,, | Performed by: HOSPITALIST

## 2022-12-02 PROCEDURE — 1159F MED LIST DOCD IN RCRD: CPT | Mod: CPTII,S$GLB,, | Performed by: HOSPITALIST

## 2022-12-02 PROCEDURE — 99204 OFFICE O/P NEW MOD 45 MIN: CPT | Mod: S$GLB,,, | Performed by: HOSPITALIST

## 2022-12-02 PROCEDURE — 3078F DIAST BP <80 MM HG: CPT | Mod: CPTII,S$GLB,, | Performed by: HOSPITALIST

## 2022-12-02 PROCEDURE — 3075F SYST BP GE 130 - 139MM HG: CPT | Mod: CPTII,S$GLB,, | Performed by: HOSPITALIST

## 2022-12-02 PROCEDURE — 99999 PR PBB SHADOW E&M-EST. PATIENT-LVL IV: ICD-10-PCS | Mod: PBBFAC,,, | Performed by: HOSPITALIST

## 2022-12-02 PROCEDURE — 1159F PR MEDICATION LIST DOCUMENTED IN MEDICAL RECORD: ICD-10-PCS | Mod: CPTII,S$GLB,, | Performed by: HOSPITALIST

## 2022-12-02 PROCEDURE — 3078F PR MOST RECENT DIASTOLIC BLOOD PRESSURE < 80 MM HG: ICD-10-PCS | Mod: CPTII,S$GLB,, | Performed by: HOSPITALIST

## 2022-12-02 PROCEDURE — 1126F PR PAIN SEVERITY QUANTIFIED, NO PAIN PRESENT: ICD-10-PCS | Mod: CPTII,S$GLB,, | Performed by: HOSPITALIST

## 2022-12-02 PROCEDURE — 3075F PR MOST RECENT SYSTOLIC BLOOD PRESS GE 130-139MM HG: ICD-10-PCS | Mod: CPTII,S$GLB,, | Performed by: HOSPITALIST

## 2022-12-02 PROCEDURE — 3288F FALL RISK ASSESSMENT DOCD: CPT | Mod: CPTII,S$GLB,, | Performed by: HOSPITALIST

## 2022-12-02 PROCEDURE — 99999 PR PBB SHADOW E&M-EST. PATIENT-LVL IV: CPT | Mod: PBBFAC,,, | Performed by: HOSPITALIST

## 2022-12-02 PROCEDURE — 3288F PR FALLS RISK ASSESSMENT DOCUMENTED: ICD-10-PCS | Mod: CPTII,S$GLB,, | Performed by: HOSPITALIST

## 2022-12-02 PROCEDURE — 1101F PR PT FALLS ASSESS DOC 0-1 FALLS W/OUT INJ PAST YR: ICD-10-PCS | Mod: CPTII,S$GLB,, | Performed by: HOSPITALIST

## 2022-12-02 NOTE — ASSESSMENT & PLAN NOTE
- Lab work reviewed, and discussed with patient in clinic  - Currently on VitD3 1000iu daily  - lab work soon to monitor, will make adjustment pending lab

## 2022-12-02 NOTE — ASSESSMENT & PLAN NOTE
- pre diabetes worsening A1c 6.2%   - patient endorses sweet intake   - advised dietary modification   - follow-up with PCP

## 2022-12-02 NOTE — ASSESSMENT & PLAN NOTE
- Patient with Osteoporosis diagnosed on DEXA scan with High FRAX  - most recent DXA scan personal reviewed by me and discussed with patient in clinic.   - Risk factors include:  Postmenopausal,   - high fall risk, CKD stage 3a-b  - given CKD, advised patient to start Prolia SC every 6 month injection further least 2 years  - we will perform bloodwork/secondary workup : check BMP, vitamin-D, TSH  - Next DXA:  2 years from most current  - continue Calcium/Vit D supplements  - Fall precautions/Exercise regimen: advised  - Advised dental work should ideally be completed prior to initiation of treatment, follow up with dentist/oral surgeon advised.

## 2022-12-02 NOTE — PROGRESS NOTES
Subjective:      Patient ID: Rachel Asif is a 82 y.o. female presented to [facility] on 12/2/2022.  Chief Complaint:  Osteoporosis      History of Present Illness: Rachel Asif is a 82 y.o. female here for  osteoporosis  Other significant past medical history:  CKD stage 3a, secondary hyperparathyroidism, obesity    In regards to Osteoporosis  - Sent by PCP for evaluation of osteoporosis, high FRAX score on bone density 2021  - Diagnosis on DXA 12/16/2021  - not on medical therapy for osteoporosis  - Vit D intake: 1000ui 6 month, not taking ergocalciferol  - patient lives with: Family at home   - Walking gait: normal, No: using cane/walker    Osteoporosis Risk Factor Assessment:  - History of falls over the last 2 years: no  - History of fractures to wrist, hip or spine:no, remote L ankle fracture 1998  - History of loss of height of more than 1 1/2 to 2 inches: no  - Family history of osteoporosis: no  - Family history of fractures of hip: no  - Age of menopause: 50s, Hysterectomy, No   - Tobacco use, including use in the past:  no   - Weight bearing exercise?   no (walking, jogging, strength training)  - Dental work planned? no    Medical hx  - History of CKD3, yes, CKD3 A-B  - History of Hyperparathyrodism, yes  - History of thyroid disease, no   - History of kidney stones, no  - History of active malignancy, history of malignancy the involved the bone, prior radiation treatment , no    Recent DXA: 12/2021  Lumbar spine (L1-L4):  BMD is 1.058 g/cm2, T-score is 0.1, and Z-score is 2.3.  Total hip:   BMD is 0.854 g/cm2, T-score is -0.7, and Z-score is 0.6.  Femoral neck:    BMD is 0.622 g/cm2, T-score is -2.0, and Z-score is -0.4.  Distal 1/3 radius: Not applicable    FRAX:13% risk of a major osteoporotic fracture in the next 10 years. 3.7% risk of hip fracture in the next 10 years.     Impression:  *Osteoporosis based on T-score between -1.0 and -2.5 and elevated risk based on  "FRAX.  *Fracture risk is high  *Compared with previous DXA, BMD at the lumbar spine has remained stable, and the BMD at the total hip has declined by -5.9%.    Lab work reviewed  Lab Results   Component Value Date    .5 (H) 11/21/2022    .3 (H) 09/06/2022    .0 (H) 02/11/2022    PIJSAMJX14SJ 24 (L) 02/11/2022    MJWYCPQZ76KA 20 (L) 03/16/2021    TTWHELTF28YB 17 (L) 12/16/2020    CALCIUM 9.4 11/21/2022    CALCIUM 9.2 09/06/2022    CALCIUM 9.2 09/06/2022    PHOS 3.4 11/21/2022    PHOS 3.1 09/06/2022    PHOS 3.6 02/11/2022    ALKPHOS 59 11/21/2022    ALKPHOS 58 08/19/2020    ALKPHOS 73 11/20/2019    TSH 2.082 12/19/2019       2) Hyperparathyroidism  - CKD stage 3a-b  - Follow up with nephrology  - Osteoporosis as above      Reviewed past surgical, medical, family, social history and updated as appropriate.  Review of Systems: see HPI above    Objective:   /72 (BP Location: Left arm)   Pulse 68   Temp 99 °F (37.2 °C) (Oral)   Ht 5' 2" (1.575 m)   Wt 87.6 kg (193 lb 3.2 oz)   BMI 35.34 kg/m²     Body mass index is 35.34 kg/m².  Vital signs reviewed    Physical Exam  Vitals and nursing note reviewed.   Constitutional:       General: She is not in acute distress.     Appearance: Normal appearance. She is well-developed. She is obese.   Neck:      Thyroid: No thyromegaly.      Trachea: No tracheal deviation.   Cardiovascular:      Rate and Rhythm: Normal rate and regular rhythm.      Heart sounds: Normal heart sounds.   Pulmonary:      Effort: Pulmonary effort is normal. No respiratory distress.      Breath sounds: Normal breath sounds.   Musculoskeletal:         General: No tenderness (No paraspinous tenderness noted on exam) or deformity. Normal range of motion.      Cervical back: Normal range of motion and neck supple.      Comments: Patient was able to  arise from chair without assistant of armrest, good gait noted.   Skin:     General: Skin is warm.      Findings: No erythema or rash. "   Neurological:      Mental Status: She is alert. Mental status is at baseline.   Psychiatric:         Mood and Affect: Mood normal.         Behavior: Behavior normal.         Thought Content: Thought content normal.       Lab Reviewed:  See results in subjective  Lab Results   Component Value Date    HGBA1C 6.2 (H) 11/21/2022     Lab Results   Component Value Date    CHOL 190 11/21/2022    HDL 63 11/21/2022    LDLCALC 98.6 11/21/2022    TRIG 142 11/21/2022    CHOLHDL 33.2 11/21/2022     Lab Results   Component Value Date     11/21/2022    K 3.7 11/21/2022     11/21/2022    CO2 24 11/21/2022    GLU 85 11/21/2022    BUN 24 (H) 11/21/2022    CREATININE 1.6 (H) 11/21/2022    CALCIUM 9.4 11/21/2022    PROT 7.3 11/21/2022    ALBUMIN 3.5 11/21/2022    ALBUMIN 3.5 11/21/2022    BILITOT 0.5 11/21/2022    ALKPHOS 59 11/21/2022    AST 22 11/21/2022    ALT 24 11/21/2022    ANIONGAP 9 11/21/2022    ESTGFRAFRICA 40.1 (A) 02/11/2022    EGFRNONAA 34.8 (A) 02/11/2022    TSH 2.082 12/19/2019     Lab Results   Component Value Date    .5 (H) 11/21/2022    RDITFMNO29QR 24 (L) 02/11/2022    CALCIUM 9.4 11/21/2022    PHOS 3.4 11/21/2022    ALKPHOS 59 11/21/2022    TSH 2.082 12/19/2019     Assessment     1. Vitamin D deficiency  Vitamin D      2. Osteoporosis, post-menopausal  Ambulatory referral/consult to Endocrinology    Vitamin D    TSH    Comprehensive Metabolic Panel      3. Prediabetes  TSH         Plan     Osteoporosis, post-menopausal  - Patient with Osteoporosis diagnosed on DEXA scan with High FRAX  - most recent DXA scan personal reviewed by me and discussed with patient in clinic.   - Risk factors include:  Postmenopausal,   - high fall risk, CKD stage 3a-b  - given CKD, advised patient to start Prolia SC every 6 month injection further least 2 years  - we will perform bloodwork/secondary workup : check BMP, vitamin-D, TSH  - Next DXA:  2 years from most current  - continue vitamin-D, advised patient to  increase calcium supplement: 2x Tums tab daily  - Fall precautions/Exercise regimen: advised  - Advised dental work should ideally be completed prior to initiation of treatment, follow up with dentist/oral surgeon advised.     Prediabetes  - pre diabetes worsening A1c 6.2%   - patient endorses sweet intake   - advised dietary modification   - follow-up with PCP     Vitamin D deficiency  - Lab work reviewed, and discussed with patient in clinic  - Currently on VitD3 1000iu daily  - lab work soon to monitor, will make adjustment pending lab      Advised patient to follow up with PCP for routine health maintenance care.   RTC in 6 mo    He Adrian M.D.  Endocrinology  [facility]  12/2/2022      Disclaimer: This note has been generated in part with the use of voice-recognition software. There may be typographical errors that have been missed during proof-reading.

## 2022-12-15 ENCOUNTER — INFUSION (OUTPATIENT)
Dept: INFUSION THERAPY | Facility: HOSPITAL | Age: 82
End: 2022-12-15
Attending: HOSPITALIST
Payer: MEDICARE

## 2022-12-15 VITALS
TEMPERATURE: 98 F | OXYGEN SATURATION: 100 % | DIASTOLIC BLOOD PRESSURE: 72 MMHG | HEART RATE: 81 BPM | RESPIRATION RATE: 17 BRPM | SYSTOLIC BLOOD PRESSURE: 167 MMHG

## 2022-12-15 DIAGNOSIS — M81.0 OSTEOPOROSIS, POST-MENOPAUSAL: Primary | ICD-10-CM

## 2022-12-15 PROCEDURE — 63600175 PHARM REV CODE 636 W HCPCS: Mod: JG | Performed by: HOSPITALIST

## 2022-12-15 PROCEDURE — 96372 THER/PROPH/DIAG INJ SC/IM: CPT

## 2022-12-15 RX ADMIN — DENOSUMAB 60 MG: 60 INJECTION SUBCUTANEOUS at 01:12

## 2022-12-15 NOTE — PLAN OF CARE
Pt arrived to unit for her first Prolia injection. Pt oriented to unit. Most recent calcium reviewed and wnl. Pt denies any recent falls or major dental work. Is taking calcium and vitamin d at home as directed. Injection tolerated well. Pt observed for 10 minutes after. No issues noted. Pt instructed to return on June 15th for her next injection. Verbalized understanding. Given appointment reminder. Discharged from unit in NAD.

## 2023-02-16 DIAGNOSIS — M54.2 CHRONIC NECK PAIN: ICD-10-CM

## 2023-02-16 DIAGNOSIS — G89.29 CHRONIC NECK PAIN: ICD-10-CM

## 2023-02-16 DIAGNOSIS — M54.41 CHRONIC MIDLINE LOW BACK PAIN WITH RIGHT-SIDED SCIATICA: ICD-10-CM

## 2023-02-16 DIAGNOSIS — G89.29 CHRONIC MIDLINE LOW BACK PAIN WITH RIGHT-SIDED SCIATICA: ICD-10-CM

## 2023-02-16 NOTE — TELEPHONE ENCOUNTER
No new care gaps identified.  [facility] Embedded Care Gaps. Reference number: [id number]. 2/16/2023   4:22:28 PM CST

## 2023-02-16 NOTE — TELEPHONE ENCOUNTER
Patient states she would need a refill for her traMADoL. Patient has appointment scheduled in may

## 2023-02-16 NOTE — TELEPHONE ENCOUNTER
----- Message from Brit Davis sent at 2/16/2023  4:00 PM CST -----  Regarding: pt [phone]  Type: Patient Call Back    Who called:pt     What is the request in detail:medication questions     Can the clinic reply by [facility]?no     Would the patient rather a call back or a response via [facility]?call back     Best call back number: [phone]      Additional Information:

## 2023-02-17 RX ORDER — TRAMADOL HYDROCHLORIDE 50 MG/1
50 TABLET ORAL 3 TIMES DAILY PRN
Qty: 90 TABLET | Refills: 1 | Status: SHIPPED | OUTPATIENT
Start: 2023-02-17 | End: 2023-05-31 | Stop reason: SDUPTHER

## 2023-02-19 DIAGNOSIS — K21.9 GASTROESOPHAGEAL REFLUX DISEASE WITHOUT ESOPHAGITIS: ICD-10-CM

## 2023-02-19 RX ORDER — PANTOPRAZOLE SODIUM 20 MG/1
TABLET, DELAYED RELEASE ORAL
Qty: 90 TABLET | Refills: 3 | Status: SHIPPED | OUTPATIENT
Start: 2023-02-19

## 2023-02-19 NOTE — TELEPHONE ENCOUNTER
No new care gaps identified.  [facility] Embedded Care Gaps. Reference number: [id number]. 2/19/2023   5:45:47 AM CST

## 2023-02-20 NOTE — TELEPHONE ENCOUNTER
Refill Decision Note   Rachel Asif  is requesting a refill authorization.  Brief Assessment and Rationale for Refill:  Approve     Medication Therapy Plan:       Medication Reconciliation Completed: No   Comments:     No Care Gaps recommended.     Note composed:9:53 PM 02/19/2023

## 2023-03-03 ENCOUNTER — PATIENT OUTREACH (OUTPATIENT)
Dept: ADMINISTRATIVE | Facility: HOSPITAL | Age: 83
End: 2023-03-03
Payer: MEDICARE

## 2023-03-03 ENCOUNTER — PATIENT MESSAGE (OUTPATIENT)
Dept: ADMINISTRATIVE | Facility: HOSPITAL | Age: 83
End: 2023-03-03
Payer: MEDICARE

## 2023-03-03 ENCOUNTER — LAB VISIT (OUTPATIENT)
Dept: LAB | Facility: HOSPITAL | Age: 83
End: 2023-03-03
Attending: INTERNAL MEDICINE
Payer: MEDICARE

## 2023-03-03 DIAGNOSIS — N18.30 STAGE 3 CHRONIC KIDNEY DISEASE, UNSPECIFIED WHETHER STAGE 3A OR 3B CKD: ICD-10-CM

## 2023-03-03 LAB
25(OH)D3+25(OH)D2 SERPL-MCNC: 25 NG/ML (ref 30–96)
ALBUMIN SERPL BCP-MCNC: 3.5 G/DL (ref 3.5–5.2)
ANION GAP SERPL CALC-SCNC: 8 MMOL/L (ref 8–16)
BASOPHILS # BLD AUTO: 0.04 K/UL (ref 0–0.2)
BASOPHILS NFR BLD: 0.6 % (ref 0–1.9)
BUN SERPL-MCNC: 40 MG/DL (ref 8–23)
CALCIUM SERPL-MCNC: 9.5 MG/DL (ref 8.7–10.5)
CHLORIDE SERPL-SCNC: 107 MMOL/L (ref 95–110)
CO2 SERPL-SCNC: 26 MMOL/L (ref 23–29)
CREAT SERPL-MCNC: 2.3 MG/DL (ref 0.5–1.4)
DIFFERENTIAL METHOD: ABNORMAL
EOSINOPHIL # BLD AUTO: 0.2 K/UL (ref 0–0.5)
EOSINOPHIL NFR BLD: 3 % (ref 0–8)
ERYTHROCYTE [DISTWIDTH] IN BLOOD BY AUTOMATED COUNT: 14.5 % (ref 11.5–14.5)
EST. GFR  (NO RACE VARIABLE): 20.4 ML/MIN/1.73 M^2
GLUCOSE SERPL-MCNC: 111 MG/DL (ref 70–110)
HCT VFR BLD AUTO: 32.9 % (ref 37–48.5)
HGB BLD-MCNC: 9.7 G/DL (ref 12–16)
IMM GRANULOCYTES # BLD AUTO: 0.03 K/UL (ref 0–0.04)
IMM GRANULOCYTES NFR BLD AUTO: 0.4 % (ref 0–0.5)
LYMPHOCYTES # BLD AUTO: 1.3 K/UL (ref 1–4.8)
LYMPHOCYTES NFR BLD: 18.2 % (ref 18–48)
MCH RBC QN AUTO: 29.6 PG (ref 27–31)
MCHC RBC AUTO-ENTMCNC: 29.5 G/DL (ref 32–36)
MCV RBC AUTO: 100 FL (ref 82–98)
MONOCYTES # BLD AUTO: 0.7 K/UL (ref 0.3–1)
MONOCYTES NFR BLD: 10 % (ref 4–15)
NEUTROPHILS # BLD AUTO: 4.7 K/UL (ref 1.8–7.7)
NEUTROPHILS NFR BLD: 67.8 % (ref 38–73)
NRBC BLD-RTO: 0 /100 WBC
PHOSPHATE SERPL-MCNC: 4.6 MG/DL (ref 2.7–4.5)
PLATELET # BLD AUTO: 321 K/UL (ref 150–450)
PMV BLD AUTO: 10.3 FL (ref 9.2–12.9)
POTASSIUM SERPL-SCNC: 4.4 MMOL/L (ref 3.5–5.1)
PTH-INTACT SERPL-MCNC: 173.1 PG/ML (ref 9–77)
RBC # BLD AUTO: 3.28 M/UL (ref 4–5.4)
SODIUM SERPL-SCNC: 141 MMOL/L (ref 136–145)
WBC # BLD AUTO: 6.97 K/UL (ref 3.9–12.7)

## 2023-03-03 PROCEDURE — 36415 COLL VENOUS BLD VENIPUNCTURE: CPT | Mod: PO | Performed by: NURSE PRACTITIONER

## 2023-03-03 PROCEDURE — 82306 VITAMIN D 25 HYDROXY: CPT | Performed by: NURSE PRACTITIONER

## 2023-03-03 PROCEDURE — 80069 RENAL FUNCTION PANEL: CPT | Performed by: NURSE PRACTITIONER

## 2023-03-03 PROCEDURE — 83970 ASSAY OF PARATHORMONE: CPT | Performed by: NURSE PRACTITIONER

## 2023-03-03 PROCEDURE — 85025 COMPLETE CBC W/AUTO DIFF WBC: CPT | Performed by: NURSE PRACTITIONER

## 2023-03-06 DIAGNOSIS — N17.9 AKI (ACUTE KIDNEY INJURY): Primary | ICD-10-CM

## 2023-03-06 NOTE — PROGRESS NOTES
Pls let pt know kidney function looks a bit worse.     Ask if she has been sick at all (diarrhea, vomiting) or having pain in midback. Make sure she is avoiding NSAIDs (advil, aleve, naproxen, ibuprofen, headache powders). Hydrate well with water.    Repeat CBC, RFP later this week or early next week.

## 2023-03-08 ENCOUNTER — TELEPHONE (OUTPATIENT)
Dept: NEPHROLOGY | Facility: CLINIC | Age: 83
End: 2023-03-08
Payer: MEDICARE

## 2023-03-08 NOTE — TELEPHONE ENCOUNTER
Discussed Lab results with pt's son, Jefferson.  Denies n/v/d.  Denies NSAID use.    He said he came over when niece called him because pt had slurred speech and was very tired. He said they aren't sure if she took too much robitussin or cold medication.  He said she is currently sleeping in bed.    Due to new onset of slurred speech and drowsiness, recommend she go to ER.  Son said he will take her now.        ----- Message from Alden Jackson MA sent at 3/8/2023 12:08 PM CST -----  Regarding: FW: results  Contact: Jefferson [phone]  Please advise   ----- Message -----  From: Jacqueline Lao  Sent: 3/8/2023  11:25 AM CST  To: [facility] Staff  Subject: results                                          Caller, Pt son requesting to speak with the doctor regarding Pt. Caller states he was just on a call with the doctor regarding Pt results. Please call to discuss further.

## 2023-03-08 NOTE — TELEPHONE ENCOUNTER
----- Message from Kamini Pressley NP sent at 3/6/2023  3:11 PM CST -----  Pls let pt know kidney function looks a bit worse.     Ask if she has been sick at all (diarrhea, vomiting) or having pain in midback. Make sure she is avoiding NSAIDs (advil, aleve, naproxen, ibuprofen, headache powders). Hydrate well with water.    Repeat CBC, RFP later this week or early next week.

## 2023-05-20 DIAGNOSIS — I12.9 BENIGN HYPERTENSION WITH CHRONIC KIDNEY DISEASE, STAGE IV: ICD-10-CM

## 2023-05-20 DIAGNOSIS — N18.4 BENIGN HYPERTENSION WITH CHRONIC KIDNEY DISEASE, STAGE IV: ICD-10-CM

## 2023-05-20 NOTE — TELEPHONE ENCOUNTER
Refill Routing Note   Medication(s) are not appropriate for processing by [facility] for the following reason(s):      Medication outside of protocol  Required labs abnormal  Required vitals abnormal    [facility] action(s):  Defer  Route Care Due:  None identified          Appointments  past 12m or future 3m with PCP    Date Provider   Last Visit   11/25/2022 Oneyda Pace MD   Next Visit   5/31/2023 Oneyda Pace MD   ED visits in past 90 days: 0        Note composed:1:26 PM 05/20/2023

## 2023-05-22 RX ORDER — HYDRALAZINE HYDROCHLORIDE 50 MG/1
TABLET, FILM COATED ORAL
Qty: 270 TABLET | Refills: 0 | Status: SHIPPED | OUTPATIENT
Start: 2023-05-22 | End: 2023-09-19

## 2023-05-22 RX ORDER — OLMESARTAN MEDOXOMIL AND HYDROCHLOROTHIAZIDE 40/25 40; 25 MG/1; MG/1
TABLET ORAL
Qty: 90 TABLET | Refills: 0 | Status: SHIPPED | OUTPATIENT
Start: 2023-05-22 | End: 2023-09-19

## 2023-05-22 RX ORDER — POTASSIUM CHLORIDE 20 MEQ/1
TABLET, EXTENDED RELEASE ORAL
Qty: 90 TABLET | Refills: 0 | Status: SHIPPED | OUTPATIENT
Start: 2023-05-22 | End: 2023-09-19

## 2023-05-22 RX ORDER — METOPROLOL SUCCINATE 50 MG/1
TABLET, EXTENDED RELEASE ORAL
Qty: 90 TABLET | Refills: 0 | Status: SHIPPED | OUTPATIENT
Start: 2023-05-22 | End: 2023-09-19

## 2023-05-22 RX ORDER — FUROSEMIDE 20 MG/1
TABLET ORAL
Qty: 180 TABLET | Refills: 0 | Status: SHIPPED | OUTPATIENT
Start: 2023-05-22 | End: 2023-09-19

## 2023-05-31 ENCOUNTER — OFFICE VISIT (OUTPATIENT)
Dept: FAMILY MEDICINE | Facility: CLINIC | Age: 83
End: 2023-05-31
Payer: MEDICARE

## 2023-05-31 VITALS
TEMPERATURE: 98 F | HEIGHT: 62 IN | BODY MASS INDEX: 35.11 KG/M2 | HEART RATE: 62 BPM | DIASTOLIC BLOOD PRESSURE: 70 MMHG | SYSTOLIC BLOOD PRESSURE: 130 MMHG | OXYGEN SATURATION: 98 % | WEIGHT: 190.81 LBS

## 2023-05-31 DIAGNOSIS — M81.0 OSTEOPOROSIS, POST-MENOPAUSAL: ICD-10-CM

## 2023-05-31 DIAGNOSIS — I70.0 ATHEROSCLEROSIS OF AORTA: ICD-10-CM

## 2023-05-31 DIAGNOSIS — M54.41 CHRONIC MIDLINE LOW BACK PAIN WITH RIGHT-SIDED SCIATICA: ICD-10-CM

## 2023-05-31 DIAGNOSIS — Z00.00 ROUTINE MEDICAL EXAM: Primary | ICD-10-CM

## 2023-05-31 DIAGNOSIS — E66.01 SEVERE OBESITY (BMI 35.0-39.9) WITH COMORBIDITY: ICD-10-CM

## 2023-05-31 DIAGNOSIS — N25.81 SECONDARY HYPERPARATHYROIDISM OF RENAL ORIGIN: ICD-10-CM

## 2023-05-31 DIAGNOSIS — E78.5 HYPERLIPIDEMIA WITH TARGET LDL LESS THAN 100: ICD-10-CM

## 2023-05-31 DIAGNOSIS — K21.9 GASTROESOPHAGEAL REFLUX DISEASE WITHOUT ESOPHAGITIS: ICD-10-CM

## 2023-05-31 DIAGNOSIS — M54.2 CHRONIC NECK PAIN: ICD-10-CM

## 2023-05-31 DIAGNOSIS — I12.9 BENIGN HYPERTENSION WITH CHRONIC KIDNEY DISEASE, STAGE IV: ICD-10-CM

## 2023-05-31 DIAGNOSIS — G89.29 CHRONIC MIDLINE LOW BACK PAIN WITH RIGHT-SIDED SCIATICA: ICD-10-CM

## 2023-05-31 DIAGNOSIS — G89.29 CHRONIC NECK PAIN: ICD-10-CM

## 2023-05-31 DIAGNOSIS — R73.03 PREDIABETES: ICD-10-CM

## 2023-05-31 DIAGNOSIS — N18.4 BENIGN HYPERTENSION WITH CHRONIC KIDNEY DISEASE, STAGE IV: ICD-10-CM

## 2023-05-31 PROCEDURE — 1126F PR PAIN SEVERITY QUANTIFIED, NO PAIN PRESENT: ICD-10-PCS | Mod: CPTII,S$GLB,, | Performed by: INTERNAL MEDICINE

## 2023-05-31 PROCEDURE — 99999 PR PBB SHADOW E&M-EST. PATIENT-LVL III: CPT | Mod: PBBFAC,,, | Performed by: INTERNAL MEDICINE

## 2023-05-31 PROCEDURE — 1101F PR PT FALLS ASSESS DOC 0-1 FALLS W/OUT INJ PAST YR: ICD-10-PCS | Mod: CPTII,S$GLB,, | Performed by: INTERNAL MEDICINE

## 2023-05-31 PROCEDURE — 1126F AMNT PAIN NOTED NONE PRSNT: CPT | Mod: CPTII,S$GLB,, | Performed by: INTERNAL MEDICINE

## 2023-05-31 PROCEDURE — 1101F PT FALLS ASSESS-DOCD LE1/YR: CPT | Mod: CPTII,S$GLB,, | Performed by: INTERNAL MEDICINE

## 2023-05-31 PROCEDURE — 3078F DIAST BP <80 MM HG: CPT | Mod: CPTII,S$GLB,, | Performed by: INTERNAL MEDICINE

## 2023-05-31 PROCEDURE — 99999 PR PBB SHADOW E&M-EST. PATIENT-LVL III: ICD-10-PCS | Mod: PBBFAC,,, | Performed by: INTERNAL MEDICINE

## 2023-05-31 PROCEDURE — 3075F SYST BP GE 130 - 139MM HG: CPT | Mod: CPTII,S$GLB,, | Performed by: INTERNAL MEDICINE

## 2023-05-31 PROCEDURE — 1160F PR REVIEW ALL MEDS BY PRESCRIBER/CLIN PHARMACIST DOCUMENTED: ICD-10-PCS | Mod: CPTII,S$GLB,, | Performed by: INTERNAL MEDICINE

## 2023-05-31 PROCEDURE — 99397 PER PM REEVAL EST PAT 65+ YR: CPT | Mod: S$GLB,,, | Performed by: INTERNAL MEDICINE

## 2023-05-31 PROCEDURE — 1160F RVW MEDS BY RX/DR IN RCRD: CPT | Mod: CPTII,S$GLB,, | Performed by: INTERNAL MEDICINE

## 2023-05-31 PROCEDURE — 1159F PR MEDICATION LIST DOCUMENTED IN MEDICAL RECORD: ICD-10-PCS | Mod: CPTII,S$GLB,, | Performed by: INTERNAL MEDICINE

## 2023-05-31 PROCEDURE — 3078F PR MOST RECENT DIASTOLIC BLOOD PRESSURE < 80 MM HG: ICD-10-PCS | Mod: CPTII,S$GLB,, | Performed by: INTERNAL MEDICINE

## 2023-05-31 PROCEDURE — 3288F PR FALLS RISK ASSESSMENT DOCUMENTED: ICD-10-PCS | Mod: CPTII,S$GLB,, | Performed by: INTERNAL MEDICINE

## 2023-05-31 PROCEDURE — 99397 PR PREVENTIVE VISIT,EST,65 & OVER: ICD-10-PCS | Mod: S$GLB,,, | Performed by: INTERNAL MEDICINE

## 2023-05-31 PROCEDURE — 1159F MED LIST DOCD IN RCRD: CPT | Mod: CPTII,S$GLB,, | Performed by: INTERNAL MEDICINE

## 2023-05-31 PROCEDURE — 3288F FALL RISK ASSESSMENT DOCD: CPT | Mod: CPTII,S$GLB,, | Performed by: INTERNAL MEDICINE

## 2023-05-31 PROCEDURE — 3075F PR MOST RECENT SYSTOLIC BLOOD PRESS GE 130-139MM HG: ICD-10-PCS | Mod: CPTII,S$GLB,, | Performed by: INTERNAL MEDICINE

## 2023-05-31 RX ORDER — TRAMADOL HYDROCHLORIDE 50 MG/1
50 TABLET ORAL 3 TIMES DAILY PRN
Qty: 90 TABLET | Refills: 0 | Status: SHIPPED | OUTPATIENT
Start: 2023-05-31 | End: 2023-09-19 | Stop reason: SDUPTHER

## 2023-05-31 NOTE — PROGRESS NOTES
CHIEF COMPLAINT:   Chief Complaint   Patient presents with    Annual Exam    Medication Refill        HISTORY OF PRESENT ILLNESS:  Rachel Asif is a 82 y.o. female who presents to the clinic today for a routine physical exam. Her last physical exam was approximately 1 years(s) ago.      Objective     PAST MEDICAL HISTORY:  Past Medical History:   Diagnosis Date    Amblyopia     os    Anemia of other chronic disease     Atherosclerosis of aorta     noted on L-spine X-ray 2011    Atherosclerosis of aortic arch     - noted on CXR 2017    Chronic low back pain     followed by orthopaedics    DJD (degenerative joint disease)     Glaucoma     History of colonic polyps     History of vitamin D deficiency     Hyperlipidemia     Hypertension     Lumbar radiculopathy     Obesity     Obesity     OH (ocular hypertension)     Osteoarthritis     Osteoporosis, post-menopausal     currently on a drug holiday    PCO (posterior capsular opacification), right 2020    Postmenopausal status     Prediabetes     Secondary hyperparathyroidism of renal origin     Trochanteric bursitis        PAST SURGICAL HISTORY:  Past Surgical History:   Procedure Laterality Date    CATARACT EXTRACTION W/  INTRAOCULAR LENS IMPLANT Bilateral     COLONOSCOPY N/A 8/10/2020    Procedure: COLONOSCOPY;  Surgeon: Bettina Gates MD;  Location: [facility];  Service: Endoscopy;  Laterality: N/A;       SOCIAL HISTORY:  Social History     Socioeconomic History    Marital status:    Occupational History    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 She is taking care of a sister who has brain damage from a car accident.       FAMILY HISTORY:  Family History   Problem Relation Age of Onset    Cataracts Mother      Hypertension Mother     Other Mother         complications from splenectomy after fall    Glaucoma Mother     Cataracts Father     Hypertension Father     Glaucoma Father     Hypertension Sister     Edema Sister     Hypertension Brother     Diabetes Brother     Heart disease Brother     Glaucoma Maternal Grandmother     Hypertension Sister     Other Sister         shingles    Lupus Sister     Lung cancer Sister     Hypertension Sister     Other Sister         brain damage from MVA    Hypertension Sister     Hypertension Brother     Heart disease Brother     Hypertension Brother     Heart attack Brother     Hypertension Brother     Hypertension Brother     No Known Problems Brother     No Known Problems Brother     Hypertension Brother     Heart disease Brother     No Known Problems Maternal Aunt     No Known Problems Maternal Uncle     No Known Problems Paternal Aunt     No Known Problems Paternal Uncle     No Known Problems Maternal Grandfather     No Known Problems Paternal Grandmother     No Known Problems Paternal Grandfather     Cancer Neg Hx     Amblyopia Neg Hx     Blindness Neg Hx     Retinal detachment Neg Hx     Strabismus Neg Hx     Stroke Neg Hx        ALLERGIES AND MEDICATIONS: updated and reviewed.  Review of patient's allergies indicates:   Allergen Reactions    No known allergies      Medication List with Changes/Refills   Current Medications    AMLODIPINE (NORVASC) 10 MG TABLET    TAKE 1 TABLET(10 MG) BY MOUTH EVERY DAY    ASPIRIN 81 MG CHEWABLE TABLET    Every day    ATORVASTATIN (LIPITOR) 20 MG TABLET    TAKE 1 TABLET(20 MG) BY MOUTH EVERY DAY    DICLOFENAC SODIUM (VOLTAREN) 1 % GEL    Apply topically 3 (three) times daily as needed.    DULOXETINE (CYMBALTA) 30 MG CAPSULE    Take 1 capsule (30 mg total) by mouth once daily.    ERGOCALCIFEROL (ERGOCALCIFEROL) 50,000 UNIT CAP    TAKE 1 CAPSULE BY MOUTH EVERY 7 DAYS    FUROSEMIDE (LASIX) 20 MG TABLET    TAKE 1 TABLET(20 MG) BY MOUTH TWICE DAILY     HYDRALAZINE (APRESOLINE) 50 MG TABLET    TAKE 1 TABLET(50 MG) BY MOUTH THREE TIMES DAILY    METOPROLOL SUCCINATE (TOPROL-XL) 50 MG 24 HR TABLET    TAKE 1 TABLET(50 MG) BY MOUTH EVERY DAY    OLMESARTAN-HYDROCHLOROTHIAZIDE (BENICAR HCT) 40-25 MG PER TABLET    TAKE 1 TABLET BY MOUTH EVERY DAY    PANTOPRAZOLE (PROTONIX) 20 MG TABLET    TAKE 1 TABLET BY MOUTH EVERY DAY TO PROTECT STOMACH    POTASSIUM CHLORIDE SA (K-DUR,KLOR-CON) 20 MEQ TABLET    TAKE 1 TABLET BY MOUTH ONCE DAILY    PROPYLENE GLYCOL/ (SYSTANE OPHT)    Weekly PRN    TRAVOPROST (TRAVATAN Z) 0.004 % OPHTHALMIC SOLUTION    Place 1 drop into both eyes every evening.   Changed and/or Refilled Medications    Modified Medication Previous Medication    TRAMADOL (ULTRAM) 50 MG TABLET traMADoL (ULTRAM) 50 mg tablet       Take 1 tablet (50 mg total) by mouth 3 (three) times daily as needed for Pain.    Take 1 tablet (50 mg total) by mouth 3 (three) times daily as needed for Pain.          CARE TEAM:  Patient Care Team:  Oneyda Pace MD as PCP - General (Internal Medicine)  Yasmin Meyer LPN as Licensed Practical Nurse              SCREENING HISTORY:  Health Maintenance         Date Due Completion Date    COVID-19 Vaccine (4 - Moderna series) 03/16/2022 1/19/2022    Lipid Panel 11/21/2023 11/21/2022    Hemoglobin A1c 11/21/2023 11/21/2022    DEXA Scan 12/16/2023 12/16/2021    Override on 10/18/2011: Done    TETANUS VACCINE 06/20/2026 6/20/2016              REVIEW OF SYSTEMS:   The patient reports : fair dietary habits.  The patient reports  : that they do not exercise.  Review of Systems   Constitutional:  Negative for chills and fever.   HENT:  Negative for congestion.    Respiratory:  Negative for cough and shortness of breath.    Cardiovascular:  Negative for chest pain and leg swelling.   Gastrointestinal:  Negative for abdominal pain.   Genitourinary:  Negative for dysuria.    ROS (Optional)-: no pelvic pain  Breast ROS (Optional)-: negative  "for breast lumps/discharge          Physical Examination: 274}  Vitals:    05/31/23 0953 05/31/23 1354   BP: (!) 140/60 130/70  Comment: average home BP reading   BP Location: Right arm    Patient Position: Sitting    BP Method: Large (Manual)    Pulse: 62    Temp: 98 °F (36.7 °C)    TempSrc: Oral    SpO2: 98%    Weight: 86.5 kg (190 lb 12.9 oz)    Height: 5' 2" (1.575 m)         Body mass index is 34.9 kg/m².        General appearance - alert, well appearing, and in no distress, obese, exam limited as patient did not get onto the examination table  Psychiatric - alert, oriented to person, place, and time, normal behavior, speech, dress, motor activity and thought process  Eyes - pupils equal and reactive, extraocular eye movements intact, sclera anicteric  Mouth - not examined  Neck - supple, no significant adenopathy, carotids upstroke normal bilaterally, no bruits  Lymphatics - no palpable cervical lymphadenopathy  Chest - clear to auscultation, no wheezes, rales or rhonchi, symmetric air entry  Heart - normal rate and regular rhythm  Neurological - alert, normal speech, no focal findings; cranial nerves II through XII intact  Musculoskeletal - patient noted to have Moderate osteoarthritic changes to both knee joints. No joint effusions noted.  Extremities - no pedal edema noted  Skin - normal coloration, no suspicious skin lesions      Labs:  No labs needed at this time      ASSESSMENT AND PLAN:  274}  1. Routine medical exam  Counseled on age appropriate medical preventative services including age appropriate cancer screenings, age appropriate eye and dental exams, over all nutritional health, need for a consistent exercise regimen, and an over all push towards maintaining a vigorous and active lifestyle.  Counseled on age appropriate vaccines and discussed upcoming health care needs based on age/gender. Discussed good sleep hygiene and stress management.    2. Benign hypertension with chronic kidney disease, " stage IV  BP Readings from Last 1 Encounters:   05/31/23 130/70      Discussed sodium restriction, maintaining ideal body weight and regular exercise program as physiologic means to achieve blood pressure control. The patient will strive towards this.   The current medical regimen is effective;  continue present plan and medications. Recommended patient to check home readings to monitor and see me for followup as scheduled or sooner as needed.   Patient was educated that both decongestant and anti-inflammatory medication may raise blood pressure.  Stable decreased kidney function. Observe. Patient counseled to avoid/minimize the use of anti-inflammatory  Medication. Discussed to stay well hydrated. Also discussed with patient that good control of blood pressure and/or diabetes, if present, will help to prevent progression.  The patient is not active on the digital hypertension program.    3. Hyperlipidemia with target LDL less than 100  Lab Results   Component Value Date    CHOL 190 11/21/2022     Lab Results   Component Value Date    HDL 63 11/21/2022     Lab Results   Component Value Date    LDLCALC 98.6 11/21/2022     Lab Results   Component Value Date    TRIG 142 11/21/2022     Lab Results   Component Value Date    LDLCALC 98.6 11/21/2022     We discussed low fat diet and regular exercise.The current medical regimen is effective;  continue present plan and medications.     4. Atherosclerosis of aorta  Patient with Atherosclerosis of the Aorta.  Stable/asymptomatic. Currently stable on lipid lowering medication and blood pressure monitoring.  Overview:  noted on L-spine X-ray 4/14/2011      5. Prediabetes  Lab Results   Component Value Date    HGBA1C 6.2 (H) 11/21/2022     Stable. We discussed low sugar/low carbohydrate diet and regular exercise to prevent progression. No need for prescription medication at this time.  Check A1c yearly.    6. Secondary hyperparathyroidism of renal origin  Stable. Asymptomatic.  Observe.    7. Gastroesophageal reflux disease without esophagitis  Symptoms controlled: yes - takes medication occasionally as needed.   Reflux precautions discussed (eliminate tobacco if a smoker; minimize caffeine, chocolate and red/white peppermint intake; avoid heavy and spicy meals; don't lay down within 2-3 hours after eating; minimize the intake of NSAIDs). Medication as needed.   Patient asked to take medication breaks, if possible - discussed chronic use can limit calcium absorption (which can lead to osteopenia/osteoporosis), increases the risk for intestinal infections, and can cause kidney damage. There are also some newer studies that show possible increased risk of mortality.    8. Osteoporosis, post-menopausal  We discussed adequate calcium and vitamin D supplementation. We discussed fall precautions. She is up to date on her BMD. Continue current treatment regimen as per endocrinology recommendation/treatment plan (Prolia injection q6 months).  Overview:  currently on a drug holiday  - 5/2017 - no need for Rx treatment at this time.  -12/2021 BMD recommends Rx tx - saw endocrinology who recommended prolia injections  - 12/15/2022 - prolia      9. Severe obesity (BMI 35.0-39.9) with comorbidity  BMI Readings from Last 3 Encounters:   05/31/23 34.90 kg/m²   12/02/22 35.34 kg/m²   11/25/22 36.45 kg/m²     The patient is asked to make an attempt to improve diet and exercise patterns to aid in medical management of this problem.    10. Chronic midline low back pain with right-sided sciatica  Stable. Medication as needed.  -     traMADoL (ULTRAM) 50 mg tablet; Take 1 tablet (50 mg total) by mouth 3 (three) times daily as needed for Pain.  Dispense: 90 tablet; Refill: 0    11. Chronic neck pain  Stable. Medication as needed.  -     traMADoL (ULTRAM) 50 mg tablet; Take 1 tablet (50 mg total) by mouth 3 (three) times daily as needed for Pain.  Dispense: 90 tablet; Refill: 0             No orders of the  defined types were placed in this encounter.     Follow up in about 6 months (around 11/30/2023), or if symptoms worsen or fail to improve, for follow up chronic medical conditions.. or sooner as needed.

## 2023-09-19 DIAGNOSIS — M54.2 CHRONIC NECK PAIN: ICD-10-CM

## 2023-09-19 DIAGNOSIS — M54.41 CHRONIC MIDLINE LOW BACK PAIN WITH RIGHT-SIDED SCIATICA: ICD-10-CM

## 2023-09-19 DIAGNOSIS — G89.29 CHRONIC NECK PAIN: ICD-10-CM

## 2023-09-19 DIAGNOSIS — G89.29 CHRONIC MIDLINE LOW BACK PAIN WITH RIGHT-SIDED SCIATICA: ICD-10-CM

## 2023-09-19 RX ORDER — DULOXETIN HYDROCHLORIDE 30 MG/1
30 CAPSULE, DELAYED RELEASE ORAL DAILY
Qty: 30 CAPSULE | Refills: 3 | Status: SHIPPED | OUTPATIENT
Start: 2023-09-19

## 2023-09-19 RX ORDER — TRAMADOL HYDROCHLORIDE 50 MG/1
50 TABLET ORAL 3 TIMES DAILY PRN
Qty: 90 TABLET | Refills: 0 | Status: SHIPPED | OUTPATIENT
Start: 2023-09-19

## 2023-09-20 ENCOUNTER — TELEPHONE (OUTPATIENT)
Dept: CARDIOLOGY | Facility: CLINIC | Age: 83
End: 2023-09-20
Payer: MEDICARE

## 2023-09-20 DIAGNOSIS — N18.4 BENIGN HYPERTENSION WITH CHRONIC KIDNEY DISEASE, STAGE IV: ICD-10-CM

## 2023-09-20 DIAGNOSIS — I12.9 BENIGN HYPERTENSION WITH CHRONIC KIDNEY DISEASE, STAGE IV: ICD-10-CM

## 2023-09-20 NOTE — TELEPHONE ENCOUNTER
----- Message from Scott João sent at 9/19/2023  4:22 PM CDT -----  Regarding: GrandDaughter [phone]  Type: RX Refill Request    Who Called:  Self     Have you contacted your pharmacy: no     Refill    RX Name and Strength: amLODIPine (NORVASC) 10 MG tablet    Preferred Pharmacy with phone number:   [id number] [address]  [address] [address] [place] [address] [address]  [facility] [address]  Phone: [phone] Fax: [phone]    Local or Mail Order: local     Would the patient rather a call back or a response via [facility]? Call back     Best Call Back Number: [phone]    Additional Information:     Thank you.

## 2023-09-21 ENCOUNTER — TELEPHONE (OUTPATIENT)
Dept: CARDIOLOGY | Facility: CLINIC | Age: 83
End: 2023-09-21
Payer: MEDICARE

## 2023-09-21 ENCOUNTER — TELEPHONE (OUTPATIENT)
Dept: PRIMARY CARE CLINIC | Facility: CLINIC | Age: 83
End: 2023-09-21
Payer: MEDICARE

## 2023-09-21 NOTE — TELEPHONE ENCOUNTER
Spoke with patient daughter in regards to medication refill .  Patient follow up appointment is scheduled.

## 2023-09-21 NOTE — TELEPHONE ENCOUNTER
"----- Message from Mani Jade sent at 9/21/2023  8:32 AM CDT -----  Regarding: Daughter  Type:  Patient Returning Call     Who Called: Daughter     Who Left Message for Patient: Ani Marte     Does the patient know what this is regarding?:Medication     Would the patient rather a call back or a response via [facility]? Callback     Best Call Back Number:[phone] "daughters contact"     Additional Information:Daughter was calling in reference to the medication request that was sent. Please call the daughter as soon as possible. Pt is out of medication.     "

## 2023-09-21 NOTE — TELEPHONE ENCOUNTER
----- Message from Indu Summers sent at 9/20/2023  4:59 PM CDT -----  Type:  Needs Medical Advice    Who Called: pt    Would the patient rather a call back or a response via [facility]? call  Best Call Back Number: [phone] or [phone]  Additional Information: pt requesting a back from nurse    Returning call

## 2023-09-22 ENCOUNTER — TELEPHONE (OUTPATIENT)
Dept: FAMILY MEDICINE | Facility: CLINIC | Age: 83
End: 2023-09-22
Payer: MEDICARE

## 2023-09-22 ENCOUNTER — TELEPHONE (OUTPATIENT)
Dept: PRIMARY CARE CLINIC | Facility: CLINIC | Age: 83
End: 2023-09-22
Payer: MEDICARE

## 2023-09-22 NOTE — TELEPHONE ENCOUNTER
----- Message from Indu Summers sent at 9/20/2023  4:59 PM CDT -----  Type:  Needs Medical Advice    Who Called: pt    Would the patient rather a call back or a response via [facility]? call  Best Call Back Number: [phone] or [phone]  Additional Information: pt requesting a back from nurse    Returning call

## 2023-09-22 NOTE — TELEPHONE ENCOUNTER
Spoke with patient's daughter. Daughter was not sure why the call was made.Daughter states there are no complaints at this time and will call back if anything arises.

## 2023-09-22 NOTE — TELEPHONE ENCOUNTER
----- Message from Lexx Jacobsen MA sent at 9/21/2023  1:38 PM CDT -----  The patient's grand-daughter is returning the staff's call. Please give her a call back at [phone].

## 2023-09-29 ENCOUNTER — PATIENT OUTREACH (OUTPATIENT)
Dept: ADMINISTRATIVE | Facility: HOSPITAL | Age: 83
End: 2023-09-29
Payer: MEDICARE

## 2023-09-29 ENCOUNTER — OFFICE VISIT (OUTPATIENT)
Dept: CARDIOLOGY | Facility: CLINIC | Age: 83
End: 2023-09-29
Payer: MEDICARE

## 2023-09-29 VITALS
BODY MASS INDEX: 32.49 KG/M2 | HEART RATE: 80 BPM | RESPIRATION RATE: 15 BRPM | SYSTOLIC BLOOD PRESSURE: 128 MMHG | HEIGHT: 62 IN | WEIGHT: 176.56 LBS | DIASTOLIC BLOOD PRESSURE: 60 MMHG

## 2023-09-29 DIAGNOSIS — E66.01 SEVERE OBESITY (BMI 35.0-39.9) WITH COMORBIDITY: ICD-10-CM

## 2023-09-29 DIAGNOSIS — E78.5 HYPERLIPIDEMIA WITH TARGET LDL LESS THAN 100: ICD-10-CM

## 2023-09-29 DIAGNOSIS — I70.0 ATHEROSCLEROSIS OF AORTA: ICD-10-CM

## 2023-09-29 DIAGNOSIS — M17.0 PRIMARY OSTEOARTHRITIS OF BOTH KNEES: ICD-10-CM

## 2023-09-29 DIAGNOSIS — I12.9 BENIGN HYPERTENSION WITH CHRONIC KIDNEY DISEASE, STAGE IV: Primary | ICD-10-CM

## 2023-09-29 DIAGNOSIS — I70.0 ATHEROSCLEROSIS OF AORTIC ARCH: ICD-10-CM

## 2023-09-29 DIAGNOSIS — N18.4 BENIGN HYPERTENSION WITH CHRONIC KIDNEY DISEASE, STAGE IV: Primary | ICD-10-CM

## 2023-09-29 DIAGNOSIS — E55.9 VITAMIN D DEFICIENCY: ICD-10-CM

## 2023-09-29 PROCEDURE — 99999 PR PBB SHADOW E&M-EST. PATIENT-LVL III: ICD-10-PCS | Mod: PBBFAC,,, | Performed by: INTERNAL MEDICINE

## 2023-09-29 PROCEDURE — 1126F PR PAIN SEVERITY QUANTIFIED, NO PAIN PRESENT: ICD-10-PCS | Mod: CPTII,S$GLB,, | Performed by: INTERNAL MEDICINE

## 2023-09-29 PROCEDURE — 1101F PT FALLS ASSESS-DOCD LE1/YR: CPT | Mod: CPTII,S$GLB,, | Performed by: INTERNAL MEDICINE

## 2023-09-29 PROCEDURE — 3288F PR FALLS RISK ASSESSMENT DOCUMENTED: ICD-10-PCS | Mod: CPTII,S$GLB,, | Performed by: INTERNAL MEDICINE

## 2023-09-29 PROCEDURE — 1101F PR PT FALLS ASSESS DOC 0-1 FALLS W/OUT INJ PAST YR: ICD-10-PCS | Mod: CPTII,S$GLB,, | Performed by: INTERNAL MEDICINE

## 2023-09-29 PROCEDURE — 99214 OFFICE O/P EST MOD 30 MIN: CPT | Mod: S$GLB,,, | Performed by: INTERNAL MEDICINE

## 2023-09-29 PROCEDURE — 3078F DIAST BP <80 MM HG: CPT | Mod: CPTII,S$GLB,, | Performed by: INTERNAL MEDICINE

## 2023-09-29 PROCEDURE — 1159F MED LIST DOCD IN RCRD: CPT | Mod: CPTII,S$GLB,, | Performed by: INTERNAL MEDICINE

## 2023-09-29 PROCEDURE — 99214 PR OFFICE/OUTPT VISIT, EST, LEVL IV, 30-39 MIN: ICD-10-PCS | Mod: S$GLB,,, | Performed by: INTERNAL MEDICINE

## 2023-09-29 PROCEDURE — 3288F FALL RISK ASSESSMENT DOCD: CPT | Mod: CPTII,S$GLB,, | Performed by: INTERNAL MEDICINE

## 2023-09-29 PROCEDURE — 93000 EKG 12-LEAD: ICD-10-PCS | Mod: S$GLB,,, | Performed by: INTERNAL MEDICINE

## 2023-09-29 PROCEDURE — 93000 ELECTROCARDIOGRAM COMPLETE: CPT | Mod: S$GLB,,, | Performed by: INTERNAL MEDICINE

## 2023-09-29 PROCEDURE — 3078F PR MOST RECENT DIASTOLIC BLOOD PRESSURE < 80 MM HG: ICD-10-PCS | Mod: CPTII,S$GLB,, | Performed by: INTERNAL MEDICINE

## 2023-09-29 PROCEDURE — 3074F SYST BP LT 130 MM HG: CPT | Mod: CPTII,S$GLB,, | Performed by: INTERNAL MEDICINE

## 2023-09-29 PROCEDURE — 1159F PR MEDICATION LIST DOCUMENTED IN MEDICAL RECORD: ICD-10-PCS | Mod: CPTII,S$GLB,, | Performed by: INTERNAL MEDICINE

## 2023-09-29 PROCEDURE — 1126F AMNT PAIN NOTED NONE PRSNT: CPT | Mod: CPTII,S$GLB,, | Performed by: INTERNAL MEDICINE

## 2023-09-29 PROCEDURE — 99999 PR PBB SHADOW E&M-EST. PATIENT-LVL III: CPT | Mod: PBBFAC,,, | Performed by: INTERNAL MEDICINE

## 2023-09-29 PROCEDURE — 3074F PR MOST RECENT SYSTOLIC BLOOD PRESSURE < 130 MM HG: ICD-10-PCS | Mod: CPTII,S$GLB,, | Performed by: INTERNAL MEDICINE

## 2023-09-29 NOTE — PROGRESS NOTES
Updates were requested from care everywhere.  Health Maintenance has been updated.  LINKS immunization registry triggered.  Immunizations were reconciled.  Pt declined flu vaccine 09/29/2023.

## 2023-11-30 ENCOUNTER — TELEPHONE (OUTPATIENT)
Dept: FAMILY MEDICINE | Facility: CLINIC | Age: 83
End: 2023-11-30
Payer: MEDICARE

## 2023-11-30 DIAGNOSIS — R73.03 PREDIABETES: Primary | ICD-10-CM

## 2023-11-30 DIAGNOSIS — E78.5 HYPERLIPIDEMIA WITH TARGET LDL LESS THAN 100: ICD-10-CM

## 2023-12-04 ENCOUNTER — LAB VISIT (OUTPATIENT)
Dept: LAB | Facility: HOSPITAL | Age: 83
End: 2023-12-04
Attending: INTERNAL MEDICINE
Payer: MEDICARE

## 2023-12-04 ENCOUNTER — OFFICE VISIT (OUTPATIENT)
Dept: FAMILY MEDICINE | Facility: CLINIC | Age: 83
End: 2023-12-04
Payer: MEDICARE

## 2023-12-04 VITALS
OXYGEN SATURATION: 98 % | WEIGHT: 175.63 LBS | HEART RATE: 54 BPM | HEIGHT: 62 IN | SYSTOLIC BLOOD PRESSURE: 128 MMHG | DIASTOLIC BLOOD PRESSURE: 56 MMHG | TEMPERATURE: 98 F | BODY MASS INDEX: 32.32 KG/M2

## 2023-12-04 DIAGNOSIS — E66.9 OBESITY (BMI 30-39.9): ICD-10-CM

## 2023-12-04 DIAGNOSIS — R73.03 PREDIABETES: ICD-10-CM

## 2023-12-04 DIAGNOSIS — I70.0 ATHEROSCLEROSIS OF AORTA: ICD-10-CM

## 2023-12-04 DIAGNOSIS — H81.10 BENIGN PAROXYSMAL POSITIONAL VERTIGO, UNSPECIFIED LATERALITY: ICD-10-CM

## 2023-12-04 DIAGNOSIS — I12.9 BENIGN HYPERTENSION WITH CHRONIC KIDNEY DISEASE, STAGE IV: Primary | ICD-10-CM

## 2023-12-04 DIAGNOSIS — Z23 NEED FOR INFLUENZA VACCINATION: ICD-10-CM

## 2023-12-04 DIAGNOSIS — Z77.120 MOLD SUSPECTED EXPOSURE: ICD-10-CM

## 2023-12-04 DIAGNOSIS — E78.5 HYPERLIPIDEMIA WITH TARGET LDL LESS THAN 100: ICD-10-CM

## 2023-12-04 DIAGNOSIS — N17.9 AKI (ACUTE KIDNEY INJURY): ICD-10-CM

## 2023-12-04 DIAGNOSIS — M81.0 OSTEOPOROSIS, POST-MENOPAUSAL: ICD-10-CM

## 2023-12-04 DIAGNOSIS — D63.8 ANEMIA OF CHRONIC DISEASE: ICD-10-CM

## 2023-12-04 DIAGNOSIS — N25.81 SECONDARY HYPERPARATHYROIDISM OF RENAL ORIGIN: ICD-10-CM

## 2023-12-04 DIAGNOSIS — N18.4 BENIGN HYPERTENSION WITH CHRONIC KIDNEY DISEASE, STAGE IV: Primary | ICD-10-CM

## 2023-12-04 LAB
CHOLEST SERPL-MCNC: 264 MG/DL (ref 120–199)
CHOLEST/HDLC SERPL: 4.2 {RATIO} (ref 2–5)
ESTIMATED AVG GLUCOSE: 117 MG/DL (ref 68–131)
HBA1C MFR BLD: 5.7 % (ref 4–5.6)
HDLC SERPL-MCNC: 63 MG/DL (ref 40–75)
HDLC SERPL: 23.9 % (ref 20–50)
LDLC SERPL CALC-MCNC: 159.8 MG/DL (ref 63–159)
NONHDLC SERPL-MCNC: 201 MG/DL
TRIGL SERPL-MCNC: 206 MG/DL (ref 30–150)

## 2023-12-04 PROCEDURE — 1126F AMNT PAIN NOTED NONE PRSNT: CPT | Mod: CPTII,S$GLB,, | Performed by: INTERNAL MEDICINE

## 2023-12-04 PROCEDURE — 1100F PTFALLS ASSESS-DOCD GE2>/YR: CPT | Mod: CPTII,S$GLB,, | Performed by: INTERNAL MEDICINE

## 2023-12-04 PROCEDURE — 99999 PR PBB SHADOW E&M-EST. PATIENT-LVL IV: ICD-10-PCS | Mod: PBBFAC,,, | Performed by: INTERNAL MEDICINE

## 2023-12-04 PROCEDURE — 36415 COLL VENOUS BLD VENIPUNCTURE: CPT | Mod: PO | Performed by: INTERNAL MEDICINE

## 2023-12-04 PROCEDURE — G0008 FLU VACCINE - QUADRIVALENT - ADJUVANTED: ICD-10-PCS | Mod: S$GLB,,, | Performed by: INTERNAL MEDICINE

## 2023-12-04 PROCEDURE — 1159F MED LIST DOCD IN RCRD: CPT | Mod: CPTII,S$GLB,, | Performed by: INTERNAL MEDICINE

## 2023-12-04 PROCEDURE — 90694 FLU VACCINE - QUADRIVALENT - ADJUVANTED: ICD-10-PCS | Mod: S$GLB,,, | Performed by: INTERNAL MEDICINE

## 2023-12-04 PROCEDURE — G0008 ADMIN INFLUENZA VIRUS VAC: HCPCS | Mod: S$GLB,,, | Performed by: INTERNAL MEDICINE

## 2023-12-04 PROCEDURE — 1160F PR REVIEW ALL MEDS BY PRESCRIBER/CLIN PHARMACIST DOCUMENTED: ICD-10-PCS | Mod: CPTII,S$GLB,, | Performed by: INTERNAL MEDICINE

## 2023-12-04 PROCEDURE — 1100F PR PT FALLS ASSESS DOC 2+ FALLS/FALL W/INJURY/YR: ICD-10-PCS | Mod: CPTII,S$GLB,, | Performed by: INTERNAL MEDICINE

## 2023-12-04 PROCEDURE — 1159F PR MEDICATION LIST DOCUMENTED IN MEDICAL RECORD: ICD-10-PCS | Mod: CPTII,S$GLB,, | Performed by: INTERNAL MEDICINE

## 2023-12-04 PROCEDURE — 3288F PR FALLS RISK ASSESSMENT DOCUMENTED: ICD-10-PCS | Mod: CPTII,S$GLB,, | Performed by: INTERNAL MEDICINE

## 2023-12-04 PROCEDURE — 80061 LIPID PANEL: CPT | Performed by: INTERNAL MEDICINE

## 2023-12-04 PROCEDURE — 99999 PR PBB SHADOW E&M-EST. PATIENT-LVL IV: CPT | Mod: PBBFAC,,, | Performed by: INTERNAL MEDICINE

## 2023-12-04 PROCEDURE — 3074F PR MOST RECENT SYSTOLIC BLOOD PRESSURE < 130 MM HG: ICD-10-PCS | Mod: CPTII,S$GLB,, | Performed by: INTERNAL MEDICINE

## 2023-12-04 PROCEDURE — 1160F RVW MEDS BY RX/DR IN RCRD: CPT | Mod: CPTII,S$GLB,, | Performed by: INTERNAL MEDICINE

## 2023-12-04 PROCEDURE — 90694 VACC AIIV4 NO PRSRV 0.5ML IM: CPT | Mod: S$GLB,,, | Performed by: INTERNAL MEDICINE

## 2023-12-04 PROCEDURE — 3078F PR MOST RECENT DIASTOLIC BLOOD PRESSURE < 80 MM HG: ICD-10-PCS | Mod: CPTII,S$GLB,, | Performed by: INTERNAL MEDICINE

## 2023-12-04 PROCEDURE — 99214 PR OFFICE/OUTPT VISIT, EST, LEVL IV, 30-39 MIN: ICD-10-PCS | Mod: S$GLB,,, | Performed by: INTERNAL MEDICINE

## 2023-12-04 PROCEDURE — 83036 HEMOGLOBIN GLYCOSYLATED A1C: CPT | Performed by: INTERNAL MEDICINE

## 2023-12-04 PROCEDURE — 3078F DIAST BP <80 MM HG: CPT | Mod: CPTII,S$GLB,, | Performed by: INTERNAL MEDICINE

## 2023-12-04 PROCEDURE — 3288F FALL RISK ASSESSMENT DOCD: CPT | Mod: CPTII,S$GLB,, | Performed by: INTERNAL MEDICINE

## 2023-12-04 PROCEDURE — 3074F SYST BP LT 130 MM HG: CPT | Mod: CPTII,S$GLB,, | Performed by: INTERNAL MEDICINE

## 2023-12-04 PROCEDURE — 1126F PR PAIN SEVERITY QUANTIFIED, NO PAIN PRESENT: ICD-10-PCS | Mod: CPTII,S$GLB,, | Performed by: INTERNAL MEDICINE

## 2023-12-04 PROCEDURE — 99214 OFFICE O/P EST MOD 30 MIN: CPT | Mod: S$GLB,,, | Performed by: INTERNAL MEDICINE

## 2023-12-04 RX ORDER — ATORVASTATIN CALCIUM 20 MG/1
20 TABLET, FILM COATED ORAL DAILY
Qty: 90 TABLET | Refills: 1 | Status: SHIPPED | OUTPATIENT
Start: 2023-12-04

## 2023-12-04 RX ORDER — POTASSIUM CHLORIDE 20 MEQ/1
20 TABLET, EXTENDED RELEASE ORAL DAILY
Qty: 90 TABLET | Refills: 1 | Status: SHIPPED | OUTPATIENT
Start: 2023-12-04

## 2023-12-04 RX ORDER — METOPROLOL SUCCINATE 50 MG/1
50 TABLET, EXTENDED RELEASE ORAL DAILY
Qty: 90 TABLET | Refills: 1 | Status: SHIPPED | OUTPATIENT
Start: 2023-12-04

## 2023-12-04 RX ORDER — OLMESARTAN MEDOXOMIL AND HYDROCHLOROTHIAZIDE 40/25 40; 25 MG/1; MG/1
1 TABLET ORAL DAILY
Qty: 90 TABLET | Refills: 1 | Status: SHIPPED | OUTPATIENT
Start: 2023-12-04

## 2023-12-04 RX ORDER — AMLODIPINE BESYLATE 10 MG/1
10 TABLET ORAL DAILY
Qty: 30 TABLET | Refills: 1 | Status: SHIPPED | OUTPATIENT
Start: 2023-12-04

## 2023-12-04 RX ORDER — FUROSEMIDE 20 MG/1
20 TABLET ORAL 2 TIMES DAILY
Qty: 180 TABLET | Refills: 1 | Status: SHIPPED | OUTPATIENT
Start: 2023-12-04

## 2023-12-04 RX ORDER — HYDRALAZINE HYDROCHLORIDE 50 MG/1
50 TABLET, FILM COATED ORAL 3 TIMES DAILY
Qty: 90 TABLET | Refills: 1 | Status: SHIPPED | OUTPATIENT
Start: 2023-12-04

## 2023-12-04 NOTE — PROGRESS NOTES
CHIEF COMPLAINT:   Chief Complaint   Patient presents with    Hypertension     6 month f/u           HISTORY OF PRESENT ILLNESS:  Rachel Asif is a 83 y.o. female who presents to the clinic today for Hypertension (6 month f/u )  .     The patient presents to clinic today for follow-up of her hypertension complicated by chronic kidney disease stage 4, hyperlipidemia, and prediabetes.  She presents to the office today with her daughter.  She reports that her granddaughter is concerned about her exposure to black mold and would like her to be tested.  Apparently the patient also intermittently gets a little dizzy when she gets up too quickly.  The patient otherwise reports feeling fine.  She denies cardiac chest pain or shortness of breath.  She has mild chronic fatigue.  She is not as active as she used to be.  She states that she is happy and likes being at home.    Subjective    PAST MEDICAL HISTORY:  Past Medical History:   Diagnosis Date    Amblyopia     os    Anemia of other chronic disease     Atherosclerosis of aorta     noted on L-spine X-ray 4/14/2011    Atherosclerosis of aortic arch     - noted on CXR 5/2017    Chronic low back pain     followed by orthopaedics    DJD (degenerative joint disease)     Glaucoma     History of colonic polyps     History of vitamin D deficiency     Hyperlipidemia     Hypertension     Lumbar radiculopathy     Obesity     Obesity     OH (ocular hypertension)     Osteoarthritis     Osteoporosis, post-menopausal     currently on a drug holiday    PCO (posterior capsular opacification), right 2/27/2020    Postmenopausal status     Prediabetes     Secondary hyperparathyroidism of renal origin     Trochanteric bursitis        PAST SURGICAL HISTORY:  Past Surgical History:   Procedure Laterality Date    CATARACT EXTRACTION W/  INTRAOCULAR LENS IMPLANT Bilateral     COLONOSCOPY N/A 8/10/2020    Procedure: COLONOSCOPY;  Surgeon: Bettina Gates MD;  Location: [facility];  Service:  Endoscopy;  Laterality: N/A;       SOCIAL HISTORY:  Social History     Socioeconomic History    Marital status:    Occupational History    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 She is taking care of a sister who has brain damage from a car accident.       FAMILY HISTORY:  Family History   Problem Relation Age of Onset    Cataracts Mother     Hypertension Mother     Other Mother         complications from splenectomy after fall    Glaucoma Mother     Cataracts Father     Hypertension Father     Glaucoma Father     Hypertension Sister     Edema Sister     Hypertension Brother     Diabetes Brother     Heart disease Brother     Glaucoma Maternal Grandmother     Hypertension Sister     Other Sister         shingles    Lupus Sister     Lung cancer Sister     Hypertension Sister     Other Sister         brain damage from MVA    Hypertension Sister     Hypertension Brother     Heart disease Brother     Hypertension Brother     Heart attack Brother     Hypertension Brother     Hypertension Brother     No Known Problems Brother     No Known Problems Brother     Hypertension Brother     Heart disease Brother     No Known Problems Maternal Aunt     No Known Problems Maternal Uncle     No Known Problems Paternal Aunt     No Known Problems Paternal Uncle     No Known Problems Maternal Grandfather     No Known Problems Paternal Grandmother     No Known Problems Paternal Grandfather     Cancer Neg Hx     Amblyopia Neg Hx     Blindness Neg Hx     Retinal detachment Neg Hx     Strabismus Neg Hx     Stroke Neg Hx        ALLERGIES AND MEDICATIONS: updated and reviewed.  Review of patient's allergies indicates:   Allergen Reactions    No known allergies      Medication List with Changes/Refills    Current Medications    ASPIRIN 81 MG CHEWABLE TABLET    Every day    DICLOFENAC SODIUM (VOLTAREN) 1 % GEL    Apply topically 3 (three) times daily as needed.    DULOXETINE (CYMBALTA) 30 MG CAPSULE    Take 1 capsule (30 mg total) by mouth once daily.    ERGOCALCIFEROL (ERGOCALCIFEROL) 50,000 UNIT CAP    TAKE 1 CAPSULE BY MOUTH EVERY 7 DAYS    PANTOPRAZOLE (PROTONIX) 20 MG TABLET    TAKE 1 TABLET BY MOUTH EVERY DAY TO PROTECT STOMACH    PROPYLENE GLYCOL/ (SYSTANE OPHT)    Weekly PRN    TRAMADOL (ULTRAM) 50 MG TABLET    Take 1 tablet (50 mg total) by mouth 3 (three) times daily as needed for Pain.    TRAVOPROST (TRAVATAN Z) 0.004 % OPHTHALMIC SOLUTION    Place 1 drop into both eyes every evening.   Changed and/or Refilled Medications    Modified Medication Previous Medication    AMLODIPINE (NORVASC) 10 MG TABLET amLODIPine (NORVASC) 10 MG tablet       Take 1 tablet (10 mg total) by mouth once daily.    TAKE 1 TABLET(10 MG) BY MOUTH EVERY DAY    ATORVASTATIN (LIPITOR) 20 MG TABLET atorvastatin (LIPITOR) 20 MG tablet       Take 1 tablet (20 mg total) by mouth once daily.    TAKE 1 TABLET(20 MG) BY MOUTH EVERY DAY    FUROSEMIDE (LASIX) 20 MG TABLET furosemide (LASIX) 20 MG tablet       Take 1 tablet (20 mg total) by mouth 2 (two) times daily.    Take 1 tablet (20 mg total) by mouth 2 (two) times daily.    HYDRALAZINE (APRESOLINE) 50 MG TABLET hydrALAZINE (APRESOLINE) 50 MG tablet       Take 1 tablet (50 mg total) by mouth 3 (three) times daily.    TAKE 1 TABLET(50 MG) BY MOUTH THREE TIMES DAILY    METOPROLOL SUCCINATE (TOPROL-XL) 50 MG 24 HR TABLET metoprolol succinate (TOPROL-XL) 50 MG 24 hr tablet       Take 1 tablet (50 mg total) by mouth once daily.    Take 1 tablet (50 mg total) by mouth once daily.    OLMESARTAN-HYDROCHLOROTHIAZIDE (BENICAR HCT) 40-25 MG PER TABLET olmesartan-hydrochlorothiazide (BENICAR HCT) 40-25 mg per tablet       Take 1 tablet by mouth once daily.    Take 1 tablet by mouth once daily.  "   POTASSIUM CHLORIDE SA (K-DUR,KLOR-CON) 20 MEQ TABLET potassium chloride SA (K-DUR,KLOR-CON) 20 MEQ tablet       Take 1 tablet (20 mEq total) by mouth once daily.    Take 1 tablet (20 mEq total) by mouth once daily.       CARE TEAM:  Patient Care Team:  Oneyda Pace MD as PCP - General (Internal Medicine)  Yasmin Meyer LPN as Licensed Practical Nurse       REVIEW OF SYSTEMS:  Review of Systems   Constitutional:  Positive for fatigue (mild; chronic). Negative for chills and fever.   HENT:  Negative for congestion.    Respiratory:  Negative for cough and shortness of breath.    Cardiovascular:  Negative for chest pain.   Gastrointestinal:  Negative for abdominal pain.   Genitourinary:  Negative for dysuria.   Neurological:  Positive for dizziness. Negative for weakness.             Objective    PHYSICAL EXAMINATION/VITALS:  Vitals:    12/04/23 0945   BP: (!) 128/56   Pulse: (!) 54   Temp: 97.8 °F (36.6 °C)   TempSrc: Oral   SpO2: 98%   Weight: 79.6 kg (175 lb 9.5 oz)   Height: 5' 2" (1.575 m)       Body mass index is 32.12 kg/m².      General appearance - alert, well appearing, and in no distress, pleasant elderly female, exam limited as patient did not get onto the examination table  Psychiatric - alert, oriented to person, place, and time, normal behavior, speech, dress, motor activity and thought process  Neck - supple, no significant adenopathy, carotids upstroke normal bilaterally, no bruits  Lymphatics - no palpable cervical lymphadenopathy  Chest - clear to auscultation, no wheezes, rales or rhonchi, symmetric air entry  Heart - normal rate and regular rhythm  Neurological - alert, normal speech, no focal findings; cranial nerves II through XII intact  Musculoskeletal - patient noted to have Moderate osteoarthritic changes to both knee joints. No joint effusions noted.  Extremities - no pedal edema noted  Skin - normal coloration, no suspicious skin lesions      LABS:  Ordered/Scheduled    "         ASSESSMENT AND PLAN:  1. Benign hypertension with chronic kidney disease, stage IV  BP Readings from Last 1 Encounters:   12/04/23 (!) 128/56      Discussed sodium restriction, maintaining ideal body weight and regular exercise program as physiologic means to achieve blood pressure control. The patient will strive towards this.   The current medical regimen is effective;  continue present plan and medications. Recommended patient to check home readings to monitor and see me for followup as scheduled or sooner as needed.   Patient was educated that both decongestant and anti-inflammatory medication may raise blood pressure.  Stable decreased kidney function. Observe. Patient counseled to avoid/minimize the use of anti-inflammatory  Medication. Discussed to stay well hydrated. Also discussed with patient that good control of blood pressure and/or diabetes, if present, will help to prevent progression.   She is followed by Nephrology.  The patient is not active on the digital hypertension program.  -     potassium chloride SA (K-DUR,KLOR-CON) 20 MEQ tablet; Take 1 tablet (20 mEq total) by mouth once daily.  Dispense: 90 tablet; Refill: 1  -     olmesartan-hydrochlorothiazide (BENICAR HCT) 40-25 mg per tablet; Take 1 tablet by mouth once daily.  Dispense: 90 tablet; Refill: 1  -     metoprolol succinate (TOPROL-XL) 50 MG 24 hr tablet; Take 1 tablet (50 mg total) by mouth once daily.  Dispense: 90 tablet; Refill: 1  -     hydrALAZINE (APRESOLINE) 50 MG tablet; Take 1 tablet (50 mg total) by mouth 3 (three) times daily.  Dispense: 90 tablet; Refill: 1  -     furosemide (LASIX) 20 MG tablet; Take 1 tablet (20 mg total) by mouth 2 (two) times daily.  Dispense: 180 tablet; Refill: 1  -     amLODIPine (NORVASC) 10 MG tablet; Take 1 tablet (10 mg total) by mouth once daily.  Dispense: 30 tablet; Refill: 1    2. Hyperlipidemia with target LDL less than 100  Lab Results   Component Value Date    CHOL 190 11/21/2022      Lab Results   Component Value Date    HDL 63 11/21/2022     Lab Results   Component Value Date    LDLCALC 98.6 11/21/2022     Lab Results   Component Value Date    TRIG 142 11/21/2022     Lab Results   Component Value Date    LDLCALC 98.6 11/21/2022     We discussed low fat diet and regular exercise.The current medical regimen is effective;  continue present plan and medications.   -     atorvastatin (LIPITOR) 20 MG tablet; Take 1 tablet (20 mg total) by mouth once daily.  Dispense: 90 tablet; Refill: 1    3. Atherosclerosis of aorta  Patient with Atherosclerosis of the Aorta.  Stable/asymptomatic. Currently stable on lipid lowering medication and blood pressure monitoring.  Overview:  noted on L-spine X-ray 4/14/2011      4. Prediabetes  Lab Results   Component Value Date    HGBA1C 6.2 (H) 11/21/2022     Stable. We discussed low sugar/low carbohydrate diet and regular exercise to prevent progression. No need for prescription medication at this time.  Check A1c yearly.    5. Anemia of chronic disease  Stable. Asymptomatic. Observe.    6. Osteoporosis, post-menopausal  We discussed adequate calcium intake (preferably from diet) and vitamin D supplementation. We discussed fall precautions. She is scheduled for her BMD. Continue current treatment regimen as per endocrinology recommendation/treatment plan (Prolia injection q6 months). Further treatment plan will be based on results of bone density testing.  Overview:  currently on a drug holiday  - 5/2017 - no need for Rx treatment at this time.  -12/2021 BMD recommends Rx tx - saw endocrinology who recommended prolia injections  - 12/15/2022 - prolia    Orders:  -     DXA Bone Density Axial Skeleton 1 or more sites; Future; Expected date: 12/04/2023    7. Secondary hyperparathyroidism of renal origin  Stable. Asymptomatic. Observe.    8. Obesity (BMI 30-39.9)  BMI Readings from Last 3 Encounters:   12/04/23 32.12 kg/m²   09/29/23 32.30 kg/m²   05/31/23 34.90 kg/m²      The patient is asked to continue to make an attempt to improve diet and exercise patterns to aid in medical management of this problem.     She reports that she does not have a very big appetite.  She has lost some weight over the years.  I discussed with her that decreased kidney function and therefore increase in toxin levels will generally cause a decrease in appetite.  Her current weight and height still put her in the obesity range.  I told her to focus on healthy diet when she does eat.  Observe for now.    9. Need for influenza vaccination    -     Influenza (FLUAD) - Quadrivalent (Adjuvanted) *Preferred* (65+) (PF)    10. Mold suspected exposure    I discussed with the patient and her daughter that there is no testing available for black mold exposure.  Generally symptoms from black mold exposure similar to symptoms of any other type of allergy.  She denies having any such symptoms.  Observe for now.    11. Benign paroxysmal positional vertigo, unspecified laterality    The patient reports mild dizziness when she stands up too quickly.  She denies any dizziness when she is sitting quietly.  I discussed that benign paroxysmal positional vertigo is not a worrisome condition.  She should try to arise slowly from a seated position.  Otherwise, no need for further evaluation or treatment.  Consider further evaluation by ENT if symptoms worsen or persist.      I recommended she go to the pharmacy to complete her RSV and latest COVID vaccine.            Orders Placed This Encounter   Procedures    DXA Bone Density Axial Skeleton 1 or more sites    Influenza (FLUAD) - Quadrivalent (Adjuvanted) *Preferred* (65+) (PF)      FOLLOW UP: Follow up in about 6 months (around 6/4/2024), or if symptoms worsen or fail to improve, for annual exam. or sooner as needed.

## 2023-12-14 ENCOUNTER — INFUSION (OUTPATIENT)
Dept: INFUSION THERAPY | Facility: HOSPITAL | Age: 83
End: 2023-12-14
Attending: HOSPITALIST
Payer: MEDICARE

## 2023-12-14 VITALS
HEART RATE: 72 BPM | SYSTOLIC BLOOD PRESSURE: 147 MMHG | TEMPERATURE: 98 F | OXYGEN SATURATION: 100 % | DIASTOLIC BLOOD PRESSURE: 66 MMHG | RESPIRATION RATE: 18 BRPM

## 2023-12-14 DIAGNOSIS — M81.0 OSTEOPOROSIS, POST-MENOPAUSAL: Primary | ICD-10-CM

## 2023-12-14 LAB
ALBUMIN SERPL BCP-MCNC: 3.5 G/DL (ref 3.5–5.2)
ALP SERPL-CCNC: 44 U/L (ref 55–135)
ALT SERPL W/O P-5'-P-CCNC: 14 U/L (ref 10–44)
ANION GAP SERPL CALC-SCNC: 13 MMOL/L (ref 8–16)
AST SERPL-CCNC: 17 U/L (ref 10–40)
BILIRUB SERPL-MCNC: 0.3 MG/DL (ref 0.1–1)
BUN SERPL-MCNC: 36 MG/DL (ref 8–23)
CALCIUM SERPL-MCNC: 9.3 MG/DL (ref 8.7–10.5)
CALCIUM SERPL-MCNC: 9.4 MG/DL (ref 8.7–10.5)
CHLORIDE SERPL-SCNC: 108 MMOL/L (ref 95–110)
CO2 SERPL-SCNC: 20 MMOL/L (ref 23–29)
CREAT SERPL-MCNC: 2.5 MG/DL (ref 0.5–1.4)
EST. GFR  (NO RACE VARIABLE): 18 ML/MIN/1.73 M^2
GLUCOSE SERPL-MCNC: 105 MG/DL (ref 70–110)
POTASSIUM SERPL-SCNC: 4 MMOL/L (ref 3.5–5.1)
PROT SERPL-MCNC: 7.1 G/DL (ref 6–8.4)
SODIUM SERPL-SCNC: 141 MMOL/L (ref 136–145)

## 2023-12-14 PROCEDURE — 80053 COMPREHEN METABOLIC PANEL: CPT | Performed by: HOSPITALIST

## 2023-12-14 PROCEDURE — 82310 ASSAY OF CALCIUM: CPT | Performed by: HOSPITALIST

## 2023-12-14 PROCEDURE — 96372 THER/PROPH/DIAG INJ SC/IM: CPT

## 2023-12-14 NOTE — PLAN OF CARE
Patient presented to unit for Prolia injection. STAT cmp drawn. Calcium 9.3. Injection met parameters for treatment today. Injection administered SC on right arm. No new or worsening symptoms reported. Patient ambulated off unit in [facility].

## 2023-12-15 ENCOUNTER — TELEPHONE (OUTPATIENT)
Dept: FAMILY MEDICINE | Facility: CLINIC | Age: 83
End: 2023-12-15
Payer: MEDICARE

## 2023-12-15 NOTE — TELEPHONE ENCOUNTER
Spoke with patient regarding lab results and patient vu on result message. Patient states that she is taking medication as prescribed.

## 2023-12-15 NOTE — TELEPHONE ENCOUNTER
----- Message from Ventura Leiva MD sent at 12/5/2023 12:19 PM CST -----  Please call the patient regarding the following results:    A1c is 5.7% - prediabetes which is improved from 1 year previous.    Cholesterol is significantly elevated above our goal -- patient should be taking ATORVASTATIN per previous clinic notes -- please ensure patient is taking currently    Let me know if you have any questions or concerns.

## 2024-01-04 DIAGNOSIS — N18.30 STAGE 3 CHRONIC KIDNEY DISEASE, UNSPECIFIED WHETHER STAGE 3A OR 3B CKD: Primary | ICD-10-CM

## 2024-01-11 ENCOUNTER — HOSPITAL ENCOUNTER (OUTPATIENT)
Dept: RADIOLOGY | Facility: CLINIC | Age: 84
Discharge: HOME | End: 2024-01-11
Attending: INTERNAL MEDICINE
Payer: MEDICARE

## 2024-01-11 DIAGNOSIS — M81.0 OSTEOPOROSIS, POST-MENOPAUSAL: ICD-10-CM

## 2024-01-11 PROCEDURE — 77080 DXA BONE DENSITY AXIAL: CPT | Mod: TC,PO

## 2024-01-11 PROCEDURE — 77080 DXA BONE DENSITY AXIAL: CPT | Mod: 26,,, | Performed by: INTERNAL MEDICINE

## 2024-01-12 ENCOUNTER — TELEPHONE (OUTPATIENT)
Dept: FAMILY MEDICINE | Facility: CLINIC | Age: 84
End: 2024-01-12
Payer: MEDICARE

## 2024-01-12 NOTE — TELEPHONE ENCOUNTER
Please call patient: her recent BMD shows thinning of the bones with high fracture risk. I see that she did not complete any Prolia injections last year. Her last shot was 12/2022 and they should be completed every 6 months for bone protection. I recommend she contact Dr. Adrian to get this set up ASAP unless there is a reason that she cannot do the shots.

## 2024-01-16 NOTE — TELEPHONE ENCOUNTER
Spoke with daughter. Daughter believes there is no reason for her not to get the shot. Daughter states she will contact Dr. Adrian's office to get the prolia injections set up.

## 2024-01-17 NOTE — TELEPHONE ENCOUNTER
Noted - did not realize Dr. Adrian was doing them in his clinic.   Sorry for the confusion - recommend she continue with injections q6 months.

## 2024-01-22 ENCOUNTER — OFFICE VISIT (OUTPATIENT)
Dept: NEPHROLOGY | Facility: CLINIC | Age: 84
End: 2024-01-22
Payer: MEDICARE

## 2024-01-22 VITALS
OXYGEN SATURATION: 99 % | HEART RATE: 87 BPM | SYSTOLIC BLOOD PRESSURE: 136 MMHG | DIASTOLIC BLOOD PRESSURE: 75 MMHG | BODY MASS INDEX: 32.46 KG/M2 | WEIGHT: 176.38 LBS | HEIGHT: 62 IN

## 2024-01-22 DIAGNOSIS — D64.9 ANEMIA, UNSPECIFIED TYPE: ICD-10-CM

## 2024-01-22 DIAGNOSIS — N25.81 SECONDARY HYPERPARATHYROIDISM: ICD-10-CM

## 2024-01-22 DIAGNOSIS — I10 HYPERTENSION, UNSPECIFIED TYPE: ICD-10-CM

## 2024-01-22 DIAGNOSIS — N18.4 BENIGN HYPERTENSION WITH CHRONIC KIDNEY DISEASE, STAGE IV: ICD-10-CM

## 2024-01-22 DIAGNOSIS — R73.03 PREDIABETES: ICD-10-CM

## 2024-01-22 DIAGNOSIS — E55.9 VITAMIN D DEFICIENCY: ICD-10-CM

## 2024-01-22 DIAGNOSIS — I12.9 BENIGN HYPERTENSION WITH CHRONIC KIDNEY DISEASE, STAGE IV: ICD-10-CM

## 2024-01-22 DIAGNOSIS — N18.4 CHRONIC KIDNEY DISEASE, STAGE 4 (SEVERE): Primary | ICD-10-CM

## 2024-01-22 PROCEDURE — 3078F DIAST BP <80 MM HG: CPT | Mod: CPTII,S$GLB,, | Performed by: NURSE PRACTITIONER

## 2024-01-22 PROCEDURE — 3075F SYST BP GE 130 - 139MM HG: CPT | Mod: CPTII,S$GLB,, | Performed by: NURSE PRACTITIONER

## 2024-01-22 PROCEDURE — 1159F MED LIST DOCD IN RCRD: CPT | Mod: CPTII,S$GLB,, | Performed by: NURSE PRACTITIONER

## 2024-01-22 PROCEDURE — G2211 COMPLEX E/M VISIT ADD ON: HCPCS | Mod: S$GLB,,, | Performed by: NURSE PRACTITIONER

## 2024-01-22 PROCEDURE — 1126F AMNT PAIN NOTED NONE PRSNT: CPT | Mod: CPTII,S$GLB,, | Performed by: NURSE PRACTITIONER

## 2024-01-22 PROCEDURE — 3288F FALL RISK ASSESSMENT DOCD: CPT | Mod: CPTII,S$GLB,, | Performed by: NURSE PRACTITIONER

## 2024-01-22 PROCEDURE — 99214 OFFICE O/P EST MOD 30 MIN: CPT | Mod: S$GLB,,, | Performed by: NURSE PRACTITIONER

## 2024-01-22 PROCEDURE — 1101F PT FALLS ASSESS-DOCD LE1/YR: CPT | Mod: CPTII,S$GLB,, | Performed by: NURSE PRACTITIONER

## 2024-01-22 PROCEDURE — 99999 PR PBB SHADOW E&M-EST. PATIENT-LVL III: CPT | Mod: PBBFAC,,, | Performed by: NURSE PRACTITIONER

## 2024-01-22 NOTE — PROGRESS NOTES
Subjective:       Patient ID: Rachel Asif is a 83 y.o. AA female who presents for f/u  CKD    HPI     Last seen by me March. 2021.    Patient presents for f/u CKD.  Baseline creatinine of 1.3-1.4 since 2015. No labs available from Nov 2019 to August 2019, when patient was found to have sCr of 2.1 mg/dL, which improved to 1.8 --> 1.5. No recent labs.    Per PMR note in July 2020, had been taking meloxicam 15 mg daily daily since June 2019; now off completely.    Home BPs: 120s/80s    Cardiology initially started lasix in December 2019 when patient presented with new peripheral edema, JOHNSON, and chest tightness; lasix improved these symptoms. Lasix increased and added PRN dosing in July; patient says she had increased swelling around this time (also says it was the same time she started amlodipine). LVEF in December 2019 was 60%.    Significant issues include: HLD, atherosclerosis of aorta, anemia of chronic disease, prediabetes, vitamin D deficiency, hyperparathyroidism, GERD, OA, HTN recently diagnosed.      The patient denies NSAID. No recent hospitalizations. On PPI daily.     Significant family hx includes: HTN, edema, lupus (sister), DM, lung cancer, heart disease, MI; no known kidney disease in family    Last renal US: Sept 2020, reviewed. Small kidneys bilaterally.    Update 10/20/21:  Last seen by me in June 2021.  Presents for f/u of CKD.  Baseline sCr difficult to determine. Possibly 1.3-1.4 mg/dL, but most recent sCr level was 1.1 mg/dL.  Home BPs: 130-140s/80s p meds  Denies recent hospitalizations and denies NSAID use.    Update 3/4/22:  Presents for f/u of CKD.  Baseline sCr difficult to determine. Possibly 1.3-1.4 mg/dL.  Home BPs: 130-140/40-50s before and after meds  Denies recent hospitalizations and denies NSAID use.  On PPI PRN.    Update 9/7/22:  Presents for f/u of CKD.  Baseline sCr difficult to determine. Possibly 1.3-1.4 mg/dL. Most recent sCr was 1.5.  Home BPs:  "130-140/80-90s  Denies NSAID use.    Update 1/22/24:  Presents for f/u of CKD.   Had CHRISTOPHER in March 2023 c sCr of 2.3, which never returned to baseline. Labs were not repeated.  sCr was 2.5 in December 2023 and 2.1 earlier this month.    Home BPs: 130-140s/80s    Review of Systems   Respiratory:  Negative for shortness of breath.    Cardiovascular:  Negative for leg swelling.   Gastrointestinal:  Negative for diarrhea, nausea and vomiting.   Genitourinary:  Positive for frequency. Negative for difficulty urinating, dysuria and hematuria.       Objective:       Blood pressure 136/75, pulse 87, height 5' 2" (1.575 m), weight 80 kg (176 lb 5.9 oz), SpO2 99 %.  Physical Exam  Constitutional:       General: She is not in acute distress.     Appearance: Normal appearance. She is well-developed. She is obese. She is not ill-appearing or diaphoretic.   Cardiovascular:      Rate and Rhythm: Normal rate.   Pulmonary:      Effort: Pulmonary effort is normal.   Abdominal:      Tenderness: There is no right CVA tenderness or left CVA tenderness.   Musculoskeletal:      Cervical back: Neck supple.      Right lower leg: No edema.      Left lower leg: No edema.   Skin:     General: Skin is warm and dry.   Neurological:      Mental Status: She is alert and oriented to person, place, and time.   Psychiatric:         Mood and Affect: Mood normal.         Behavior: Behavior normal.         Thought Content: Thought content normal.         Judgment: Judgment normal.           Lab Results   Component Value Date    CREATININE 2.1 (H) 01/11/2024     Prot/Creat Ratio, Urine   Date Value Ref Range Status   01/11/2024 0.06 0.00 - 0.20 Final   03/03/2023 0.07 0.00 - 0.20 Final   11/21/2022 Unable to calculate 0.00 - 0.20 Final     Lab Results   Component Value Date     01/11/2024    K 4.0 01/11/2024    CO2 22 (L) 01/11/2024     01/11/2024     Lab Results   Component Value Date    .0 (H) 01/11/2024    CALCIUM 9.0 01/11/2024    " PHOS 3.3 01/11/2024     Lab Results   Component Value Date    HGB 11.2 (L) 01/11/2024    WBC 9.03 01/11/2024    HCT 36.3 (L) 01/11/2024      Lab Results   Component Value Date    HGBA1C 5.7 (H) 12/04/2023     01/11/2024    BUN 33 (H) 01/11/2024     Lab Results   Component Value Date    LDLCALC 159.8 (H) 12/04/2023         Assessment:       1. Chronic kidney disease, stage 4 (severe)    2. Benign hypertension with chronic kidney disease, stage IV    3. Prediabetes    4. Hypertension, unspecified type    5. Secondary hyperparathyroidism    6. Vitamin D deficiency    7. Anemia, unspecified type          Plan:   CKD stage 4 - CKD clinically secondary to  age-related nephron loss and NSAID use, also possibly atherosclerotic disease (atherosclerosis noted to aorta). sCr progressed after CHRISTOPHER in 2023; unclear why.    Educated patient to control BP, BG, remain well-hydrated, and avoid NSAIDs to prevent progression of CKD.     - No paraproteins or monoclonal peaks in Sept 2020.  UPCR Non-proteinuric. On olmesartan.   Acid-base WNL.   Renal osteodystrophy Ca, phos okay. PTH elevated. Vitamin D low previously. On ergocalciferol weekly.    Anemia Hgb at goal for CKD   DM Pre-diabetic.   Lipid Management On statin.   ESRD planning Anticipatory guidance provided about timing of dialysis. Start discussions and planning when eGFR is about 20 mL/min; most patients start dialysis between 5-10 mL/min.       HTN -Okay overall at home on amlodipine 10 mg, furosemide 20 mg BID, hydralazine 50 mg BID PRN if SBP is over 150-160 (takes about once a month), metoprolol succinate 50 mg, olmesartan-HCTZ 40-25  - If more control is needed, recommend switching hctz to chlorthalidone  - Avoiding overcontrol of BP, especially due to pt's advanced age    Hypokalemia - Okay on KCl    All questions patient had were answered.  Asked if further questions. None. F/u in clinic in 3 mos with labs and urine prior to next visit or sooner if needed.  ER  for emergency concerns.    Summary of Plan:  1. Continue ARB and ergocalciferol  2. Renal US due to worsening renal function  3. avoid NSAID/ bactrim/ IV contrast/ gadolinium/ aminoglycoside where possible  4. Intro to CKD education  5. RTC in 3 mos    Continued longitudinal management of serious, complex disease.

## 2024-02-08 ENCOUNTER — CLINICAL SUPPORT (OUTPATIENT)
Dept: NEPHROLOGY | Facility: CLINIC | Age: 84
End: 2024-02-08
Payer: MEDICARE

## 2024-02-08 ENCOUNTER — HOSPITAL ENCOUNTER (OUTPATIENT)
Dept: RADIOLOGY | Facility: HOSPITAL | Age: 84
Discharge: HOME | End: 2024-02-08
Attending: NURSE PRACTITIONER
Payer: MEDICARE

## 2024-02-08 DIAGNOSIS — I12.9 BENIGN HYPERTENSION WITH CHRONIC KIDNEY DISEASE, STAGE IV: Primary | ICD-10-CM

## 2024-02-08 DIAGNOSIS — N18.4 CHRONIC KIDNEY DISEASE, STAGE 4 (SEVERE): ICD-10-CM

## 2024-02-08 DIAGNOSIS — N18.4 BENIGN HYPERTENSION WITH CHRONIC KIDNEY DISEASE, STAGE IV: Primary | ICD-10-CM

## 2024-02-08 PROCEDURE — 76770 US EXAM ABDO BACK WALL COMP: CPT | Mod: 26,,, | Performed by: RADIOLOGY

## 2024-02-08 PROCEDURE — 76770 US EXAM ABDO BACK WALL COMP: CPT | Mod: TC

## 2024-02-08 PROCEDURE — G0421 ED SVC CKD GRP PER SESSION: HCPCS | Mod: S$GLB,,, | Performed by: INTERNAL MEDICINE

## 2024-02-08 NOTE — PROGRESS NOTES
Rachel Asif was referred to Introduction to CKD education by TORRIE Cruz (collaborating MD: )    The patient attended class in a group setting accompanied by daughter    The following material was covered. Outcomes were assessed via the outcome assessment questions and documented at the end of each lesson.    Chronic Kidney Disease Education (Lesson 1, 2, 3)    Lesson 1 Understanding Kidney Disease  Lesson Objectives  By the end of each session, participants will be able to:  Recognize that fear and grief are usual responses to kidney disease  State causes/risk factors for kidney disease  Explain how GFR reflects kidney function  Explain how urine albumin/protein reflects kidney damage  State two ways the kidneys help maintain health  Describe how kidney disease is progressive but can be slowed down  Topics & Points Covered  Emotional impact of diagnosis  You're not alone  Emotional aspects  Depression, grief, and fear are typical  Physical activity may help  Benefits of education: Why are you here?  There are many causes of CKD. Diabetes and hypertension are the leading causes. Family history, cardiovascular disease, recurrent UTIs, immunological disease and genetics also play a role in CKD  CKD is complicated  The more you understand, the better you'll do. (Stated directly)  Basic anatomy  Location in the body  The nephrons have filters (working units)  Normal kidney function  Maintain chemical balance  Produce hormones  Regulate blood pressure  Kidney damage  Major causes of kidney damage  High blood pressure, diabetes  Fewer functioning nephrons  Monitoring kidney function and damage  eGFR (function)  Urine albumin/UACR or Urine protein/creatinine ratio (damage)  eGFR goal: a stable level  Decline means progression  Urine albumin/UACR goal: a stable or lower level  Increase in urine albumin/protein may mean progression  Kidney disease is often irreversible and progressive  You'll  likely need the help of a kidney specialist  Overview of treatment modalities  There are things you can do that may slow progression (more on this in next session)  Take your medications  Control blood pressure  Manage diabetes  Eat less salt  Miscellaneous  Kidney disease runs in families (Encouraged testing)  Early kidney disease has no symptoms  Outcome Assessment Questions  See scanned document below for outcome assessment.     Lesson 2 Managing Your Kidney Disease  Lesson Objectives  By the end of each session, participants will be able to:  Recognize that managing blood pressure is a key part of managing kidney disease  Recognize that managing diabetes is a key part of managing kidney disease  State at least one step to eating right for kidney health  Recognize the importance of being cautious about over-the-counter medicines  Recognize that smoking can worsen kidney disease  Identify which lab values are used to keep track of diabetes, high blood pressure, and other conditions  Topics & Points Covered  There are steps you can take to keep your kidneys working  Weight management  Blood pressure management  Blood pressure goal: < 140/90 mm Hg  Medications - ACEs/ARBs, diuretics  ACEs/ARBS and risk of hyperkalemia (too much potassium in the blood, but help lower urine albumin)  Sodium reduction (<2,300 mg)  Physical activity  Diabetes management  A1C target   Diabetes medications may change because of kidney disease (often takes less medication to control glucose in the later stages of CKD)  How to treat hypoglycemia appropriately (risk of high potassium with ACEi and ARB use) glucose tablets are preferred [May need to avoid juices with high potassium levels if hyperkalemic]  Hyperglycemia  Cardiovascular disease (CVD)  Physical activity  CVD is major cause of mortality  LDL goal  Medications  Nutritional health  Choose and prepare foods with less salt and sodium  Eat the right amount and kind of protein  Choose  foods that are healthy for your heart  Choose foods based on phosphorus and potassium content (if restricted)  Make choices that help with diabetes management  Dietitian referral/nutrition therapy is covered benefit  Medication safety  Prescription  Over-the-counter (pain relief)  Tobacco cessation  Outcome Assessment Questions  See scanned document below for outcome assessment.     Lesson 3 What Happens When Kidney Disease Gets Worse  Lesson Objectives  By the end of each session, participants will be able to:  Recognize that managing blood pressure is a key part of managing kidney disease  Recognize that additional medications may be needed to treat complications  Topics & Points Covered  Kidneys have other jobs besides making urine. At this stage, they can't function as well.  Gradual/adaptation into symptoms; you may feel fine  Different for everyone - important to know your lab test results, including eGFR and urine albumin/protein values  Possible symptoms of decreasing kidney function and why they occur later  There is no change in urine volume or kidney pain (lower back)  Fatigue or weakness  Swelling  Bad taste in the mouth/food doesn't taste good (especially red meat)  Feel cold  Poor concentration  Shortness of breath  Itching skin  Cramping in hands and legs  Nausea and vomiting/Loss of appetite   Complications (*May require additional medications) - basic explanation and treatment  Anemia*  May feel tired or weak  Erythropoietin deficiency  May need iron  May need erythropoiesis stimulating agent (injection)  LABS: hemoglobin  Mineral and bone disorder*  May get itchy skin  Inadequate active Vitamin D  May take special supplement  Too much phosphorus in the blood  May need phosphorus binding med with meals  LABS: Phosphorus, calcium, VitaminD, iPTH  Malnutrition  May have bad taste in mouth  Poor appetite  May add to swelling  Need adequate calories  LABS: albumin  Changes in functional  status  Difficulty walking  Harder to care for self  Fluid overload  May be seen as weight gain, swelling, or shortness of breath  Kidneys may not remove fluid well  Rarely need fluid restriction  Use less salt, take water pills  Metabolic acidosis  Kidneys not removing acid in the blood  May need medication with bicarbonate  LABS: bicarbonate  Depression  Diabetes can contribute  Fear, grieving with loss of body function  It is OK to talk about it  Sexual complications  Discuss concerns with your doctor  Know how your medications work. Know which medications you can continue taking (including OTC medications). Talk with your pharmacist and provider about:  The more medications you take, the more you need to know about: medication interactions, medication-disease interactions, medication-food interactions, medication-herbal supplement interactions.  Timing of medication use (ie., qAC, etc.)  Cultural consideration: Be sensitive to use of traditional remedies and healers.  Symptoms and complications may increase as kidney function declines to kidney failure (stated directly)  Outcome Assessment Questions  See scanned document below for outcome assessment.     Scanned outcome assessment document:           Areas of information that primary nephrology provider should reinforce during follow-up visit includes:     The content of these lessons are adapted from the Kidney Disease Education (KDE) Services benefit as defined by the Centers for Medicare & Medicaid Services.    150 minutes spent educating patient on the above topics. I, personally, taught 31 minutes of the material and was present for the duration of the full 150-minute class.

## 2024-02-15 ENCOUNTER — PATIENT MESSAGE (OUTPATIENT)
Dept: NEPHROLOGY | Facility: CLINIC | Age: 84
End: 2024-02-15
Payer: MEDICARE

## 2024-05-20 ENCOUNTER — LAB VISIT (OUTPATIENT)
Dept: LAB | Facility: HOSPITAL | Age: 84
End: 2024-05-20
Payer: MEDICARE

## 2024-05-20 DIAGNOSIS — N18.4 CHRONIC KIDNEY DISEASE, STAGE 4 (SEVERE): ICD-10-CM

## 2024-05-20 LAB
25(OH)D3+25(OH)D2 SERPL-MCNC: 22 NG/ML (ref 30–96)
ALBUMIN SERPL BCP-MCNC: 3.5 G/DL (ref 3.5–5.2)
ANION GAP SERPL CALC-SCNC: 10 MMOL/L (ref 8–16)
BASOPHILS # BLD AUTO: 0.04 K/UL (ref 0–0.2)
BASOPHILS NFR BLD: 0.5 % (ref 0–1.9)
BUN SERPL-MCNC: 28 MG/DL (ref 8–23)
CALCIUM SERPL-MCNC: 9.2 MG/DL (ref 8.7–10.5)
CHLORIDE SERPL-SCNC: 110 MMOL/L (ref 95–110)
CO2 SERPL-SCNC: 20 MMOL/L (ref 23–29)
CREAT SERPL-MCNC: 2.2 MG/DL (ref 0.5–1.4)
DIFFERENTIAL METHOD BLD: ABNORMAL
EOSINOPHIL # BLD AUTO: 0.1 K/UL (ref 0–0.5)
EOSINOPHIL NFR BLD: 1.2 % (ref 0–8)
ERYTHROCYTE [DISTWIDTH] IN BLOOD BY AUTOMATED COUNT: 14.4 % (ref 11.5–14.5)
EST. GFR  (NO RACE VARIABLE): 21.4 ML/MIN/1.73 M^2
GLUCOSE SERPL-MCNC: 83 MG/DL (ref 70–110)
HCT VFR BLD AUTO: 34.5 % (ref 37–48.5)
HGB BLD-MCNC: 10.4 G/DL (ref 12–16)
IMM GRANULOCYTES # BLD AUTO: 0.04 K/UL (ref 0–0.04)
IMM GRANULOCYTES NFR BLD AUTO: 0.5 % (ref 0–0.5)
LYMPHOCYTES # BLD AUTO: 1.3 K/UL (ref 1–4.8)
LYMPHOCYTES NFR BLD: 15 % (ref 18–48)
MCH RBC QN AUTO: 29.8 PG (ref 27–31)
MCHC RBC AUTO-ENTMCNC: 30.1 G/DL (ref 32–36)
MCV RBC AUTO: 99 FL (ref 82–98)
MONOCYTES # BLD AUTO: 0.9 K/UL (ref 0.3–1)
MONOCYTES NFR BLD: 10.1 % (ref 4–15)
NEUTROPHILS # BLD AUTO: 6.2 K/UL (ref 1.8–7.7)
NEUTROPHILS NFR BLD: 72.7 % (ref 38–73)
NRBC BLD-RTO: 0 /100 WBC
PHOSPHATE SERPL-MCNC: 3.9 MG/DL (ref 2.7–4.5)
PLATELET # BLD AUTO: 300 K/UL (ref 150–450)
PMV BLD AUTO: 11.2 FL (ref 9.2–12.9)
POTASSIUM SERPL-SCNC: 4.4 MMOL/L (ref 3.5–5.1)
PTH-INTACT SERPL-MCNC: 257.1 PG/ML (ref 9–77)
RBC # BLD AUTO: 3.49 M/UL (ref 4–5.4)
SODIUM SERPL-SCNC: 140 MMOL/L (ref 136–145)
WBC # BLD AUTO: 8.58 K/UL (ref 3.9–12.7)

## 2024-05-20 PROCEDURE — 83970 ASSAY OF PARATHORMONE: CPT | Performed by: NURSE PRACTITIONER

## 2024-05-20 PROCEDURE — 36415 COLL VENOUS BLD VENIPUNCTURE: CPT | Mod: PO | Performed by: NURSE PRACTITIONER

## 2024-05-20 PROCEDURE — 85025 COMPLETE CBC W/AUTO DIFF WBC: CPT | Performed by: NURSE PRACTITIONER

## 2024-05-20 PROCEDURE — 82306 VITAMIN D 25 HYDROXY: CPT | Performed by: NURSE PRACTITIONER

## 2024-05-20 PROCEDURE — 80069 RENAL FUNCTION PANEL: CPT | Performed by: NURSE PRACTITIONER

## 2024-06-03 ENCOUNTER — OFFICE VISIT (OUTPATIENT)
Dept: NEPHROLOGY | Facility: CLINIC | Age: 84
End: 2024-06-03
Payer: MEDICARE

## 2024-06-03 VITALS
OXYGEN SATURATION: 99 % | BODY MASS INDEX: 32.54 KG/M2 | HEIGHT: 62 IN | DIASTOLIC BLOOD PRESSURE: 70 MMHG | SYSTOLIC BLOOD PRESSURE: 107 MMHG | HEART RATE: 54 BPM | WEIGHT: 176.81 LBS

## 2024-06-03 DIAGNOSIS — E87.20 ACIDOSIS: ICD-10-CM

## 2024-06-03 DIAGNOSIS — N18.4 BENIGN HYPERTENSION WITH CHRONIC KIDNEY DISEASE, STAGE IV: ICD-10-CM

## 2024-06-03 DIAGNOSIS — D64.9 ANEMIA, UNSPECIFIED TYPE: ICD-10-CM

## 2024-06-03 DIAGNOSIS — I12.9 BENIGN HYPERTENSION WITH CHRONIC KIDNEY DISEASE, STAGE IV: ICD-10-CM

## 2024-06-03 DIAGNOSIS — D53.1 OTHER MEGALOBLASTIC ANEMIAS, NOT ELSEWHERE CLASSIFIED: ICD-10-CM

## 2024-06-03 DIAGNOSIS — N25.81 SECONDARY HYPERPARATHYROIDISM: ICD-10-CM

## 2024-06-03 DIAGNOSIS — I10 HYPERTENSION, UNSPECIFIED TYPE: ICD-10-CM

## 2024-06-03 DIAGNOSIS — N18.4 CHRONIC KIDNEY DISEASE, STAGE 4 (SEVERE): Primary | ICD-10-CM

## 2024-06-03 PROCEDURE — 1101F PT FALLS ASSESS-DOCD LE1/YR: CPT | Mod: CPTII,S$GLB,, | Performed by: NURSE PRACTITIONER

## 2024-06-03 PROCEDURE — 3078F DIAST BP <80 MM HG: CPT | Mod: CPTII,S$GLB,, | Performed by: NURSE PRACTITIONER

## 2024-06-03 PROCEDURE — G2211 COMPLEX E/M VISIT ADD ON: HCPCS | Mod: S$GLB,,, | Performed by: NURSE PRACTITIONER

## 2024-06-03 PROCEDURE — 1125F AMNT PAIN NOTED PAIN PRSNT: CPT | Mod: CPTII,S$GLB,, | Performed by: NURSE PRACTITIONER

## 2024-06-03 PROCEDURE — 99214 OFFICE O/P EST MOD 30 MIN: CPT | Mod: S$GLB,,, | Performed by: NURSE PRACTITIONER

## 2024-06-03 PROCEDURE — 1159F MED LIST DOCD IN RCRD: CPT | Mod: CPTII,S$GLB,, | Performed by: NURSE PRACTITIONER

## 2024-06-03 PROCEDURE — 3074F SYST BP LT 130 MM HG: CPT | Mod: CPTII,S$GLB,, | Performed by: NURSE PRACTITIONER

## 2024-06-03 PROCEDURE — 3288F FALL RISK ASSESSMENT DOCD: CPT | Mod: CPTII,S$GLB,, | Performed by: NURSE PRACTITIONER

## 2024-06-03 PROCEDURE — 99999 PR PBB SHADOW E&M-EST. PATIENT-LVL IV: CPT | Mod: PBBFAC,,, | Performed by: NURSE PRACTITIONER

## 2024-06-03 RX ORDER — CHOLECALCIFEROL (VITAMIN D3) 25 MCG
2000 TABLET ORAL DAILY
Start: 2024-06-03

## 2024-06-03 RX ORDER — SODIUM BICARBONATE 650 MG/1
650 TABLET ORAL 2 TIMES DAILY
Qty: 180 TABLET | Refills: 3 | Status: SHIPPED | OUTPATIENT
Start: 2024-06-03 | End: 2025-06-03

## 2024-06-03 RX ORDER — AMLODIPINE BESYLATE 5 MG/1
5 TABLET ORAL DAILY
Qty: 90 TABLET | Refills: 3 | Status: SHIPPED | OUTPATIENT
Start: 2024-06-03 | End: 2025-05-29

## 2024-06-03 NOTE — PROGRESS NOTES
Subjective:       Patient ID: Rachel Asif is a 83 y.o. AA female who presents for f/u  CKD    HPI     Last seen by me March. 2021.    Patient presents for f/u CKD.  Baseline creatinine of 1.3-1.4 since 2015. No labs available from Nov 2019 to August 2019, when patient was found to have sCr of 2.1 mg/dL, which improved to 1.8 --> 1.5. No recent labs.    Per PMR note in July 2020, had been taking meloxicam 15 mg daily daily since June 2019; now off completely.    Home BPs: 120s/80s    Cardiology initially started lasix in December 2019 when patient presented with new peripheral edema, JOHNSON, and chest tightness; lasix improved these symptoms. Lasix increased and added PRN dosing in July; patient says she had increased swelling around this time (also says it was the same time she started amlodipine). LVEF in December 2019 was 60%.    Significant issues include: HLD, atherosclerosis of aorta, anemia of chronic disease, prediabetes, vitamin D deficiency, hyperparathyroidism, GERD, OA, HTN recently diagnosed.      The patient denies NSAID. No recent hospitalizations. On PPI daily.     Significant family hx includes: HTN, edema, lupus (sister), DM, lung cancer, heart disease, MI; no known kidney disease in family    Last renal US: Sept 2020, reviewed. Small kidneys bilaterally.    Update 10/20/21:  Last seen by me in June 2021.  Presents for f/u of CKD.  Baseline sCr difficult to determine. Possibly 1.3-1.4 mg/dL, but most recent sCr level was 1.1 mg/dL.  Home BPs: 130-140s/80s p meds  Denies recent hospitalizations and denies NSAID use.    Update 3/4/22:  Presents for f/u of CKD.  Baseline sCr difficult to determine. Possibly 1.3-1.4 mg/dL.  Home BPs: 130-140/40-50s before and after meds  Denies recent hospitalizations and denies NSAID use.  On PPI PRN.    Update 9/7/22:  Presents for f/u of CKD.  Baseline sCr difficult to determine. Possibly 1.3-1.4 mg/dL. Most recent sCr was 1.5.  Home BPs:  "130-140/80-90s  Denies NSAID use.    Update 1/22/24:  Presents for f/u of CKD.   Had CHRISTOPHER in March 2023 c sCr of 2.3, which never returned to baseline. Labs were not repeated.  sCr was 2.5 in December 2023 and 2.1 earlier this month.    Home BPs: 130-140s/80s    Update 6/3/24:  Presents for f/u of CKD. Presents with granddaughter.  Baseline sCr now 2.1-2.2 mg/dL.  Home BPs: SBP 110s-130s  Many questions about diet.  Injured her ankle with a fall.  Had another fall in early March.  Has been getting dizzy.    Review of Systems   Respiratory:  Negative for shortness of breath.    Cardiovascular:  Negative for leg swelling.   Gastrointestinal:  Negative for diarrhea, nausea and vomiting.   Genitourinary:  Negative for difficulty urinating, dysuria and hematuria.   Neurological:  Positive for dizziness.       Objective:       Blood pressure 107/70, pulse (!) 54, height 5' 2" (1.575 m), weight 80.2 kg (176 lb 12.9 oz), SpO2 99%.  Physical Exam  Constitutional:       General: She is not in acute distress.     Appearance: Normal appearance. She is well-developed. She is obese. She is not ill-appearing or diaphoretic.   Cardiovascular:      Rate and Rhythm: Bradycardia present.   Pulmonary:      Effort: Pulmonary effort is normal.   Abdominal:      Tenderness: There is no right CVA tenderness or left CVA tenderness.   Musculoskeletal:      Cervical back: Neck supple.      Right lower leg: No edema.      Left lower leg: No edema.   Skin:     General: Skin is warm and dry.   Neurological:      Mental Status: She is alert and oriented to person, place, and time.   Psychiatric:         Mood and Affect: Mood normal.         Behavior: Behavior normal.         Thought Content: Thought content normal.         Judgment: Judgment normal.           Lab Results   Component Value Date    CREATININE 2.2 (H) 05/20/2024     Prot/Creat Ratio, Urine   Date Value Ref Range Status   05/20/2024 Unable to calculate 0.00 - 0.20 Final   01/11/2024 " 0.06 0.00 - 0.20 Final   03/03/2023 0.07 0.00 - 0.20 Final     Lab Results   Component Value Date     05/20/2024    K 4.4 05/20/2024    CO2 20 (L) 05/20/2024     05/20/2024     Lab Results   Component Value Date    .1 (H) 05/20/2024    CALCIUM 9.2 05/20/2024    PHOS 3.9 05/20/2024     Lab Results   Component Value Date    HGB 10.4 (L) 05/20/2024    WBC 8.58 05/20/2024    HCT 34.5 (L) 05/20/2024      Lab Results   Component Value Date    HGBA1C 5.7 (H) 12/04/2023     05/20/2024    BUN 28 (H) 05/20/2024     Lab Results   Component Value Date    LDLCALC 159.8 (H) 12/04/2023         Assessment:       1. Chronic kidney disease, stage 4 (severe)    2. Acidosis    3. Benign hypertension with chronic kidney disease, stage IV    4. Other megaloblastic anemias, not elsewhere classified    5. Secondary hyperparathyroidism    6. Hypertension, unspecified type    7. Anemia, unspecified type            Plan:   CKD stage 4 - CKD clinically secondary to  age-related nephron loss and NSAID use, also possibly atherosclerotic disease (atherosclerosis noted to aorta). sCr progressed after CHRISTOPHER in 2023. Stable since.    Educated patient to control BP, BG, remain well-hydrated, and avoid NSAIDs to prevent progression of CKD.     - No paraproteins or monoclonal peaks in Sept 2020.  UPCR Non-proteinuric. On olmesartan.   Acid-base Mild acidosis. Monitor.   Renal osteodystrophy Ca, phos okay. PTH elevated. Vitamin D low. On ergocalciferol weekly.    Anemia Hgb at goal for CKD   DM Pre-diabetic.   Lipid Management On statin.   ESRD planning Anticipatory guidance provided about timing of dialysis. Start discussions and planning when eGFR is about 20 mL/min; most patients start dialysis between 5-10 mL/min.    Kidney Failure Risk Equation (Tangri)    Kidney Failure Risk at 2 years: 2.4%    Kidney Failure Risk at 5 years: 7.3%    Lab Results   Component Value Date    MICALBCREAT 11.6 05/20/2024    CREATININE 2.2 (H)  05/20/2024            HTN -Okay overall at home on amlodipine 10 mg, furosemide 20 mg BID, hydralazine 50 mg TID PRN if SBP is over 150-160 (takes about once a month), metoprolol succinate 50 mg, olmesartan-HCTZ 40-25  - If more control is needed, recommend switching hctz to chlorthalidone  - Avoiding overcontrol of BP, especially due to pt's advanced age    Hypokalemia - Okay on KCl 20 meq daily    All questions patient had were answered.  Asked if further questions. None. F/u in clinic in 3 mos with labs and urine prior to next visit or sooner if needed.  ER for emergency concerns.    Summary of Plan:  Add sodium bicarb 650 mg BID  Decrase amlodipine to 5 mg (granddaughter to verify that patient is not taking hydralazine scheduled)  Check TSAT/ ferritin/ B12/folate  Add vitamin D3 2,000 IU daily  RTC in 3 mos

## 2024-06-03 NOTE — PATIENT INSTRUCTIONS
Check pill packs.  Let me know if you are taking hydralazine daily (or 2-3 times per day) or as needed. If it is in your pill packs, we will have a different BP plan than already discussed.    [email or web address]  ^^youtube dietician    Add sodium bicarbonate 650 mg twice a day (okay to take two pills together if needed)  Decrease amlodipine to 5 mg (new prescription sent in)  Add vitamin D3 2,000 IU daily (over-the-counter)

## 2024-06-28 ENCOUNTER — CLINICAL SUPPORT (OUTPATIENT)
Dept: OPHTHALMOLOGY | Facility: CLINIC | Age: 84
End: 2024-06-28
Payer: MEDICARE

## 2024-06-28 ENCOUNTER — OFFICE VISIT (OUTPATIENT)
Dept: OPHTHALMOLOGY | Facility: CLINIC | Age: 84
End: 2024-06-28
Payer: MEDICARE

## 2024-06-28 DIAGNOSIS — Z96.1 PSEUDOPHAKIA: ICD-10-CM

## 2024-06-28 DIAGNOSIS — H53.002 AMBLYOPIA, LEFT: ICD-10-CM

## 2024-06-28 DIAGNOSIS — H04.123 DRY EYE SYNDROME, BILATERAL: ICD-10-CM

## 2024-06-28 DIAGNOSIS — H26.491 PCO (POSTERIOR CAPSULAR OPACIFICATION), RIGHT: ICD-10-CM

## 2024-06-28 DIAGNOSIS — H40.053 OCULAR HYPERTENSION, BILATERAL: Primary | ICD-10-CM

## 2024-06-28 DIAGNOSIS — H43.813 VITREOUS DETACHMENT, BILATERAL: ICD-10-CM

## 2024-06-28 PROCEDURE — 92133 CPTRZD OPH DX IMG PST SGM ON: CPT | Mod: S$GLB,,, | Performed by: OPHTHALMOLOGY

## 2024-06-28 PROCEDURE — 92083 EXTENDED VISUAL FIELD XM: CPT | Mod: S$GLB,,, | Performed by: OPHTHALMOLOGY

## 2024-06-28 PROCEDURE — 99999 PR PBB SHADOW E&M-EST. PATIENT-LVL I: CPT | Mod: PBBFAC,,, | Performed by: OPHTHALMOLOGY

## 2024-06-28 PROCEDURE — 92014 COMPRE OPH EXAM EST PT 1/>: CPT | Mod: S$GLB,,, | Performed by: OPHTHALMOLOGY

## 2024-06-28 PROCEDURE — 1160F RVW MEDS BY RX/DR IN RCRD: CPT | Mod: CPTII,S$GLB,, | Performed by: OPHTHALMOLOGY

## 2024-06-28 PROCEDURE — 1159F MED LIST DOCD IN RCRD: CPT | Mod: CPTII,S$GLB,, | Performed by: OPHTHALMOLOGY

## 2024-06-29 NOTE — PROGRESS NOTES
Subjective:       Patient ID: Rachel Asif is a 84 y.o. female.    Chief Complaint: Ocular Hypertension    HPI    DLS: 5/27/2022    Pt here for Overdue Glaucoma Check/HVF/OCT;    Meds;  Travatan BID OU    POHx:   1. OHT OU   2. SABINO OU   3. Vitreous Detachment OU   4. Ess ential HTN   5. Amblyopia OS   6. Psuedophakia OU     Last edited by Muriel Jade on 6/28/2024  2:55 PM.             Assessment:       1. Ocular hypertension, bilateral    2. PCO (posterior capsular opacification), right    3. Dry eye syndrome, bilateral    4. Vitreous detachment, bilateral    5. Amblyopia, left    6. Pseudophakia - Both Eyes        Plan:       OHT OU-IOP's are acceptable OU & ON's appear stable OU. OCT's are WNL's OD & abnl OS but stable OU. HVF's show nonspecific defects OD & sup paracentral scotoma OS but are unreliable & stable OU.  Early PCO OD- Not Visually Significant.  SABINO OU-Doing well.  PVD's OU-Stable OU.  Amblyopia OS-Stable.      CPM OU.  RTC 6 mos for IOP's.

## 2024-07-08 ENCOUNTER — INFUSION (OUTPATIENT)
Dept: INFUSION THERAPY | Facility: HOSPITAL | Age: 84
End: 2024-07-08
Attending: HOSPITALIST
Payer: MEDICARE

## 2024-07-08 VITALS
OXYGEN SATURATION: 100 % | RESPIRATION RATE: 18 BRPM | SYSTOLIC BLOOD PRESSURE: 125 MMHG | HEART RATE: 64 BPM | TEMPERATURE: 98 F | DIASTOLIC BLOOD PRESSURE: 60 MMHG

## 2024-07-08 DIAGNOSIS — M81.0 OSTEOPOROSIS, POST-MENOPAUSAL: Primary | ICD-10-CM

## 2024-07-08 PROCEDURE — 63600175 PHARM REV CODE 636 W HCPCS: Mod: JZ,JG | Performed by: HOSPITALIST

## 2024-07-08 PROCEDURE — 96372 THER/PROPH/DIAG INJ SC/IM: CPT

## 2024-07-08 RX ADMIN — DENOSUMAB 60 MG: 60 INJECTION SUBCUTANEOUS at 02:07

## 2024-07-08 NOTE — PLAN OF CARE
Patient presented for Prolia injection. VSS. No complaints voiced. Education provided. Injection administered and tolerated without difficulty. Patient accompanied by family, escorted via wheelchair and in NAD at time of discharge.      Problem: Fall Injury Risk  Goal: Absence of Fall and Fall-Related Injury  Outcome: Progressing

## 2024-07-29 ENCOUNTER — OFFICE VISIT (OUTPATIENT)
Dept: FAMILY MEDICINE | Facility: CLINIC | Age: 84
End: 2024-07-29
Payer: MEDICARE

## 2024-07-29 VITALS
DIASTOLIC BLOOD PRESSURE: 66 MMHG | OXYGEN SATURATION: 98 % | SYSTOLIC BLOOD PRESSURE: 128 MMHG | TEMPERATURE: 98 F | WEIGHT: 188.19 LBS | HEIGHT: 62 IN | BODY MASS INDEX: 34.63 KG/M2 | HEART RATE: 67 BPM

## 2024-07-29 DIAGNOSIS — E66.9 OBESITY (BMI 30-39.9): ICD-10-CM

## 2024-07-29 DIAGNOSIS — I70.0 ATHEROSCLEROSIS OF AORTA: ICD-10-CM

## 2024-07-29 DIAGNOSIS — R73.03 PREDIABETES: ICD-10-CM

## 2024-07-29 DIAGNOSIS — M81.0 OSTEOPOROSIS, POST-MENOPAUSAL: ICD-10-CM

## 2024-07-29 DIAGNOSIS — M54.41 CHRONIC MIDLINE LOW BACK PAIN WITH RIGHT-SIDED SCIATICA: ICD-10-CM

## 2024-07-29 DIAGNOSIS — E55.9 VITAMIN D DEFICIENCY: ICD-10-CM

## 2024-07-29 DIAGNOSIS — N25.81 SECONDARY HYPERPARATHYROIDISM OF RENAL ORIGIN: ICD-10-CM

## 2024-07-29 DIAGNOSIS — I12.9 BENIGN HYPERTENSION WITH CHRONIC KIDNEY DISEASE, STAGE IV: ICD-10-CM

## 2024-07-29 DIAGNOSIS — N18.4 BENIGN HYPERTENSION WITH CHRONIC KIDNEY DISEASE, STAGE IV: ICD-10-CM

## 2024-07-29 DIAGNOSIS — D63.8 ANEMIA OF CHRONIC DISEASE: ICD-10-CM

## 2024-07-29 DIAGNOSIS — Z00.00 ROUTINE MEDICAL EXAM: Primary | ICD-10-CM

## 2024-07-29 DIAGNOSIS — G89.29 CHRONIC MIDLINE LOW BACK PAIN WITH RIGHT-SIDED SCIATICA: ICD-10-CM

## 2024-07-29 DIAGNOSIS — E78.5 HYPERLIPIDEMIA WITH TARGET LDL LESS THAN 100: ICD-10-CM

## 2024-07-29 PROCEDURE — 1101F PT FALLS ASSESS-DOCD LE1/YR: CPT | Mod: CPTII,S$GLB,, | Performed by: INTERNAL MEDICINE

## 2024-07-29 PROCEDURE — 1160F RVW MEDS BY RX/DR IN RCRD: CPT | Mod: CPTII,S$GLB,, | Performed by: INTERNAL MEDICINE

## 2024-07-29 PROCEDURE — 3288F FALL RISK ASSESSMENT DOCD: CPT | Mod: CPTII,S$GLB,, | Performed by: INTERNAL MEDICINE

## 2024-07-29 PROCEDURE — 99397 PER PM REEVAL EST PAT 65+ YR: CPT | Mod: S$GLB,,, | Performed by: INTERNAL MEDICINE

## 2024-07-29 PROCEDURE — 1159F MED LIST DOCD IN RCRD: CPT | Mod: CPTII,S$GLB,, | Performed by: INTERNAL MEDICINE

## 2024-07-29 PROCEDURE — 1126F AMNT PAIN NOTED NONE PRSNT: CPT | Mod: CPTII,S$GLB,, | Performed by: INTERNAL MEDICINE

## 2024-07-29 PROCEDURE — 99999 PR PBB SHADOW E&M-EST. PATIENT-LVL IV: CPT | Mod: PBBFAC,,, | Performed by: INTERNAL MEDICINE

## 2024-07-29 NOTE — PROGRESS NOTES
CHIEF COMPLAINT:   Chief Complaint   Patient presents with    Annual Exam          HISTORY OF PRESENT ILLNESS:  Rachel Asif is a 84 y.o. female who presents to the clinic today for a routine physical exam. Her last physical exam was approximately 1 years(s) ago.    Subjective    PAST MEDICAL HISTORY:  Past Medical History:   Diagnosis Date    Amblyopia     os    Anemia of other chronic disease     Atherosclerosis of aorta     noted on L-spine X-ray 2011    Atherosclerosis of aortic arch     - noted on CXR 2017    Chronic low back pain     followed by orthopaedics    DJD (degenerative joint disease)     Glaucoma     History of colonic polyps     History of vitamin D deficiency     Hyperlipidemia     Hypertension     Lumbar radiculopathy     Obesity     Obesity     OH (ocular hypertension)     Osteoarthritis     Osteoporosis, post-menopausal     currently on a drug holiday    PCO (posterior capsular opacification), right 2020    Postmenopausal status     Prediabetes     Secondary hyperparathyroidism of renal origin     Trochanteric bursitis        PAST SURGICAL HISTORY:  Past Surgical History:   Procedure Laterality Date    CATARACT EXTRACTION W/  INTRAOCULAR LENS IMPLANT Bilateral     COLONOSCOPY N/A 8/10/2020    Procedure: COLONOSCOPY;  Surgeon: Bettina Gates MD;  Location: [facility];  Service: Endoscopy;  Laterality: N/A;       SOCIAL HISTORY:  Social History     Socioeconomic History    Marital status:    Occupational History    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 She is taking care of a sister who has brain damage from a car accident.       FAMILY HISTORY:  Family History   Problem Relation Name Age of Onset    Cataracts Mother      Hypertension Mother       Other Mother          complications from splenectomy after fall    Glaucoma Mother      Cataracts Father      Hypertension Father      Glaucoma Father      Hypertension Sister Alison     Edema Sister Alison     Hypertension Sister Maggy Dueñas     Other Sister Maggy Leana         shingles    Lupus Sister Yeimy     Lung cancer Sister Yeimy     Hypertension Sister Yeimy     Other Sister Rocío         brain damage from MVA    Hypertension Sister Rocío     Hypertension Brother Neri Jluis     Diabetes Brother Neri Jluis     Heart disease Brother Neri Jluis     Hypertension Brother Soljamar Jluis     Heart disease Brother Solomen Jluis     Hypertension Brother Derrell     Heart attack Brother Jv     Hypertension Brother Jv     Hypertension Brother Adam     No Known Problems Brother Condell     No Known Problems Brother Kimber     Hypertension Brother Andrew     Heart disease Brother Andrew     Glaucoma Maternal Grandmother      No Known Problems Maternal Grandfather      No Known Problems Paternal Grandmother      No Known Problems Paternal Grandfather      No Known Problems Maternal Aunt      No Known Problems Maternal Uncle      No Known Problems Paternal Aunt      No Known Problems Paternal Uncle      Cancer Neg Hx      Amblyopia Neg Hx      Blindness Neg Hx      Retinal detachment Neg Hx      Strabismus Neg Hx      Stroke Neg Hx         ALLERGIES AND MEDICATIONS: updated and reviewed.  Review of patient's allergies indicates:   Allergen Reactions    No known allergies      Medication List with Changes/Refills   Current Medications    AMLODIPINE (NORVASC) 5 MG TABLET    Take 1 tablet (5 mg total) by mouth once daily.    ASPIRIN 81 MG CHEWABLE TABLET    Every day    ATORVASTATIN (LIPITOR) 20 MG TABLET    Take 1 tablet (20 mg total) by mouth once daily.    DICLOFENAC SODIUM (VOLTAREN) 1 % GEL    Apply topically 3 (three) times daily as needed.    DULOXETINE (CYMBALTA) 30 MG  CAPSULE    Take 1 capsule (30 mg total) by mouth once daily.    ERGOCALCIFEROL (ERGOCALCIFEROL) 50,000 UNIT CAP    TAKE 1 CAPSULE BY MOUTH EVERY 7 DAYS    FUROSEMIDE (LASIX) 20 MG TABLET    Take 1 tablet (20 mg total) by mouth 2 (two) times daily.    HYDRALAZINE (APRESOLINE) 50 MG TABLET    Take 1 tablet (50 mg total) by mouth 3 (three) times daily.    METOPROLOL SUCCINATE (TOPROL-XL) 50 MG 24 HR TABLET    Take 1 tablet (50 mg total) by mouth once daily.    OLMESARTAN-HYDROCHLOROTHIAZIDE (BENICAR HCT) 40-25 MG PER TABLET    Take 1 tablet by mouth once daily.    PANTOPRAZOLE (PROTONIX) 20 MG TABLET    TAKE 1 TABLET BY MOUTH EVERY DAY TO PROTECT STOMACH    POTASSIUM CHLORIDE SA (K-DUR,KLOR-CON) 20 MEQ TABLET    Take 1 tablet (20 mEq total) by mouth once daily.    PROPYLENE GLYCOL/ (SYSTANE OPHT)    Weekly PRN    SODIUM BICARBONATE 650 MG TABLET    Take 1 tablet (650 mg total) by mouth 2 (two) times daily.    TRAMADOL (ULTRAM) 50 MG TABLET    Take 1 tablet (50 mg total) by mouth 3 (three) times daily as needed for Pain.    TRAVOPROST (TRAVATAN Z) 0.004 % OPHTHALMIC SOLUTION    Place 1 drop into both eyes every evening.    VITAMIN D (VITAMIN D3) 1000 UNITS TAB    Take 2 tablets (2,000 Units total) by mouth once daily.          CARE TEAM:  Patient Care Team:  Oneyda Pace MD as PCP - General (Internal Medicine)  BetsyYasmin buck LPN as Licensed Practical Nurse       SCREENING HISTORY:  Health Maintenance         Date Due Completion Date    RSV Vaccine (Age 60+ and Pregnant patients) (1 - 1-dose 60+ series) Never done ---    COVID-19 Vaccine (5 - 2023-24 season) 04/11/2024 12/11/2023    Influenza Vaccine (1) 09/01/2024 12/4/2023    Hemoglobin A1c (Prediabetes) 12/04/2024 12/4/2023    Lipid Panel 12/04/2024 12/4/2023    DEXA Scan 01/11/2026 1/11/2024    Override on 10/18/2011: Done    TETANUS VACCINE 06/20/2026 6/20/2016              REVIEW OF SYSTEMS:  The patient reports : fair dietary habits.  The  "patient reports  : that they do not exercise regularly  Review of Systems    ROS (Optional)-: no pelvic pain  Breast ROS (Optional)-: negative for breast lumps/discharge          Objective    PHYSICAL EXAMINATION/VITALS:  Vitals:    07/29/24 1412   BP: 128/66   Pulse: 67   Temp: 98 °F (36.7 °C)   TempSrc: Oral   SpO2: 98%   Weight: 85.3 kg (188 lb 2.6 oz)   Height: 5' 2" (1.575 m)        Body mass index is 34.42 kg/m².      General appearance - alert, well appearing, and in no distress, obese  Psychiatric - alert, oriented to person, place, and time, normal behavior, speech, dress, motor activity and thought process  Eyes - pupils equal and reactive, extraocular eye movements intact, sclera anicteric  Neck - supple, no significant adenopathy, carotids upstroke normal bilaterally, no bruits  Lymphatics - no palpable cervical lymphadenopathy  Chest - clear to auscultation, no wheezes, rales or rhonchi, symmetric air entry  Heart - normal rate and regular rhythm  Neurological - alert, normal speech, no focal findings; cranial nerves II through XII intact  Musculoskeletal - patient noted to have Moderate osteoarthritic changes to both knee joints. No joint effusions noted.  Extremities - trace pedal edema noted, lymphedema noted - mild  Skin - normal coloration, no suspicious skin lesions      LABS:  Ordered/Scheduled            ASSESSMENT AND PLAN:   1. Routine medical exam  Counseled on age appropriate medical preventative services including age appropriate cancer screenings, age appropriate eye and dental exams, over all nutritional health, need for a consistent exercise regimen, and an over all push towards maintaining a vigorous and active lifestyle.  Counseled on age appropriate vaccines and discussed upcoming health care needs based on age/gender. Discussed good sleep hygiene and stress management.    2. Benign hypertension with chronic kidney disease, stage IV  BP Readings from Last 1 Encounters:   07/29/24 " 128/66      Discussed sodium restriction, maintaining ideal body weight and regular exercise program as physiologic means to achieve blood pressure control. The patient will strive towards this.   The current medical regimen is effective;  continue present plan and medications. Recommended patient to check home readings to monitor and see me for followup as scheduled or sooner as needed.   Patient was educated that both decongestant and anti-inflammatory medication may raise blood pressure.  Stable decreased kidney function. Observe. Patient counseled to avoid/minimize the use of anti-inflammatory  Medication. Discussed to stay well hydrated. Also discussed with patient that good control of blood pressure and/or diabetes, if present, will help to prevent progression.  The patient is not active on the digital hypertension program.  She is followed by nephrology.    3. Hyperlipidemia with target LDL less than 100  Lab Results   Component Value Date    CHOL 264 (H) 12/04/2023     Lab Results   Component Value Date    HDL 63 12/04/2023     Lab Results   Component Value Date    LDLCALC 159.8 (H) 12/04/2023     Lab Results   Component Value Date    TRIG 206 (H) 12/04/2023     Lab Results   Component Value Date    LDLCALC 159.8 (H) 12/04/2023     We discussed low fat diet and regular exercise.The current medical regimen is effective;  continue present plan and medications.   -     Lipid Panel; Future; Expected date: 07/29/2024  -     Hepatic Function Panel; Future; Expected date: 07/29/2024    4. Atherosclerosis of aorta  Patient with Atherosclerosis of the Aorta.  Stable/asymptomatic. Currently stable on lipid lowering medication and blood pressure monitoring.  Overview:  noted on L-spine X-ray 4/14/2011      5. Prediabetes  Lab Results   Component Value Date    HGBA1C 5.7 (H) 12/04/2023     Stable. We discussed low sugar/low carbohydrate diet and regular exercise to prevent progression. No need for prescription  medication at this time.  Check A1c yearly.  -     Hemoglobin A1C; Future; Expected date: 07/29/2024    6. Anemia of chronic disease  Stable. Asymptomatic. Observe.    7. Osteoporosis, post-menopausal/8. Vitamin D deficiency  We discussed adequate calcium intake (preferably from diet) and vitamin D supplementation. We discussed fall precautions. She is up to date on her BMD. Continue current treatment regimen as per endocrinology recommendation/treatment plan (Prolia injection q6 months).  Overview:  currently on a drug holiday  - 5/2017 - no need for Rx treatment at this time.  -12/2021 BMD recommends Rx tx - saw endocrinology who recommended prolia injections  - 12/15/2022, 12/2023, 7/2024 - prolia      9. Secondary hyperparathyroidism of renal origin  Stable. Asymptomatic. Observe.    10. Chronic midline low back pain with right-sided sciatica  The current medical regimen is effective;  continue present plan and medications.     11. Obesity (BMI 30-39.9)  BMI Readings from Last 3 Encounters:   07/29/24 34.42 kg/m²   06/03/24 32.34 kg/m²   01/22/24 32.26 kg/m²     The patient is asked to make an attempt to improve diet and exercise patterns to aid in medical management of this problem.             Orders Placed This Encounter   Procedures    Lipid Panel    Hemoglobin A1C    Hepatic Function Panel      FOLLOW UP: Follow up in about 6 months (around 1/29/2025), or if symptoms worsen or fail to improve, for follow up chronic medical conditions.. or sooner as needed.

## 2024-08-03 DIAGNOSIS — I12.9 BENIGN HYPERTENSION WITH CHRONIC KIDNEY DISEASE, STAGE IV: ICD-10-CM

## 2024-08-03 DIAGNOSIS — N18.4 BENIGN HYPERTENSION WITH CHRONIC KIDNEY DISEASE, STAGE IV: ICD-10-CM

## 2024-08-05 DIAGNOSIS — M54.41 CHRONIC MIDLINE LOW BACK PAIN WITH RIGHT-SIDED SCIATICA: ICD-10-CM

## 2024-08-05 DIAGNOSIS — G89.29 CHRONIC NECK PAIN: ICD-10-CM

## 2024-08-05 DIAGNOSIS — G89.29 CHRONIC MIDLINE LOW BACK PAIN WITH RIGHT-SIDED SCIATICA: ICD-10-CM

## 2024-08-05 DIAGNOSIS — M54.2 CHRONIC NECK PAIN: ICD-10-CM

## 2024-08-05 RX ORDER — HYDRALAZINE HYDROCHLORIDE 50 MG/1
50 TABLET, FILM COATED ORAL 3 TIMES DAILY
Qty: 270 TABLET | Refills: 3 | Status: SHIPPED | OUTPATIENT
Start: 2024-08-05

## 2024-08-06 RX ORDER — TRAMADOL HYDROCHLORIDE 50 MG/1
50 TABLET ORAL 3 TIMES DAILY PRN
Qty: 90 TABLET | Refills: 0 | Status: SHIPPED | OUTPATIENT
Start: 2024-08-06

## 2024-08-22 ENCOUNTER — HOSPITAL ENCOUNTER (EMERGENCY)
Facility: HOSPITAL | Age: 84
Discharge: HOME | End: 2024-08-22
Attending: EMERGENCY MEDICINE
Payer: MEDICARE

## 2024-08-22 VITALS
TEMPERATURE: 99 F | BODY MASS INDEX: 34.39 KG/M2 | WEIGHT: 188 LBS | DIASTOLIC BLOOD PRESSURE: 79 MMHG | HEART RATE: 97 BPM | RESPIRATION RATE: 18 BRPM | SYSTOLIC BLOOD PRESSURE: 152 MMHG | OXYGEN SATURATION: 100 %

## 2024-08-22 DIAGNOSIS — M25.571 ACUTE RIGHT ANKLE PAIN: Primary | ICD-10-CM

## 2024-08-22 PROCEDURE — 99283 EMERGENCY DEPT VISIT LOW MDM: CPT | Mod: 25

## 2024-08-22 PROCEDURE — 25000003 PHARM REV CODE 250

## 2024-08-22 RX ORDER — METOPROLOL SUCCINATE 50 MG/1
50 TABLET, EXTENDED RELEASE ORAL
Status: COMPLETED | OUTPATIENT
Start: 2024-08-22 | End: 2024-08-22

## 2024-08-22 RX ORDER — ACETAMINOPHEN 500 MG
500 TABLET ORAL
Status: COMPLETED | OUTPATIENT
Start: 2024-08-22 | End: 2024-08-22

## 2024-08-22 RX ORDER — TRAMADOL HYDROCHLORIDE 50 MG/1
50 TABLET ORAL
Status: DISCONTINUED | OUTPATIENT
Start: 2024-08-22 | End: 2024-08-22

## 2024-08-22 RX ADMIN — ACETAMINOPHEN 500 MG: 500 TABLET ORAL at 12:08

## 2024-08-22 RX ADMIN — METOPROLOL SUCCINATE 50 MG: 50 TABLET, EXTENDED RELEASE ORAL at 12:08

## 2024-08-22 NOTE — DISCHARGE INSTRUCTIONS
Please do not take the 50 mg metoprolol 24 hr tablet today.     Please return to the Emergency Department for any new or worsening symptoms including:  fever, chest pain, shortness of breath, loss of consciousness, dizziness, weakness, or any other concerns.     Please follow up with your Primary Care Provider within in the week. If you do not have one, you may contact the one listed on your discharge paperwork or you may also call the [facility] Appointment Desk at [phone] to schedule an appointment with one.     Please take all medication as prescribed.

## 2024-08-22 NOTE — ED PROVIDER NOTES
Encounter Date: 8/22/2024    SCRIBE #1 NOTE: I, Rissa Cary, am scribing for, and in the presence of,  Eloise Rabago PA-C. Other sections scribed: HPI, ROS.       History     Chief Complaint   Patient presents with    Ankle Pain     Pt reports right ankle pain since Monday but reports the pain is much worse since yesterday. Pt denies known trauma or injury, denies falling. Per daughter, she thinks pt hit her ankle on something. Pt reports unable to put weight on the ankle at all.     CC: Ankle pain    HPI: History is obtained from independent historian: pt and pt's daughter. 84 y.o. F who has a PMHx of Osteoarthritis, HTN, HLD, and Prediabetes who presents to the ED for emergent evaluation of acute atraumatic right ankle pain that began 2 days ago. Although pt reports a 2 day history of ankle pain. The pt's daughter reports that the pt has been experiencing right ankle pain for the last 2 weeks. Pt states that she ambulates unassisted without without a cane, or walker.  Patient's daughter states that she thinks the patient injured her ankle in the room.  She denies any head trauma or loss of consciousness.  She states she was with the patient 24/7.  She denies any recent long distance travel, surgeries, hormone therapy.  Pt takes Tramadol for knee pain.Pt's PCP is Dr. Pace. Pt denies CP, SOB, left ankle pain, or knee pain.    The history is provided by the patient and a relative. No  was used.     Review of patient's allergies indicates:   Allergen Reactions    No known allergies      Past Medical History:   Diagnosis Date    Amblyopia     os    Anemia of other chronic disease     Atherosclerosis of aorta     noted on L-spine X-ray 4/14/2011    Atherosclerosis of aortic arch     - noted on CXR 5/2017    Chronic low back pain     followed by orthopaedics    DJD (degenerative joint disease)     Glaucoma     History of colonic polyps     History of vitamin D deficiency     Hyperlipidemia      Hypertension     Lumbar radiculopathy     Obesity     Obesity     OH (ocular hypertension)     Osteoarthritis     Osteoporosis, post-menopausal     currently on a drug holiday    PCO (posterior capsular opacification), right 2/27/2020    Postmenopausal status     Prediabetes     Secondary hyperparathyroidism of renal origin     Trochanteric bursitis      Past Surgical History:   Procedure Laterality Date    CATARACT EXTRACTION W/  INTRAOCULAR LENS IMPLANT Bilateral     COLONOSCOPY N/A 8/10/2020    Procedure: COLONOSCOPY;  Surgeon: Bettina Gates MD;  Location: [facility] ENDO;  Service: Endoscopy;  Laterality: N/A;     Family History   Problem Relation Name Age of Onset    Cataracts Mother      Hypertension Mother      Other Mother          complications from splenectomy after fall    Glaucoma Mother      Cataracts Father      Hypertension Father      Glaucoma Father      Hypertension Sister Alison     Edema Sister Alison     Hypertension Sister Maggy Mesae     Other Sister Maggyemmett Mesae         shingles    Lupus Sister Yeimy     Lung cancer Sister Yeimy     Hypertension Sister Yeimy     Other Sister Rocío         brain damage from MVA    Hypertension Sister Rocío     Hypertension Brother Neri Jluis     Diabetes Brother Neri Jluis     Heart disease Brother Neri Jluis     Hypertension Brother Solomen Jluis     Heart disease Brother Solomen Jluis     Hypertension Brother Derrell     Heart attack Brother Jv     Hypertension Brother Jv     Hypertension Brother Adam     No Known Problems Brother Condell     No Known Problems Brother Kimber     Hypertension Brother Andrew     Heart disease Brother Andrew     Glaucoma Maternal Grandmother      No Known Problems Maternal Grandfather      No Known Problems Paternal Grandmother      No Known Problems Paternal Grandfather      No Known Problems Maternal Aunt      No Known Problems Maternal Uncle      No Known Problems Paternal Aunt      No  Known Problems Paternal Uncle      Cancer Neg Hx      Amblyopia Neg Hx      Blindness Neg Hx      Retinal detachment Neg Hx      Strabismus Neg Hx      Stroke Neg Hx       Social History     Tobacco Use    Smoking status: Never    Smokeless tobacco: Never   Substance Use Topics    Alcohol use: No    Drug use: Never     Review of Systems   Constitutional:  Negative for chills and fever.   HENT:  Negative for congestion, ear pain, rhinorrhea and sore throat.    Eyes:  Negative for redness.   Respiratory:  Negative for cough and shortness of breath.    Cardiovascular:  Negative for chest pain.   Gastrointestinal:  Negative for abdominal pain, diarrhea, nausea and vomiting.   Genitourinary:  Negative for decreased urine volume, difficulty urinating, dysuria, frequency, hematuria and urgency.   Musculoskeletal:  Positive for arthralgias (right ankle). Negative for back pain and neck pain.        (-) Left ankle pain  (-) Knee pain   Skin:  Negative for rash.   Neurological:  Negative for headaches.        (-) head trauma  (-) loc   Psychiatric/Behavioral:  Negative for confusion.        Physical Exam     Initial Vitals [08/22/24 1045]   BP Pulse Resp Temp SpO2   (!) 167/81 (!) 117 18 98.3 °F (36.8 °C) 98 %      MAP       --         Physical Exam    Nursing note and vitals reviewed.  Constitutional: She appears well-developed and well-nourished.  Non-toxic appearance. She does not appear ill.   HENT:   Head: Normocephalic and atraumatic.   Right Ear: Hearing, tympanic membrane, external ear and ear canal normal. Tympanic membrane is not perforated, not erythematous and not bulging.   Left Ear: Hearing, tympanic membrane, external ear and ear canal normal. Tympanic membrane is not perforated, not erythematous and not bulging.   Nose: Nose normal.   Mouth/Throat: Uvula is midline, oropharynx is clear and moist and mucous membranes are normal.   Eyes: Conjunctivae and EOM are normal.   Neck: Neck supple.   Normal range of  motion.   Full passive range of motion without pain.     Cardiovascular:  Normal rate and regular rhythm.           Pulses:       Radial pulses are 2+ on the right side and 2+ on the left side.        Dorsalis pedis pulses are 2+ on the right side and 2+ on the left side.   Pulmonary/Chest: Effort normal and breath sounds normal. No accessory muscle usage. No respiratory distress. She has no decreased breath sounds.   Abdominal: Abdomen is soft. Bowel sounds are normal. She exhibits no distension. There is no abdominal tenderness. There is no rebound and no guarding.   Musculoskeletal:         General: Normal range of motion.      Cervical back: Full passive range of motion without pain, normal range of motion and neck supple. No rigidity.      Comments: Tenderness to palpation to right lateral malleolus.  There is some mild edema.  No surrounding erythema or cellulitis.  Patient has some mild edema noted to bilateral ankles, which she states is chronic.  No surrounding erythema or cellulitis.  Full range motion of bilateral hips, bilateral knees, left ankle, bilateral toes.     Neurological: She is alert. No cranial nerve deficit.   Neuro intact.  Strength and sensation intact bilateral upper and lower extremities.   Skin: Skin is warm and dry.   Psychiatric: She has a normal mood and affect.         ED Course   Procedures  Labs Reviewed - No data to display       Imaging Results              X-Ray Ankle Complete Right (Final result)  Result time 08/22/24 12:34:43      Final result by Cj Davila MD (08/22/24 12:34:43)                   Impression:      Suspected nonspecific soft tissue swelling of the ankle, dorsal foot and medial 1st metatarsal head.  No acute displaced fracture-dislocation identified.    Additional chronic/nonemergent findings in the body of the report.      Electronically signed by: Cj Davila MD  Date:    08/22/2024  Time:    12:34               Narrative:    EXAMINATION:  XR TIBIA  FIBULA 2 VIEW RIGHT; XR ANKLE COMPLETE 3 VIEW RIGHT; XR FOOT COMPLETE 3 VIEW RIGHT    CLINICAL HISTORY:  ankle pain;  Pain in unspecified ankle and joints of unspecified foot    TECHNIQUE:  AP and lateral views of the right tibia and fibula; three views right ankle and right foot    COMPARISON:  Bilateral knee series 08/03/2017    FINDINGS:  Generalized osteopenia.  Ankle mortise appears intact.  Moderate hallux valgus configuration.  Lisfranc articulation is congruent.  Mild prominence of the soft tissues about the ankle and dorsal foot may reflect swelling from edema or cellulitis.  No acute displaced fracture, dislocation or destructive osseous process.  Moderate to advanced degenerative change at the 1st MTP joint.  Minimal to mild baseline DJD elsewhere.  Tiny plantar calcaneal spur.  Tiny enthesophyte at the Achilles insertion site.  Prominence of the soft tissues with scattered small calcifications along the medial aspect of the 1st metatarsal head suggesting nonspecific swelling.  Scattered atherosclerotic vascular calcifications noted.  No subcutaneous emphysema or radiopaque foreign body.                                       X-Ray Foot Complete Right (Final result)  Result time 08/22/24 12:34:43      Final result by Cj Davila MD (08/22/24 12:34:43)                   Impression:      Suspected nonspecific soft tissue swelling of the ankle, dorsal foot and medial 1st metatarsal head.  No acute displaced fracture-dislocation identified.    Additional chronic/nonemergent findings in the body of the report.      Electronically signed by: Cj Davila MD  Date:    08/22/2024  Time:    12:34               Narrative:    EXAMINATION:  XR TIBIA FIBULA 2 VIEW RIGHT; XR ANKLE COMPLETE 3 VIEW RIGHT; XR FOOT COMPLETE 3 VIEW RIGHT    CLINICAL HISTORY:  ankle pain;  Pain in unspecified ankle and joints of unspecified foot    TECHNIQUE:  AP and lateral views of the right tibia and fibula; three views right ankle  and right foot    COMPARISON:  Bilateral knee series 08/03/2017    FINDINGS:  Generalized osteopenia.  Ankle mortise appears intact.  Moderate hallux valgus configuration.  Lisfranc articulation is congruent.  Mild prominence of the soft tissues about the ankle and dorsal foot may reflect swelling from edema or cellulitis.  No acute displaced fracture, dislocation or destructive osseous process.  Moderate to advanced degenerative change at the 1st MTP joint.  Minimal to mild baseline DJD elsewhere.  Tiny plantar calcaneal spur.  Tiny enthesophyte at the Achilles insertion site.  Prominence of the soft tissues with scattered small calcifications along the medial aspect of the 1st metatarsal head suggesting nonspecific swelling.  Scattered atherosclerotic vascular calcifications noted.  No subcutaneous emphysema or radiopaque foreign body.                                       X-Ray Tibia Fibula 2 View Right (Final result)  Result time 08/22/24 12:34:43      Final result by Cj Davila MD (08/22/24 12:34:43)                   Impression:      Suspected nonspecific soft tissue swelling of the ankle, dorsal foot and medial 1st metatarsal head.  No acute displaced fracture-dislocation identified.    Additional chronic/nonemergent findings in the body of the report.      Electronically signed by: Cj Davila MD  Date:    08/22/2024  Time:    12:34               Narrative:    EXAMINATION:  XR TIBIA FIBULA 2 VIEW RIGHT; XR ANKLE COMPLETE 3 VIEW RIGHT; XR FOOT COMPLETE 3 VIEW RIGHT    CLINICAL HISTORY:  ankle pain;  Pain in unspecified ankle and joints of unspecified foot    TECHNIQUE:  AP and lateral views of the right tibia and fibula; three views right ankle and right foot    COMPARISON:  Bilateral knee series 08/03/2017    FINDINGS:  Generalized osteopenia.  Ankle mortise appears intact.  Moderate hallux valgus configuration.  Lisfranc articulation is congruent.  Mild prominence of the soft tissues about the ankle  and dorsal foot may reflect swelling from edema or cellulitis.  No acute displaced fracture, dislocation or destructive osseous process.  Moderate to advanced degenerative change at the 1st MTP joint.  Minimal to mild baseline DJD elsewhere.  Tiny plantar calcaneal spur.  Tiny enthesophyte at the Achilles insertion site.  Prominence of the soft tissues with scattered small calcifications along the medial aspect of the 1st metatarsal head suggesting nonspecific swelling.  Scattered atherosclerotic vascular calcifications noted.  No subcutaneous emphysema or radiopaque foreign body.                                       Medications   metoprolol succinate (TOPROL-XL) 24 hr tablet 50 mg (50 mg Oral Given 8/22/24 1212)   acetaminophen tablet 500 mg (500 mg Oral Given 8/22/24 1214)     Medical Decision Making  This is a 84 y.o. F who has a PMHx of Osteoarthritis, HTN, HLD, and Prediabetes who presents to the ED for emergent evaluation of acute atraumatic right ankle pain that began 2 days ago. Although pt reports a 2 day history of ankle pain. The pt's daughter reports that the pt has been experiencing right ankle pain for the last 2 weeks. Pt states that she ambulates unassisted without without a cane, or walker.  Patient's daughter states that she thinks the patient injured her ankle in the room.  She denies any head trauma or loss of consciousness.  She states she was with the patient 24/7.  She denies any recent long distance travel, surgeries, hormone therapy.  Pt takes Tramadol for knee pain.Pt's PCP is Dr. Pace. Pt denies CP, SOB, left ankle pain, or knee pain.    On physical exam, patient is well-appearing and in no acute distress.  Nontoxic appearing.  Lungs are clear to auscultation bilaterally.  Abdomen is soft and nontender.  No guarding, rigidity, rebound.  2+ radial pulses bilaterally.  Posterior oropharynx is not erythematous.  No edema or exudate.  Uvula midline.  Bilateral tympanic membrane is normal.   No erythema, bulging, or perforations.  Neuro intact.  Strength and sensation intact bilateral upper and lower extremities.  Tenderness to palpation to right lateral malleolus.  There is some mild edema.  No surrounding erythema or cellulitis.  Patient has some mild edema noted to bilateral ankles, which she states is chronic.  No surrounding erythema or cellulitis.  Full range motion of bilateral hips, bilateral knees, left ankle, bilateral toes.  2+ DP pulses bilaterally.  X-ray of right ankle, right foot, and right tib-fib revealed: Suspected nonspecific soft tissue swelling of the ankle, dorsal foot and medial 1st metatarsal head.  No acute displaced fracture-dislocation identified.     Additional chronic/nonemergent findings in the body of the report.   Generalized osteopenia.  Ankle mortise appears intact.  Moderate hallux valgus configuration.  Lisfranc articulation is congruent.  Mild prominence of the soft tissues about the ankle and dorsal foot may reflect swelling from edema or cellulitis.  No acute displaced fracture, dislocation or destructive osseous process.  Moderate to advanced degenerative change at the 1st MTP joint.  Minimal to mild baseline DJD elsewhere.  Tiny plantar calcaneal spur.  Tiny enthesophyte at the Achilles insertion site.  Prominence of the soft tissues with scattered small calcifications along the medial aspect of the 1st metatarsal head suggesting nonspecific swelling.  Scattered atherosclerotic vascular calcifications noted.  No subcutaneous emphysema or radiopaque foreign body.    Patient has prescribed tramadol from her primary care provider.  She did not take her metoprolol this morning.  She also did not take her antihypertensive medications this morning.  Her heart rate is 117 upon triage.  Patient states that she will take her antihypertensive medications when she goes home.  Ace wrap applied.  Patient states that she has a wheelchair at home.  Patient's daughter is with  her.  She states that she will be able to put the patient in a wheelchair when she goes home.  Ambulatory referral to orthopedist ordered.  Urged prompt follow-up with orthopedist and PCP for further evaluation.    Strict return precautions given. I discussed with the patient/family the diagnosis, treatment plan, indications for return to the emergency department, and for expected follow-up. The patient/family verbalized an understanding. The patient/family is asked if there are any questions or concerns. We discuss the case, until all issues are addressed to the patient/family's satisfaction. Patient/family understands and is agreeable to the plan. Patient is stable and ready for discharge.      Amount and/or Complexity of Data Reviewed  Independent Historian:      Details: See HPI  Radiology: ordered.    Risk  OTC drugs.  Prescription drug management.            Scribe Attestation:   Scribe #1: I performed the above scribed service and the documentation accurately describes the services I performed. I attest to the accuracy of the note.                           I, HusseinAlexandra Rabago, personally performed the services described in this documentation. All medical record entries made by the scribe were at my direction and in my presence. I have reviewed the chart and agree that the record reflects my personal performance and is accurate and complete.      DISCLAIMER: This note was prepared with [employer] voice recognition transcription software. Garbled syntax, mangled pronouns, and other bizarre constructions may be attributed to that software system.      Clinical Impression:  Final diagnoses:  [M25.571] Acute right ankle pain (Primary)          ED Disposition Condition    Discharge Stable          ED Prescriptions    None       Follow-up Information       Follow up With Specialties Details Why Contact Oneyda Rutherford MD Internal Medicine, Pediatrics In 2 days for further evaluation [address]  [place]  [place] [address]  [phone]      [facility] - Emergency Dept Emergency Medicine In 2 days If symptoms worsen [address] [facility] [place] [id number] [phone]             Eloise Rabago PA-C  08/22/24 1425

## 2024-08-27 ENCOUNTER — OFFICE VISIT (OUTPATIENT)
Dept: ORTHOPEDICS | Facility: CLINIC | Age: 84
End: 2024-08-27
Payer: MEDICARE

## 2024-08-27 ENCOUNTER — HOSPITAL ENCOUNTER (OUTPATIENT)
Dept: RADIOLOGY | Facility: HOSPITAL | Age: 84
Discharge: HOME | End: 2024-08-27
Attending: NURSE PRACTITIONER
Payer: MEDICARE

## 2024-08-27 VITALS — HEIGHT: 62 IN | BODY MASS INDEX: 34.61 KG/M2 | WEIGHT: 188.06 LBS

## 2024-08-27 DIAGNOSIS — M25.571 PAIN IN JOINT INVOLVING RIGHT ANKLE AND FOOT: Primary | ICD-10-CM

## 2024-08-27 DIAGNOSIS — M25.571 ACUTE RIGHT ANKLE PAIN: ICD-10-CM

## 2024-08-27 DIAGNOSIS — M25.571 ACUTE RIGHT ANKLE PAIN: Primary | ICD-10-CM

## 2024-08-27 PROCEDURE — 99999 PR PBB SHADOW E&M-EST. PATIENT-LVL IV: CPT | Mod: PBBFAC,,, | Performed by: NURSE PRACTITIONER

## 2024-08-27 PROCEDURE — 3288F FALL RISK ASSESSMENT DOCD: CPT | Mod: CPTII,S$GLB,, | Performed by: NURSE PRACTITIONER

## 2024-08-27 PROCEDURE — 1125F AMNT PAIN NOTED PAIN PRSNT: CPT | Mod: CPTII,S$GLB,, | Performed by: NURSE PRACTITIONER

## 2024-08-27 PROCEDURE — 73610 X-RAY EXAM OF ANKLE: CPT | Mod: TC,RT

## 2024-08-27 PROCEDURE — 1159F MED LIST DOCD IN RCRD: CPT | Mod: CPTII,S$GLB,, | Performed by: NURSE PRACTITIONER

## 2024-08-27 PROCEDURE — 1160F RVW MEDS BY RX/DR IN RCRD: CPT | Mod: CPTII,S$GLB,, | Performed by: NURSE PRACTITIONER

## 2024-08-27 PROCEDURE — 99204 OFFICE O/P NEW MOD 45 MIN: CPT | Mod: S$GLB,,, | Performed by: NURSE PRACTITIONER

## 2024-08-27 PROCEDURE — 1101F PT FALLS ASSESS-DOCD LE1/YR: CPT | Mod: CPTII,S$GLB,, | Performed by: NURSE PRACTITIONER

## 2024-08-27 NOTE — PROGRESS NOTES
SUBJECTIVE:     Chief Complaint & History of Present Illness:  Rachel Asif is a New 84 y.o. year old female patient here for constant right foot/ankle pain which has been present for 2 weeks.  There is not a history of injury.  The pain is located in the globally aspect of the foot/ankle.  The pain is described as sharp, 8-9/10.  It is aggravated by any weight bearing activity.  She reports she is normally independent.  There is not radiation, numbness or tingling into the toes.  Associated symptoms include worsens with activity and swelling. Previous treatments include none which have provided minimal relief.  She went to the emergency department where she had an x-ray of her right foot, right ankle, tibia where the findings were negative for acute fractures.  Patient was referred to Orthopedics.  There is not a history of previous injury or surgery to the foot.   The patient does not use an assistive device.  Patient denies any past medical history of gout.  She has chronic stage 4 kidney disease, glaucoma, hypertension, hyperlipidemia and vitamin-D deficiency.  She is currently not on blood thinners.    Review of patient's allergies indicates:   Allergen Reactions    No known allergies          Current Outpatient Medications   Medication Sig Dispense Refill    amLODIPine (NORVASC) 5 MG tablet Take 1 tablet (5 mg total) by mouth once daily. 90 tablet 3    aspirin 81 MG chewable tablet Every day      atorvastatin (LIPITOR) 20 MG tablet Take 1 tablet (20 mg total) by mouth once daily. 90 tablet 1    diclofenac sodium (VOLTAREN) 1 % Gel Apply topically 3 (three) times daily as needed. 5 each 2    DULoxetine (CYMBALTA) 30 MG capsule Take 1 capsule (30 mg total) by mouth once daily. 30 capsule 3    ergocalciferol (ERGOCALCIFEROL) 50,000 unit Cap TAKE 1 CAPSULE BY MOUTH EVERY 7 DAYS 12 capsule 0    furosemide (LASIX) 20 MG tablet Take 1 tablet (20 mg total) by mouth 2 (two) times daily. 180 tablet 1     hydrALAZINE (APRESOLINE) 50 MG tablet TAKE 1 TABLET(50 MG) BY MOUTH THREE TIMES DAILY 270 tablet 3    metoprolol succinate (TOPROL-XL) 50 MG 24 hr tablet Take 1 tablet (50 mg total) by mouth once daily. 90 tablet 1    olmesartan-hydrochlorothiazide (BENICAR HCT) 40-25 mg per tablet Take 1 tablet by mouth once daily. 90 tablet 1    pantoprazole (PROTONIX) 20 MG tablet TAKE 1 TABLET BY MOUTH EVERY DAY TO PROTECT STOMACH 90 tablet 3    potassium chloride SA (K-DUR,KLOR-CON) 20 MEQ tablet Take 1 tablet (20 mEq total) by mouth once daily. 90 tablet 1    PROPYLENE GLYCOL/ (SYSTANE OPHT) Weekly PRN      sodium bicarbonate 650 MG tablet Take 1 tablet (650 mg total) by mouth 2 (two) times daily. 180 tablet 3    traMADoL (ULTRAM) 50 mg tablet Take 1 tablet (50 mg total) by mouth 3 (three) times daily as needed for Pain. 90 tablet 0    travoprost (TRAVATAN Z) 0.004 % ophthalmic solution Place 1 drop into both eyes every evening. 2.5 mL 6    vitamin D (VITAMIN D3) 1000 units Tab Take 2 tablets (2,000 Units total) by mouth once daily.       No current facility-administered medications for this visit.       Past Medical History:   Diagnosis Date    Amblyopia     os    Anemia of other chronic disease     Atherosclerosis of aorta     noted on L-spine X-ray 4/14/2011    Atherosclerosis of aortic arch     - noted on CXR 5/2017    Chronic low back pain     followed by orthopaedics    DJD (degenerative joint disease)     Glaucoma     History of colonic polyps     History of vitamin D deficiency     Hyperlipidemia     Hypertension     Lumbar radiculopathy     Obesity     Obesity     OH (ocular hypertension)     Osteoarthritis     Osteoporosis, post-menopausal     currently on a drug holiday    PCO (posterior capsular opacification), right 2/27/2020    Postmenopausal status     Prediabetes     Secondary hyperparathyroidism of renal origin     Trochanteric bursitis        Past Surgical History:   Procedure Laterality Date     CATARACT EXTRACTION W/  INTRAOCULAR LENS IMPLANT Bilateral     COLONOSCOPY N/A 8/10/2020    Procedure: COLONOSCOPY;  Surgeon: Bettina Gates MD;  [facility]: [facility] ENDO;  Service: Endoscopy;  Laterality: N/A;       Family History   Problem Relation Name Age of Onset    Cataracts Mother      Hypertension Mother      Other Mother          complications from splenectomy after fall    Glaucoma Mother      Cataracts Father      Hypertension Father      Glaucoma Father      Hypertension Sister Alison     Edema Sister Alsion     Hypertension Sister Maggy Mesae     Other Sister Maggy Leana         shingles    Lupus Sister Yeimy     Lung cancer Sister Yeimy     Hypertension Sister Yeimy     Other Sister Rocío         brain damage from MVA    Hypertension Sister Rocío     Hypertension Brother Neri Jluis     Diabetes Brother Enri Jluis     Heart disease Brother Neri Jluis     Hypertension Brother Solomen Jluis     Heart disease Brother Solomen Jluis     Hypertension Brother Derrell     Heart attack Brother Jv     Hypertension Brother Jv     Hypertension Brother Adam     No Known Problems Brother Condell     No Known Problems Brother Kimber     Hypertension Brother Andrew     Heart disease Brother Andrew     Glaucoma Maternal Grandmother      No Known Problems Maternal Grandfather      No Known Problems Paternal Grandmother      No Known Problems Paternal Grandfather      No Known Problems Maternal Aunt      No Known Problems Maternal Uncle      No Known Problems Paternal Aunt      No Known Problems Paternal Uncle      Cancer Neg Hx      Amblyopia Neg Hx      Blindness Neg Hx      Retinal detachment Neg Hx      Strabismus Neg Hx      Stroke Neg Hx           Review of Systems:  ROS:  Constitutional: no fever or chills  Eyes: no visual changes  ENT: no nasal congestion or sore throat  Respiratory: no cough or shortness of breath  Cardiovascular: no chest pain or  "palpitations  Gastrointestinal: no nausea or vomiting, tolerating diet  Genitourinary: no hematuria or dysuria  Integument/Breast: no rash or pruritis  Hematologic/Lymphatic: no easy bruising or lymphadenopathy  Musculoskeletal: no arthralgias or myalgias  Neurological: no seizures or tremors  Behavioral/Psych: no auditory or visual hallucinations  Endocrine: no heat or cold intolerance      OBJECTIVE:     PHYSICAL EXAM:  Vital Signs (Most Recent)  There were no vitals filed for this visit.  Height: 5' 2" (157.5 cm) Weight: 85.3 kg (188 lb 0.8 oz),   Estimated body mass index is 34.4 kg/m² as calculated from the following:    Height as of this encounter: 5' 2" (1.575 m).    Weight as of this encounter: 85.3 kg (188 lb 0.8 oz).   General Appearance: Well nourished, well developed, in no acute distress.  HENT: Normal cephalic, oropharynx pink and moist  Eyes: PERRLA bilaterally and EOM x 4  Respiratory: Even and unlabored  Skin: Warm and Dry.   Psychiatric: AAO x 4, Mood & affect are normal.    right  Foot/Ankle    General appearance: no acute distress, alert/oriented x3, appropriate mood and affect, looks stated age, obese, and well nourished  The examination was performed out of splint/cast  Skin: normal  Swelling: mild  Warmth: no warmth  Tenderness: mild and moderate  ROM: limited by pain  Strength: limited by pain  Stability: stable to testing and Cotton test: negative  Crepitus: no  Neurological Exam: normal  Vascular Exam: normal and pulse present    soft tissue swelling noted over the lateral and medial aspect of her ankle.      RADIOGRAPHS:  AP and lateral ankle x-rays were obtained, findings show the following:  No acute fracture seen.  Patient has soft tissue swelling noted.  X-rays of her right tibia, right foot, right ankle dated August 22, 2024 was reviewed.  All radiographs were personally reviewed by me.    ASSESSMENT/PLAN:       ICD-10-CM ICD-9-CM   1. Pain in joint involving right ankle and foot  " M25.571 719.47   2. Acute right ankle pain  M25.571 719.47     338.19       Plan:  -Rachel Asif presents to clinic today with c/c right foot/ankle pain for the past 2 weeks, no trauma.  Patient has pain with all weight-bearing activities.  -X-ray as above.    I will place the patient in a tall cam boot.  I will order a CBC, uric acid, ESR, CRP, and an MRI of her right ankle for further evaluation.  I will call the patient with updates and further recommendation pending outcome of the above lab and test results.

## 2024-08-28 ENCOUNTER — PATIENT MESSAGE (OUTPATIENT)
Dept: ORTHOPEDICS | Facility: CLINIC | Age: 84
End: 2024-08-28
Payer: MEDICARE

## 2024-08-28 ENCOUNTER — HOSPITAL ENCOUNTER (OUTPATIENT)
Dept: RADIOLOGY | Facility: HOSPITAL | Age: 84
Discharge: HOME | End: 2024-08-28
Attending: NURSE PRACTITIONER
Payer: MEDICARE

## 2024-08-28 DIAGNOSIS — M25.571 PAIN IN JOINT INVOLVING RIGHT ANKLE AND FOOT: ICD-10-CM

## 2024-08-28 DIAGNOSIS — M25.571 ACUTE RIGHT ANKLE PAIN: ICD-10-CM

## 2024-08-28 PROCEDURE — 73721 MRI JNT OF LWR EXTRE W/O DYE: CPT | Mod: TC,RT

## 2024-08-28 PROCEDURE — 73721 MRI JNT OF LWR EXTRE W/O DYE: CPT | Mod: 26,RT,, | Performed by: INTERNAL MEDICINE

## 2024-08-30 ENCOUNTER — PATIENT MESSAGE (OUTPATIENT)
Dept: ORTHOPEDICS | Facility: CLINIC | Age: 84
End: 2024-08-30
Payer: MEDICARE

## 2024-08-30 NOTE — TELEPHONE ENCOUNTER
Attempted to call patient on numbers listed on chart.  There is no answer at the home number and other numbers listed state they are not accepting phone calls and I am unable to leave a message.  I will send a message through the portal.

## 2024-09-04 ENCOUNTER — OFFICE VISIT (OUTPATIENT)
Dept: ORTHOPEDICS | Facility: CLINIC | Age: 84
End: 2024-09-04
Payer: MEDICARE

## 2024-09-04 VITALS
TEMPERATURE: 98 F | BODY MASS INDEX: 34.61 KG/M2 | WEIGHT: 188.06 LBS | SYSTOLIC BLOOD PRESSURE: 147 MMHG | HEIGHT: 62 IN | DIASTOLIC BLOOD PRESSURE: 73 MMHG | HEART RATE: 78 BPM

## 2024-09-04 DIAGNOSIS — M10.9 ACUTE GOUT OF RIGHT ANKLE, UNSPECIFIED CAUSE: ICD-10-CM

## 2024-09-04 DIAGNOSIS — M25.571 ACUTE RIGHT ANKLE PAIN: Primary | ICD-10-CM

## 2024-09-04 LAB
APPEARANCE FLD: NORMAL
BODY FLD TYPE: ABNORMAL
BODY FLD TYPE: NORMAL
COLOR FLD: NORMAL
CRYSTALS FLD MICRO: POSITIVE
GRAM STN SPEC: NORMAL
GRAM STN SPEC: NORMAL
LYMPHOCYTES NFR FLD MANUAL: 13 %
MONOS+MACROS NFR FLD MANUAL: 24 %
NEUTROPHILS NFR FLD MANUAL: 63 %
WBC # FLD: 5270 /CU MM

## 2024-09-04 PROCEDURE — 89060 EXAM SYNOVIAL FLUID CRYSTALS: CPT | Performed by: NURSE PRACTITIONER

## 2024-09-04 PROCEDURE — 89051 BODY FLUID CELL COUNT: CPT | Performed by: NURSE PRACTITIONER

## 2024-09-04 PROCEDURE — 87070 CULTURE OTHR SPECIMN AEROBIC: CPT | Performed by: NURSE PRACTITIONER

## 2024-09-04 PROCEDURE — 87075 CULTR BACTERIA EXCEPT BLOOD: CPT | Performed by: NURSE PRACTITIONER

## 2024-09-04 PROCEDURE — 99999 PR PBB SHADOW E&M-EST. PATIENT-LVL III: CPT | Mod: PBBFAC,,, | Performed by: NURSE PRACTITIONER

## 2024-09-04 PROCEDURE — 87116 MYCOBACTERIA CULTURE: CPT | Performed by: NURSE PRACTITIONER

## 2024-09-04 PROCEDURE — 87102 FUNGUS ISOLATION CULTURE: CPT | Performed by: NURSE PRACTITIONER

## 2024-09-04 PROCEDURE — 87206 SMEAR FLUORESCENT/ACID STAI: CPT | Performed by: NURSE PRACTITIONER

## 2024-09-04 PROCEDURE — 87205 SMEAR GRAM STAIN: CPT | Performed by: NURSE PRACTITIONER

## 2024-09-04 RX ORDER — LIDOCAINE HYDROCHLORIDE 10 MG/ML
1 INJECTION, SOLUTION INFILTRATION; PERINEURAL
Status: DISCONTINUED | OUTPATIENT
Start: 2024-09-04 | End: 2024-09-04 | Stop reason: HOSPADM

## 2024-09-04 RX ORDER — TRIAMCINOLONE ACETONIDE 40 MG/ML
40 INJECTION, SUSPENSION INTRA-ARTICULAR; INTRAMUSCULAR
Status: DISCONTINUED | OUTPATIENT
Start: 2024-09-04 | End: 2024-09-04 | Stop reason: HOSPADM

## 2024-09-04 RX ADMIN — TRIAMCINOLONE ACETONIDE 40 MG: 40 INJECTION, SUSPENSION INTRA-ARTICULAR; INTRAMUSCULAR at 01:09

## 2024-09-04 RX ADMIN — LIDOCAINE HYDROCHLORIDE 1 ML: 10 INJECTION, SOLUTION INFILTRATION; PERINEURAL at 01:09

## 2024-09-04 NOTE — PROGRESS NOTES
SUBJECTIVE:     Chief Complaint & History of Present Illness:  Rachel Asif is a Established 84 y.o. year old female patient here follow-up for her pain and swelling involving the right ankle.  Patient was last seen in clinic on August 27, 2024.  At that time x-rays were taken and no acute fractures were seen.  Patient was sent for lab and advanced imaging with the MRI.  Lab results showed a sed rate of 115, CRP was 140.2, uric acid was 7.1, in her CBC was negative for leukocytosis.    MRI of the right ankle show the following:  Motion degraded examination.     Degenerative changes, worst at the calcaneocuboid joint with full thickness cartilage loss and subchondral edema.  Subchondral cysts and bone marrow edema in the distal fibula and lateral talar process, suggesting lateral hindfoot impingement. A bone stress injury is difficult to exclude.     Tibiotalar, subtalar, and calcaneocuboid effusions/synovitis.     Posterior tibialis tenosynovitis.     Plantar fasciitis.     Effacement of fat in the sinus tarsi, which could represent sinus tarsi syndrome in the appropriate setting.    I discussed the case with Dr. Brennan and Russell who recommended having the patient come back to the clinic for aspiration and injection with steroids to see if this would improve her symptoms.  Patient was contacted and instructed to come back in in the reason for a visit today.    Review of patient's allergies indicates:   Allergen Reactions    No known allergies          Current Outpatient Medications   Medication Sig Dispense Refill    amLODIPine (NORVASC) 5 MG tablet Take 1 tablet (5 mg total) by mouth once daily. 90 tablet 3    aspirin 81 MG chewable tablet Every day      atorvastatin (LIPITOR) 20 MG tablet Take 1 tablet (20 mg total) by mouth once daily. 90 tablet 1    diclofenac sodium (VOLTAREN) 1 % Gel Apply topically 3 (three) times daily as needed. 5 each 2    DULoxetine (CYMBALTA) 30 MG capsule Take 1 capsule (30  mg total) by mouth once daily. 30 capsule 3    ergocalciferol (ERGOCALCIFEROL) 50,000 unit Cap TAKE 1 CAPSULE BY MOUTH EVERY 7 DAYS 12 capsule 0    furosemide (LASIX) 20 MG tablet Take 1 tablet (20 mg total) by mouth 2 (two) times daily. 180 tablet 1    hydrALAZINE (APRESOLINE) 50 MG tablet TAKE 1 TABLET(50 MG) BY MOUTH THREE TIMES DAILY 270 tablet 3    metoprolol succinate (TOPROL-XL) 50 MG 24 hr tablet Take 1 tablet (50 mg total) by mouth once daily. 90 tablet 1    olmesartan-hydrochlorothiazide (BENICAR HCT) 40-25 mg per tablet Take 1 tablet by mouth once daily. 90 tablet 1    pantoprazole (PROTONIX) 20 MG tablet TAKE 1 TABLET BY MOUTH EVERY DAY TO PROTECT STOMACH 90 tablet 3    potassium chloride SA (K-DUR,KLOR-CON) 20 MEQ tablet Take 1 tablet (20 mEq total) by mouth once daily. 90 tablet 1    PROPYLENE GLYCOL/ (SYSTANE OPHT) Weekly PRN      sodium bicarbonate 650 MG tablet Take 1 tablet (650 mg total) by mouth 2 (two) times daily. 180 tablet 3    traMADoL (ULTRAM) 50 mg tablet Take 1 tablet (50 mg total) by mouth 3 (three) times daily as needed for Pain. 90 tablet 0    travoprost (TRAVATAN Z) 0.004 % ophthalmic solution Place 1 drop into both eyes every evening. 2.5 mL 6    vitamin D (VITAMIN D3) 1000 units Tab Take 2 tablets (2,000 Units total) by mouth once daily.       No current facility-administered medications for this visit.       Past Medical History:   Diagnosis Date    Amblyopia     os    Anemia of other chronic disease     Atherosclerosis of aorta     noted on L-spine X-ray 4/14/2011    Atherosclerosis of aortic arch     - noted on CXR 5/2017    Chronic low back pain     followed by orthopaedics    DJD (degenerative joint disease)     Glaucoma     History of colonic polyps     History of vitamin D deficiency     Hyperlipidemia     Hypertension     Lumbar radiculopathy     Obesity     Obesity     OH (ocular hypertension)     Osteoarthritis     Osteoporosis, post-menopausal     currently on a  drug holiday    PCO (posterior capsular opacification), right 2/27/2020    Postmenopausal status     Prediabetes     Secondary hyperparathyroidism of renal origin     Trochanteric bursitis        Past Surgical History:   Procedure Laterality Date    CATARACT EXTRACTION W/  INTRAOCULAR LENS IMPLANT Bilateral     COLONOSCOPY N/A 8/10/2020    Procedure: COLONOSCOPY;  Surgeon: Bettina Gates MD;  Location: [facility] ENDO;  Service: Endoscopy;  Laterality: N/A;       Family History   Problem Relation Name Age of Onset    Cataracts Mother      Hypertension Mother      Other Mother          complications from splenectomy after fall    Glaucoma Mother      Cataracts Father      Hypertension Father      Glaucoma Father      Hypertension Sister Alison     Edema Sister Alison     Hypertension Sister Maggy Leana     Other Sister Maggy Leana         shingles    Lupus Sister Yeimy     Lung cancer Sister Yeimy     Hypertension Sister Yeimy     Other Sister Rocío         brain damage from MVA    Hypertension Sister Rocío     Hypertension Brother Neri Jluis     Diabetes Brother Neri Jluis     Heart disease Brother Neri Jluis     Hypertension Brother Solomen Jluis     Heart disease Brother Solomen Jluis     Hypertension Brother Derrell     Heart attack Brother Jv     Hypertension Brother Jv     Hypertension Brother Adam     No Known Problems Brother Condell     No Known Problems Brother Kimber     Hypertension Brother Andrew     Heart disease Brother Andrew     Glaucoma Maternal Grandmother      No Known Problems Maternal Grandfather      No Known Problems Paternal Grandmother      No Known Problems Paternal Grandfather      No Known Problems Maternal Aunt      No Known Problems Maternal Uncle      No Known Problems Paternal Aunt      No Known Problems Paternal Uncle      Cancer Neg Hx      Amblyopia Neg Hx      Blindness Neg Hx      Retinal detachment Neg Hx      Strabismus Neg Hx      Stroke Neg  "Hx           Review of Systems:  ROS:  Constitutional: no fever or chills  Eyes: no visual changes  ENT: no nasal congestion or sore throat  Respiratory: no cough or shortness of breath  Cardiovascular: no chest pain or palpitations  Gastrointestinal: no nausea or vomiting, tolerating diet  Genitourinary: no hematuria or dysuria  Integument/Breast: no rash or pruritis  Hematologic/Lymphatic: no easy bruising or lymphadenopathy  Musculoskeletal: no arthralgias or myalgias  Neurological: no seizures or tremors  Behavioral/Psych: no auditory or visual hallucinations  Endocrine: no heat or cold intolerance      OBJECTIVE:     PHYSICAL EXAM:  Vital Signs (Most Recent)  Vitals:    09/04/24 1357   BP: (!) 147/73   Pulse: 78   Temp: 98.3 °F (36.8 °C)     Height: 5' 2" (157.5 cm) Weight: 85.3 kg (188 lb 0.8 oz),   Estimated body mass index is 34.4 kg/m² as calculated from the following:    Height as of this encounter: 5' 2" (1.575 m).    Weight as of this encounter: 85.3 kg (188 lb 0.8 oz).   General Appearance: Well nourished, well developed, in no acute distress.  HENT: Normal cephalic, oropharynx pink and moist  Eyes: PERRLA bilaterally and EOM x 4  Respiratory: Even and unlabored  Skin: Warm and Dry.   Psychiatric: AAO x 4, Mood & affect are normal.    right  Foot/Ankle    General appearance: no acute distress, alert/oriented x3, appropriate mood and affect, looks stated age, obese, and well nourished  The examination was performed out of splint/cast  Skin: normal  Swelling: mild  Warmth: no warmth  Tenderness: mild and moderate  ROM: limited by pain  Strength: limited by pain  Stability: stable to testing and Cotton test: negative  Crepitus: no  Neurological Exam: normal  Vascular Exam: normal and pulse present    soft tissue swelling noted over the lateral and medial aspect of her ankle.      RADIOGRAPHS:  None today    ASSESSMENT/PLAN:       ICD-10-CM ICD-9-CM   1. Acute right ankle pain  M25.571 719.47     338.19 "   2. Acute gout of right ankle, unspecified cause  M10.9 274.01       Plan:  -Sharsuzette Asif presents to clinic today for attempt to aspirate her right ankle and inject the ankle with steroids for suspected gout.  Please see procedural note.    I will call the patient with updates on lab findings once resulted I was asked to give a call [phone].

## 2024-09-04 NOTE — PROCEDURES
Intermediate Joint Aspiration/Injection: R ankle    Date/Time: 9/4/2024 1:00 PM    Performed by: Celia Ramon MD  Authorized by: Rayshawn Pichardo NP    Consent Done?:  Yes (Verbal)  Indications:  Joint swelling and diagnostic evaluation  Site marked: The procedure site was marked    Timeout: Prior to procedure the correct patient, procedure, and site was verified      Location:  Ankle  Site:  R ankle  Prep: Patient was prepped and draped in usual sterile fashion    Ultrasonic Guidance for needle placement: No  Needle size:  22 G  Approach:  Anterolateral  Medications:  1 mL LIDOcaine HCL 10 mg/ml (1%) 10 mg/mL (1 %); 40 mg triamcinolone acetonide 40 mg/mL  Aspirate amount (ml):  1.5  Aspirate:  Blood-tinged  Lab: Fluid sent for laboratory analysis    Patient tolerance:  Patient tolerated the procedure well with no immediate complications

## 2024-09-05 ENCOUNTER — TELEPHONE (OUTPATIENT)
Dept: ORTHOPEDICS | Facility: CLINIC | Age: 84
End: 2024-09-05
Payer: MEDICARE

## 2024-09-05 ENCOUNTER — TELEPHONE (OUTPATIENT)
Dept: NEPHROLOGY | Facility: CLINIC | Age: 84
End: 2024-09-05
Payer: MEDICARE

## 2024-09-05 LAB — PATH INTERP FLD-IMP: NORMAL

## 2024-09-05 NOTE — TELEPHONE ENCOUNTER
Spoke with grand-daughter and advised her joint fluid had crystals which is consistent with gout.  Cultures show NGTD.  Recommend she follow up with her nephrologist/PCP for long-term gout management.  Verb understanding.

## 2024-09-06 ENCOUNTER — TELEPHONE (OUTPATIENT)
Dept: NEPHROLOGY | Facility: CLINIC | Age: 84
End: 2024-09-06

## 2024-09-06 ENCOUNTER — OFFICE VISIT (OUTPATIENT)
Dept: NEPHROLOGY | Facility: CLINIC | Age: 84
End: 2024-09-06
Payer: MEDICARE

## 2024-09-06 ENCOUNTER — LAB VISIT (OUTPATIENT)
Dept: LAB | Facility: HOSPITAL | Age: 84
End: 2024-09-06
Payer: MEDICARE

## 2024-09-06 VITALS
BODY MASS INDEX: 34.61 KG/M2 | SYSTOLIC BLOOD PRESSURE: 159 MMHG | HEIGHT: 62 IN | HEART RATE: 57 BPM | DIASTOLIC BLOOD PRESSURE: 78 MMHG | OXYGEN SATURATION: 98 % | WEIGHT: 188.06 LBS

## 2024-09-06 DIAGNOSIS — N18.4 CHRONIC KIDNEY DISEASE, STAGE 4 (SEVERE): ICD-10-CM

## 2024-09-06 DIAGNOSIS — R31.9 HEMATURIA, UNSPECIFIED TYPE: ICD-10-CM

## 2024-09-06 DIAGNOSIS — N18.4 CHRONIC KIDNEY DISEASE, STAGE 4 (SEVERE): Primary | ICD-10-CM

## 2024-09-06 DIAGNOSIS — R73.03 PREDIABETES: ICD-10-CM

## 2024-09-06 DIAGNOSIS — M10.9 GOUT, UNSPECIFIED CAUSE, UNSPECIFIED CHRONICITY, UNSPECIFIED SITE: ICD-10-CM

## 2024-09-06 DIAGNOSIS — D64.9 ANEMIA, UNSPECIFIED TYPE: ICD-10-CM

## 2024-09-06 DIAGNOSIS — N17.9 AKI (ACUTE KIDNEY INJURY): ICD-10-CM

## 2024-09-06 DIAGNOSIS — E87.6 HYPOKALEMIA: ICD-10-CM

## 2024-09-06 DIAGNOSIS — I10 HYPERTENSION, UNSPECIFIED TYPE: ICD-10-CM

## 2024-09-06 DIAGNOSIS — E87.20 ACIDOSIS: ICD-10-CM

## 2024-09-06 LAB
25(OH)D3+25(OH)D2 SERPL-MCNC: 26 NG/ML (ref 30–96)
ACID FAST MOD KINY STN SPEC: NORMAL
ALBUMIN SERPL BCP-MCNC: 3.3 G/DL (ref 3.5–5.2)
ANION GAP SERPL CALC-SCNC: 14 MMOL/L (ref 8–16)
BACTERIA SPEC ANAEROBE CULT: NORMAL
BASOPHILS # BLD AUTO: 0.01 K/UL (ref 0–0.2)
BASOPHILS NFR BLD: 0.1 % (ref 0–1.9)
BUN SERPL-MCNC: 55 MG/DL (ref 8–23)
CALCIUM SERPL-MCNC: 9.8 MG/DL (ref 8.7–10.5)
CHLORIDE SERPL-SCNC: 109 MMOL/L (ref 95–110)
CO2 SERPL-SCNC: 20 MMOL/L (ref 23–29)
CREAT SERPL-MCNC: 2.5 MG/DL (ref 0.5–1.4)
DIFFERENTIAL METHOD BLD: ABNORMAL
EOSINOPHIL # BLD AUTO: 0 K/UL (ref 0–0.5)
EOSINOPHIL NFR BLD: 0 % (ref 0–8)
ERYTHROCYTE [DISTWIDTH] IN BLOOD BY AUTOMATED COUNT: 14.3 % (ref 11.5–14.5)
EST. GFR  (NO RACE VARIABLE): 18.3 ML/MIN/1.73 M^2
FERRITIN SERPL-MCNC: 136 NG/ML (ref 20–300)
GLUCOSE SERPL-MCNC: 137 MG/DL (ref 70–110)
HCT VFR BLD AUTO: 33.6 % (ref 37–48.5)
HGB BLD-MCNC: 9.5 G/DL (ref 12–16)
IMM GRANULOCYTES # BLD AUTO: 0.09 K/UL (ref 0–0.04)
IMM GRANULOCYTES NFR BLD AUTO: 0.5 % (ref 0–0.5)
LYMPHOCYTES # BLD AUTO: 0.9 K/UL (ref 1–4.8)
LYMPHOCYTES NFR BLD: 4.7 % (ref 18–48)
MCH RBC QN AUTO: 28.4 PG (ref 27–31)
MCHC RBC AUTO-ENTMCNC: 28.3 G/DL (ref 32–36)
MCV RBC AUTO: 100 FL (ref 82–98)
MONOCYTES # BLD AUTO: 0.3 K/UL (ref 0.3–1)
MONOCYTES NFR BLD: 1.8 % (ref 4–15)
MYCOBACTERIUM SPEC QL CULT: NORMAL
NEUTROPHILS # BLD AUTO: 16.9 K/UL (ref 1.8–7.7)
NEUTROPHILS NFR BLD: 92.9 % (ref 38–73)
NRBC BLD-RTO: 0 /100 WBC
PHOSPHATE SERPL-MCNC: 4.3 MG/DL (ref 2.7–4.5)
PLATELET # BLD AUTO: 434 K/UL (ref 150–450)
PMV BLD AUTO: 10 FL (ref 9.2–12.9)
POTASSIUM SERPL-SCNC: 4.2 MMOL/L (ref 3.5–5.1)
PTH-INTACT SERPL-MCNC: 206.1 PG/ML (ref 9–77)
RBC # BLD AUTO: 3.35 M/UL (ref 4–5.4)
SODIUM SERPL-SCNC: 143 MMOL/L (ref 136–145)
WBC # BLD AUTO: 18.17 K/UL (ref 3.9–12.7)

## 2024-09-06 PROCEDURE — 36415 COLL VENOUS BLD VENIPUNCTURE: CPT | Performed by: NURSE PRACTITIONER

## 2024-09-06 PROCEDURE — 1125F AMNT PAIN NOTED PAIN PRSNT: CPT | Mod: CPTII,S$GLB,, | Performed by: NURSE PRACTITIONER

## 2024-09-06 PROCEDURE — 82728 ASSAY OF FERRITIN: CPT | Performed by: NURSE PRACTITIONER

## 2024-09-06 PROCEDURE — 99999 PR PBB SHADOW E&M-EST. PATIENT-LVL IV: CPT | Mod: PBBFAC,,, | Performed by: NURSE PRACTITIONER

## 2024-09-06 PROCEDURE — 99214 OFFICE O/P EST MOD 30 MIN: CPT | Mod: S$GLB,,, | Performed by: NURSE PRACTITIONER

## 2024-09-06 PROCEDURE — 1159F MED LIST DOCD IN RCRD: CPT | Mod: CPTII,S$GLB,, | Performed by: NURSE PRACTITIONER

## 2024-09-06 PROCEDURE — 82306 VITAMIN D 25 HYDROXY: CPT | Performed by: NURSE PRACTITIONER

## 2024-09-06 PROCEDURE — 1100F PTFALLS ASSESS-DOCD GE2>/YR: CPT | Mod: CPTII,S$GLB,, | Performed by: NURSE PRACTITIONER

## 2024-09-06 PROCEDURE — 3288F FALL RISK ASSESSMENT DOCD: CPT | Mod: CPTII,S$GLB,, | Performed by: NURSE PRACTITIONER

## 2024-09-06 PROCEDURE — G2211 COMPLEX E/M VISIT ADD ON: HCPCS | Mod: S$GLB,,, | Performed by: NURSE PRACTITIONER

## 2024-09-06 PROCEDURE — 1160F RVW MEDS BY RX/DR IN RCRD: CPT | Mod: CPTII,S$GLB,, | Performed by: NURSE PRACTITIONER

## 2024-09-06 PROCEDURE — 85025 COMPLETE CBC W/AUTO DIFF WBC: CPT | Performed by: NURSE PRACTITIONER

## 2024-09-06 PROCEDURE — 80069 RENAL FUNCTION PANEL: CPT | Performed by: NURSE PRACTITIONER

## 2024-09-06 PROCEDURE — 83970 ASSAY OF PARATHORMONE: CPT | Performed by: NURSE PRACTITIONER

## 2024-09-06 RX ORDER — SODIUM BICARBONATE 650 MG/1
650 TABLET ORAL 2 TIMES DAILY
Qty: 180 TABLET | Refills: 3 | Status: SHIPPED | OUTPATIENT
Start: 2024-09-06 | End: 2025-09-06

## 2024-09-06 NOTE — PATIENT INSTRUCTIONS
Recommend starting sodium bicarbonate 650 mg twice a day (baking soda pill)    Follow up with your primary care doctor regarding the gout

## 2024-09-06 NOTE — PROGRESS NOTES
Subjective:       Patient ID: Rachel Asif is a 84 y.o. AA female who presents for f/u  CKD    HPI     Last seen by me March. 2021.    Patient presents for f/u CKD.  Baseline creatinine of 1.3-1.4 since 2015. No labs available from Nov 2019 to August 2019, when patient was found to have sCr of 2.1 mg/dL, which improved to 1.8 --> 1.5. No recent labs.    Per PMR note in July 2020, had been taking meloxicam 15 mg daily daily since June 2019; now off completely.    Home BPs: 120s/80s    Cardiology initially started lasix in December 2019 when patient presented with new peripheral edema, JOHNSON, and chest tightness; lasix improved these symptoms. Lasix increased and added PRN dosing in July; patient says she had increased swelling around this time (also says it was the same time she started amlodipine). LVEF in December 2019 was 60%.    Significant issues include: HLD, atherosclerosis of aorta, anemia of chronic disease, prediabetes, vitamin D deficiency, hyperparathyroidism, GERD, OA, HTN recently diagnosed.      The patient denies NSAID. No recent hospitalizations. On PPI daily.     Significant family hx includes: HTN, edema, lupus (sister), DM, lung cancer, heart disease, MI; no known kidney disease in family    Last renal US: Sept 2020, reviewed. Small kidneys bilaterally.    Update 10/20/21:  Last seen by me in June 2021.  Presents for f/u of CKD.  Baseline sCr difficult to determine. Possibly 1.3-1.4 mg/dL, but most recent sCr level was 1.1 mg/dL.  Home BPs: 130-140s/80s p meds  Denies recent hospitalizations and denies NSAID use.    Update 3/4/22:  Presents for f/u of CKD.  Baseline sCr difficult to determine. Possibly 1.3-1.4 mg/dL.  Home BPs: 130-140/40-50s before and after meds  Denies recent hospitalizations and denies NSAID use.  On PPI PRN.    Update 9/7/22:  Presents for f/u of CKD.  Baseline sCr difficult to determine. Possibly 1.3-1.4 mg/dL. Most recent sCr was 1.5.  Home BPs:  "130-140/80-90s  Denies NSAID use.    Update 1/22/24:  Presents for f/u of CKD.   Had CHRISTOPHER in March 2023 c sCr of 2.3, which never returned to baseline. Labs were not repeated.  sCr was 2.5 in December 2023 and 2.1 earlier this month.    Home BPs: 130-140s/80s    Update 6/3/24:  Presents for f/u of CKD. Presents with granddaughter.  Baseline sCr now 2.1-2.2 mg/dL.  Home BPs: SBP 110s-130s  Many questions about diet.  Injured her ankle with a fall.  Had another fall in early March.  Has been getting dizzy.    Update 9/6/24:  Presents for f/u of CKD. Presents with daughter.  Baseline sCr now 2.1-2.2 mg/dL. Most recent sCr 2.5.   Home BPs: 140-150/80s  Currently with gout flare that has been ongoing about a month. This is her first flare.  Has been taking ibuprofen daily (?) for the last month.  In a walking boot and wheelchair today.    Review of Systems   Respiratory:  Negative for shortness of breath.    Cardiovascular:  Negative for leg swelling.   Gastrointestinal:  Negative for diarrhea, nausea and vomiting.   Genitourinary:  Negative for difficulty urinating, dysuria and hematuria.   Neurological:  Positive for dizziness.       Objective:       Blood pressure (!) 159/78, pulse (!) 57, height 5' 2" (1.575 m), weight 85.3 kg (188 lb 0.8 oz), SpO2 98%.  Physical Exam  Constitutional:       General: She is not in acute distress.     Appearance: Normal appearance. She is well-developed. She is obese. She is not ill-appearing or diaphoretic.   Cardiovascular:      Rate and Rhythm: Bradycardia present.   Pulmonary:      Effort: Pulmonary effort is normal.   Musculoskeletal:      Cervical back: Neck supple.   Skin:     General: Skin is warm and dry.   Neurological:      Mental Status: She is alert and oriented to person, place, and time.   Psychiatric:         Mood and Affect: Mood normal.         Behavior: Behavior normal.         Thought Content: Thought content normal.         Judgment: Judgment normal.           Lab " Results   Component Value Date    CREATININE 2.5 (H) 09/06/2024    URICACID 7.1 (H) 08/27/2024     Prot/Creat Ratio, Urine   Date Value Ref Range Status   09/06/2024 0.10 0.00 - 0.20 Final   05/20/2024 Unable to calculate 0.00 - 0.20 Final   01/11/2024 0.06 0.00 - 0.20 Final     Lab Results   Component Value Date     09/06/2024    K 4.2 09/06/2024    CO2 20 (L) 09/06/2024     09/06/2024     Lab Results   Component Value Date    .1 (H) 09/06/2024    CALCIUM 9.8 09/06/2024    PHOS 4.3 09/06/2024     Lab Results   Component Value Date    HGB 9.5 (L) 09/06/2024    WBC 18.17 (H) 09/06/2024    HCT 33.6 (L) 09/06/2024      Lab Results   Component Value Date    HGBA1C 5.7 (H) 12/04/2023     09/06/2024    BUN 55 (H) 09/06/2024     Lab Results   Component Value Date    LDLCALC 159.8 (H) 12/04/2023         Assessment:       1. Chronic kidney disease, stage 4 (severe)    2. Acidosis    3. Hematuria, unspecified type    4. CHRISTOPHER (acute kidney injury)    5. Prediabetes    6. Hypertension, unspecified type    7. Gout, unspecified cause, unspecified chronicity, unspecified site    8. Hypokalemia    9. Anemia, unspecified type              Plan:   CKD stage 4 c superimposed CHRISTOPHER - CKD clinically secondary to  age-related nephron loss and NSAID use, also possibly atherosclerotic disease (atherosclerosis noted to aorta). sCr progressed after CHRISTOPHER in 2023.     Current CHRISTOPHER. D/c ibuprofen. Repeat in a week.    Educated patient to control BP, BG, remain well-hydrated, and avoid NSAIDs to prevent progression of CKD.     - No paraproteins or monoclonal peaks in Sept 2020.  UPCR Non-proteinuric. On olmesartan.   Acid-base Mild acidosis. Not taking sodium bicarb 650 mg BID. Resume..   Renal osteodystrophy Ca, phos okay. PTH elevated. Vitamin D low. On ergocalciferol weekly.    Anemia Hgb below goal for CKD   DM Pre-diabetic.   Lipid Management On statin.   ESRD planning Anticipatory guidance provided about timing of  dialysis. Start discussions and planning when eGFR is about 20 mL/min; most patients start dialysis between 5-10 mL/min.    Kidney Failure Risk Equation (Tangri)    Kidney Failure Risk at 2 years: 5.4%    Kidney Failure Risk at 5 years: 15.8%    Lab Results   Component Value Date    MICALBCREAT 34.3 (H) 09/06/2024    CREATININE 2.5 (H) 09/06/2024            HTN - High recently (in pain) on amlodipine 5mg, furosemide 20 mg BID, hydralazine 50 mg TID PRN if SBP is over 150-160 (takes about once a month), metoprolol succinate 50 mg, olmesartan-HCTZ 40-25  - If more control is needed, recommend switching hctz to chlorthalidone  - Avoiding overcontrol of BP, especially due to pt's advanced age    Hypokalemia - Okay on KCl 20 meq daily    Gout - Recommend f/u c PCP    Hematuria - needs repeat UA and BMP    All questions patient had were answered.  Asked if further questions. None. F/u in clinic in 3 mos with labs and urine prior to next visit or sooner if needed.  ER for emergency concerns.    Summary of Plan:  Resume sodium bicarb 650 mg BID  No changes to blood pressure medication  F/u c PCP re: gout  Add on iron, B12, folate to today's labs  Needs repeat UA, BMP. May need ultrasound, spin urine  RTC in 3 mos    Visit today included increased complexity associated with the care of the episodic problem CHRISTOPHER addressed and managing the longitudinal care of the patient due to the serious and/or complex managed problem(s) in 3 mos.

## 2024-09-06 NOTE — Clinical Note
Good afternoon, Pls notify pt she had a lot of blood in her urine. I would like to repeat BMP and UA on Thursday or Friday of next week. Thanks, Kamini

## 2024-09-07 LAB — BACTERIA SPEC AEROBE CULT: NO GROWTH

## 2024-09-09 DIAGNOSIS — M54.41 CHRONIC MIDLINE LOW BACK PAIN WITH RIGHT-SIDED SCIATICA: ICD-10-CM

## 2024-09-09 DIAGNOSIS — G89.29 CHRONIC MIDLINE LOW BACK PAIN WITH RIGHT-SIDED SCIATICA: ICD-10-CM

## 2024-09-09 DIAGNOSIS — G89.29 CHRONIC NECK PAIN: ICD-10-CM

## 2024-09-09 DIAGNOSIS — M54.2 CHRONIC NECK PAIN: ICD-10-CM

## 2024-09-09 RX ORDER — TRAMADOL HYDROCHLORIDE 50 MG/1
50 TABLET ORAL 3 TIMES DAILY PRN
Qty: 90 TABLET | Refills: 0 | Status: SHIPPED | OUTPATIENT
Start: 2024-09-09

## 2024-09-09 NOTE — TELEPHONE ENCOUNTER
----- Message from Oneyda Pace MD sent at 9/6/2024  5:56 PM CDT -----  Regarding: FW: Refill Request  Please handle this message and convert to refill request. Thanks!  ----- Message -----  From: Chet Sommers MA  Sent: 9/6/2024   3:31 PM CDT  To: Oneyda Pace MD  Subject: Refill Request                                   Good evening, pt seen CLARISSA Pressley on 9/6/2024 and requested refill for her pain medication, Tramadol.

## 2024-09-09 NOTE — TELEPHONE ENCOUNTER
Care Due:                  Date            Visit Type   Department     Provider  --------------------------------------------------------------------------------                                EP [facility] FAMILY                              PRIMARY      MED/ INTERNAL  Catarino Erazo  Last Visit: 07-      CARE ([facility])   MED/ PEDS      Hemal Pace  Next Visit: None Scheduled  None         None Found                                                            Last  Test          Frequency    Reason                     Performed    Due Date  --------------------------------------------------------------------------------    Lipid Panel.  12 months..  atorvastatin.............  12- 11-    [facility] Embedded Care Due Messages. Reference number: [id number].   9/09/2024 8:41:23 AM CDT

## 2024-09-10 ENCOUNTER — TELEPHONE (OUTPATIENT)
Dept: FAMILY MEDICINE | Facility: CLINIC | Age: 84
End: 2024-09-10
Payer: MEDICARE

## 2024-09-10 NOTE — TELEPHONE ENCOUNTER
Spoke with patient's daughter. Daughter requesting an appointment per gout. Daughter was advised to complete an evisit. Daughter verbalized understanding and does not have any other questions or concerns at this time.

## 2024-09-10 NOTE — TELEPHONE ENCOUNTER
----- Message from Kelly Carey sent at 9/10/2024 11:12 AM CDT -----  Regarding: missed call  Name of caller:howardsha       What is the requesting detail: returning a call. Please advise       Can the clinic reply by [facility]:       What number to call back:[phone]

## 2024-09-25 ENCOUNTER — TELEPHONE (OUTPATIENT)
Dept: FAMILY MEDICINE | Facility: CLINIC | Age: 84
End: 2024-09-25
Payer: MEDICARE

## 2024-09-25 NOTE — TELEPHONE ENCOUNTER
Spoke with grand-daughter. Daughter states that Leyla did not receive the script sent on 09/06/2024.   Spoke with [facility] pharmacist, May,. Pharmacist states that both medication was stored in the system. Pharmacist both will be processed now.     Spoke with grand-daughter. Grand daughter verbalized understanding and does not have any other questions or concerns at this time.

## 2024-09-25 NOTE — TELEPHONE ENCOUNTER
----- Message from Delilah Ma sent at 9/25/2024  2:20 PM CDT -----  Regarding: Refill Request  Type: RX Refill Request      Who Called: Self       Refill or New Rx: refill       RX Name and Strength:  Disp Refills Start End [id number]  traMADoL (ULTRAM) 50 mg tablet         And sodium       Preferred Pharmacy with phone number:  [id number] [address]        Would the patient rather a call back or a response via [facility]?       Best Call Back Number: [phone]

## 2024-09-26 ENCOUNTER — OFFICE VISIT (OUTPATIENT)
Dept: FAMILY MEDICINE | Facility: CLINIC | Age: 84
End: 2024-09-26
Payer: MEDICARE

## 2024-09-26 VITALS
OXYGEN SATURATION: 99 % | BODY MASS INDEX: 32.37 KG/M2 | TEMPERATURE: 99 F | HEIGHT: 62 IN | HEART RATE: 88 BPM | SYSTOLIC BLOOD PRESSURE: 134 MMHG | DIASTOLIC BLOOD PRESSURE: 72 MMHG | WEIGHT: 175.94 LBS

## 2024-09-26 DIAGNOSIS — R04.0 EPISTAXIS: Primary | ICD-10-CM

## 2024-09-26 DIAGNOSIS — M54.2 CHRONIC NECK PAIN: ICD-10-CM

## 2024-09-26 DIAGNOSIS — E87.20 ACIDOSIS: ICD-10-CM

## 2024-09-26 DIAGNOSIS — M10.9 GOUT, UNSPECIFIED CAUSE, UNSPECIFIED CHRONICITY, UNSPECIFIED SITE: ICD-10-CM

## 2024-09-26 DIAGNOSIS — M54.41 CHRONIC MIDLINE LOW BACK PAIN WITH RIGHT-SIDED SCIATICA: ICD-10-CM

## 2024-09-26 DIAGNOSIS — G89.29 CHRONIC MIDLINE LOW BACK PAIN WITH RIGHT-SIDED SCIATICA: ICD-10-CM

## 2024-09-26 DIAGNOSIS — G89.29 CHRONIC NECK PAIN: ICD-10-CM

## 2024-09-26 PROCEDURE — 1100F PTFALLS ASSESS-DOCD GE2>/YR: CPT | Mod: CPTII,S$GLB,, | Performed by: FAMILY MEDICINE

## 2024-09-26 PROCEDURE — 3288F FALL RISK ASSESSMENT DOCD: CPT | Mod: CPTII,S$GLB,, | Performed by: FAMILY MEDICINE

## 2024-09-26 PROCEDURE — 1159F MED LIST DOCD IN RCRD: CPT | Mod: CPTII,S$GLB,, | Performed by: FAMILY MEDICINE

## 2024-09-26 PROCEDURE — 99999 PR PBB SHADOW E&M-EST. PATIENT-LVL III: CPT | Mod: PBBFAC,,, | Performed by: FAMILY MEDICINE

## 2024-09-26 PROCEDURE — 1126F AMNT PAIN NOTED NONE PRSNT: CPT | Mod: CPTII,S$GLB,, | Performed by: FAMILY MEDICINE

## 2024-09-26 PROCEDURE — 99214 OFFICE O/P EST MOD 30 MIN: CPT | Mod: S$GLB,,, | Performed by: FAMILY MEDICINE

## 2024-09-26 RX ORDER — COLCHICINE 0.6 MG/1
TABLET ORAL
Qty: 30 TABLET | Refills: 11 | Status: SHIPPED | OUTPATIENT
Start: 2024-09-26

## 2024-09-26 RX ORDER — SODIUM BICARBONATE 650 MG/1
650 TABLET ORAL 2 TIMES DAILY
Qty: 180 TABLET | Refills: 3 | Status: SHIPPED | OUTPATIENT
Start: 2024-09-26 | End: 2025-09-26

## 2024-09-26 RX ORDER — TRAMADOL HYDROCHLORIDE 50 MG/1
50 TABLET ORAL 3 TIMES DAILY PRN
Qty: 21 TABLET | Refills: 0 | Status: SHIPPED | OUTPATIENT
Start: 2024-09-26

## 2024-09-26 NOTE — PROGRESS NOTES
[facility]  Progress Note    SUBJECTIVE:     Chief Complaint   Patient presents with    Epistaxis     2 days     Foot Swelling       HPI   Rachel Asif  is a 84 y.o. female here for 2 issues.  Epistaxis which has been going on for the past 2 days on/off. Not using any nasal sprays. Onset is sudden. Is taking ASA daily.  Had a recent gout attack which she was given steroid dose pack. Has resolved since. No joint pains today.    Review of patient's allergies indicates:   Allergen Reactions    No known allergies        Past Medical History:   Diagnosis Date    Amblyopia     os    Anemia of other chronic disease     Atherosclerosis of aorta     noted on L-spine X-ray 4/14/2011    Atherosclerosis of aortic arch     - noted on CXR 5/2017    Chronic low back pain     followed by orthopaedics    DJD (degenerative joint disease)     Glaucoma     History of colonic polyps     History of vitamin D deficiency     Hyperlipidemia     Hypertension     Lumbar radiculopathy     Obesity     Obesity     OH (ocular hypertension)     Osteoarthritis     Osteoporosis, post-menopausal     currently on a drug holiday    PCO (posterior capsular opacification), right 2/27/2020    Postmenopausal status     Prediabetes     Secondary hyperparathyroidism of renal origin     Trochanteric bursitis      Past Surgical History:   Procedure Laterality Date    CATARACT EXTRACTION W/  INTRAOCULAR LENS IMPLANT Bilateral     COLONOSCOPY N/A 8/10/2020    Procedure: COLONOSCOPY;  Surgeon: Bettina Gates MD;  Location: [facility];  Service: Endoscopy;  Laterality: N/A;     Family History   Problem Relation Name Age of Onset    Cataracts Mother      Hypertension Mother      Other Mother          complications from splenectomy after fall    Glaucoma Mother      Cataracts Father      Hypertension Father      Glaucoma Father      Hypertension Sister Alison     Edema Sister Alison     Hypertension Sister Maggy Dueñas     Other Sister Maggy Dueñas          shingles    Lupus Sister Yeimy     Lung cancer Sister Yeimy     Hypertension Sister Yeimy     Other Sister Rocío         brain damage from MVA    Hypertension Sister Rocío     Hypertension Brother Neri Bazzi     Diabetes Brother Neri Jluis     Heart disease Brother Neri Jluis     Hypertension Brother Felipe Jluis     Heart disease Brother Felipe Bazzi     Hypertension Brother Derrell     Heart attack Brother Jv     Hypertension Brother Jv     Hypertension Brother Adam     No Known Problems Brother Condvani     No Known Problems Brother Kimber     Hypertension Brother Andrew     Heart disease Brother Andrew     Glaucoma Maternal Grandmother      No Known Problems Maternal Grandfather      No Known Problems Paternal Grandmother      No Known Problems Paternal Grandfather      No Known Problems Maternal Aunt      No Known Problems Maternal Uncle      No Known Problems Paternal Aunt      No Known Problems Paternal Uncle      Cancer Neg Hx      Amblyopia Neg Hx      Blindness Neg Hx      Retinal detachment Neg Hx      Strabismus Neg Hx      Stroke Neg Hx       Social History     Tobacco Use    Smoking status: Never    Smokeless tobacco: Never   Substance Use Topics    Alcohol use: No    Drug use: Never        Review of Systems   Constitutional:  Negative for chills and fever.   HENT:  Positive for nosebleeds. Negative for sore throat.    Respiratory: Negative.  Negative for shortness of breath.    Cardiovascular: Negative.  Negative for chest pain.   Gastrointestinal: Negative.  Negative for abdominal pain, nausea and vomiting.   Genitourinary: Negative.    Neurological:  Negative for headaches.   All other systems reviewed and are negative.    OBJECTIVE:     Vitals:    09/26/24 1307   BP: 134/72   Pulse: 88   Temp: 98.5 °F (36.9 °C)     Body mass index is 32.18 kg/m².    Physical Exam  Constitutional:       General: She is not in acute distress.     Appearance: Normal  appearance. She is not diaphoretic.   HENT:      Head: Normocephalic and atraumatic.      Nose: Nose normal.      Comments: No active bleeding. No friable lesions.  Eyes:      Conjunctiva/sclera: Conjunctivae normal.   Pulmonary:      Effort: Pulmonary effort is normal.   Neurological:      Mental Status: She is alert and oriented to person, place, and time.         Old records were reviewed. Labs and/or images were independently reviewed.    ASSESSMENT     1. Epistaxis    2. Chronic midline low back pain with right-sided sciatica    3. Chronic neck pain    4. Acidosis    5. Gout, unspecified cause, unspecified chronicity, unspecified site        PLAN:     Epistaxis   -      hold ASA for a week to see if it improves. If persistent, will refer to ENT.    Chronic midline low back pain with right-sided sciatica  -     traMADoL (ULTRAM) 50 mg tablet; Take 1 tablet (50 mg total) by mouth 3 (three) times daily as needed for Pain.  Dispense: 21 tablet; Refill: 0  -     gave small temp supply. Advised to f/u with pain management     Chronic neck pain  -     traMADoL (ULTRAM) 50 mg tablet; Take 1 tablet (50 mg total) by mouth 3 (three) times daily as needed for Pain.  Dispense: 21 tablet; Refill: 0    Acidosis  -     sodium bicarbonate 650 MG tablet; Take 1 tablet (650 mg total) by mouth 2 (two) times daily.  Dispense: 180 tablet; Refill: 3  -     Stable. Continue current regimen.    Gout, unspecified cause, unspecified chronicity, unspecified site  -     colchicine (COLCRYS) 0.6 mg tablet; Take 1/2 tab PO QD prn gout attacks.  Dispense: 30 tablet; Refill: 11  -     use as directed. Advised to stay away from purine rich foods.      RTC PRN    Brandon Trevino MD  09/26/2024 1:23 PM

## 2024-11-10 DIAGNOSIS — I12.9 BENIGN HYPERTENSION WITH CHRONIC KIDNEY DISEASE, STAGE IV: ICD-10-CM

## 2024-11-10 DIAGNOSIS — E78.5 HYPERLIPIDEMIA WITH TARGET LDL LESS THAN 100: ICD-10-CM

## 2024-11-10 DIAGNOSIS — N18.4 BENIGN HYPERTENSION WITH CHRONIC KIDNEY DISEASE, STAGE IV: ICD-10-CM

## 2024-11-10 NOTE — TELEPHONE ENCOUNTER
Care Due:                  Date            Visit Type   Department     Provider  --------------------------------------------------------------------------------                                EP [facility] FAM[facility]                              PRIMARY      MED/ INTERNAL  Last Visit: 09-      CARE ([facility])   MED/ PEDS      JOSI SIMPSON  Next Visit: None Scheduled  None         None Found                                                            Last  Test          Frequency    Reason                     Performed    Due Date  --------------------------------------------------------------------------------    CMP.........  12 months..  atorvastatin.............  12- 12-    Lipid Panel.  12 months..  atorvastatin.............  12- 11-    [employer] Embedded Care Due Messages. Reference number: [id number].   11/10/2024 8:04:31 AM CST

## 2024-11-11 RX ORDER — ATORVASTATIN CALCIUM 20 MG/1
20 TABLET, FILM COATED ORAL
Qty: 90 TABLET | Refills: 0 | Status: SHIPPED | OUTPATIENT
Start: 2024-11-11

## 2024-11-11 RX ORDER — POTASSIUM CHLORIDE 20 MEQ/1
20 TABLET, EXTENDED RELEASE ORAL
Qty: 90 TABLET | Refills: 2 | Status: SHIPPED | OUTPATIENT
Start: 2024-11-11

## 2024-11-11 RX ORDER — FUROSEMIDE 20 MG/1
20 TABLET ORAL 2 TIMES DAILY
Qty: 180 TABLET | Refills: 2 | Status: SHIPPED | OUTPATIENT
Start: 2024-11-11

## 2024-11-11 RX ORDER — OLMESARTAN MEDOXOMIL AND HYDROCHLOROTHIAZIDE 40/25 40; 25 MG/1; MG/1
1 TABLET ORAL
Qty: 90 TABLET | Refills: 0 | Status: SHIPPED | OUTPATIENT
Start: 2024-11-11

## 2024-11-11 NOTE — TELEPHONE ENCOUNTER
Refill Routing Note   Medication(s) are not appropriate for processing by [facility] for the following reason(s):        Required labs abnormal  ED/Hospital Visit since last OV with provider:     [facility] action(s):  Approve  Defer    Medication Therapy Plan: ATORVASTATIN = Reviewed acute care/admission visit notes; No follow up with PCP recommended by acute care provider; Approved per protocol    Pharmacist review requested: Yes   Extended chart review required: Yes     Appointments  past 12m or future 3m with PCP    Date Provider   Last Visit   7/29/2024 Oneyda Pace MD   Next Visit   Visit date not found Oneyda Pace MD   ED visits in past 90 days: 1        Note composed:9:42 AM 11/11/2024

## 2024-11-11 NOTE — TELEPHONE ENCOUNTER
Refill Routing Note   Medication(s) are not appropriate for processing by [facility] for the following reason(s):        Required labs abnormal  ED/Hospital Visit since last OV with provider    [facility] action(s):  Approve  Defer     Requires labs : Yes      Medication Therapy Plan: atorvastatin, potassium and furosemide- renal fxn labs reviewed; Olmesartan recommended dose is 20 mg daily max if CrCl is less than 40;    Pharmacist review requested: Yes   Extended chart review required: Yes     Appointments  past 12m or future 3m with PCP    Date Provider   Last Visit   7/29/2024 Oneyda Pace MD   Next Visit   Visit date not found Oneyda Pace MD   ED visits in past 90 days: 1        Note composed:10:20 AM 11/11/2024

## 2024-11-21 ENCOUNTER — PATIENT MESSAGE (OUTPATIENT)
Dept: FAMILY MEDICINE | Facility: CLINIC | Age: 84
End: 2024-11-21
Payer: MEDICAID

## 2024-11-29 ENCOUNTER — LAB VISIT (OUTPATIENT)
Dept: LAB | Facility: HOSPITAL | Age: 84
End: 2024-11-29
Attending: NURSE PRACTITIONER
Payer: MEDICARE

## 2024-11-29 DIAGNOSIS — N18.4 CHRONIC KIDNEY DISEASE, STAGE 4 (SEVERE): ICD-10-CM

## 2024-11-29 LAB
25(OH)D3+25(OH)D2 SERPL-MCNC: 23 NG/ML (ref 30–96)
ALBUMIN SERPL BCP-MCNC: 3.3 G/DL (ref 3.5–5.2)
ANION GAP SERPL CALC-SCNC: 11 MMOL/L (ref 8–16)
BASOPHILS # BLD AUTO: 0.05 K/UL (ref 0–0.2)
BASOPHILS NFR BLD: 0.6 % (ref 0–1.9)
BUN SERPL-MCNC: 13 MG/DL (ref 8–23)
CALCIUM SERPL-MCNC: 9.4 MG/DL (ref 8.7–10.5)
CHLORIDE SERPL-SCNC: 109 MMOL/L (ref 95–110)
CO2 SERPL-SCNC: 26 MMOL/L (ref 23–29)
CREAT SERPL-MCNC: 1.3 MG/DL (ref 0.5–1.4)
DIFFERENTIAL METHOD BLD: ABNORMAL
EOSINOPHIL # BLD AUTO: 0.2 K/UL (ref 0–0.5)
EOSINOPHIL NFR BLD: 1.9 % (ref 0–8)
ERYTHROCYTE [DISTWIDTH] IN BLOOD BY AUTOMATED COUNT: 15.7 % (ref 11.5–14.5)
EST. GFR  (NO RACE VARIABLE): 40 ML/MIN/1.73 M^2
GLUCOSE SERPL-MCNC: 105 MG/DL (ref 70–110)
HCT VFR BLD AUTO: 33 % (ref 37–48.5)
HGB BLD-MCNC: 9.8 G/DL (ref 12–16)
IMM GRANULOCYTES # BLD AUTO: 0.03 K/UL (ref 0–0.04)
IMM GRANULOCYTES NFR BLD AUTO: 0.4 % (ref 0–0.5)
LYMPHOCYTES # BLD AUTO: 1.5 K/UL (ref 1–4.8)
LYMPHOCYTES NFR BLD: 18.1 % (ref 18–48)
MCH RBC QN AUTO: 28 PG (ref 27–31)
MCHC RBC AUTO-ENTMCNC: 29.7 G/DL (ref 32–36)
MCV RBC AUTO: 94 FL (ref 82–98)
MONOCYTES # BLD AUTO: 0.9 K/UL (ref 0.3–1)
MONOCYTES NFR BLD: 10.4 % (ref 4–15)
NEUTROPHILS # BLD AUTO: 5.8 K/UL (ref 1.8–7.7)
NEUTROPHILS NFR BLD: 68.6 % (ref 38–73)
NRBC BLD-RTO: 0 /100 WBC
PHOSPHATE SERPL-MCNC: 3.2 MG/DL (ref 2.7–4.5)
PLATELET # BLD AUTO: 343 K/UL (ref 150–450)
PMV BLD AUTO: 10.3 FL (ref 9.2–12.9)
POTASSIUM SERPL-SCNC: 3.4 MMOL/L (ref 3.5–5.1)
PTH-INTACT SERPL-MCNC: 282.3 PG/ML (ref 9–77)
RBC # BLD AUTO: 3.5 M/UL (ref 4–5.4)
SODIUM SERPL-SCNC: 146 MMOL/L (ref 136–145)
WBC # BLD AUTO: 8.46 K/UL (ref 3.9–12.7)

## 2024-11-29 PROCEDURE — 36415 COLL VENOUS BLD VENIPUNCTURE: CPT | Mod: PO | Performed by: NURSE PRACTITIONER

## 2024-11-29 PROCEDURE — 80069 RENAL FUNCTION PANEL: CPT | Performed by: NURSE PRACTITIONER

## 2024-11-29 PROCEDURE — 82306 VITAMIN D 25 HYDROXY: CPT | Performed by: NURSE PRACTITIONER

## 2024-11-29 PROCEDURE — 83970 ASSAY OF PARATHORMONE: CPT | Performed by: NURSE PRACTITIONER

## 2024-11-29 PROCEDURE — 85025 COMPLETE CBC W/AUTO DIFF WBC: CPT | Performed by: NURSE PRACTITIONER

## 2024-12-02 DIAGNOSIS — G89.29 CHRONIC NECK PAIN: ICD-10-CM

## 2024-12-02 DIAGNOSIS — G89.29 CHRONIC MIDLINE LOW BACK PAIN WITH RIGHT-SIDED SCIATICA: ICD-10-CM

## 2024-12-02 DIAGNOSIS — M54.41 CHRONIC MIDLINE LOW BACK PAIN WITH RIGHT-SIDED SCIATICA: ICD-10-CM

## 2024-12-02 DIAGNOSIS — M54.2 CHRONIC NECK PAIN: ICD-10-CM

## 2024-12-02 RX ORDER — TRAMADOL HYDROCHLORIDE 50 MG/1
50 TABLET ORAL 3 TIMES DAILY PRN
Qty: 90 TABLET | Refills: 0 | Status: SHIPPED | OUTPATIENT
Start: 2024-12-02

## 2024-12-02 NOTE — TELEPHONE ENCOUNTER
No care due was identified.  [facility] Embedded Care Due Messages. Reference number: [id number].   12/02/2024 10:08:24 AM CST

## 2024-12-06 ENCOUNTER — OFFICE VISIT (OUTPATIENT)
Dept: NEPHROLOGY | Facility: CLINIC | Age: 84
End: 2024-12-06
Payer: MEDICARE

## 2024-12-06 VITALS
OXYGEN SATURATION: 99 % | WEIGHT: 180.31 LBS | BODY MASS INDEX: 33.18 KG/M2 | SYSTOLIC BLOOD PRESSURE: 137 MMHG | HEIGHT: 62 IN | HEART RATE: 80 BPM | DIASTOLIC BLOOD PRESSURE: 83 MMHG

## 2024-12-06 DIAGNOSIS — M10.9 GOUT, UNSPECIFIED CAUSE, UNSPECIFIED CHRONICITY, UNSPECIFIED SITE: ICD-10-CM

## 2024-12-06 DIAGNOSIS — R31.9 HEMATURIA, UNSPECIFIED TYPE: ICD-10-CM

## 2024-12-06 DIAGNOSIS — E87.20 ACIDOSIS: ICD-10-CM

## 2024-12-06 DIAGNOSIS — N18.4 CHRONIC KIDNEY DISEASE, STAGE 4 (SEVERE): ICD-10-CM

## 2024-12-06 DIAGNOSIS — D64.9 ANEMIA, UNSPECIFIED TYPE: Primary | ICD-10-CM

## 2024-12-06 DIAGNOSIS — I10 HYPERTENSION, UNSPECIFIED TYPE: ICD-10-CM

## 2024-12-06 DIAGNOSIS — E87.6 HYPOKALEMIA: ICD-10-CM

## 2024-12-06 DIAGNOSIS — R73.03 PREDIABETES: ICD-10-CM

## 2024-12-06 PROCEDURE — 99999 PR PBB SHADOW E&M-EST. PATIENT-LVL IV: CPT | Mod: PBBFAC,,, | Performed by: NURSE PRACTITIONER

## 2024-12-06 NOTE — PROGRESS NOTES
Subjective:       Patient ID: Rachel Asif is a 84 y.o. AA female who presents for f/u  CKD    HPI     Last seen by me March. 2021.    Patient presents for f/u CKD.  Baseline creatinine of 1.3-1.4 since 2015. No labs available from Nov 2019 to August 2019, when patient was found to have sCr of 2.1 mg/dL, which improved to 1.8 --> 1.5. No recent labs.    Per PMR note in July 2020, had been taking meloxicam 15 mg daily daily since June 2019; now off completely.    Home BPs: 120s/80s    Cardiology initially started lasix in December 2019 when patient presented with new peripheral edema, JOHNSON, and chest tightness; lasix improved these symptoms. Lasix increased and added PRN dosing in July; patient says she had increased swelling around this time (also says it was the same time she started amlodipine). LVEF in December 2019 was 60%.    Significant issues include: HLD, atherosclerosis of aorta, anemia of chronic disease, prediabetes, vitamin D deficiency, hyperparathyroidism, GERD, OA, HTN recently diagnosed.      The patient denies NSAID. No recent hospitalizations. On PPI daily.     Significant family hx includes: HTN, edema, lupus (sister), DM, lung cancer, heart disease, MI; no known kidney disease in family    Last renal US: Sept 2020, reviewed. Small kidneys bilaterally.    Update 10/20/21:  Last seen by me in June 2021.  Presents for f/u of CKD.  Baseline sCr difficult to determine. Possibly 1.3-1.4 mg/dL, but most recent sCr level was 1.1 mg/dL.  Home BPs: 130-140s/80s p meds  Denies recent hospitalizations and denies NSAID use.    Update 3/4/22:  Presents for f/u of CKD.  Baseline sCr difficult to determine. Possibly 1.3-1.4 mg/dL.  Home BPs: 130-140/40-50s before and after meds  Denies recent hospitalizations and denies NSAID use.  On PPI PRN.    Update 9/7/22:  Presents for f/u of CKD.  Baseline sCr difficult to determine. Possibly 1.3-1.4 mg/dL. Most recent sCr was 1.5.  Home BPs:  "130-140/80-90s  Denies NSAID use.    Update 1/22/24:  Presents for f/u of CKD.   Had CHRISTOPHER in March 2023 c sCr of 2.3, which never returned to baseline. Labs were not repeated.  sCr was 2.5 in December 2023 and 2.1 earlier this month.    Home BPs: 130-140s/80s    Update 6/3/24:  Presents for f/u of CKD. Presents with granddaughter.  Baseline sCr now 2.1-2.2 mg/dL.  Home BPs: SBP 110s-130s  Many questions about diet.  Injured her ankle with a fall.  Had another fall in early March.  Has been getting dizzy.    Update 9/6/24:  Presents for f/u of CKD. Presents with daughter.  Baseline sCr now 2.1-2.2 mg/dL. Most recent sCr 2.5.   Home BPs: 140-150/80s  Currently with gout flare that has been ongoing about a month. This is her first flare.  Has been taking ibuprofen daily (?) for the last month.  In a walking boot and wheelchair today.    Update 12/6/24:  Presents for f/u of CKD. Presents with grandson.  sCr 1.3 mg/dL.  Home BPs: 130-140/60-80s  Having good days and bad days; sometimes gout impairs walking. Colchicine helping but still present.    Review of Systems   HENT:  Negative for nosebleeds.    Respiratory:  Negative for shortness of breath.    Cardiovascular:  Positive for leg swelling (attributes to gout).   Gastrointestinal:  Negative for diarrhea, nausea and vomiting.   Genitourinary:  Negative for difficulty urinating, dysuria and hematuria.   Musculoskeletal:  Positive for arthralgias (leg pain attributes to gout).   Neurological:  Negative for dizziness.       Objective:       Blood pressure 137/83, pulse 80, height 5' 2" (1.575 m), weight 81.8 kg (180 lb 5.4 oz), SpO2 99%.  Physical Exam  Constitutional:       General: She is not in acute distress.     Appearance: Normal appearance. She is well-developed. She is obese. She is not ill-appearing or diaphoretic.   Cardiovascular:      Rate and Rhythm: Bradycardia present.   Pulmonary:      Effort: Pulmonary effort is normal.   Musculoskeletal:      Cervical " back: Neck supple.   Skin:     General: Skin is warm and dry.   Neurological:      Mental Status: She is alert and oriented to person, place, and time.   Psychiatric:         Mood and Affect: Mood normal.         Behavior: Behavior normal.         Thought Content: Thought content normal.         Judgment: Judgment normal.           Lab Results   Component Value Date    CREATININE 1.3 11/29/2024    URICACID 7.1 (H) 08/27/2024     Prot/Creat Ratio, Urine   Date Value Ref Range Status   11/29/2024 Unable to calculate 0.00 - 0.20 Final   09/06/2024 0.10 0.00 - 0.20 Final   05/20/2024 Unable to calculate 0.00 - 0.20 Final     Lab Results   Component Value Date     (H) 11/29/2024    K 3.4 (L) 11/29/2024    CO2 26 11/29/2024     11/29/2024     Lab Results   Component Value Date    .3 (H) 11/29/2024    CALCIUM 9.4 11/29/2024    PHOS 3.2 11/29/2024     Lab Results   Component Value Date    HGB 9.8 (L) 11/29/2024    WBC 8.46 11/29/2024    HCT 33.0 (L) 11/29/2024      Lab Results   Component Value Date    HGBA1C 5.7 (H) 12/04/2023     11/29/2024    BUN 13 11/29/2024     Lab Results   Component Value Date    LDLCALC 159.8 (H) 12/04/2023         Assessment:       1. Anemia, unspecified type    2. Chronic kidney disease, stage 4 (severe)    3. Acidosis    4. Hematuria, unspecified type    5. Prediabetes    6. Hypertension, unspecified type    7. Gout, unspecified cause, unspecified chronicity, unspecified site    8. Hypokalemia                Plan:   CKD stage 4 c superimposed CHRISTOPHER - CKD clinically secondary to  age-related nephron loss and NSAID use, also possibly atherosclerotic disease (atherosclerosis noted to aorta). sCr progressed after CHRISTOPHER in 2023.     Current CHRISTOPHER. D/c ibuprofen. Repeat in a week.    Educated patient to control BP, BG, remain well-hydrated, and avoid NSAIDs to prevent progression of CKD.     - No paraproteins or monoclonal peaks in Sept 2020.  UPCR Non-proteinuric. On olmesartan.    Acid-base WNL. On sodium bicarb 650 mg BID.    Renal osteodystrophy Ca, phos okay. PTH elevated. Vitamin D low. On ergocalciferol weekly and vitamin D 3 2,000 daily.   Anemia Hgb below goal for CKD   DM Pre-diabetic.   Lipid Management On statin.   ESRD planning Anticipatory guidance provided about timing of dialysis. Start discussions and planning when eGFR is about 20 mL/min; most patients start dialysis between 5-10 mL/min.    Kidney Failure Risk Equation (Tangri)    Kidney Failure Risk at 2 years: 0.3%    Kidney Failure Risk at 5 years: 0.8%    Lab Results   Component Value Date    MICALBCREAT 9.1 11/29/2024    CREATININE 1.3 11/29/2024            HTN -WNL on amlodipine 5mg, furosemide 20 mg BID, hydralazine 50 mg TID PRN if SBP is over 150-160 (takes about once a month), metoprolol succinate 50 mg, olmesartan-HCTZ 40-25  - If more control is needed, recommend switching hctz to chlorthalidone  - Avoiding overcontrol of BP, especially due to pt's advanced age    Hypokalemia - Low on KCl 20 meq daily; repeat today    Gout - Appears to be atypical presentation, but she had urate crystals on right ankle aspiration. Per PCP/ortho. On colchicine.     Hematuria - Negative on recent UA    Hypernatremia - repeat    All questions patient had were answered.  Asked if further questions. None. F/u in clinic in 3 mos with labs and urine prior to next visit or sooner if needed.  ER for emergency concerns.    Summary of Plan:  Iron, ferritin, TSAT, BMP  Message PCP or ortho re: gout  3. avoid NSAID/ bactrim/ IV contrast/ gadolinium/ aminoglycoside where possible  4. RTC in 3 mos      Visit today included increased complexity associated with the care of the episodic problem CHRISTOPHER addressed and managing the longitudinal care of the patient due to the serious and/or complex managed problem(s) in 3 mos.

## 2024-12-06 NOTE — Clinical Note
Hello, Haugen pt. She is concerned that her gout is persisting even on colchicine. She says sometimes it impairs her ability to work. Her grandson says she is not compliant with a low purine diet though. I've asked her to f/u with on of y'all for it. She asked multiple times about getting another injection. Thanks, Kamini

## 2024-12-09 ENCOUNTER — LAB VISIT (OUTPATIENT)
Dept: LAB | Facility: HOSPITAL | Age: 84
End: 2024-12-09
Attending: NURSE PRACTITIONER
Payer: MEDICARE

## 2024-12-09 DIAGNOSIS — D64.9 ANEMIA, UNSPECIFIED TYPE: ICD-10-CM

## 2024-12-09 DIAGNOSIS — R31.9 HEMATURIA, UNSPECIFIED TYPE: ICD-10-CM

## 2024-12-09 DIAGNOSIS — N18.4 CHRONIC KIDNEY DISEASE, STAGE 4 (SEVERE): ICD-10-CM

## 2024-12-09 LAB
ANION GAP SERPL CALC-SCNC: 13 MMOL/L (ref 8–16)
ANION GAP SERPL CALC-SCNC: 13 MMOL/L (ref 8–16)
BUN SERPL-MCNC: 26 MG/DL (ref 8–23)
BUN SERPL-MCNC: 26 MG/DL (ref 8–23)
CALCIUM SERPL-MCNC: 8.5 MG/DL (ref 8.7–10.5)
CALCIUM SERPL-MCNC: 8.5 MG/DL (ref 8.7–10.5)
CHLORIDE SERPL-SCNC: 105 MMOL/L (ref 95–110)
CHLORIDE SERPL-SCNC: 105 MMOL/L (ref 95–110)
CO2 SERPL-SCNC: 25 MMOL/L (ref 23–29)
CO2 SERPL-SCNC: 25 MMOL/L (ref 23–29)
CREAT SERPL-MCNC: 1.9 MG/DL (ref 0.5–1.4)
CREAT SERPL-MCNC: 1.9 MG/DL (ref 0.5–1.4)
EST. GFR  (NO RACE VARIABLE): 25.4 ML/MIN/1.73 M^2
EST. GFR  (NO RACE VARIABLE): 25.4 ML/MIN/1.73 M^2
FERRITIN SERPL-MCNC: 28 NG/ML (ref 20–300)
GLUCOSE SERPL-MCNC: 108 MG/DL (ref 70–110)
GLUCOSE SERPL-MCNC: 108 MG/DL (ref 70–110)
IRON SERPL-MCNC: 47 UG/DL (ref 30–160)
POTASSIUM SERPL-SCNC: 3.9 MMOL/L (ref 3.5–5.1)
POTASSIUM SERPL-SCNC: 3.9 MMOL/L (ref 3.5–5.1)
SATURATED IRON: 13 % (ref 20–50)
SODIUM SERPL-SCNC: 143 MMOL/L (ref 136–145)
SODIUM SERPL-SCNC: 143 MMOL/L (ref 136–145)
TOTAL IRON BINDING CAPACITY: 358 UG/DL (ref 250–450)
TRANSFERRIN SERPL-MCNC: 242 MG/DL (ref 200–375)

## 2024-12-09 PROCEDURE — 84466 ASSAY OF TRANSFERRIN: CPT | Performed by: NURSE PRACTITIONER

## 2024-12-09 PROCEDURE — 36415 COLL VENOUS BLD VENIPUNCTURE: CPT | Mod: PO | Performed by: NURSE PRACTITIONER

## 2024-12-09 PROCEDURE — 80048 BASIC METABOLIC PNL TOTAL CA: CPT | Performed by: NURSE PRACTITIONER

## 2024-12-09 PROCEDURE — 82728 ASSAY OF FERRITIN: CPT | Performed by: NURSE PRACTITIONER

## 2024-12-20 ENCOUNTER — TELEPHONE (OUTPATIENT)
Dept: NEPHROLOGY | Facility: CLINIC | Age: 84
End: 2024-12-20
Payer: MEDICAID

## 2024-12-20 NOTE — TELEPHONE ENCOUNTER
Called patient to discuss labwork. Kidney function at her baseline.  Recommend IV iron infusions.  Granddaughter answered and is not with her. They will call back to consent.

## 2024-12-23 ENCOUNTER — TELEPHONE (OUTPATIENT)
Dept: NEPHROLOGY | Facility: CLINIC | Age: 84
End: 2024-12-23
Payer: MEDICARE

## 2024-12-23 DIAGNOSIS — D50.9 IRON DEFICIENCY ANEMIA, UNSPECIFIED IRON DEFICIENCY ANEMIA TYPE: Primary | ICD-10-CM

## 2024-12-23 RX ORDER — HEPARIN 100 UNIT/ML
500 SYRINGE INTRAVENOUS
OUTPATIENT
Start: 2024-12-23

## 2024-12-23 RX ORDER — EPINEPHRINE 0.3 MG/.3ML
0.3 INJECTION SUBCUTANEOUS ONCE AS NEEDED
OUTPATIENT
Start: 2024-12-23

## 2024-12-23 RX ORDER — SODIUM CHLORIDE 0.9 % (FLUSH) 0.9 %
10 SYRINGE (ML) INJECTION
OUTPATIENT
Start: 2024-12-23

## 2024-12-23 RX ORDER — DIPHENHYDRAMINE HYDROCHLORIDE 50 MG/ML
50 INJECTION INTRAMUSCULAR; INTRAVENOUS ONCE AS NEEDED
OUTPATIENT
Start: 2024-12-23

## 2024-12-23 NOTE — TELEPHONE ENCOUNTER
Called daughter who is with patient at [phone].  Spoke to patient about iron infusions. She agreed to proceed. Obtained phone consent. Witnessed by JOHN PAUL White, and MITCHELL Truong NP.

## 2025-01-02 ENCOUNTER — PATIENT MESSAGE (OUTPATIENT)
Dept: INFUSION THERAPY | Facility: HOSPITAL | Age: 85
End: 2025-01-02
Payer: MEDICARE

## 2025-01-10 ENCOUNTER — OFFICE VISIT (OUTPATIENT)
Dept: ORTHOPEDICS | Facility: CLINIC | Age: 85
End: 2025-01-10
Payer: MEDICARE

## 2025-01-10 VITALS — BODY MASS INDEX: 32.98 KG/M2 | HEIGHT: 62 IN

## 2025-01-10 DIAGNOSIS — M19.072 ARTHRITIS OF BOTH ANKLES: Primary | ICD-10-CM

## 2025-01-10 DIAGNOSIS — M19.071 ARTHRITIS OF BOTH ANKLES: Primary | ICD-10-CM

## 2025-01-10 PROCEDURE — 99999 PR PBB SHADOW E&M-EST. PATIENT-LVL III: CPT | Mod: PBBFAC,,, | Performed by: SURGERY

## 2025-01-10 RX ORDER — TRIAMCINOLONE ACETONIDE 40 MG/ML
40 INJECTION, SUSPENSION INTRA-ARTICULAR; INTRAMUSCULAR
Status: DISCONTINUED | OUTPATIENT
Start: 2025-01-10 | End: 2025-01-10 | Stop reason: HOSPADM

## 2025-01-10 RX ADMIN — TRIAMCINOLONE ACETONIDE 40 MG: 40 INJECTION, SUSPENSION INTRA-ARTICULAR; INTRAMUSCULAR at 01:01

## 2025-01-10 NOTE — PROGRESS NOTES
" Patient ID: Rachel Asif is a 84 y.o. female.    Chief Complaint: Ankle Pain (Bilateral ankle pain and swelling)    HPI     Rachel Asif has experienced problems with the bilateral foot over the past serve months. The patient denies relevant history of injury/aggravation.  Patient reports increase of pain and swelling and was referred to by Rayshawn Pichardo NP for bilateral ankle cortisone steroid injections.  Patient has a history of chronic kidney failure. Pain is located  plantar surface of foot," medially and  lateral foot. Associated symptoms include swelling and giving way. Symptoms are aggravated by weight-bearing, walking. They have been treated with rest, ice. Ambulation reportedly has been impaired. Self care ADLs are painful.     ROS      Objective:      Ortho/SPM Exam        There were no vitals filed for this visit.    The patient is not in acute distress.   Body habitus is normal.  The skin over the foot and ankle is intact.  Effusion 4+  Palpation- tender to palpation on the anterior joint line  Range of motion- within normal limits  Ligament laxity exam:  Ligamentously stable  Flatfoot negative.   Pulses DP present, PT present.  Motor normal 5/5 strength in all tested muscle groups.   Sensory normal.    IMAGING- right ankle complete taken on 08/27/2024  No fracture or dislocation. There is ankle joint osteoarthritis. There is diffuse soft tissue swelling of the ankle.  Small vessel calcification  These images were independently reviewed and discussed with the patient.    MEDICATIONS: reviewed  @Medications@    Problem List: reviewed    Assessment:       No diagnosis found.         Bilateral ankle osteoarthritis      Plan:   You physical and radiographic findings the patient today.  Discussed nonsurgical conservative treatments which include home exercise program, cortisone steroid injection.  Bilateral ankle cortisone steroid injection gave him today's clinic.  She will follow up in " clinic in 3 months for further re-evaluation    Hep 85182: Bilateral exercises reviewed for patient by provider for greater than 15 minutes.  Home exercises sheet given.    Patient expressed understanding of this plan and all questions were answered.    Erick Crawford MD  [facility]  Department of Orthopedic Surgery    This note is dictated using the M*Modal Fluency Direct word recognition program. There are word recognition mistakes that are occasionally missed on review

## 2025-01-10 NOTE — PROCEDURES
Intermediate Joint Aspiration/Injection: R ankle, L ankle    Date/Time: 1/10/2025 1:00 PM    Performed by: Erick Crawford MD  Authorized by: Erick Crawford MD    Consent Done?:  Yes (Verbal)  Indications:  Arthritis    Location:  Ankle  Site:  R ankle and L ankle  Ultrasonic Guidance for needle placement: No  Needle size:  22 G  Approach:  Anteromedial  Medications:  40 mg triamcinolone acetonide 40 mg/mL  Patient tolerance:  Patient tolerated the procedure well with no immediate complications

## 2025-01-29 ENCOUNTER — INFUSION (OUTPATIENT)
Dept: INFUSION THERAPY | Facility: HOSPITAL | Age: 85
End: 2025-01-29
Attending: HOSPITALIST
Payer: MEDICARE

## 2025-01-29 VITALS
RESPIRATION RATE: 18 BRPM | TEMPERATURE: 98 F | OXYGEN SATURATION: 99 % | HEART RATE: 61 BPM | DIASTOLIC BLOOD PRESSURE: 70 MMHG | SYSTOLIC BLOOD PRESSURE: 143 MMHG

## 2025-01-29 DIAGNOSIS — D50.9 IRON DEFICIENCY ANEMIA, UNSPECIFIED IRON DEFICIENCY ANEMIA TYPE: Primary | ICD-10-CM

## 2025-01-29 PROCEDURE — 63600175 PHARM REV CODE 636 W HCPCS: Performed by: NURSE PRACTITIONER

## 2025-01-29 PROCEDURE — 96374 THER/PROPH/DIAG INJ IV PUSH: CPT

## 2025-01-29 RX ORDER — DIPHENHYDRAMINE HYDROCHLORIDE 50 MG/ML
50 INJECTION INTRAMUSCULAR; INTRAVENOUS ONCE AS NEEDED
OUTPATIENT
Start: 2025-02-05

## 2025-01-29 RX ORDER — SODIUM CHLORIDE 0.9 % (FLUSH) 0.9 %
10 SYRINGE (ML) INJECTION
OUTPATIENT
Start: 2025-02-05

## 2025-01-29 RX ORDER — EPINEPHRINE 0.3 MG/.3ML
0.3 INJECTION SUBCUTANEOUS ONCE AS NEEDED
OUTPATIENT
Start: 2025-02-05

## 2025-01-29 RX ORDER — HEPARIN 100 UNIT/ML
500 SYRINGE INTRAVENOUS
OUTPATIENT
Start: 2025-02-05

## 2025-01-29 RX ADMIN — IRON SUCROSE 200 MG: 20 INJECTION, SOLUTION INTRAVENOUS at 10:01

## 2025-01-29 NOTE — PLAN OF CARE
Patient wheeled onto unit for venofer, VSS, no s/s of distress. Plan of care reviewed with patient, patient voices that they have no active complaints. Venofer given IV push. Patient tolerated treatment well and was monitored for hypersensitivity. Calendar was printed and patient made aware of upcoming appointments. Patient wheeled off unit, no s/s of distress.

## 2025-01-30 ENCOUNTER — OFFICE VISIT (OUTPATIENT)
Dept: FAMILY MEDICINE | Facility: CLINIC | Age: 85
End: 2025-01-30
Payer: MEDICARE

## 2025-01-30 ENCOUNTER — LAB VISIT (OUTPATIENT)
Dept: LAB | Facility: HOSPITAL | Age: 85
End: 2025-01-30
Attending: INTERNAL MEDICINE
Payer: MEDICARE

## 2025-01-30 VITALS
WEIGHT: 174.38 LBS | BODY MASS INDEX: 32.09 KG/M2 | HEIGHT: 62 IN | DIASTOLIC BLOOD PRESSURE: 68 MMHG | TEMPERATURE: 98 F | SYSTOLIC BLOOD PRESSURE: 136 MMHG | HEART RATE: 88 BPM | OXYGEN SATURATION: 97 %

## 2025-01-30 DIAGNOSIS — I12.9 BENIGN HYPERTENSION WITH CHRONIC KIDNEY DISEASE, STAGE IV: ICD-10-CM

## 2025-01-30 DIAGNOSIS — Z23 NEEDS FLU SHOT: ICD-10-CM

## 2025-01-30 DIAGNOSIS — M25.571 BILATERAL ANKLE PAIN, UNSPECIFIED CHRONICITY: Primary | ICD-10-CM

## 2025-01-30 DIAGNOSIS — E78.5 HYPERLIPIDEMIA WITH TARGET LDL LESS THAN 100: ICD-10-CM

## 2025-01-30 DIAGNOSIS — R73.03 PREDIABETES: ICD-10-CM

## 2025-01-30 DIAGNOSIS — M25.572 BILATERAL ANKLE PAIN, UNSPECIFIED CHRONICITY: Primary | ICD-10-CM

## 2025-01-30 DIAGNOSIS — N18.4 CHRONIC KIDNEY DISEASE, STAGE 4 (SEVERE): ICD-10-CM

## 2025-01-30 DIAGNOSIS — N18.4 BENIGN HYPERTENSION WITH CHRONIC KIDNEY DISEASE, STAGE IV: ICD-10-CM

## 2025-01-30 LAB
ALBUMIN SERPL BCP-MCNC: 3.3 G/DL (ref 3.5–5.2)
ALP SERPL-CCNC: 60 U/L (ref 40–150)
ALT SERPL W/O P-5'-P-CCNC: 38 U/L (ref 10–44)
AST SERPL-CCNC: 27 U/L (ref 10–40)
BILIRUB DIRECT SERPL-MCNC: 0.1 MG/DL (ref 0.1–0.3)
BILIRUB SERPL-MCNC: 0.3 MG/DL (ref 0.1–1)
CHOLEST SERPL-MCNC: 177 MG/DL (ref 120–199)
CHOLEST/HDLC SERPL: 3 {RATIO} (ref 2–5)
ESTIMATED AVG GLUCOSE: 140 MG/DL (ref 68–131)
HBA1C MFR BLD: 6.5 % (ref 4–5.6)
HDLC SERPL-MCNC: 60 MG/DL (ref 40–75)
HDLC SERPL: 33.9 % (ref 20–50)
LDLC SERPL CALC-MCNC: 89.8 MG/DL (ref 63–159)
NONHDLC SERPL-MCNC: 117 MG/DL
PROT SERPL-MCNC: 7.5 G/DL (ref 6–8.4)
TRIGL SERPL-MCNC: 136 MG/DL (ref 30–150)

## 2025-01-30 PROCEDURE — 80061 LIPID PANEL: CPT | Performed by: INTERNAL MEDICINE

## 2025-01-30 PROCEDURE — 80076 HEPATIC FUNCTION PANEL: CPT | Performed by: INTERNAL MEDICINE

## 2025-01-30 PROCEDURE — 90653 IIV ADJUVANT VACCINE IM: CPT | Mod: S$GLB,,,

## 2025-01-30 PROCEDURE — G2211 COMPLEX E/M VISIT ADD ON: HCPCS | Mod: S$GLB,,,

## 2025-01-30 PROCEDURE — 3288F FALL RISK ASSESSMENT DOCD: CPT | Mod: CPTII,S$GLB,,

## 2025-01-30 PROCEDURE — 1101F PT FALLS ASSESS-DOCD LE1/YR: CPT | Mod: CPTII,S$GLB,,

## 2025-01-30 PROCEDURE — 99214 OFFICE O/P EST MOD 30 MIN: CPT | Mod: S$GLB,,,

## 2025-01-30 PROCEDURE — 1126F AMNT PAIN NOTED NONE PRSNT: CPT | Mod: CPTII,S$GLB,,

## 2025-01-30 PROCEDURE — 83036 HEMOGLOBIN GLYCOSYLATED A1C: CPT | Performed by: INTERNAL MEDICINE

## 2025-01-30 PROCEDURE — 1159F MED LIST DOCD IN RCRD: CPT | Mod: CPTII,S$GLB,,

## 2025-01-30 PROCEDURE — G0008 ADMIN INFLUENZA VIRUS VAC: HCPCS | Mod: S$GLB,,,

## 2025-01-30 PROCEDURE — 99999 PR PBB SHADOW E&M-EST. PATIENT-LVL V: CPT | Mod: PBBFAC,,,

## 2025-01-30 NOTE — PROGRESS NOTES
Subjective     Patient ID: Rachel Asif is a 84 y.o. female.    Chief Complaint: Follow-up    HPI   Patient arrived at clinic for visit for bilateral ankle pain. Patient reports that she received steroid injections approximately 2 weeks ago and pain has not improved. Patient report the she is no more ambulatory then she was prior to injections. Mrs. Asif also reports increased pain to with light touch to area. She is currently in WC. Patient is in no acute distress VSS    Review of Systems   Constitutional: Negative.    HENT: Negative.     Eyes: Negative.    Respiratory: Negative.     Cardiovascular: Negative.    Gastrointestinal: Negative.    Endocrine: Negative.    Genitourinary: Negative.    Musculoskeletal:  Positive for gait problem and joint swelling.   Integumentary:  Negative.   Allergic/Immunologic: Negative.    Hematological: Negative.    Psychiatric/Behavioral: Negative.       Allergies :NKDA    Current medications: Please see chart     PMFSH:   Problem List         Lumbar radiculopathy    Osteoarthritis    Benign hypertension with chronic kidney disease, stage IV    Hyperlipidemia with target LDL less than 100    Chronic low back pain    Osteoporosis, post-menopausal    Overview    currently on a drug holiday  - 5/2017 - no need for Rx treatment at this time.  -12/2021 BMD recommends Rx tx - saw endocrinology who recommended prolia injections  - 12/15/2022, 12/2023, 7/2024 - prolia      Anemia of chronic disease    Postmenopausal status    DJD (degenerative joint disease)    History of colonic polyps    Obesity (BMI 30-39.9)    Prediabetes    Vitamin D deficiency    Atherosclerosis of aorta    Overview    noted on L-spine X-ray 4/14/2011      Gastroesophageal reflux disease without esophagitis    Secondary hyperparathyroidism of renal origin    Pseudophakia - Both Eyes    Refractive error    Dry eye syndrome, bilateral    Ocular hypertension, bilateral    Amblyopia, left    Atherosclerosis of  aortic arch    Overview    - noted on CXR 5/2017      Vitreous detachment, bilateral    PCO (posterior capsular opacification), right    Iron deficiency anemia      Objective     Physical Exam  Constitutional:       Appearance: Normal appearance.   HENT:      Head: Normocephalic.      Nose: Nose normal.   Eyes:      Pupils: Pupils are equal, round, and reactive to light.   Cardiovascular:      Rate and Rhythm: Normal rate.   Pulmonary:      Effort: Pulmonary effort is normal.   Abdominal:      Palpations: Abdomen is soft.   Musculoskeletal:         General: Swelling and tenderness present.   Skin:     General: Skin is warm.      Capillary Refill: Capillary refill takes less than 2 seconds.   Neurological:      General: No focal deficit present.      Mental Status: She is alert.   Psychiatric:         Mood and Affect: Mood normal.        Assessment and Plan     Discussed physical therapy treatment plan. Patient is in agreement with plan.     1. Needs flu shot  -     influenza (adjuvanted) (Fluad) 45 mcg/0.5 mL IM vaccine (> or = 64 yo) 0.5 mL                 No follow-ups on file.

## 2025-01-30 NOTE — PROGRESS NOTES
HPI     Chief Complaint:  Chief Complaint   Patient presents with    Follow-up       Rachel Asif is a 84 y.o. female with multiple medical diagnoses as listed in the medical history and problem list that presents for   Chief Complaint   Patient presents with    Follow-up    .     Patient is not known to me with her last appointment in this department on 9/26/2024.     HPI  Pt presents for follow up.  Had steroid injections in both ankles about 2 weeks ago which helped some.  Has been doing home exercises for ankles.  Tramadol helps with the pain.    Assessment & Plan     Problem List Items Addressed This Visit       Benign hypertension with chronic kidney disease, stage IV  BP in clinic today 136/68.  The current medical regimen is effective;  continue present plan and medications.       Other Visit Diagnoses       Bilateral ankle pain, unspecified chronicity    -  Primary  Improving.  Had steroid injections in both ankles about 2 weeks ago which helped some.  Has been doing home exercises for ankles.  Tramadol helps with the pain.  Will refer to PT.    Relevant Orders    Ambulatory Referral/Consult to Physical Therapy/Occupational Therapy    Needs flu shot      Due for seasonal flu vaccine, will order flu vaccine today in clinic.      Relevant Medications    influenza (adjuvanted) (Fluad) 45 mcg/0.5 mL IM vaccine (> or = 64 yo) 0.5 mL (Completed)    Chronic kidney disease, stage 4 (severe)      Decreased kidney function continues. Close follow up with nephrology. Next appt 3/31.              --------------------------------------------      Health Maintenance:  Health Maintenance         Date Due Completion Date    RSV Vaccine (Age 60+ and Pregnant patients) (1 - 1-dose 75+ series) Never done ---    COVID-19 Vaccine (5 - 2024-25 season) 09/01/2024 12/11/2023    DEXA Scan 01/11/2026 1/11/2024    Override on 10/18/2011: Done    Hemoglobin A1c (Prediabetes) 01/30/2026 1/30/2025    Lipid Panel 01/30/2026  "1/30/2025    TETANUS VACCINE 06/20/2026 6/20/2016            Health maintenance reviewed and Flu vaccine ordered    Follow Up:  Follow up in about 6 months (around 7/30/2025).    Exam     Review of Systems:  (as noted above)  Review of Systems    Physical Exam:   Physical Exam  Constitutional:       General: She is not in acute distress.     Appearance: Normal appearance. She is obese. She is not ill-appearing or toxic-appearing.   Cardiovascular:      Rate and Rhythm: Normal rate.   Pulmonary:      Effort: Pulmonary effort is normal.   Musculoskeletal:      Right ankle: Swelling present. Tenderness present over the lateral malleolus and medial malleolus.      Left ankle: Swelling present. Tenderness present over the lateral malleolus and medial malleolus.   Neurological:      Mental Status: She is alert.       Vitals:    01/30/25 1023   BP: 136/68   BP Location: Right arm   Patient Position: Sitting   Pulse: 88   Temp: 98.4 °F (36.9 °C)   TempSrc: Oral   SpO2: 97%   Weight: 79.1 kg (174 lb 6.1 oz)   Height: 5' 2" (1.575 m)      Body mass index is 31.9 kg/m².        History     Past Medical History:  Past Medical History:   Diagnosis Date    Amblyopia     os    Anemia of other chronic disease     Atherosclerosis of aorta     noted on L-spine X-ray 4/14/2011    Atherosclerosis of aortic arch     - noted on CXR 5/2017    Chronic low back pain     followed by orthopaedics    DJD (degenerative joint disease)     Glaucoma     History of colonic polyps     History of vitamin D deficiency     Hyperlipidemia     Hypertension     Lumbar radiculopathy     Obesity     Obesity     OH (ocular hypertension)     Osteoarthritis     Osteoporosis, post-menopausal     currently on a drug holiday    PCO (posterior capsular opacification), right 2/27/2020    Postmenopausal status     Prediabetes     Secondary hyperparathyroidism of renal origin     Trochanteric bursitis        Past Surgical History:  Past Surgical History:   Procedure " Laterality Date    CATARACT EXTRACTION W/  INTRAOCULAR LENS IMPLANT Bilateral     COLONOSCOPY N/A 8/10/2020    Procedure: COLONOSCOPY;  Surgeon: Bettina Gates MD;  Location: [facility];  Service: Endoscopy;  Laterality: N/A;       Social History:  Social History     Socioeconomic History    Marital status:    Occupational History    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 She is taking care of a sister who has brain damage from a car accident.       Family History:  Family History   Problem Relation Name Age of Onset    Cataracts Mother      Hypertension Mother      Other Mother          complications from splenectomy after fall    Glaucoma Mother      Cataracts Father      Hypertension Father      Glaucoma Father      Hypertension Sister Alison     Edema Sister Alison     Hypertension Sister Maggy Leana     Other Sister Maggy Leana         shingles    Lupus Sister Yeimy     Lung cancer Sister Yeimy     Hypertension Sister Yeimy     Other Sister Rocío         brain damage from MVA    Hypertension Sister Rocío     Hypertension Brother Neri Jluis     Diabetes Brother Neri Jluis     Heart disease Brother Neri Jluis     Hypertension Brother Solomen Jluis     Heart disease Brother Solomen Jluis     Hypertension Brother Derrell     Heart attack Brother Jv     Hypertension Brother Jv     Hypertension Brother Adam     No Known Problems Brother Condell     No Known Problems Brother Kimber     Hypertension Brother Andrew     Heart disease Brother Andrew     Glaucoma Maternal Grandmother      No Known Problems Maternal Grandfather      No Known Problems Paternal Grandmother      No Known Problems Paternal Grandfather      No Known Problems Maternal  Aunt      No Known Problems Maternal Uncle      No Known Problems Paternal Aunt      No Known Problems Paternal Uncle      Cancer Neg Hx      Amblyopia Neg Hx      Blindness Neg Hx      Retinal detachment Neg Hx      Strabismus Neg Hx      Stroke Neg Hx         Allergies and Medications: (updated and reviewed)  Review of patient's allergies indicates:   Allergen Reactions    No known allergies      Current Outpatient Medications   Medication Sig Dispense Refill    amLODIPine (NORVASC) 5 MG tablet Take 1 tablet (5 mg total) by mouth once daily. 90 tablet 3    aspirin 81 MG chewable tablet Every day      atorvastatin (LIPITOR) 20 MG tablet TAKE 1 TABLET(20 MG) BY MOUTH EVERY DAY 90 tablet 0    colchicine (COLCRYS) 0.6 mg tablet Take 1/2 tab PO QD prn gout attacks. 30 tablet 11    diclofenac sodium (VOLTAREN) 1 % Gel Apply topically 3 (three) times daily as needed. 5 each 2    DULoxetine (CYMBALTA) 30 MG capsule Take 1 capsule (30 mg total) by mouth once daily. 30 capsule 3    ergocalciferol (ERGOCALCIFEROL) 50,000 unit Cap TAKE 1 CAPSULE BY MOUTH EVERY 7 DAYS 12 capsule 0    furosemide (LASIX) 20 MG tablet TAKE 1 TABLET(20 MG) BY MOUTH TWICE DAILY 180 tablet 2    hydrALAZINE (APRESOLINE) 50 MG tablet TAKE 1 TABLET(50 MG) BY MOUTH THREE TIMES DAILY 270 tablet 3    metoprolol succinate (TOPROL-XL) 50 MG 24 hr tablet Take 1 tablet (50 mg total) by mouth once daily. 90 tablet 1    olmesartan-hydrochlorothiazide (BENICAR HCT) 40-25 mg per tablet TAKE 1 TABLET BY MOUTH EVERY DAY 90 tablet 0    pantoprazole (PROTONIX) 20 MG tablet TAKE 1 TABLET BY MOUTH EVERY DAY TO PROTECT STOMACH 90 tablet 3    potassium chloride SA (K-DUR,KLOR-CON) 20 MEQ tablet TAKE 1 TABLET(20 MEQ) BY MOUTH EVERY DAY 90 tablet 2    PROPYLENE GLYCOL/ (SYSTANE OPHT) Weekly PRN      sodium bicarbonate 650 MG tablet Take 1 tablet (650 mg total) by mouth 2 (two) times daily. 180 tablet 3    traMADoL (ULTRAM) 50 mg tablet Take 1 tablet (50 mg total) by  mouth 3 (three) times daily as needed for Pain. 90 tablet 0    travoprost (TRAVATAN Z) 0.004 % ophthalmic solution Place 1 drop into both eyes every evening. 2.5 mL 6    vitamin D (VITAMIN D3) 1000 units Tab Take 2 tablets (2,000 Units total) by mouth once daily.       No current facility-administered medications for this visit.       Patient Care Team:  Oneyda Pace MD as PCP - General (Internal Medicine)  Yasmin Meyer LPN as Licensed Practical Nurse         - The patient is given an After Visit Summary that lists all medications with directions, allergies, education, orders placed during this encounter and follow-up instructions.      - I have reviewed the patient's medical information including past medical, family, and social history sections including the medications and allergies.      - We discussed the patient's current medications.     This note was created by combination of typed  and MModal dictation.  Transcription errors may be present.  If there are any questions, please contact me.       Jacqueline Chow NP

## 2025-02-05 ENCOUNTER — INFUSION (OUTPATIENT)
Dept: INFUSION THERAPY | Facility: HOSPITAL | Age: 85
End: 2025-02-05
Attending: HOSPITALIST
Payer: MEDICARE

## 2025-02-05 VITALS
HEART RATE: 74 BPM | RESPIRATION RATE: 18 BRPM | OXYGEN SATURATION: 100 % | TEMPERATURE: 98 F | SYSTOLIC BLOOD PRESSURE: 139 MMHG | DIASTOLIC BLOOD PRESSURE: 62 MMHG

## 2025-02-05 DIAGNOSIS — D50.9 IRON DEFICIENCY ANEMIA, UNSPECIFIED IRON DEFICIENCY ANEMIA TYPE: Primary | ICD-10-CM

## 2025-02-05 PROCEDURE — 96374 THER/PROPH/DIAG INJ IV PUSH: CPT

## 2025-02-05 PROCEDURE — 63600175 PHARM REV CODE 636 W HCPCS: Performed by: NURSE PRACTITIONER

## 2025-02-05 RX ORDER — EPINEPHRINE 0.3 MG/.3ML
0.3 INJECTION SUBCUTANEOUS ONCE AS NEEDED
Status: CANCELLED | OUTPATIENT
Start: 2025-02-12

## 2025-02-05 RX ORDER — HEPARIN 100 UNIT/ML
500 SYRINGE INTRAVENOUS
Status: CANCELLED | OUTPATIENT
Start: 2025-02-12

## 2025-02-05 RX ORDER — SODIUM CHLORIDE 0.9 % (FLUSH) 0.9 %
10 SYRINGE (ML) INJECTION
Status: CANCELLED | OUTPATIENT
Start: 2025-02-12

## 2025-02-05 RX ORDER — DIPHENHYDRAMINE HYDROCHLORIDE 50 MG/ML
50 INJECTION INTRAMUSCULAR; INTRAVENOUS ONCE AS NEEDED
Status: CANCELLED | OUTPATIENT
Start: 2025-02-12

## 2025-02-05 RX ADMIN — IRON SUCROSE 200 MG: 20 INJECTION, SOLUTION INTRAVENOUS at 11:02

## 2025-02-05 NOTE — PLAN OF CARE
Patient arrived on unit for Venofer infusion. Patient is awake, alert, and oriented x4, denies any new complaints or worsening signs and symptoms since last visit. Placed 24g PIV in right AC, administered Venofer IVP over 5 min, tolerated well with no signs or symptoms of adverse reaction, removed IV. Calendar with future appointment dates provided to patient per request. Patient denies any questions or concerns at this time. Discharged home upon completion of treatments in NAD.    Please see MAR and flowsheets for any additional information.      Problem: Adult Inpatient Plan of Care  Goal: Optimal Comfort and Wellbeing  Outcome: Met

## 2025-02-12 ENCOUNTER — INFUSION (OUTPATIENT)
Dept: INFUSION THERAPY | Facility: HOSPITAL | Age: 85
End: 2025-02-12
Attending: HOSPITALIST
Payer: MEDICARE

## 2025-02-12 VITALS
HEART RATE: 93 BPM | SYSTOLIC BLOOD PRESSURE: 157 MMHG | RESPIRATION RATE: 18 BRPM | OXYGEN SATURATION: 98 % | DIASTOLIC BLOOD PRESSURE: 73 MMHG | TEMPERATURE: 98 F

## 2025-02-12 DIAGNOSIS — D50.9 IRON DEFICIENCY ANEMIA, UNSPECIFIED IRON DEFICIENCY ANEMIA TYPE: Primary | ICD-10-CM

## 2025-02-12 PROCEDURE — 63600175 PHARM REV CODE 636 W HCPCS: Performed by: NURSE PRACTITIONER

## 2025-02-12 PROCEDURE — 96374 THER/PROPH/DIAG INJ IV PUSH: CPT

## 2025-02-12 RX ORDER — SODIUM CHLORIDE 0.9 % (FLUSH) 0.9 %
10 SYRINGE (ML) INJECTION
OUTPATIENT
Start: 2025-02-19

## 2025-02-12 RX ORDER — DIPHENHYDRAMINE HYDROCHLORIDE 50 MG/ML
50 INJECTION INTRAMUSCULAR; INTRAVENOUS ONCE AS NEEDED
OUTPATIENT
Start: 2025-02-19

## 2025-02-12 RX ORDER — HEPARIN 100 UNIT/ML
500 SYRINGE INTRAVENOUS
OUTPATIENT
Start: 2025-02-19

## 2025-02-12 RX ORDER — EPINEPHRINE 0.3 MG/.3ML
0.3 INJECTION SUBCUTANEOUS ONCE AS NEEDED
OUTPATIENT
Start: 2025-02-19

## 2025-02-12 RX ADMIN — IRON SUCROSE 200 MG: 20 INJECTION, SOLUTION INTRAVENOUS at 11:02

## 2025-02-12 NOTE — PLAN OF CARE
Pt arrived to the Infusion unit via wheelchair for IV medication today. Pt alert and oriented x4. Pt reports no new symptoms or concerns at this time. Denies any recent falls/ reactions to prior treatments. Pt consents to plan of care for today. Pt administered Venofer 200mg IVP followed by 0.9% NaCl 100mL flush bag via 22G in (R) AC. Pt tolerated medication well with no adverse reactions. Pt aware of next scheduled appointment and verbalizes no additional needs at this time. Pt discharged in no acute distress.      Problem: Fall Injury Risk  Goal: Absence of Fall and Fall-Related Injury  Outcome: Met

## 2025-02-19 ENCOUNTER — INFUSION (OUTPATIENT)
Dept: INFUSION THERAPY | Facility: HOSPITAL | Age: 85
End: 2025-02-19
Attending: HOSPITALIST
Payer: MEDICARE

## 2025-02-19 VITALS
OXYGEN SATURATION: 98 % | HEART RATE: 112 BPM | TEMPERATURE: 99 F | SYSTOLIC BLOOD PRESSURE: 129 MMHG | DIASTOLIC BLOOD PRESSURE: 56 MMHG | RESPIRATION RATE: 18 BRPM

## 2025-02-19 DIAGNOSIS — D50.9 IRON DEFICIENCY ANEMIA, UNSPECIFIED IRON DEFICIENCY ANEMIA TYPE: Primary | ICD-10-CM

## 2025-02-19 PROCEDURE — 63600175 PHARM REV CODE 636 W HCPCS: Performed by: NURSE PRACTITIONER

## 2025-02-19 RX ORDER — HEPARIN 100 UNIT/ML
500 SYRINGE INTRAVENOUS
Status: CANCELLED | OUTPATIENT
Start: 2025-02-26

## 2025-02-19 RX ORDER — SODIUM CHLORIDE 0.9 % (FLUSH) 0.9 %
10 SYRINGE (ML) INJECTION
Status: DISCONTINUED | OUTPATIENT
Start: 2025-02-19 | End: 2025-02-19

## 2025-02-19 RX ORDER — EPINEPHRINE 0.3 MG/.3ML
0.3 INJECTION SUBCUTANEOUS ONCE AS NEEDED
Status: CANCELLED | OUTPATIENT
Start: 2025-02-26

## 2025-02-19 RX ORDER — DIPHENHYDRAMINE HYDROCHLORIDE 50 MG/ML
50 INJECTION INTRAMUSCULAR; INTRAVENOUS ONCE AS NEEDED
Status: CANCELLED | OUTPATIENT
Start: 2025-02-26

## 2025-02-19 RX ORDER — SODIUM CHLORIDE 0.9 % (FLUSH) 0.9 %
10 SYRINGE (ML) INJECTION
Status: CANCELLED | OUTPATIENT
Start: 2025-02-26

## 2025-02-19 RX ADMIN — IRON SUCROSE 200 MG: 20 INJECTION, SOLUTION INTRAVENOUS at 11:02

## 2025-02-19 NOTE — PLAN OF CARE
Arrived on unit per w/c with family member. AAOx4, VSS For Venofer infusion (5/5). PIV started R AC #22 with good blood return. POC reviewed with patient. No new problems noted. All Concerns answered. Venofer IVP given tolerated well. PIV D/C and bandage applied. No further appts required. Left per w/c in NAD

## 2025-03-10 ENCOUNTER — CLINICAL SUPPORT (OUTPATIENT)
Dept: REHABILITATION | Facility: HOSPITAL | Age: 85
End: 2025-03-10
Payer: MEDICARE

## 2025-03-10 DIAGNOSIS — M25.571 BILATERAL ANKLE PAIN, UNSPECIFIED CHRONICITY: ICD-10-CM

## 2025-03-10 DIAGNOSIS — M25.572 BILATERAL ANKLE PAIN, UNSPECIFIED CHRONICITY: ICD-10-CM

## 2025-03-10 DIAGNOSIS — Z74.09 IMPAIRED FUNCTIONAL MOBILITY, BALANCE, GAIT, AND ENDURANCE: ICD-10-CM

## 2025-03-10 DIAGNOSIS — Z74.09 IMPAIRED FUNCTIONAL MOBILITY AND ACTIVITY TOLERANCE: ICD-10-CM

## 2025-03-10 DIAGNOSIS — R53.1 WEAKNESS: Primary | ICD-10-CM

## 2025-03-10 PROCEDURE — 97161 PT EVAL LOW COMPLEX 20 MIN: CPT | Mod: PN

## 2025-03-10 PROCEDURE — 97110 THERAPEUTIC EXERCISES: CPT | Mod: PN

## 2025-03-10 NOTE — PROGRESS NOTES
[facility]  Physical Therapy Initial Evaluation  Date: 3/10/2025   Name: Rachel Asif  Clinic Number: [id number]    Therapy Diagnosis:   Encounter Diagnoses   Name Primary?    Bilateral ankle pain, unspecified chronicity     Weakness Yes    Impaired functional mobility and activity tolerance     Impaired functional mobility, balance, gait, and endurance      Physician: Jacqueline Chow NP    Physician Orders: PT Eval and Treat   Medical Diagnosis from Referral: M25.571,M25.572 (ICD-10-CM) - Bilateral ankle pain, unspecified chronicity   Evaluation Date: 3/10/2025  Authorization Period Expiration: 12/31/2025  Plan of Care Expiration: 6/10/2025  Visit # / Visits authorized: 1/ 1 Progress Note Due: 4/10/2025  FOTO: 1/ 1    Precautions: History of multiple falls, episodes of dizziness     Time In: 2:00pm  Time Out: 3:00pm   Total Appointment Time (timed & untimed codes): 60 minutes    Subjective   Date of onset: 6 months   History of current condition - Rachel reports: B ankle pain and arthritis as well as gout. She has been using a wheelchair ever since. There are times where she has to use the wheelchair all throughout the day. Pt also explained experiencing dizziness upon rising from a chair that lasts for 20-30 minutes. Pt says she has ankle pain but was not as much a limiting factor as dizziness. She reports a history of 4 falls with the most recent being 1 month- 2 months ago while getting out of a chair.      NILSON: n/a  Any falls: 4 falls (getting out of a car, up at night and during the day from dizziness)  Any pain Plantar fascia pain: none  Any pain Deltoid ligament: none  Any pain ATFL, calcaneou fibular or posterior talofibular L: none  Any ankle bruise: none   Pain radiates: yes   Pain constant or intermittent: intermittent   Any injection: none       Current 8-9/10, worst 10/10, best 6/10   Location: B ankles  Description: throbbing  Aggravating Factors: constantly moving  "around, sweeping/mopping  Easing Factors: rest     Prior Therapy: none   Social History: lives with family   Occupation: retired   Prior Level of Function: independent   Current Level of Function: assistance with housework     Pts goals: "get around on feet"       Imaging, bone scan films:     Impression:    "Motion degraded examination.     Degenerative changes, worst at the calcaneocuboid joint with full thickness cartilage loss and subchondral edema.  Subchondral cysts and bone marrow edema in the distal fibula and lateral talar process, suggesting lateral hindfoot impingement. A bone stress injury is difficult to exclude.     Tibiotalar, subtalar, and calcaneocuboid effusions/synovitis.     Posterior tibialis tenosynovitis.     Plantar fasciitis.     Effacement of fat in the sinus tarsi, which could represent sinus tarsi syndrome in the appropriate setting."    Medical History:   Past Medical History:   Diagnosis Date    Amblyopia     os    Anemia of other chronic disease     Atherosclerosis of aorta     noted on L-spine X-ray 4/14/2011    Atherosclerosis of aortic arch     - noted on CXR 5/2017    Chronic low back pain     followed by orthopaedics    DJD (degenerative joint disease)     Glaucoma     History of colonic polyps     History of vitamin D deficiency     Hyperlipidemia     Hypertension     Lumbar radiculopathy     Obesity     Obesity     OH (ocular hypertension)     Osteoarthritis     Osteoporosis, post-menopausal     currently on a drug holiday    PCO (posterior capsular opacification), right 2/27/2020    Postmenopausal status     Prediabetes     Secondary hyperparathyroidism of renal origin     Trochanteric bursitis        Surgical History:   Rachel Asif  has a past surgical history that includes Cataract extraction w/  intraocular lens implant (Bilateral) and Colonoscopy (N/A, 8/10/2020).    Medications:   Rachel has a current medication list which includes the following prescription(s): " amlodipine, aspirin, atorvastatin, colchicine, diclofenac sodium, duloxetine, ergocalciferol, furosemide, hydralazine, metoprolol succinate, olmesartan-hydrochlorothiazide, pantoprazole, potassium chloride sa, propylene glycol/peg 400, sodium bicarbonate, tramadol, travoprost, and vitamin d.    Allergies:   Review of patient's allergies indicates:   Allergen Reactions    No known allergies             Objective     Observation: pt in wheelchair    GAIT DEVIATIONS: Artnase in wheelchair; will assess next visit        Strength:   Right Ankle   Left Ankle    Dorsiflexion: 4/5 Dorsiflexion: 4/5   Plantarflexion:  4/5 Plantarflexion: 4/5   Inversion: 4/5 Inversion: 4/5   Eversion: 4/5 Eversion: 4/5       Right LE  Left LE    Hip flexion: 3+/5 Hip flexion: 3+/5   Hip extension:  4/5 Hip extension: 4/5   Hip abduction: 5/5 Hip abduction: 5/5   Hip adduction: 4/5 Hip adduction 4/5   Knee extension: 5/5 Knee extension: 5/5   Knee flexion: 4+/5 Knee flexion: 4+/5     Palpation: tender anterior T/C joint , medial/ lateral ankle         CMS Impairment/Limitation/Restriction for FOTO LEF Survey    Therapist reviewed FOTO scores for Rachel Asif on 3/10/2025.   FOTO documents entered into EPIC - see Media section.    Limitation Score: 13/80 Lower score           TREATMENT   Treatment Time In: 2:45pm  Treatment Time Out: 3:00pm  Total Treatment time separate from Evaluation: 15 minutes    Artnase received therapeutic exercises to develop strength and ROM for 15 minutes including:  Ankle circles   Seated heel raises   Ankle pumps   Seated marches       Artnase received the following manual therapy techniques:  were applied to the:  for 00 minutes, including:      Artnase participated in dynamic functional therapeutic activities to improve functional performance for 00  minutes, including:      Artnase participated in gait training to improve functional mobility and safety for 00  minutes, including:      Artnase received Hot  or cold pack for 00 minutes.    Home Exercises and Patient Education Provided    Education provided:   - PT role, intervention rationale     Written Home Exercises Provided: yes.  Exercises were reviewed and Rachel was able to demonstrate them prior to the end of the session.  Rachel demonstrated good  understanding of the education provided.     See EMR under Patient Instructions for exercises provided 3/10/2025.    Assessment   Rachel is a 84 y.o. female referred to outpatient Physical Therapy with a medical diagnosis of B ankle pain. Pt also has frequent dizziness which has contributed to recent falls in which she has sustained 4. Though pt presented to therapy due to B ankle pain, dizziness seems to be a limiting factor with ambulation and standing. Evaluation further revealed weakness, pain, and difficulty with weightbearing activities which impacts ADLs, and community mobility due to decreased activity tolerance, functional capacity and frequent dizziness. In addition to skilled PT that is required, pt would also benefit from further testing from physician and/or neuro PT to investigate source of dizziness.     Pt prognosis is Fair.   Pt will benefit from skilled outpatient Physical Therapy to address the deficits stated above and in the chart below, provide pt/family education, and to maximize pt's level of independence.     Plan of care discussed with patient: Yes  Pt's spiritual, cultural and educational needs considered and patient is agreeable to the plan of care and goals as stated below:     Anticipated Barriers for therapy: none    Medical Necessity is demonstrated by the following  History  Co-morbidities and personal factors that may impact the plan of care [x] LOW: no personal factors / co-morbidities  [] MODERATE: 1-2 personal factors / co-morbidities  [] HIGH: 3+ personal factors / co-morbidities    Moderate / High Support Documentation:   Co-morbidities affecting plan of care:   Amblyopia     os   Anemia of other chronic disease    Atherosclerosis of aorta    noted on L-spine X-ray 4/14/2011   Atherosclerosis of aortic arch    - noted on CXR 5/2017   Chronic low back pain    followed by orthopaedics   DJD (degenerative joint disease)    Glaucoma    History of colonic polyps    History of vitamin D deficiency    Hyperlipidemia    Hypertension    Lumbar radiculopathy    Obesity    Obesity    OH (ocular hypertension)    Osteoarthritis    Osteoporosis, post-menopausal    currently on a drug holiday   PCO (posterior capsular opacification), right    Postmenopausal status    Prediabetes    Secondary hyperparathyroidism of renal origin    Trochanteric bursitis        Personal Factors:   no deficits     Examination  Body Structures and Functions, activity limitations and participation restrictions that may impact the plan of care [x] LOW: addressing 1-2 elements  [] MODERATE: 3+ elements  [] HIGH: 4+ elements (please support below)    Moderate / High Support Documentation:      Clinical Presentation [x] LOW: stable  [] MODERATE: Evolving  [] HIGH: Unstable     Decision Making/ Complexity Score: low         GOALS:     Short Term Goals:  4 weeks  1.Report decreased  in  pain at worse less than  <   / =  5  /10  to increase tolerance for improved functional actvities. On going  2. Pt to improve range of motion by 25% to allow for improved functional mobility to allow for improvement in IADLs. On going  3. Increased B LE MMT 1/2 grade  to increase tolerance for ADL and work activities.On going  4. Pt to tolerate HEP to improve ROM and independence with ADL's. On going    Long Term Goals: 8 weeks  1.Report decreased  in  pain at worse  less than  <   / =  2  /10  to increase tolerance for improved functional actvities. On going  2.Pt to improve range of motion by 75% to allow for improved functional mobility to allow for improvement in IADLs. On going  3. Increased B LE MMT 1 grade  to increase tolerance for ADL  and work activities.On going  4. Pt will score at least 20% limitation or less on  FOTO outcome assessment  to increase functional activities and mobility. On going  5. Pt to be Independent with HEP to improve ROM and independence with ADL's. On going  6. Pt will be able to walk with least assistive device for 20 min in order to show tolerance for mobility. Ongoing       Plan   Plan of care Certification: 3/10/2025 to 6/10/2025.    Outpatient Physical Therapy 2 times weekly for 8 weeks to include the following interventions: Gait Training, Manual Therapy, Moist Heat/ Ice, Neuromuscular Re-ed, Patient Education, Self Care, Therapeutic Activities, and Therapeutic Exercise. Dry needling.     Miki Hitchcock Jr, PT

## 2025-03-11 PROBLEM — R53.1 WEAKNESS: Status: ACTIVE | Noted: 2025-03-11

## 2025-03-11 PROBLEM — Z74.09 IMPAIRED FUNCTIONAL MOBILITY AND ACTIVITY TOLERANCE: Status: ACTIVE | Noted: 2025-03-11

## 2025-03-11 PROBLEM — Z74.09 IMPAIRED FUNCTIONAL MOBILITY, BALANCE, GAIT, AND ENDURANCE: Status: ACTIVE | Noted: 2025-03-11

## 2025-03-13 ENCOUNTER — CLINICAL SUPPORT (OUTPATIENT)
Dept: REHABILITATION | Facility: HOSPITAL | Age: 85
End: 2025-03-13
Payer: MEDICARE

## 2025-03-13 DIAGNOSIS — M25.572 BILATERAL ANKLE PAIN, UNSPECIFIED CHRONICITY: ICD-10-CM

## 2025-03-13 DIAGNOSIS — M25.571 BILATERAL ANKLE PAIN, UNSPECIFIED CHRONICITY: ICD-10-CM

## 2025-03-13 DIAGNOSIS — Z74.09 IMPAIRED FUNCTIONAL MOBILITY, BALANCE, GAIT, AND ENDURANCE: ICD-10-CM

## 2025-03-13 DIAGNOSIS — R53.1 WEAKNESS: Primary | ICD-10-CM

## 2025-03-13 PROCEDURE — 97110 THERAPEUTIC EXERCISES: CPT | Mod: PN,CQ

## 2025-03-13 NOTE — PROGRESS NOTES
"[facility]   Physical Therapy Treatment Note     Name: Rachel Asif  Clinic Number: [id number]    Therapy Diagnosis:   Encounter Diagnoses   Name Primary?    Weakness Yes    Impaired functional mobility, balance, gait, and endurance     Bilateral ankle pain, unspecified chronicity      Physician: Jacqueline Chow NP    Visit Date: 3/13/2025    Physician Orders: PT Eval and Treat   Medical Diagnosis from Referral: M25.571,M25.572 (ICD-10-CM) - Bilateral ankle pain, unspecified chronicity   Evaluation Date: 3/10/2025  Authorization Period Expiration: 12/31/2025  Plan of Care Expiration: 6/10/2025  Visit # / Visits authorized: 1/ 6 Progress Note Due: 4/10/2025  FOTO: 1/ 1     Precautions: History of multiple falls, episodes of dizziness        Visit #: 1 of 6  PTA Visit #: 1  Time In: 10:00am   Time Out: 10:58am   Total Billable Time: 30 minutes 1:1 with PTA    Subjective     Pt reports: she has no pain in the ankles today. No dizziness right now. Patient states her limitation is dizziness, but she has been trying to walk more around the house using her rolling walker.   She was compliant with home exercise program.  Response to previous treatment: first treatment day   Functional change: ongoing     Pain: 0/10  Location: bilateral ankles     Objective     Rachel received therapeutic exercises to develop strength, endurance, ROM, and flexibility for 48 minutes including:  Ankle pumps x 30  Seated marches x 20   Seated ankle circles x 30 each way   Foot gym bilateral YTB x 30  Seated calf stretch with blue strap 3x30" holds bilateral   Standing heel raises, 2x10 with rolling walker     At parallel bars:   Standing hip abduction bilateral 2x10 with BUE support   Lateral side steps along the parallel bars 2 laps with BUE support       Rachel received the following manual therapy techniques: Joint mobilizations and Soft tissue Mobilization were applied to the: N/A for 00 minutes, " including:      Rachel participated in neuromuscular re-education activities to improve: Balance, Coordination, Sense, Proprioception, and Posture for 00 minutes. The following activities were included:      Rachel participated in dynamic functional therapeutic activities to improve functional performance for 10  minutes, including:  Nu-steps for 5 minutes Level 1   Ambulates with RW in therapy gym, CGA       Rachel participated in gait training to improve functional mobility and safety for 00 minutes, including:        Home Exercises Provided and Patient Education Provided     Written Home Exercises Provided: yes.  Exercises were reviewed and Rachel was able to demonstrate them prior to the end of the session.  Rachel demonstrated good  understanding of the education provided.     Education provided:   - home exercise program     Assessment   Patient arrived in transportation W/C with no assistive device. She was able to ambulate around the therapy gym using rolling walker with CGA. No complaints of ankle pains or dizziness today. Focused on improving ankle mobility, stability, hip strengthening, and endurance. Verbal cues and demonstration for proper exercise execution. CGA and SBA for standing exercises. Patient required multiple rest break in between exercises due to poor endurance. Ended session on the nu-stepper for lower extremity strengthening and endurance. Patient had no adverse reaction after the nu-stepper. Will progress patient as tolerated.     Rachel Is progressing well towards her goals.   Pt prognosis is Good.     Pt will continue to benefit from skilled outpatient physical therapy to address the deficits listed in the problem list box on initial evaluation, provide pt/family education and to maximize pt's level of independence in the home and community environment.     Pt's spiritual, cultural and educational needs considered and pt agreeable to plan of care and goals.     Anticipated  barriers to physical therapy: None     Goals:   Short Term Goals:  4 weeks  1.Report decreased  in  pain at worse less than  <   / =  5  /10  to increase tolerance for improved functional actvities. On going  2. Pt to improve range of motion by 25% to allow for improved functional mobility to allow for improvement in IADLs. On going  3. Increased B LE MMT 1/2 grade  to increase tolerance for ADL and work activities.On going  4. Pt to tolerate HEP to improve ROM and independence with ADL's. On going     Long Term Goals: 8 weeks  1.Report decreased  in  pain at worse  less than  <   / =  2  /10  to increase tolerance for improved functional actvities. On going  2.Pt to improve range of motion by 75% to allow for improved functional mobility to allow for improvement in IADLs. On going  3. Increased B LE MMT 1 grade  to increase tolerance for ADL and work activities.On going  4. Pt will score at least 20% limitation or less on  FOTO outcome assessment  to increase functional activities and mobility. On going  5. Pt to be Independent with HEP to improve ROM and independence with ADL's. On going  6. Pt will be able to walk with least assistive device for 20 min in order to show tolerance for mobility. Ongoing   Plan   Plan of care Certification: 3/10/2025 to 6/10/2025.     Outpatient Physical Therapy 2 times weekly for 8 weeks to include the following interventions: Gait Training, Manual Therapy, Moist Heat/ Ice, Neuromuscular Re-ed, Patient Education, Self Care, Therapeutic Activities, and Therapeutic Exercise. Dry needling.     Continue with plan of care     Analia Ontiveros, JAC   3/13/2025

## 2025-03-18 ENCOUNTER — TELEPHONE (OUTPATIENT)
Dept: FAMILY MEDICINE | Facility: CLINIC | Age: 85
End: 2025-03-18
Payer: MEDICARE

## 2025-03-18 NOTE — TELEPHONE ENCOUNTER
----- Message from Jacqueline Chow NP sent at 3/17/2025 10:34 AM CDT -----  Regarding: FW: Frequent episodes of Dizziness  Please schedule patient an appointment for follow up on her dizziness. Thanks  ----- Message -----  From: Miki Hitchcock Jr., PT  Sent: 3/11/2025   4:36 PM CDT  To: Jacqueline Chow NP  Subject: Frequent episodes of Dizziness                   Good afternoon,I recently evaluated Ms. Jung for bilateral ankle pain. However, she mentioned that she suffers from frequent dizziness which seems to be more of a limiting factor for her with ambulation and weightbearing activities. She presented in a wheelchair. She is a good candidate for physical therapy, but she may also need further testing to determine the source of her dizziness and to rule out any serious pathology or potential vestibular issue. Thank you

## 2025-03-19 ENCOUNTER — TELEPHONE (OUTPATIENT)
Dept: OPHTHALMOLOGY | Facility: CLINIC | Age: 85
End: 2025-03-19
Payer: MEDICARE

## 2025-03-19 NOTE — TELEPHONE ENCOUNTER
----- Message from Josué Paige sent at 3/18/2025  4:03 PM CDT -----    ----- Message -----  From: Tequila Gleason  Sent: 3/18/2025  11:36 AM CDT  To: [facility] Staff    Type:  Sooner Apoointment RequestCaller is requesting a sooner appointment.  Name of Caller:Sharelle Granddaughter When is the first available appointment?soon Symptoms:Both eye watery 2 mosWould the patient rather a call back or a response via [facility]? [facility] Call Back Number:[phone] Information:

## 2025-03-19 NOTE — TELEPHONE ENCOUNTER
[facility] 6 month IOP check scheduled with Dr. Duval 04/07/2025 at 10:30 am.

## 2025-03-20 ENCOUNTER — OFFICE VISIT (OUTPATIENT)
Dept: FAMILY MEDICINE | Facility: CLINIC | Age: 85
End: 2025-03-20
Payer: MEDICARE

## 2025-03-20 ENCOUNTER — LAB VISIT (OUTPATIENT)
Dept: LAB | Facility: HOSPITAL | Age: 85
End: 2025-03-20
Payer: MEDICARE

## 2025-03-20 VITALS
TEMPERATURE: 99 F | SYSTOLIC BLOOD PRESSURE: 128 MMHG | HEIGHT: 62 IN | DIASTOLIC BLOOD PRESSURE: 96 MMHG | HEART RATE: 110 BPM | OXYGEN SATURATION: 98 % | BODY MASS INDEX: 31.9 KG/M2

## 2025-03-20 DIAGNOSIS — R42 DIZZINESS: Primary | ICD-10-CM

## 2025-03-20 DIAGNOSIS — M25.571 BILATERAL ANKLE PAIN, UNSPECIFIED CHRONICITY: ICD-10-CM

## 2025-03-20 DIAGNOSIS — H91.90 HEARING LOSS, UNSPECIFIED HEARING LOSS TYPE, UNSPECIFIED LATERALITY: ICD-10-CM

## 2025-03-20 DIAGNOSIS — G89.29 CHRONIC NECK PAIN: ICD-10-CM

## 2025-03-20 DIAGNOSIS — R42 DIZZINESS: ICD-10-CM

## 2025-03-20 DIAGNOSIS — M54.2 CHRONIC NECK PAIN: ICD-10-CM

## 2025-03-20 DIAGNOSIS — G89.29 CHRONIC MIDLINE LOW BACK PAIN WITH RIGHT-SIDED SCIATICA: ICD-10-CM

## 2025-03-20 DIAGNOSIS — N18.4 BENIGN HYPERTENSION WITH CHRONIC KIDNEY DISEASE, STAGE IV: ICD-10-CM

## 2025-03-20 DIAGNOSIS — M25.572 BILATERAL ANKLE PAIN, UNSPECIFIED CHRONICITY: ICD-10-CM

## 2025-03-20 DIAGNOSIS — I12.9 BENIGN HYPERTENSION WITH CHRONIC KIDNEY DISEASE, STAGE IV: ICD-10-CM

## 2025-03-20 DIAGNOSIS — M54.41 CHRONIC MIDLINE LOW BACK PAIN WITH RIGHT-SIDED SCIATICA: ICD-10-CM

## 2025-03-20 LAB
ALBUMIN SERPL BCP-MCNC: 3.5 G/DL (ref 3.5–5.2)
ALP SERPL-CCNC: 51 U/L (ref 40–150)
ALT SERPL W/O P-5'-P-CCNC: 13 U/L (ref 10–44)
ANION GAP SERPL CALC-SCNC: 12 MMOL/L (ref 8–16)
AST SERPL-CCNC: 16 U/L (ref 10–40)
BASOPHILS # BLD AUTO: 0.04 K/UL (ref 0–0.2)
BASOPHILS NFR BLD: 0.4 % (ref 0–1.9)
BILIRUB SERPL-MCNC: 0.6 MG/DL (ref 0.1–1)
BUN SERPL-MCNC: 21 MG/DL (ref 8–23)
CALCIUM SERPL-MCNC: 9.1 MG/DL (ref 8.7–10.5)
CHLORIDE SERPL-SCNC: 106 MMOL/L (ref 95–110)
CO2 SERPL-SCNC: 26 MMOL/L (ref 23–29)
CREAT SERPL-MCNC: 2.3 MG/DL (ref 0.5–1.4)
DIFFERENTIAL METHOD BLD: ABNORMAL
EOSINOPHIL # BLD AUTO: 0.1 K/UL (ref 0–0.5)
EOSINOPHIL NFR BLD: 0.9 % (ref 0–8)
ERYTHROCYTE [DISTWIDTH] IN BLOOD BY AUTOMATED COUNT: 22.4 % (ref 11.5–14.5)
EST. GFR  (NO RACE VARIABLE): 20.2 ML/MIN/1.73 M^2
GLUCOSE SERPL-MCNC: 108 MG/DL (ref 70–110)
HCT VFR BLD AUTO: 33.7 % (ref 37–48.5)
HGB BLD-MCNC: 10 G/DL (ref 12–16)
IMM GRANULOCYTES # BLD AUTO: 0.07 K/UL (ref 0–0.04)
IMM GRANULOCYTES NFR BLD AUTO: 0.7 % (ref 0–0.5)
LYMPHOCYTES # BLD AUTO: 0.9 K/UL (ref 1–4.8)
LYMPHOCYTES NFR BLD: 8.7 % (ref 18–48)
MCH RBC QN AUTO: 27.9 PG (ref 27–31)
MCHC RBC AUTO-ENTMCNC: 29.7 G/DL (ref 32–36)
MCV RBC AUTO: 94 FL (ref 82–98)
MONOCYTES # BLD AUTO: 1 K/UL (ref 0.3–1)
MONOCYTES NFR BLD: 10.4 % (ref 4–15)
NEUTROPHILS # BLD AUTO: 7.8 K/UL (ref 1.8–7.7)
NEUTROPHILS NFR BLD: 78.9 % (ref 38–73)
NRBC BLD-RTO: 0 /100 WBC
PLATELET # BLD AUTO: 287 K/UL (ref 150–450)
PMV BLD AUTO: 10.4 FL (ref 9.2–12.9)
POTASSIUM SERPL-SCNC: 3.8 MMOL/L (ref 3.5–5.1)
PROT SERPL-MCNC: 7 G/DL (ref 6–8.4)
RBC # BLD AUTO: 3.59 M/UL (ref 4–5.4)
SODIUM SERPL-SCNC: 144 MMOL/L (ref 136–145)
WBC # BLD AUTO: 9.88 K/UL (ref 3.9–12.7)

## 2025-03-20 PROCEDURE — G2211 COMPLEX E/M VISIT ADD ON: HCPCS | Mod: S$GLB,,,

## 2025-03-20 PROCEDURE — 99999 PR PBB SHADOW E&M-EST. PATIENT-LVL IV: CPT | Mod: PBBFAC,,,

## 2025-03-20 PROCEDURE — 99214 OFFICE O/P EST MOD 30 MIN: CPT | Mod: S$GLB,,,

## 2025-03-20 PROCEDURE — 3288F FALL RISK ASSESSMENT DOCD: CPT | Mod: CPTII,S$GLB,,

## 2025-03-20 PROCEDURE — 36415 COLL VENOUS BLD VENIPUNCTURE: CPT | Mod: PO

## 2025-03-20 PROCEDURE — 1126F AMNT PAIN NOTED NONE PRSNT: CPT | Mod: CPTII,S$GLB,,

## 2025-03-20 PROCEDURE — 80053 COMPREHEN METABOLIC PANEL: CPT

## 2025-03-20 PROCEDURE — 1159F MED LIST DOCD IN RCRD: CPT | Mod: CPTII,S$GLB,,

## 2025-03-20 PROCEDURE — 1101F PT FALLS ASSESS-DOCD LE1/YR: CPT | Mod: CPTII,S$GLB,,

## 2025-03-20 PROCEDURE — 85025 COMPLETE CBC W/AUTO DIFF WBC: CPT

## 2025-03-20 RX ORDER — TRAMADOL HYDROCHLORIDE 50 MG/1
50 TABLET ORAL 3 TIMES DAILY PRN
Qty: 90 TABLET | Refills: 0 | Status: SHIPPED | OUTPATIENT
Start: 2025-03-20

## 2025-03-20 RX ORDER — MECLIZINE HCL 12.5 MG 12.5 MG/1
12.5 TABLET ORAL 3 TIMES DAILY PRN
Qty: 30 TABLET | Refills: 0 | Status: SHIPPED | OUTPATIENT
Start: 2025-03-20

## 2025-03-20 NOTE — TELEPHONE ENCOUNTER
No care due was identified.  [facility] Embedded Care Due Messages. Reference number: [id number].   3/20/2025 3:03:18 PM CDT

## 2025-03-20 NOTE — PROGRESS NOTES
"Subjective       Patient ID: Rachel Asif is a 84 y.o. female who presents for Dizziness and Follow-up.    HPI  Follow up appointment for dizziness   Patient states that dizziness has become better, last episode was 2-3 weeks ago.   Patient reports gout is better, taking physical therapy.    Family reports that the patient is not getting out of chair/bed, has difficulty hearing, and has dizziness daily.  Patient states that she walks in room and kitchen (when she needs a drink of water)           Objective     BP (!) 128/96 (BP Location: Right arm, Patient Position: Sitting)   Pulse 110   Temp 98.6 °F (37 °C) (Oral)   Ht 5' 2" (1.575 m)   SpO2 98%   BMI 31.90 kg/m²      Physical Exam      Assessment & Plan                   "

## 2025-03-21 ENCOUNTER — RESULTS FOLLOW-UP (OUTPATIENT)
Dept: FAMILY MEDICINE | Facility: CLINIC | Age: 85
End: 2025-03-21

## 2025-03-21 NOTE — PROGRESS NOTES
HPI     Chief Complaint:  Chief Complaint   Patient presents with    Dizziness    Follow-up       Rachel Asif is a 84 y.o. female with multiple medical diagnoses as listed in the medical history and problem list that presents for   Chief Complaint   Patient presents with    Dizziness    Follow-up    .     Patient is know to me with her last appointment with me on 1/30/2025.     Follow-up      Pt presents for follow up.  Accompanied by daughter today in clinic.  Reports that dizziness has improved.  The provider was recently contacted by physical therapist reporting dizziness noted during therapy sessions.  Family present today report that patient is hesitant to ambulate recently.  Patient in physical therapy multiple times per week.      Assessment & Plan     Problem List Items Addressed This Visit       Benign hypertension with chronic kidney disease, stage IV  BP in clinic today 128/96.  The current medical regimen is effective;  continue present plan and medications.     Other Visit Diagnoses         Dizziness    -  Primary  Continues with dizziness that has worsened with physical therapy.  We will order meclizine.  We will order CBC and CMP today.    Relevant Medications    meclizine (ANTIVERT) 12.5 mg tablet    Other Relevant Orders    CBC Auto Differential (Completed)    COMPREHENSIVE METABOLIC PANEL (Completed)      Hearing loss, unspecified hearing loss type, unspecified laterality      Family members with concern for patient's hearing loss.  We will refer to audiology for hearing test.    Relevant Orders    Ambulatory referral/consult to Audiology      Bilateral ankle pain, unspecified chronicity      Improving.  Currently in physical therapy which has helped.              --------------------------------------------      Health Maintenance:  Health Maintenance         Date Due Completion Date    RSV Vaccine (Age 60+ and Pregnant patients) (1 - 1-dose 75+ series) Never done ---    COVID-19 Vaccine  "(5 - 2024-25 season) 09/01/2024 12/11/2023    DEXA Scan 01/11/2026 1/11/2024    Override on 10/18/2011: Done    Hemoglobin A1c (Prediabetes) 01/30/2026 1/30/2025    Lipid Panel 01/30/2026 1/30/2025    TETANUS VACCINE 06/20/2026 6/20/2016            Health maintenance reviewed    Follow Up:  Follow up in about 6 months (around 9/20/2025).    Exam     Review of Systems:  (as noted above)  Review of Systems   Neurological:  Positive for dizziness.       Physical Exam:   Physical Exam  Constitutional:       General: She is not in acute distress.     Appearance: Normal appearance. She is not ill-appearing, toxic-appearing or diaphoretic.      Comments: Seated in wheelchair   Cardiovascular:      Rate and Rhythm: Normal rate and regular rhythm.   Pulmonary:      Effort: Pulmonary effort is normal.      Breath sounds: Normal breath sounds.   Neurological:      Mental Status: She is alert.       Vitals:    03/20/25 1428   BP: (!) 128/96   BP Location: Right arm   Patient Position: Sitting   Pulse: 110   Temp: 98.6 °F (37 °C)   TempSrc: Oral   SpO2: 98%   Height: 5' 2" (1.575 m)      Body mass index is 31.9 kg/m².        History     Past Medical History:  Past Medical History:   Diagnosis Date    Amblyopia     os    Anemia of other chronic disease     Atherosclerosis of aorta     noted on L-spine X-ray 4/14/2011    Atherosclerosis of aortic arch     - noted on CXR 5/2017    Chronic low back pain     followed by orthopaedics    DJD (degenerative joint disease)     Glaucoma     History of colonic polyps     History of vitamin D deficiency     Hyperlipidemia     Hypertension     Lumbar radiculopathy     Obesity     Obesity     OH (ocular hypertension)     Osteoarthritis     Osteoporosis, post-menopausal     currently on a drug holiday    PCO (posterior capsular opacification), right 2/27/2020    Postmenopausal status     Prediabetes     Secondary hyperparathyroidism of renal origin     Trochanteric bursitis        Past Surgical " History:  Past Surgical History:   Procedure Laterality Date    CATARACT EXTRACTION W/  INTRAOCULAR LENS IMPLANT Bilateral     COLONOSCOPY N/A 8/10/2020    Procedure: COLONOSCOPY;  Surgeon: Bettina Gates MD;  Location: [facility];  Service: Endoscopy;  Laterality: N/A;       Social History:  Social History[1]    Family History:  Family History   Problem Relation Name Age of Onset    Cataracts Mother      Hypertension Mother      Other Mother          complications from splenectomy after fall    Glaucoma Mother      Cataracts Father      Hypertension Father      Glaucoma Father      Hypertension Sister Alison     Edema Sister Alison     Hypertension Sister Maggy Leana     Other Sister Maggy Leana         shingles    Lupus Sister Yeimy     Lung cancer Sister Yeimy     Hypertension Sister Yeimy     Other Sister Rocío         brain damage from MVA    Hypertension Sister Rocío     Hypertension Brother Neri Jluis     Diabetes Brother Neri Jluis     Heart disease Brother Neri Jluis     Hypertension Brother Solomen Jluis     Heart disease Brother Solomen Jluis     Hypertension Brother Derrell     Heart attack Brother Jv     Hypertension Brother Jv     Hypertension Brother Adam     No Known Problems Brother Condell     No Known Problems Brother Kimber     Hypertension Brother Andrew     Heart disease Brother Andrew     Glaucoma Maternal Grandmother      No Known Problems Maternal Grandfather      No Known Problems Paternal Grandmother      No Known Problems Paternal Grandfather      No Known Problems Maternal Aunt      No Known Problems Maternal Uncle      No Known Problems Paternal Aunt      No Known Problems Paternal Uncle      Cancer Neg Hx      Amblyopia Neg Hx      Blindness Neg Hx      Retinal detachment Neg Hx      Strabismus Neg Hx      Stroke Neg Hx         Allergies and Medications: (updated and reviewed)  Review of patient's allergies indicates:   Allergen Reactions    No  known allergies      Current Medications[2]    Patient Care Team:  Oneyda Pace MD as PCP - General (Internal Medicine)  Yasmin Meyer LPN as Licensed Practical Nurse         - The patient is given an After Visit Summary that lists all medications with directions, allergies, education, orders placed during this encounter and follow-up instructions.      - I have reviewed the patient's medical information including past medical, family, and social history sections including the medications and allergies.      - We discussed the patient's current medications.     This note was created by combination of typed  and MModal dictation.  Transcription errors may be present.  If there are any questions, please contact me.       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 She is taking care of a sister who has brain damage from a car accident.   [2]   Current Outpatient Medications   Medication Sig Dispense Refill    amLODIPine (NORVASC) 5 MG tablet Take 1 tablet (5 mg total) by mouth once daily. 90 tablet 3    aspirin 81 MG chewable tablet Every day      atorvastatin (LIPITOR) 20 MG tablet TAKE 1 TABLET(20 MG) BY MOUTH EVERY DAY 90 tablet 0    colchicine (COLCRYS) 0.6 mg tablet Take 1/2 tab PO QD prn gout attacks. 30 tablet 11    diclofenac sodium (VOLTAREN) 1 % Gel Apply topically 3 (three) times daily as needed. 5 each 2    DULoxetine (CYMBALTA) 30 MG capsule Take 1 capsule (30 mg total) by mouth once daily. 30 capsule 3    ergocalciferol (ERGOCALCIFEROL) 50,000 unit Cap TAKE 1 CAPSULE BY MOUTH EVERY 7 DAYS 12 capsule 0    furosemide  (LASIX) 20 MG tablet TAKE 1 TABLET(20 MG) BY MOUTH TWICE DAILY 180 tablet 2    hydrALAZINE (APRESOLINE) 50 MG tablet TAKE 1 TABLET(50 MG) BY MOUTH THREE TIMES DAILY 270 tablet 3    metoprolol succinate (TOPROL-XL) 50 MG 24 hr tablet Take 1 tablet (50 mg total) by mouth once daily. 90 tablet 1    olmesartan-hydrochlorothiazide (BENICAR HCT) 40-25 mg per tablet TAKE 1 TABLET BY MOUTH EVERY DAY 90 tablet 0    pantoprazole (PROTONIX) 20 MG tablet TAKE 1 TABLET BY MOUTH EVERY DAY TO PROTECT STOMACH 90 tablet 3    potassium chloride SA (K-DUR,KLOR-CON) 20 MEQ tablet TAKE 1 TABLET(20 MEQ) BY MOUTH EVERY DAY 90 tablet 2    PROPYLENE GLYCOL/ (SYSTANE OPHT) Weekly PRN      sodium bicarbonate 650 MG tablet Take 1 tablet (650 mg total) by mouth 2 (two) times daily. 180 tablet 3    travoprost (TRAVATAN Z) 0.004 % ophthalmic solution Place 1 drop into both eyes every evening. 2.5 mL 6    vitamin D (VITAMIN D3) 1000 units Tab Take 2 tablets (2,000 Units total) by mouth once daily.      meclizine (ANTIVERT) 12.5 mg tablet Take 1 tablet (12.5 mg total) by mouth 3 (three) times daily as needed for Dizziness. 30 tablet 0    traMADoL (ULTRAM) 50 mg tablet Take 1 tablet (50 mg total) by mouth 3 (three) times daily as needed for Pain. 90 tablet 0     No current facility-administered medications for this visit.

## 2025-03-25 ENCOUNTER — TELEPHONE (OUTPATIENT)
Dept: NEPHROLOGY | Facility: CLINIC | Age: 85
End: 2025-03-25
Payer: MEDICARE

## 2025-03-28 ENCOUNTER — TELEPHONE (OUTPATIENT)
Dept: FAMILY MEDICINE | Facility: CLINIC | Age: 85
End: 2025-03-28
Payer: MEDICARE

## 2025-03-28 NOTE — TELEPHONE ENCOUNTER
Tried calling patient daughter on number left in message, unable to leave a message no voicemail set up

## 2025-03-28 NOTE — TELEPHONE ENCOUNTER
----- Message from Med Assistant Graham sent at 3/27/2025 10:44 AM CDT -----  Type: Patient Call BackWho called: Luz - Daughter What is the request in detail: states she has a few concerns her mother is dealing with .. She will discuss them with the nurse when she's called back .Can the clinic reply by [facility]?NOWould the patient rather a call back or a response via [facility]? Yes, call Best call back number: [phone]

## 2025-04-06 ENCOUNTER — DOCUMENTATION ONLY (OUTPATIENT)
Dept: REHABILITATION | Facility: HOSPITAL | Age: 85
End: 2025-04-06
Payer: MEDICARE

## 2025-04-06 NOTE — PROGRESS NOTES
[facility]                                 No Shows Therapy Appointment     Rachel Asif  MRN: [id number]    Patient no shows to today's therapy appointment on 4/6/2025.    Jean-Pierre To, PTA  4/6/2025

## 2025-04-10 ENCOUNTER — CLINICAL SUPPORT (OUTPATIENT)
Dept: REHABILITATION | Facility: HOSPITAL | Age: 85
End: 2025-04-10
Payer: MEDICARE

## 2025-04-10 DIAGNOSIS — R53.1 WEAKNESS: Primary | ICD-10-CM

## 2025-04-10 DIAGNOSIS — Z74.09 IMPAIRED FUNCTIONAL MOBILITY, BALANCE, GAIT, AND ENDURANCE: ICD-10-CM

## 2025-04-10 PROCEDURE — 97110 THERAPEUTIC EXERCISES: CPT | Mod: PN

## 2025-04-10 PROCEDURE — 97530 THERAPEUTIC ACTIVITIES: CPT | Mod: PN

## 2025-04-10 NOTE — PROGRESS NOTES
"[facility]   Physical Therapy Treatment Note     Name: Rachel Asif  Clinic Number: [id number]    Therapy Diagnosis:   Encounter Diagnoses   Name Primary?    Weakness Yes    Impaired functional mobility, balance, gait, and endurance      Physician: Jacqueline Chow NP    Visit Date: 4/10/2025    Physician Orders: PT Eval and Treat   Medical Diagnosis from Referral: M25.571,M25.572 (ICD-10-CM) - Bilateral ankle pain, unspecified chronicity   Evaluation Date: 3/10/2025  Authorization Period Expiration: 12/31/2025  Plan of Care Expiration: 6/10/2025  Visit # / Visits authorized: 1/ 6 Progress Note Due: 4/10/2025  FOTO: 1/ 1     Precautions: History of multiple falls, episodes of dizziness        Visit #: 1 of 6  PTA Visit #: 1  Time In: 12:10 pm   Time Out: 1:00pm   Total Billable Time: 45 minutes     Subjective     Pt reports: doing okay with no pain and no falls or anything since evaluation.   She was compliant with home exercise program.  Response to previous treatment: first treatment day   Functional change: ongoing     Pain: 0/10  Location: bilateral ankles     Objective     Rachel received therapeutic exercises to develop strength, endurance, ROM, and flexibility for 20 minutes including:  Ankle pumps x 30  Seated marches x 20   Seated ankle circles x 30 each way   Seated ADD 20 x 5"  Foot gym bilateral YTB x 30  Seated calf stretch with blue strap 3x30" holds bilateral   Standing heel raises, 2x10 with rolling walker     At parallel bars:   Standing hip abduction bilateral 2x10 with BUE support   Lateral side steps along the parallel bars 2 laps with BUE support       Rachel received the following manual therapy techniques: Joint mobilizations and Soft tissue Mobilization were applied to the: N/A for 00 minutes, including:      Rachel participated in neuromuscular re-education activities to improve: Balance, Coordination, Sense, Proprioception, and Posture for 00 minutes. The " following activities were included:      Rachel participated in dynamic functional therapeutic activities to improve functional performance for 25 minutes, including:  Nu-steps for 10 minutes Level 1   STS EOM 3 x 6  Ambulates with RW in therapy gym, [facility]       Rachel participated in gait training to improve functional mobility and safety for 00 minutes, including:        Home Exercises Provided and Patient Education Provided     Written Home Exercises Provided: yes.  Exercises were reviewed and Rachel was able to demonstrate them prior to the end of the session.  Rachel demonstrated good  understanding of the education provided.     Education provided:   - home exercise program     Assessment   Pt presented in wheelchair and no ambulation AD. Pt's ankles had increased swelling and she was in pain. Only tolerated 1 length about 50 feet of ambulation. Pt received instruction to elevate legs above heart and to pump her ankles to help reduce swelling. Will progress patient as tolerated.     Rachel Is progressing well towards her goals.   Pt prognosis is Good.     Pt will continue to benefit from skilled outpatient physical therapy to address the deficits listed in the problem list box on initial evaluation, provide pt/family education and to maximize pt's level of independence in the home and community environment.     Pt's spiritual, cultural and educational needs considered and pt agreeable to plan of care and goals.     Anticipated barriers to physical therapy: None     Goals:   Short Term Goals:  4 weeks  1.Report decreased  in  pain at worse less than  <   / =  5  /10  to increase tolerance for improved functional actvities. On going  2. Pt to improve range of motion by 25% to allow for improved functional mobility to allow for improvement in IADLs. On going  3. Increased B LE MMT 1/2 grade  to increase tolerance for ADL and work activities.On going  4. Pt to tolerate HEP to improve ROM and independence with  ADL's. On going     Long Term Goals: 8 weeks  1.Report decreased  in  pain at worse  less than  <   / =  2  /10  to increase tolerance for improved functional actvities. On going  2.Pt to improve range of motion by 75% to allow for improved functional mobility to allow for improvement in IADLs. On going  3. Increased B LE MMT 1 grade  to increase tolerance for ADL and work activities.On going  4. Pt will score at least 20% limitation or less on  FOTO outcome assessment  to increase functional activities and mobility. On going  5. Pt to be Independent with HEP to improve ROM and independence with ADL's. On going  6. Pt will be able to walk with least assistive device for 20 min in order to show tolerance for mobility. Ongoing   Plan   Plan of care Certification: 3/10/2025 to 6/10/2025.     Outpatient Physical Therapy 2 times weekly for 8 weeks to include the following interventions: Gait Training, Manual Therapy, Moist Heat/ Ice, Neuromuscular Re-ed, Patient Education, Self Care, Therapeutic Activities, and Therapeutic Exercise. Dry needling.     Continue with plan of care     Miki Hitchcock Jr, PT   4/10/2025

## 2025-04-11 ENCOUNTER — TELEPHONE (OUTPATIENT)
Dept: ORTHOPEDICS | Facility: CLINIC | Age: 85
End: 2025-04-11
Payer: MEDICARE

## 2025-04-11 NOTE — TELEPHONE ENCOUNTER
----- Message from Lauren sent at 4/11/2025  1:07 PM CDT -----  Type: General Call Back Name of Caller:PtWould the patient rather a call back or a response via [facility]? [facility] Call Back Number: [phone]  Additional Information: Pt had appt scheduled for 1:00 but was unable to make it due to traffic please contact pt with further information I tried to schedule for another day but was unable to schedule due to next appt being in May and pt need sooner appt.

## 2025-04-16 ENCOUNTER — CLINICAL SUPPORT (OUTPATIENT)
Dept: REHABILITATION | Facility: HOSPITAL | Age: 85
End: 2025-04-16
Payer: MEDICARE

## 2025-04-16 DIAGNOSIS — Z74.09 IMPAIRED FUNCTIONAL MOBILITY, BALANCE, GAIT, AND ENDURANCE: ICD-10-CM

## 2025-04-16 DIAGNOSIS — R53.1 WEAKNESS: Primary | ICD-10-CM

## 2025-04-16 PROCEDURE — 97110 THERAPEUTIC EXERCISES: CPT | Mod: PN

## 2025-04-16 PROCEDURE — 97530 THERAPEUTIC ACTIVITIES: CPT | Mod: PN

## 2025-04-16 NOTE — PROGRESS NOTES
"[facility]   Physical Therapy Treatment Note     Name: Rachel Asif  Clinic Number: [id number]    Therapy Diagnosis:   Encounter Diagnoses   Name Primary?    Weakness Yes    Impaired functional mobility, balance, gait, and endurance      Physician: Jacqueline Chow NP    Visit Date: 4/16/2025    Physician Orders: PT Eval and Treat   Medical Diagnosis from Referral: M25.571,M25.572 (ICD-10-CM) - Bilateral ankle pain, unspecified chronicity   Evaluation Date: 3/10/2025  Authorization Period Expiration: 12/31/2025  Plan of Care Expiration: 6/10/2025  Visit # / Visits authorized: 1/ 6 Progress Note Due: 4/10/2025  FOTO: 1/ 1     Precautions: History of multiple falls, episodes of dizziness        Visit #: 1 of 6  PTA Visit #: 1  Time In: 11:00 pm   Time Out: 11:47pm   Total Billable Time: 45 minutes     Subjective     Pt reports: ankles are doing better today.   She was compliant with home exercise program.  Response to previous treatment: first treatment day   Functional change: ongoing     Pain: 0/10  Location: bilateral ankles     Objective     Rachel received therapeutic exercises to develop strength, endurance, ROM, and flexibility for 30 minutes including:  Ankle pumps x 30  Seated marches x 20   Seated ankle circles x 30 each way   Seated BAPs circles , PF/DF (1 min each )  Seated ABD GTB 20 x 5"  Seated ADD 20 x 5"  Seated heel raises 10# KB x 30 (cueing required)  Foot gym bilateral YTB x 30  Seated calf stretch with blue strap 3x30" holds bilateral   Standing heel raises, 2x10 with rolling walker (resume next visit if time)     Resume next visit if time and ankle pain is decreased   At parallel bars:    Standing hip abduction bilateral 2x10 with BUE support   Lateral side steps along the parallel bars 2 laps with BUE support       Rachel received the following manual therapy techniques: Joint mobilizations and Soft tissue Mobilization were applied to the: N/A for 00 minutes, " including:      Rachel participated in neuromuscular re-education activities to improve: Balance, Coordination, Sense, Proprioception, and Posture for 00 minutes. The following activities were included:      Rachel participated in dynamic functional therapeutic activities to improve functional performance for 15 minutes, including:  Nu-steps for 10 minutes Level 1 (no time)  STS EOM 3 x 6 ( 4 on last set today)  Ambulates with RW in therapy gym, CGA x 4 lengths on turf        Rachel participated in gait training to improve functional mobility and safety for 00 minutes, including:        Home Exercises Provided and Patient Education Provided     Written Home Exercises Provided: yes.  Exercises were reviewed and Rachel was able to demonstrate them prior to the end of the session.  Rachel demonstrated good  understanding of the education provided.     Education provided:   - home exercise program     Assessment   Pt continues to present in a wheelchair each session. However, she did not have any ankle pain today which allowed more ambulation today. She was able to tolerate ambulating, but endurance was more of a limiting factor than her ankles. She demonstrates strength for ambulation and rising up and down from a chair. Will continue to emphasize B LE strengthening and increase endurance for weightbearing and ambulation as long as B ankle pain is mild. Will progress patient as tolerated to PLOF.     Rachel Is progressing well towards her goals.   Pt prognosis is Good.     Pt will continue to benefit from skilled outpatient physical therapy to address the deficits listed in the problem list box on initial evaluation, provide pt/family education and to maximize pt's level of independence in the home and community environment.     Pt's spiritual, cultural and educational needs considered and pt agreeable to plan of care and goals.     Anticipated barriers to physical therapy: None     Goals:   Short Term Goals:  4  weeks  1.Report decreased  in  pain at worse less than  <   / =  5  /10  to increase tolerance for improved functional actvities. On going  2. Pt to improve range of motion by 25% to allow for improved functional mobility to allow for improvement in IADLs. On going  3. Increased B LE MMT 1/2 grade  to increase tolerance for ADL and work activities.On going  4. Pt to tolerate HEP to improve ROM and independence with ADL's. On going     Long Term Goals: 8 weeks  1.Report decreased  in  pain at worse  less than  <   / =  2  /10  to increase tolerance for improved functional actvities. On going  2.Pt to improve range of motion by 75% to allow for improved functional mobility to allow for improvement in IADLs. On going  3. Increased B LE MMT 1 grade  to increase tolerance for ADL and work activities.On going  4. Pt will score at least 20% limitation or less on  FOTO outcome assessment  to increase functional activities and mobility. On going  5. Pt to be Independent with HEP to improve ROM and independence with ADL's. On going  6. Pt will be able to walk with least assistive device for 20 min in order to show tolerance for mobility. Ongoing   Plan   Plan of care Certification: 3/10/2025 to 6/10/2025.     Outpatient Physical Therapy 2 times weekly for 8 weeks to include the following interventions: Gait Training, Manual Therapy, Moist Heat/ Ice, Neuromuscular Re-ed, Patient Education, Self Care, Therapeutic Activities, and Therapeutic Exercise. Dry needling.     Continue with plan of care     Miki Hitchcock Jr, PT   4/16/2025

## 2025-04-28 ENCOUNTER — CLINICAL SUPPORT (OUTPATIENT)
Dept: AUDIOLOGY | Facility: CLINIC | Age: 85
End: 2025-04-28
Payer: MEDICARE

## 2025-04-28 DIAGNOSIS — H90.3 SENSORINEURAL HEARING LOSS (SNHL) OF BOTH EARS: Primary | ICD-10-CM

## 2025-04-28 DIAGNOSIS — H91.90 HEARING LOSS, UNSPECIFIED HEARING LOSS TYPE, UNSPECIFIED LATERALITY: ICD-10-CM

## 2025-04-28 PROCEDURE — 92567 TYMPANOMETRY: CPT | Mod: S$GLB,,,

## 2025-04-28 PROCEDURE — 92557 COMPREHENSIVE HEARING TEST: CPT | Mod: S$GLB,,,

## 2025-05-02 ENCOUNTER — CLINICAL SUPPORT (OUTPATIENT)
Dept: REHABILITATION | Facility: HOSPITAL | Age: 85
End: 2025-05-02
Payer: MEDICARE

## 2025-05-02 DIAGNOSIS — Z74.09 IMPAIRED FUNCTIONAL MOBILITY, BALANCE, GAIT, AND ENDURANCE: ICD-10-CM

## 2025-05-02 DIAGNOSIS — R53.1 WEAKNESS: Primary | ICD-10-CM

## 2025-05-02 PROCEDURE — 97530 THERAPEUTIC ACTIVITIES: CPT | Mod: PN

## 2025-05-02 PROCEDURE — 97110 THERAPEUTIC EXERCISES: CPT | Mod: PN

## 2025-05-02 NOTE — PROGRESS NOTES
"[facility]   Physical Therapy Treatment Note     Name: Rachel Asif  Clinic Number: [id number]    Therapy Diagnosis:   Encounter Diagnoses   Name Primary?    Weakness Yes    Impaired functional mobility, balance, gait, and endurance      Physician: Jacqueline Chow NP    Visit Date: 5/2/2025    Physician Orders: PT Eval and Treat   Medical Diagnosis from Referral: M25.571,M25.572 (ICD-10-CM) - Bilateral ankle pain, unspecified chronicity   Evaluation Date: 3/10/2025  Authorization Period Expiration: 12/31/2025  Plan of Care Expiration: 6/10/2025  Visit # / Visits authorized: 4/ 6 Progress Note Due: 4/10/2025 (PT ASSESS NEXT TIME)  FOTO: 1/ 1     Precautions: History of multiple falls, episodes of dizziness        Visit #: 1 of 6  PTA Visit #: 1  Time In: 11:00 pm   Time Out: 11:47pm   Total Billable Time: 45 minutes     Subjective     Pt reports: feeling pretty good and ankles aren't bothering her too much today.    She was compliant with home exercise program.  Response to previous treatment: first treatment day   Functional change: ongoing     Pain: 0/10  Location: bilateral ankles     Objective     Rachel received therapeutic exercises to develop strength, endurance, ROM, and flexibility for 15 minutes including:  Ankle pumps x 30  Seated marches x 20  Seated heel raises 10# kb 2 x 20   Standing heel raises --> Attempted at parallel bars but too difficulty    Seated ankle circles x 30 each way   Seated BAPs circles , PF/DF (1 min each )  Seated ABD GTB 20 x 5"  Seated ADD 20 x 5"  Foot gym bilateral YTB x 30  Seated calf stretch with blue strap 3x30" holds bilateral       Resume next visit if time and ankle pain is decreased   At parallel bars:    Standing hip abduction bilateral 2x10 with BUE support   Lateral side steps along the parallel bars 2 laps with BUE support       Rachel received the following manual therapy techniques: Joint mobilizations and Soft tissue Mobilization " were applied to the: N/A for 00 minutes, including:      Rachel participated in neuromuscular re-education activities to improve: Balance, Coordination, Sense, Proprioception, and Posture for 00 minutes. The following activities were included:      Rachel participated in dynamic functional therapeutic activities to improve functional performance for 30 minutes, including:  Nu-steps for 10 minutes Level 1 (no time)  Step ups 4 inch (fatigued after 5 reps L LE)  STS standard chair+foam 3 x 6   Ambulates with RW in therapy gym, CGA x 4 lengths on turf  (parallel bars to front door today end of session)      Rachel participated in gait training to improve functional mobility and safety for 00 minutes, including:        Home Exercises Provided and Patient Education Provided     Written Home Exercises Provided: yes.  Exercises were reviewed and Rachel was able to demonstrate them prior to the end of the session.  Rachel demonstrated good  understanding of the education provided.     Education provided:   - home exercise program     Assessment   Pt continues to present in a wheelchair each session. However, she is able to ambulate with walker and able to get up and down from a chair. B ankles were not painful today. Will progress patient as tolerated to PLOF.     Rachel Is progressing well towards her goals.   Pt prognosis is Good.     Pt will continue to benefit from skilled outpatient physical therapy to address the deficits listed in the problem list box on initial evaluation, provide pt/family education and to maximize pt's level of independence in the home and community environment.     Pt's spiritual, cultural and educational needs considered and pt agreeable to plan of care and goals.     Anticipated barriers to physical therapy: None     Goals:   Short Term Goals:  4 weeks  1.Report decreased  in  pain at worse less than  <   / =  5  /10  to increase tolerance for improved functional actvities. On going  2.  Pt to improve range of motion by 25% to allow for improved functional mobility to allow for improvement in IADLs. On going  3. Increased B LE MMT 1/2 grade  to increase tolerance for ADL and work activities.On going  4. Pt to tolerate HEP to improve ROM and independence with ADL's. On going     Long Term Goals: 8 weeks  1.Report decreased  in  pain at worse  less than  <   / =  2  /10  to increase tolerance for improved functional actvities. On going  2.Pt to improve range of motion by 75% to allow for improved functional mobility to allow for improvement in IADLs. On going  3. Increased B LE MMT 1 grade  to increase tolerance for ADL and work activities.On going  4. Pt will score at least 20% limitation or less on  FOTO outcome assessment  to increase functional activities and mobility. On going  5. Pt to be Independent with HEP to improve ROM and independence with ADL's. On going  6. Pt will be able to walk with least assistive device for 20 min in order to show tolerance for mobility. Ongoing   Plan   Plan of care Certification: 3/10/2025 to 6/10/2025.     Outpatient Physical Therapy 2 times weekly for 8 weeks to include the following interventions: Gait Training, Manual Therapy, Moist Heat/ Ice, Neuromuscular Re-ed, Patient Education, Self Care, Therapeutic Activities, and Therapeutic Exercise. Dry needling.     Continue with plan of care     Miki Hitchcock Jr, PT   5/2/2025

## 2025-05-09 ENCOUNTER — CLINICAL SUPPORT (OUTPATIENT)
Dept: REHABILITATION | Facility: HOSPITAL | Age: 85
End: 2025-05-09
Payer: MEDICARE

## 2025-05-09 DIAGNOSIS — R53.1 WEAKNESS: Primary | ICD-10-CM

## 2025-05-09 DIAGNOSIS — Z74.09 IMPAIRED FUNCTIONAL MOBILITY, BALANCE, GAIT, AND ENDURANCE: ICD-10-CM

## 2025-05-09 PROCEDURE — 97110 THERAPEUTIC EXERCISES: CPT | Mod: PN

## 2025-05-09 PROCEDURE — 97530 THERAPEUTIC ACTIVITIES: CPT | Mod: PN

## 2025-05-09 NOTE — PROGRESS NOTES
"[facility]   Physical Therapy Treatment Note     Name: Rachel Asif  Clinic Number: [id number]    Therapy Diagnosis:   Encounter Diagnoses   Name Primary?    Weakness Yes    Impaired functional mobility, balance, gait, and endurance      Physician: Jacqueline Chow NP    Visit Date: 5/9/2025    Physician Orders: PT Eval and Treat   Medical Diagnosis from Referral: M25.571,M25.572 (ICD-10-CM) - Bilateral ankle pain, unspecified chronicity   Evaluation Date: 3/10/2025  Authorization Period Expiration: 12/31/2025  Plan of Care Expiration: 6/10/2025  Visit # / Visits authorized: 5/ 6 Progress Note Due: 4/10/2025 (PT ASSESS NEXT TIME)  FOTO: 1/ 1     Precautions: History of multiple falls, episodes of dizziness        Visit #: 1 of 6  PTA Visit #: 1  Time In: 12:00 pm   Time Out: 12:46pm   Total Billable Time: 40 minutes     Subjective     Pt reports: ankles continue to do okay and she has new shoes.    She was compliant with home exercise program.  Response to previous treatment: first treatment day   Functional change: ongoing     Pain: 0/10  Location: bilateral ankles     Objective     Rachel received therapeutic exercises to develop strength, endurance, ROM, and flexibility for 15 minutes including:  Ankle pumps x 30  Seated marches x 20 3#   LAQ x 20 3#   Seated heel raises 10# kb x 30   Standing heel raises --> Attempted at parallel bars but too difficulty    Seated ankle circles x 30 each way   Seated BAPs circles , PF/DF (1 min each )  Seated ABD GTB 20 x 5"  Seated ADD 20 x 5"  Foot gym bilateral YTB x 30  Seated calf stretch with blue strap 3x30" holds bilateral       Resume next visit if time and ankle pain is decreased   At parallel bars:    Standing hip abduction bilateral 2x10 with BUE support   Lateral side steps along the parallel bars 2 laps with BUE support       Rachel received the following manual therapy techniques: Joint mobilizations and Soft tissue Mobilization " were applied to the: N/A for 00 minutes, including:      Rachel participated in neuromuscular re-education activities to improve: Balance, Coordination, Sense, Proprioception, and Posture for 00 minutes. The following activities were included:      Rachel participated in dynamic functional therapeutic activities to improve functional performance for 25 minutes, including:  Nu-steps for 6 minutes Level 1   STS standard chair+foam 3 x 6   Ambulates with RW in therapy gym, 2 x as tolerated    Step ups 4 inch (fatigued after 5 reps L LE)         Rachel participated in gait training to improve functional mobility and safety for 00 minutes, including:        Home Exercises Provided and Patient Education Provided     Written Home Exercises Provided: yes.  Exercises were reviewed and Rachel was able to demonstrate them prior to the end of the session.  Rachel demonstrated good  understanding of the education provided.     Education provided:   - home exercise program     Assessment   B ankle pain continues to be improved. Emphasis is on endurance with ambulation. She has the strength to ambulate and get up from a chair but progress is required to increase stamina for ADLs. Will progress patient as tolerated to PLOF.     Rachel Is progressing well towards her goals.   Pt prognosis is Good.     Pt will continue to benefit from skilled outpatient physical therapy to address the deficits listed in the problem list box on initial evaluation, provide pt/family education and to maximize pt's level of independence in the home and community environment.     Pt's spiritual, cultural and educational needs considered and pt agreeable to plan of care and goals.     Anticipated barriers to physical therapy: None     Goals:   Short Term Goals:  4 weeks  1.Report decreased  in  pain at worse less than  <   / =  5  /10  to increase tolerance for improved functional actvities. On going  2. Pt to improve range of motion by 25% to allow  for improved functional mobility to allow for improvement in IADLs. On going  3. Increased B LE MMT 1/2 grade  to increase tolerance for ADL and work activities.On going  4. Pt to tolerate HEP to improve ROM and independence with ADL's. On going     Long Term Goals: 8 weeks  1.Report decreased  in  pain at worse  less than  <   / =  2  /10  to increase tolerance for improved functional actvities. On going  2.Pt to improve range of motion by 75% to allow for improved functional mobility to allow for improvement in IADLs. On going  3. Increased B LE MMT 1 grade  to increase tolerance for ADL and work activities.On going  4. Pt will score at least 20% limitation or less on  FOTO outcome assessment  to increase functional activities and mobility. On going  5. Pt to be Independent with HEP to improve ROM and independence with ADL's. On going  6. Pt will be able to walk with least assistive device for 20 min in order to show tolerance for mobility. Ongoing   Plan   Plan of care Certification: 3/10/2025 to 6/10/2025.     Outpatient Physical Therapy 2 times weekly for 8 weeks to include the following interventions: Gait Training, Manual Therapy, Moist Heat/ Ice, Neuromuscular Re-ed, Patient Education, Self Care, Therapeutic Activities, and Therapeutic Exercise. Dry needling.     Continue with plan of care     Miki Hitchcock Jr, PT   5/9/2025

## 2025-05-14 DIAGNOSIS — M54.2 CHRONIC NECK PAIN: ICD-10-CM

## 2025-05-14 DIAGNOSIS — G89.29 CHRONIC MIDLINE LOW BACK PAIN WITH RIGHT-SIDED SCIATICA: ICD-10-CM

## 2025-05-14 DIAGNOSIS — G89.29 CHRONIC NECK PAIN: ICD-10-CM

## 2025-05-14 DIAGNOSIS — M54.41 CHRONIC MIDLINE LOW BACK PAIN WITH RIGHT-SIDED SCIATICA: ICD-10-CM

## 2025-05-14 RX ORDER — TRAMADOL HYDROCHLORIDE 50 MG/1
50 TABLET, FILM COATED ORAL 3 TIMES DAILY PRN
Qty: 90 TABLET | Refills: 0 | Status: SHIPPED | OUTPATIENT
Start: 2025-05-14

## 2025-05-14 NOTE — TELEPHONE ENCOUNTER
----- Message from Med Assistant Neda sent at 5/14/2025 10:55 AM CDT -----  Regarding: Refill Request  Who Called: Luz daughter of ERNESTO LOVE [[id number]] New Prescription or Refill : RefillRX Name and Strength:  traMADoL (ULTRAM) 50 mg tablet  30 day or 90 day RX: 30 Local or Mail Order : local  Preferred Pharmacy:[id number] [place] [address] Would the patient rather a call back or a response via [facility]? [facility] Call Back Number:  [phone]

## 2025-05-14 NOTE — TELEPHONE ENCOUNTER
No care due was identified.  [facility] Embedded Care Due Messages. Reference number: [id number].   5/14/2025 11:08:06 AM CDT

## 2025-05-16 ENCOUNTER — CLINICAL SUPPORT (OUTPATIENT)
Dept: REHABILITATION | Facility: HOSPITAL | Age: 85
End: 2025-05-16
Payer: MEDICARE

## 2025-05-16 DIAGNOSIS — Z74.09 IMPAIRED FUNCTIONAL MOBILITY, BALANCE, GAIT, AND ENDURANCE: ICD-10-CM

## 2025-05-16 DIAGNOSIS — R53.1 WEAKNESS: Primary | ICD-10-CM

## 2025-05-16 PROCEDURE — 97110 THERAPEUTIC EXERCISES: CPT | Mod: PN

## 2025-05-16 PROCEDURE — 97530 THERAPEUTIC ACTIVITIES: CPT | Mod: PN

## 2025-05-16 NOTE — PROGRESS NOTES
"[facility]   Physical Therapy Treatment Note     Name: Rachel Asif  Clinic Number: [id number]    Therapy Diagnosis:   Encounter Diagnoses   Name Primary?    Weakness Yes    Impaired functional mobility, balance, gait, and endurance      Physician: Jacqueline Chow NP    Visit Date: 5/16/2025    Physician Orders: PT Eval and Treat   Medical Diagnosis from Referral: M25.571,M25.572 (ICD-10-CM) - Bilateral ankle pain, unspecified chronicity   Evaluation Date: 3/10/2025  Authorization Period Expiration: 12/31/2025  Plan of Care Expiration: 6/10/2025  Visit # / Visits authorized: 5/ 6 Progress Note Due: 4/10/2025 (PT ASSESS NEXT TIME)  FOTO: 1/ 1     Precautions: History of multiple falls, episodes of dizziness        Visit #: 1 of 6  PTA Visit #: 1  Time In: 12:00 pm   Time Out: 12:46pm   Total Billable Time: 40 minutes     Subjective     Pt reports:doing okay with no ankle pain .     She was compliant with home exercise program.  Response to previous treatment: first treatment day   Functional change: ongoing     Pain: 0/10  Location: bilateral ankles     Objective     Rachel received therapeutic exercises to develop strength, endurance, ROM, and flexibility for 15 minutes including:  Ankle pumps x 30  Seated marches x 20 3#   LAQ x 20 3#   Seated heel raises 10# kb x 30   Standing hip ABD  x 10 each   Standing heel raises --> Attempted at parallel bars but too difficulty    Seated ankle circles x 30 each way   Seated BAPs circles , PF/DF (1 min each )  Seated ABD GTB 20 x 5"  Seated ADD 20 x 5"  Foot gym bilateral YTB x 30  Seated calf stretch with blue strap 3x30" holds bilateral       Resume next visit if time and ankle pain is decreased   At parallel bars:    Standing hip abduction bilateral 2x10 with BUE support   Lateral side steps along the parallel bars 2 laps with BUE support       Rachel received the following manual therapy techniques: Joint mobilizations and Soft tissue " Mobilization were applied to the: N/A for 00 minutes, including:      Rachel participated in neuromuscular re-education activities to improve: Balance, Coordination, Sense, Proprioception, and Posture for 00 minutes. The following activities were included:      Rachel participated in dynamic functional therapeutic activities to improve functional performance for 25 minutes, including:  Nu-steps for 10 minutes Level 1   STS standard chair+foam 3 x 6   Ambulates with RW in therapy gym, 3 x as tolerated    Step ups 4 inch (fatigued after 5 reps L LE)         Rachel participated in gait training to improve functional mobility and safety for 00 minutes, including:        Home Exercises Provided and Patient Education Provided     Written Home Exercises Provided: yes.  Exercises were reviewed and Rachel was able to demonstrate them prior to the end of the session.  Rachel demonstrated good  understanding of the education provided.     Education provided:   - home exercise program     Assessment   Focus continues to be endurance with weightbearing and walking. Pt was able to do 3 bouts of ambulation as tolerated which was about 1 min each bout. Provided instructions to her and daughter to ambulate more often throughout the day as tolerated  (1 min to 1.5 min ) to maintain ability to walk.     Rachel Is progressing well towards her goals.   Pt prognosis is Good.     Pt will continue to benefit from skilled outpatient physical therapy to address the deficits listed in the problem list box on initial evaluation, provide pt/family education and to maximize pt's level of independence in the home and community environment.     Pt's spiritual, cultural and educational needs considered and pt agreeable to plan of care and goals.     Anticipated barriers to physical therapy: None     Goals:   Short Term Goals:  4 weeks  1.Report decreased  in  pain at worse less than  <   / =  5  /10  to increase tolerance for improved  functional actvities. On going  2. Pt to improve range of motion by 25% to allow for improved functional mobility to allow for improvement in IADLs. On going  3. Increased B LE MMT 1/2 grade  to increase tolerance for ADL and work activities.On going  4. Pt to tolerate HEP to improve ROM and independence with ADL's. On going     Long Term Goals: 8 weeks  1.Report decreased  in  pain at worse  less than  <   / =  2  /10  to increase tolerance for improved functional actvities. On going  2.Pt to improve range of motion by 75% to allow for improved functional mobility to allow for improvement in IADLs. On going  3. Increased B LE MMT 1 grade  to increase tolerance for ADL and work activities.On going  4. Pt will score at least 20% limitation or less on  FOTO outcome assessment  to increase functional activities and mobility. On going  5. Pt to be Independent with HEP to improve ROM and independence with ADL's. On going  6. Pt will be able to walk with least assistive device for 20 min in order to show tolerance for mobility. Ongoing   Plan   Plan of care Certification: 3/10/2025 to 6/10/2025.     Outpatient Physical Therapy 2 times weekly for 8 weeks to include the following interventions: Gait Training, Manual Therapy, Moist Heat/ Ice, Neuromuscular Re-ed, Patient Education, Self Care, Therapeutic Activities, and Therapeutic Exercise. Dry needling.     Continue with plan of care     Miki Hitchcock Jr, PT   5/16/2025

## 2025-05-23 ENCOUNTER — CLINICAL SUPPORT (OUTPATIENT)
Dept: REHABILITATION | Facility: HOSPITAL | Age: 85
End: 2025-05-23
Payer: MEDICARE

## 2025-05-23 DIAGNOSIS — Z74.09 IMPAIRED FUNCTIONAL MOBILITY, BALANCE, GAIT, AND ENDURANCE: ICD-10-CM

## 2025-05-23 DIAGNOSIS — R53.1 WEAKNESS: Primary | ICD-10-CM

## 2025-05-23 PROCEDURE — 97530 THERAPEUTIC ACTIVITIES: CPT | Mod: PN

## 2025-05-23 PROCEDURE — 97110 THERAPEUTIC EXERCISES: CPT | Mod: PN

## 2025-05-26 NOTE — PROGRESS NOTES
"[facility]   Physical Therapy Treatment Note     Name: Rachel Asif  Clinic Number: [id number]    Therapy Diagnosis:   Encounter Diagnoses   Name Primary?    Weakness Yes    Impaired functional mobility, balance, gait, and endurance      Physician: Jacqueline Chow NP    Visit Date: 5/23/2025    Physician Orders: PT Eval and Treat   Medical Diagnosis from Referral: M25.571,M25.572 (ICD-10-CM) - Bilateral ankle pain, unspecified chronicity   Evaluation Date: 3/10/2025  Authorization Period Expiration: 12/31/2025  Plan of Care Expiration: 6/10/2025  Visit # / Visits authorized: 5/ 6 Progress Note Due: 4/10/2025 (PT ASSESS NEXT TIME)  FOTO: 1/ 1     Precautions: History of multiple falls, episodes of dizziness        Visit #: 1 of 6  PTA Visit #: 1  Time In: 12:00 pm   Time Out: 12:46pm   Total Billable Time: 40 minutes     Subjective     Pt reports: she continues to do well with no ankle pain and is using her walker to ambulate more around the house.     She was compliant with home exercise program.  Response to previous treatment: first treatment day   Functional change: ongoing     Pain: 0/10  Location: bilateral ankles     Objective     Rachel received therapeutic exercises to develop strength, endurance, ROM, and flexibility for 15 minutes including:  Ankle pumps x 30  Seated marches x 20 3#   LAQ x 20 3#   +HS curls 2 x 12 GTB  Seated heel raises 10# kb x 30   Standing hip ABD  x 10 each (not today)  Standing heel raises --> Attempted at parallel bars but too difficulty    Seated ankle circles x 30 each way   Seated BAPs circles , PF/DF (1 min each )  Seated ABD GTB 20 x 5"  Seated ADD 20 x 5"  Foot gym bilateral YTB x 30  Seated calf stretch with blue strap 3x30" holds bilateral       Resume next visit if time and ankle pain is decreased   At parallel bars:    Standing hip abduction bilateral 2x10 with BUE support   Lateral side steps along the parallel bars 2 laps with BUE " support       Rachel received the following manual therapy techniques: Joint mobilizations and Soft tissue Mobilization were applied to the: N/A for 00 minutes, including:      Rachel participated in neuromuscular re-education activities to improve: Balance, Coordination, Sense, Proprioception, and Posture for 00 minutes. The following activities were included:      Rachel participated in dynamic functional therapeutic activities to improve functional performance for 25 minutes, including:  Nu-steps for 10 minutes Level 1   STS standard chair+foam 3 x 6   Ambulates with RW in therapy gym, 3 x as tolerated    Step ups 4 inch (fatigued after 5 reps L LE)         Rachel participated in gait training to improve functional mobility and safety for 00 minutes, including:        Home Exercises Provided and Patient Education Provided     Written Home Exercises Provided: yes.  Exercises were reviewed and Rachel was able to demonstrate them prior to the end of the session.  Rachel demonstrated good  understanding of the education provided.     Education provided:   - home exercise program     Assessment   Pt is nearing a possible discharge in the next few sessions. Her ankle pain remains improved. Right now, emphasis is on endurance with ambulation and getting in and out of a chair. Pt did not eat before session, so intensity was low for her capacity level. Will resume normal treatment next session.     Rachel Is progressing well towards her goals.   Pt prognosis is Good.     Pt will continue to benefit from skilled outpatient physical therapy to address the deficits listed in the problem list box on initial evaluation, provide pt/family education and to maximize pt's level of independence in the home and community environment.     Pt's spiritual, cultural and educational needs considered and pt agreeable to plan of care and goals.     Anticipated barriers to physical therapy: None     Goals:   Short Term Goals:  4  weeks  1.Report decreased  in  pain at worse less than  <   / =  5  /10  to increase tolerance for improved functional actvities. On going  2. Pt to improve range of motion by 25% to allow for improved functional mobility to allow for improvement in IADLs. On going  3. Increased B LE MMT 1/2 grade  to increase tolerance for ADL and work activities.On going  4. Pt to tolerate HEP to improve ROM and independence with ADL's. On going     Long Term Goals: 8 weeks  1.Report decreased  in  pain at worse  less than  <   / =  2  /10  to increase tolerance for improved functional actvities. On going  2.Pt to improve range of motion by 75% to allow for improved functional mobility to allow for improvement in IADLs. On going  3. Increased B LE MMT 1 grade  to increase tolerance for ADL and work activities.On going  4. Pt will score at least 20% limitation or less on  FOTO outcome assessment  to increase functional activities and mobility. On going  5. Pt to be Independent with HEP to improve ROM and independence with ADL's. On going  6. Pt will be able to walk with least assistive device for 20 min in order to show tolerance for mobility. Ongoing   Plan   Plan of care Certification: 3/10/2025 to 6/10/2025.     Outpatient Physical Therapy 2 times weekly for 8 weeks to include the following interventions: Gait Training, Manual Therapy, Moist Heat/ Ice, Neuromuscular Re-ed, Patient Education, Self Care, Therapeutic Activities, and Therapeutic Exercise. Dry needling.     Continue with plan of care     Miki Hitchcock Jr, PT   5/26/2025

## 2025-05-30 ENCOUNTER — CLINICAL SUPPORT (OUTPATIENT)
Dept: REHABILITATION | Facility: HOSPITAL | Age: 85
End: 2025-05-30
Payer: MEDICARE

## 2025-05-30 DIAGNOSIS — Z74.09 IMPAIRED FUNCTIONAL MOBILITY, BALANCE, GAIT, AND ENDURANCE: ICD-10-CM

## 2025-05-30 DIAGNOSIS — R53.1 WEAKNESS: Primary | ICD-10-CM

## 2025-05-30 PROCEDURE — 97530 THERAPEUTIC ACTIVITIES: CPT | Mod: PN

## 2025-05-31 DIAGNOSIS — N18.4 BENIGN HYPERTENSION WITH CHRONIC KIDNEY DISEASE, STAGE IV: ICD-10-CM

## 2025-05-31 DIAGNOSIS — I12.9 BENIGN HYPERTENSION WITH CHRONIC KIDNEY DISEASE, STAGE IV: ICD-10-CM

## 2025-05-31 DIAGNOSIS — E78.5 HYPERLIPIDEMIA WITH TARGET LDL LESS THAN 100: ICD-10-CM

## 2025-06-04 RX ORDER — ATORVASTATIN CALCIUM 20 MG/1
20 TABLET, FILM COATED ORAL
Qty: 90 TABLET | Refills: 0 | Status: SHIPPED | OUTPATIENT
Start: 2025-06-04

## 2025-06-04 RX ORDER — OLMESARTAN MEDOXOMIL AND HYDROCHLOROTHIAZIDE 40/25 40; 25 MG/1; MG/1
1 TABLET ORAL
Qty: 90 TABLET | Refills: 1 | Status: SHIPPED | OUTPATIENT
Start: 2025-06-04

## 2025-06-30 ENCOUNTER — TELEPHONE (OUTPATIENT)
Dept: FAMILY MEDICINE | Facility: CLINIC | Age: 85
End: 2025-06-30
Payer: MEDICARE

## 2025-06-30 DIAGNOSIS — M54.41 CHRONIC MIDLINE LOW BACK PAIN WITH RIGHT-SIDED SCIATICA: ICD-10-CM

## 2025-06-30 DIAGNOSIS — G89.29 CHRONIC MIDLINE LOW BACK PAIN WITH RIGHT-SIDED SCIATICA: ICD-10-CM

## 2025-06-30 DIAGNOSIS — G89.29 CHRONIC NECK PAIN: ICD-10-CM

## 2025-06-30 DIAGNOSIS — M54.2 CHRONIC NECK PAIN: ICD-10-CM

## 2025-06-30 RX ORDER — TRAMADOL HYDROCHLORIDE 50 MG/1
50 TABLET, FILM COATED ORAL 3 TIMES DAILY PRN
Qty: 90 TABLET | Refills: 0 | Status: SHIPPED | OUTPATIENT
Start: 2025-06-30

## 2025-06-30 NOTE — TELEPHONE ENCOUNTER
Copied from CRM #[id number]. Topic: Medications - Medication Refill  >> Jun 30, 2025  2:35 PM Cornelius wrote:  Type: RX Refill Request    Who Called: pt's daughter     Have you contacted your pharmacy:     Refill or New Rx:refill     RX Name and Strength:traMADoL (ULTRAM) 50 mg tablet    How is the patient currently taking it? (ex. 1XDay):    Is this a 30 day or 90 day RX:    Preferred Pharmacy with phone number:  [id number] - [place], [place]  [address] [facility] [place] [address]  [place] [address]  Phone: [phone] Fax: [phone]    Local or Mail Order:local     Ordering Provider:Oneyda Pace     Would the patient rather a call back or a response via [facility]? Call back     [facility] Call Back Number:[phone]      Additional Information:  >> Jun 30, 2025  4:21 PM Med Assistant Danii wrote:    ----- Message -----  From: Cornelius Alford  Sent: 6/30/2025   2:37 PM CDT  To: Sanjeev EM Staff  I sent the prescription(s) to the pharmacy on file.

## 2025-07-15 ENCOUNTER — INFUSION (OUTPATIENT)
Dept: INFUSION THERAPY | Facility: HOSPITAL | Age: 85
End: 2025-07-15
Attending: HOSPITALIST
Payer: MEDICARE

## 2025-07-15 ENCOUNTER — LAB VISIT (OUTPATIENT)
Dept: LAB | Facility: HOSPITAL | Age: 85
End: 2025-07-15
Attending: HOSPITALIST
Payer: MEDICARE

## 2025-07-15 VITALS
OXYGEN SATURATION: 98 % | TEMPERATURE: 98 F | SYSTOLIC BLOOD PRESSURE: 168 MMHG | DIASTOLIC BLOOD PRESSURE: 79 MMHG | HEART RATE: 97 BPM | RESPIRATION RATE: 16 BRPM

## 2025-07-15 DIAGNOSIS — M81.0 OSTEOPOROSIS, POST-MENOPAUSAL: Primary | ICD-10-CM

## 2025-07-15 DIAGNOSIS — N18.4 CHRONIC KIDNEY DISEASE, STAGE 4 (SEVERE): ICD-10-CM

## 2025-07-15 LAB
ALBUMIN SERPL BCP-MCNC: 3.6 G/DL (ref 3.5–5.2)
ANION GAP (OHS): 14 MMOL/L (ref 8–16)
BUN SERPL-MCNC: 20 MG/DL (ref 8–23)
CALCIUM SERPL-MCNC: 9.1 MG/DL (ref 8.7–10.5)
CHLORIDE SERPL-SCNC: 111 MMOL/L (ref 95–110)
CO2 SERPL-SCNC: 21 MMOL/L (ref 23–29)
CREAT SERPL-MCNC: 1.9 MG/DL (ref 0.5–1.4)
GFR SERPLBLD CREATININE-BSD FMLA CKD-EPI: 25 ML/MIN/1.73/M2
GLUCOSE SERPL-MCNC: 102 MG/DL (ref 70–110)
PHOSPHATE SERPL-MCNC: 3.5 MG/DL (ref 2.7–4.5)
POTASSIUM SERPL-SCNC: 3.5 MMOL/L (ref 3.5–5.1)
SODIUM SERPL-SCNC: 146 MMOL/L (ref 136–145)

## 2025-07-15 PROCEDURE — 63600175 PHARM REV CODE 636 W HCPCS: Mod: JZ,TB | Performed by: HOSPITALIST

## 2025-07-15 PROCEDURE — 36415 COLL VENOUS BLD VENIPUNCTURE: CPT

## 2025-07-15 PROCEDURE — 96372 THER/PROPH/DIAG INJ SC/IM: CPT

## 2025-07-15 PROCEDURE — 82310 ASSAY OF CALCIUM: CPT

## 2025-07-15 RX ADMIN — DENOSUMAB 60 MG: 60 INJECTION SUBCUTANEOUS at 03:07

## 2025-07-16 NOTE — PLAN OF CARE
Prolia injection administered and tolerated without difficulty. Appointments provided. Patient escorted via wheelchair by MA and discharged in NAD.    Problem: Fall Injury Risk  Goal: Absence of Fall and Fall-Related Injury  Outcome: Progressing

## 2025-07-17 ENCOUNTER — TELEPHONE (OUTPATIENT)
Dept: NEPHROLOGY | Facility: CLINIC | Age: 85
End: 2025-07-17
Payer: MEDICARE

## 2025-07-30 ENCOUNTER — TELEPHONE (OUTPATIENT)
Dept: NEPHROLOGY | Facility: CLINIC | Age: 85
End: 2025-07-30
Payer: MEDICARE

## 2025-08-01 ENCOUNTER — LAB VISIT (OUTPATIENT)
Dept: LAB | Facility: HOSPITAL | Age: 85
End: 2025-08-01
Attending: NURSE PRACTITIONER
Payer: MEDICARE

## 2025-08-01 DIAGNOSIS — N18.4 CHRONIC KIDNEY DISEASE, STAGE 4 (SEVERE): ICD-10-CM

## 2025-08-01 DIAGNOSIS — D53.1 OTHER MEGALOBLASTIC ANEMIAS, NOT ELSEWHERE CLASSIFIED: ICD-10-CM

## 2025-08-01 LAB
ABSOLUTE EOSINOPHIL (OHS): 0.03 K/UL
ABSOLUTE MONOCYTE (OHS): 0.61 K/UL (ref 0.3–1)
ABSOLUTE NEUTROPHIL COUNT (OHS): 5.76 K/UL (ref 1.8–7.7)
BASOPHILS # BLD AUTO: 0.04 K/UL
BASOPHILS NFR BLD AUTO: 0.5 %
CREAT UR-MCNC: 128.5 MG/DL (ref 15–325)
ERYTHROCYTE [DISTWIDTH] IN BLOOD BY AUTOMATED COUNT: 14.4 % (ref 11.5–14.5)
HCT VFR BLD AUTO: 36.2 % (ref 37–48.5)
HGB BLD-MCNC: 11.1 GM/DL (ref 12–16)
IMM GRANULOCYTES # BLD AUTO: 0.03 K/UL (ref 0–0.04)
IMM GRANULOCYTES NFR BLD AUTO: 0.4 % (ref 0–0.5)
IRON SATN MFR SERPL: 13 % (ref 20–50)
IRON SERPL-MCNC: 38 UG/DL (ref 30–160)
LYMPHOCYTES # BLD AUTO: 0.88 K/UL (ref 1–4.8)
MCH RBC QN AUTO: 29.2 PG (ref 27–31)
MCHC RBC AUTO-ENTMCNC: 30.7 G/DL (ref 32–36)
MCV RBC AUTO: 95 FL (ref 82–98)
NUCLEATED RBC (/100WBC) (OHS): 0 /100 WBC
PLATELET # BLD AUTO: 352 K/UL (ref 150–450)
PMV BLD AUTO: 9.4 FL (ref 9.2–12.9)
PROT UR-MCNC: 20 MG/DL
PROT/CREAT UR: 0.16 MG/G{CREAT}
PTH-INTACT SERPL-MCNC: 488.3 PG/ML (ref 9–77)
RBC # BLD AUTO: 3.8 M/UL (ref 4–5.4)
RELATIVE EOSINOPHIL (OHS): 0.4 %
RELATIVE LYMPHOCYTE (OHS): 12 % (ref 18–48)
RELATIVE MONOCYTE (OHS): 8.3 % (ref 4–15)
RELATIVE NEUTROPHIL (OHS): 78.4 % (ref 38–73)
TIBC SERPL-MCNC: 286 UG/DL (ref 250–450)
TRANSFERRIN SERPL-MCNC: 193 MG/DL (ref 200–375)
WBC # BLD AUTO: 7.35 K/UL (ref 3.9–12.7)

## 2025-08-01 PROCEDURE — 83540 ASSAY OF IRON: CPT

## 2025-08-01 PROCEDURE — 83970 ASSAY OF PARATHORMONE: CPT

## 2025-08-01 PROCEDURE — 84156 ASSAY OF PROTEIN URINE: CPT

## 2025-08-01 PROCEDURE — 82607 VITAMIN B-12: CPT

## 2025-08-01 PROCEDURE — 36415 COLL VENOUS BLD VENIPUNCTURE: CPT

## 2025-08-01 PROCEDURE — 85025 COMPLETE CBC W/AUTO DIFF WBC: CPT

## 2025-08-01 PROCEDURE — 82746 ASSAY OF FOLIC ACID SERUM: CPT

## 2025-08-02 LAB
FOLATE SERPL-MCNC: 5.2 NG/ML (ref 4–24)
VIT B12 SERPL-MCNC: 350 PG/ML (ref 210–950)

## 2025-08-04 ENCOUNTER — OFFICE VISIT (OUTPATIENT)
Dept: NEPHROLOGY | Facility: CLINIC | Age: 85
End: 2025-08-04
Payer: MEDICARE

## 2025-08-04 VITALS
DIASTOLIC BLOOD PRESSURE: 64 MMHG | BODY MASS INDEX: 32.05 KG/M2 | SYSTOLIC BLOOD PRESSURE: 121 MMHG | HEART RATE: 112 BPM | OXYGEN SATURATION: 98 % | RESPIRATION RATE: 19 BRPM | WEIGHT: 174.19 LBS | HEIGHT: 62 IN

## 2025-08-04 DIAGNOSIS — D50.9 IRON DEFICIENCY ANEMIA, UNSPECIFIED IRON DEFICIENCY ANEMIA TYPE: ICD-10-CM

## 2025-08-04 DIAGNOSIS — I12.9 BENIGN HYPERTENSION WITH CHRONIC KIDNEY DISEASE, STAGE IV: ICD-10-CM

## 2025-08-04 DIAGNOSIS — R73.03 PREDIABETES: ICD-10-CM

## 2025-08-04 DIAGNOSIS — N25.81 SECONDARY HYPERPARATHYROIDISM: ICD-10-CM

## 2025-08-04 DIAGNOSIS — I95.1 ORTHOSTATIC HYPOTENSION: ICD-10-CM

## 2025-08-04 DIAGNOSIS — N18.4 BENIGN HYPERTENSION WITH CHRONIC KIDNEY DISEASE, STAGE IV: ICD-10-CM

## 2025-08-04 DIAGNOSIS — N18.4 CHRONIC KIDNEY DISEASE, STAGE 4 (SEVERE): Primary | ICD-10-CM

## 2025-08-04 DIAGNOSIS — I10 HYPERTENSION, UNSPECIFIED TYPE: ICD-10-CM

## 2025-08-04 DIAGNOSIS — E87.20 ACIDOSIS: ICD-10-CM

## 2025-08-04 PROCEDURE — 99214 OFFICE O/P EST MOD 30 MIN: CPT | Mod: S$PBB,,, | Performed by: NURSE PRACTITIONER

## 2025-08-04 PROCEDURE — 99999 PR PBB SHADOW E&M-EST. PATIENT-LVL V: CPT | Mod: PBBFAC,,, | Performed by: NURSE PRACTITIONER

## 2025-08-04 PROCEDURE — G2211 COMPLEX E/M VISIT ADD ON: HCPCS | Mod: S$PBB,,, | Performed by: NURSE PRACTITIONER

## 2025-08-04 PROCEDURE — 99215 OFFICE O/P EST HI 40 MIN: CPT | Mod: PBBFAC | Performed by: NURSE PRACTITIONER

## 2025-08-04 RX ORDER — OLMESARTAN MEDOXOMIL 40 MG/1
40 TABLET ORAL DAILY
Qty: 90 TABLET | Refills: 3 | Status: SHIPPED | OUTPATIENT
Start: 2025-08-04 | End: 2026-08-04

## 2025-08-04 NOTE — PATIENT INSTRUCTIONS
This website provides a list of blood pressure machines that are known to be accurate. If you need a blood pressure cuff, please try to choose one off this site. You cannot buy them from the website, but you can go to [employer], or a [employer] to find them.    [email or web address].[email or web address]

## 2025-08-04 NOTE — Clinical Note
Hello, Pt reports multiple falls due to dizziness with position changes. She had positive orthostatics today. I'm stopping her thiazide. Thanks, Kamini

## 2025-08-04 NOTE — PROGRESS NOTES
Subjective:       Patient ID: Rachel Asif is a 85 y.o. AA female who presents for f/u  CKD    HPI     Last seen by me March. 2021.    Patient presents for f/u CKD.  Baseline creatinine of 1.3-1.4 since 2015. No labs available from Nov 2019 to August 2019, when patient was found to have sCr of 2.1 mg/dL, which improved to 1.8 --> 1.5. No recent labs.    Per PMR note in July 2020, had been taking meloxicam 15 mg daily daily since June 2019; now off completely.    Home BPs: 120s/80s    Cardiology initially started lasix in December 2019 when patient presented with new peripheral edema, JOHNSON, and chest tightness; lasix improved these symptoms. Lasix increased and added PRN dosing in July; patient says she had increased swelling around this time (also says it was the same time she started amlodipine). LVEF in December 2019 was 60%.    Significant issues include: HLD, atherosclerosis of aorta, anemia of chronic disease, prediabetes, vitamin D deficiency, hyperparathyroidism, GERD, OA, HTN recently diagnosed.      The patient denies NSAID. No recent hospitalizations. On PPI daily.     Significant family hx includes: HTN, edema, lupus (sister), DM, lung cancer, heart disease, MI; no known kidney disease in family    Last renal US: Sept 2020, reviewed. Small kidneys bilaterally.    Update 10/20/21:  Last seen by me in June 2021.  Presents for f/u of CKD.  Baseline sCr difficult to determine. Possibly 1.3-1.4 mg/dL, but most recent sCr level was 1.1 mg/dL.  Home BPs: 130-140s/80s p meds  Denies recent hospitalizations and denies NSAID use.    Update 3/4/22:  Presents for f/u of CKD.  Baseline sCr difficult to determine. Possibly 1.3-1.4 mg/dL.  Home BPs: 130-140/40-50s before and after meds  Denies recent hospitalizations and denies NSAID use.  On PPI PRN.    Update 9/7/22:  Presents for f/u of CKD.  Baseline sCr difficult to determine. Possibly 1.3-1.4 mg/dL. Most recent sCr was 1.5.  Home BPs:  "130-140/80-90s  Denies NSAID use.    Update 1/22/24:  Presents for f/u of CKD.   Had CHRISTOPHER in March 2023 c sCr of 2.3, which never returned to baseline. Labs were not repeated.  sCr was 2.5 in December 2023 and 2.1 earlier this month.    Home BPs: 130-140s/80s    Update 6/3/24:  Presents for f/u of CKD. Presents with granddaughter.  Baseline sCr now 2.1-2.2 mg/dL.  Home BPs: SBP 110s-130s  Many questions about diet.  Injured her ankle with a fall.  Had another fall in early March.  Has been getting dizzy.    Update 9/6/24:  Presents for f/u of CKD. Presents with daughter.  Baseline sCr now 2.1-2.2 mg/dL. Most recent sCr 2.5.   Home BPs: 140-150/80s  Currently with gout flare that has been ongoing about a month. This is her first flare.  Has been taking ibuprofen daily (?) for the last month.  In a walking boot and wheelchair today.    Update 12/6/24:  Presents for f/u of CKD. Presents with grandson.  sCr 1.3 mg/dL.  Home BPs: 130-140/60-80s  Having good days and bad days; sometimes gout impairs walking. Colchicine helping but still present.    Update 8/4/25:  Presents for f/u of CKD. Presents with grandaughter.  sCr 1.9 mg/dL.  Home BPs: does not take    Has had some falls. Has been getting dizzy when she stands up. Fell last week. Did not hit head.  Drinks sodas, frappes, sugary drinks - not much water.    No gout recently.    Family wants help with care at home. Reports that patient is more forgetful. Patient does not want assistance at home.    Review of Systems   Respiratory:  Negative for shortness of breath.    Cardiovascular:  Positive for leg swelling (to ankles). Negative for palpitations.   Gastrointestinal:  Negative for diarrhea, nausea and vomiting.   Genitourinary:  Negative for difficulty urinating, dysuria and hematuria.   Neurological:  Positive for dizziness (when going from standing to sitting sometimes).       Objective:       Blood pressure 121/64, pulse (!) 112, resp. rate 19, height 5' 2" (1.575 " m), weight 79 kg (174 lb 2.6 oz), SpO2 98%.        Physical Exam  Constitutional:       General: She is not in acute distress.     Appearance: Normal appearance. She is well-developed. She is obese. She is not ill-appearing or diaphoretic.   Cardiovascular:      Rate and Rhythm: Tachycardia present.   Pulmonary:      Effort: Pulmonary effort is normal. No respiratory distress.      Breath sounds: Rales (trace to bases) present. No wheezing.   Musculoskeletal:      Cervical back: Neck supple.      Right lower leg: No edema.      Left lower leg: No edema.   Skin:     General: Skin is warm and dry.   Neurological:      Mental Status: She is alert and oriented to person, place, and time.   Psychiatric:         Mood and Affect: Mood normal.         Behavior: Behavior normal.         Thought Content: Thought content normal.         Judgment: Judgment normal.           Lab Results   Component Value Date    CREATININE 1.9 (H) 07/15/2025    URICACID 7.1 (H) 08/27/2024     Urine Protein/Creatinine Ratio   Date Value Ref Range Status   08/01/2025 0.16 <=0.20 Final     Prot/Creat Ratio, Urine   Date Value Ref Range Status   11/29/2024 Unable to calculate 0.00 - 0.20 Final   09/06/2024 0.10 0.00 - 0.20 Final   05/20/2024 Unable to calculate 0.00 - 0.20 Final     Lab Results   Component Value Date     (H) 07/15/2025    K 3.5 07/15/2025    CO2 21 (L) 07/15/2025     (H) 07/15/2025     Lab Results   Component Value Date    .3 (H) 08/01/2025    CALCIUM 9.1 07/15/2025    PHOS 3.5 07/15/2025     Lab Results   Component Value Date    HGB 11.1 (L) 08/01/2025    WBC 7.35 08/01/2025    HCT 36.2 (L) 08/01/2025      Lab Results   Component Value Date    HGBA1C 6.5 (H) 01/30/2025     08/01/2025    BUN 20 07/15/2025     Lab Results   Component Value Date    LDLCALC 89.8 01/30/2025         Assessment:       1. Chronic kidney disease, stage 4 (severe)    2. Orthostatic hypotension    3. Iron deficiency anemia,  unspecified iron deficiency anemia type    4. Acidosis    5. Hypertension, unspecified type    6. Secondary hyperparathyroidism    7. Prediabetes    8. Benign hypertension with chronic kidney disease, stage IV                  Plan:   CKD stage 4 c superimposed CHRISTOPHER - CKD clinically secondary to  age-related nephron loss and NSAID use, also possibly atherosclerotic disease (atherosclerosis noted to aorta). sCr progressed after CHRISTOPHER in 2023.     Current CHRISTOPHER. D/c ibuprofen. Repeat in a week.    Educated patient to control BP, BG, remain well-hydrated, and avoid NSAIDs to prevent progression of CKD.     - No paraproteins or monoclonal peaks in Sept 2020.  UPCR Non-proteinuric. On olmesartan.   Acid-base Mild acidosis. On sodium bicarb 650 mg BID.    Renal osteodystrophy Ca, phos okay. PTH elevated. Vitamin D low. On ergocalciferol weekly and vitamin D 3 2,000 daily.   Anemia Hgb below goal for CKD.  TSAT low. Continue PO iron.   DM Pre-diabetic.   Lipid Management On statin.   ESRD planning Anticipatory guidance provided about timing of dialysis. Start discussions and planning when eGFR is about 20 mL/min; most patients start dialysis between 5-10 mL/min.    Kidney Failure Risk Equation ([employer]angri)    Kidney Failure Risk at 2 years: 1.4%    Kidney Failure Risk at 5 years: 4.3%    Lab Results   Component Value Date    MICALBCREAT 9.1 11/29/2024    CREATININE 1.9 (H) 07/15/2025            HTN -WNL on amlodipine 5mg, furosemide 20 mg BID, hydralazine 50 mg TID, metoprolol succinate 50 mg, olmesartan-HCTZ 40-25  - Avoiding overcontrol of BP, especially due to pt's advanced age  - Holding Hctz due to orthostatic hypotension. (149/72 --> 121/64)  - Recommended slow position changes to avoid dizziness    Hypokalemia - Low on KCl 20 meq daily; repeat today    Gout -  Per PCP/ortho. On PRN colchicine.     Hematuria - Negative on recent UA    Hypernatremia - recommended hydration    Tachycardia - expect this may be due to volume  depletion with low PO water intake. Recommend hydration    All questions patient had were answered.  Asked if further questions. None. F/u in clinic in 3 mos with labs and urine prior to next visit or sooner if needed.  ER for emergency concerns.    Summary of Plan:  Hydrate well  D/c olmesartan-hctz due to orthostatic hypotension. Prescribe plain olmesartan   Recommended monitoring BP at home.  RTC in 3 mos      Visit today included increased complexity associated with the care of the episodic problem orthostatic hypotension addressed and managing the longitudinal care of the patient due to the serious and/or complex managed problem(s) in 3 mos.

## 2025-08-11 DIAGNOSIS — G89.29 CHRONIC NECK PAIN: ICD-10-CM

## 2025-08-11 DIAGNOSIS — M54.2 CHRONIC NECK PAIN: ICD-10-CM

## 2025-08-11 DIAGNOSIS — M54.41 CHRONIC MIDLINE LOW BACK PAIN WITH RIGHT-SIDED SCIATICA: ICD-10-CM

## 2025-08-11 DIAGNOSIS — G89.29 CHRONIC MIDLINE LOW BACK PAIN WITH RIGHT-SIDED SCIATICA: ICD-10-CM

## 2025-08-11 RX ORDER — TRAMADOL HYDROCHLORIDE 50 MG/1
50 TABLET, FILM COATED ORAL 3 TIMES DAILY PRN
Qty: 90 TABLET | Refills: 0 | Status: SHIPPED | OUTPATIENT
Start: 2025-08-11

## 2025-08-19 ENCOUNTER — OFFICE VISIT (OUTPATIENT)
Dept: FAMILY MEDICINE | Facility: CLINIC | Age: 85
End: 2025-08-19
Payer: MEDICARE

## 2025-08-19 ENCOUNTER — HOSPITAL ENCOUNTER (OUTPATIENT)
Dept: RADIOLOGY | Facility: HOSPITAL | Age: 85
Discharge: HOME | End: 2025-08-19
Payer: MEDICARE

## 2025-08-19 VITALS
DIASTOLIC BLOOD PRESSURE: 66 MMHG | HEIGHT: 62 IN | OXYGEN SATURATION: 97 % | HEART RATE: 105 BPM | WEIGHT: 171.75 LBS | TEMPERATURE: 99 F | BODY MASS INDEX: 31.6 KG/M2 | SYSTOLIC BLOOD PRESSURE: 138 MMHG

## 2025-08-19 DIAGNOSIS — E78.5 HYPERLIPIDEMIA WITH TARGET LDL LESS THAN 100: ICD-10-CM

## 2025-08-19 DIAGNOSIS — Z74.09 IMPAIRED FUNCTIONAL MOBILITY, BALANCE, GAIT, AND ENDURANCE: ICD-10-CM

## 2025-08-19 DIAGNOSIS — I12.9 BENIGN HYPERTENSION WITH CHRONIC KIDNEY DISEASE, STAGE IV: Primary | ICD-10-CM

## 2025-08-19 DIAGNOSIS — M20.002 FINGER DEFORMITY, LEFT: ICD-10-CM

## 2025-08-19 DIAGNOSIS — N18.4 BENIGN HYPERTENSION WITH CHRONIC KIDNEY DISEASE, STAGE IV: Primary | ICD-10-CM

## 2025-08-19 DIAGNOSIS — R42 DIZZINESS: ICD-10-CM

## 2025-08-19 DIAGNOSIS — E87.20 ACIDOSIS: ICD-10-CM

## 2025-08-19 PROCEDURE — G2211 COMPLEX E/M VISIT ADD ON: HCPCS | Mod: ,,,

## 2025-08-19 PROCEDURE — 73130 X-RAY EXAM OF HAND: CPT | Mod: TC,PO,LT

## 2025-08-19 PROCEDURE — 99999 PR PBB SHADOW E&M-EST. PATIENT-LVL IV: CPT | Mod: PBBFAC,,,

## 2025-08-19 PROCEDURE — 99214 OFFICE O/P EST MOD 30 MIN: CPT | Mod: PBBFAC,25,PO

## 2025-08-19 PROCEDURE — 99214 OFFICE O/P EST MOD 30 MIN: CPT | Mod: S$PBB,,,

## 2025-08-19 PROCEDURE — 73130 X-RAY EXAM OF HAND: CPT | Mod: 26,LT,, | Performed by: RADIOLOGY

## 2025-08-19 RX ORDER — SODIUM BICARBONATE 650 MG/1
650 TABLET ORAL 2 TIMES DAILY
Qty: 180 TABLET | Refills: 1 | Status: SHIPPED | OUTPATIENT
Start: 2025-08-19 | End: 2026-08-19

## 2025-08-19 RX ORDER — MECLIZINE HCL 12.5 MG 12.5 MG/1
12.5 TABLET ORAL 3 TIMES DAILY PRN
Qty: 30 TABLET | Refills: 1 | Status: SHIPPED | OUTPATIENT
Start: 2025-08-19

## 2025-08-19 RX ORDER — HYDRALAZINE HYDROCHLORIDE 50 MG/1
50 TABLET, FILM COATED ORAL 3 TIMES DAILY
Qty: 270 TABLET | Refills: 1 | Status: SHIPPED | OUTPATIENT
Start: 2025-08-19

## 2025-08-19 RX ORDER — METOPROLOL SUCCINATE 50 MG/1
50 TABLET, EXTENDED RELEASE ORAL DAILY
Qty: 90 TABLET | Refills: 1 | Status: SHIPPED | OUTPATIENT
Start: 2025-08-19

## 2025-08-19 RX ORDER — ATORVASTATIN CALCIUM 20 MG/1
20 TABLET, FILM COATED ORAL DAILY
Qty: 90 TABLET | Refills: 1 | Status: SHIPPED | OUTPATIENT
Start: 2025-08-19

## 2025-08-19 RX ORDER — FUROSEMIDE 20 MG/1
20 TABLET ORAL 2 TIMES DAILY
Qty: 180 TABLET | Refills: 1 | Status: SHIPPED | OUTPATIENT
Start: 2025-08-19

## 2025-08-19 RX ORDER — POTASSIUM CHLORIDE 20 MEQ/1
20 TABLET, EXTENDED RELEASE ORAL DAILY
Qty: 90 TABLET | Refills: 1 | Status: SHIPPED | OUTPATIENT
Start: 2025-08-19